# Patient Record
Sex: MALE | Race: WHITE | NOT HISPANIC OR LATINO | Employment: OTHER | URBAN - METROPOLITAN AREA
[De-identification: names, ages, dates, MRNs, and addresses within clinical notes are randomized per-mention and may not be internally consistent; named-entity substitution may affect disease eponyms.]

---

## 2017-02-13 ENCOUNTER — OFFICE VISIT (OUTPATIENT)
Dept: ORTHOPEDICS | Facility: CLINIC | Age: 82
End: 2017-02-13
Payer: COMMERCIAL

## 2017-02-13 VITALS — WEIGHT: 156 LBS | BODY MASS INDEX: 25.07 KG/M2 | HEIGHT: 66 IN | RESPIRATION RATE: 18 BRPM

## 2017-02-13 DIAGNOSIS — M17.0 PRIMARY OSTEOARTHRITIS OF BOTH KNEES: Primary | ICD-10-CM

## 2017-02-13 PROCEDURE — 20610 DRAIN/INJ JOINT/BURSA W/O US: CPT | Mod: 50 | Performed by: ORTHOPAEDIC SURGERY

## 2017-02-13 RX ORDER — METHYLPREDNISOLONE ACETATE 80 MG/ML
320 INJECTION, SUSPENSION INTRA-ARTICULAR; INTRALESIONAL; INTRAMUSCULAR; SOFT TISSUE ONCE
Qty: 4 ML | Refills: 0 | OUTPATIENT
Start: 2017-02-13 | End: 2017-02-13

## 2017-02-13 NOTE — PROGRESS NOTES
The patient's left and right knee was prepped with betadine solution after verification of allergies. Area approximately 10 cm x 10 cm prepped in a sterile fashion. After injection, betadine removed with soap and water and band-aids applied.    2ml depo-medrol with 1% lidocaine plain injected into each knee (for a total of 4 mL) by Dr. Sheng Montoya  LOT# [id number]  Exp. 06/2019

## 2017-02-13 NOTE — MR AVS SNAPSHOT
"              After Visit Summary   2/13/2017    Lang Christina    MRN: [id number]           Patient Information     Date Of Birth          6/3/1928        Visit Information        Provider Department      2/13/2017 1:15 PM Sheng Montoya MD [facility]        Today's Diagnoses     Primary osteoarthritis of both knees    -  1      Care Instructions    You have been given a \"cortisone\" injection today.  There are two medications in the injection you were given.      One of these medications is lidocaine, which is similar to the numbing agent you receive at the dentist.  You should start to feel the effect of this medication starting a few  minutes after you are injected and it will last for about two hours.  After the lidocaine wears off, the area will feel sore and a small percentage of people actually have a slight increase of pain for the first 24-48 hours after the lidocaine wears off.     The other medication is the \"cortisone\" type medication.  This medication takes effect slowly and should make you more comfortable day by day over the next two weeks.  After the first two weeks, the medication levels off and keeps working over about three months or longer.      You may have the area injected, in general, every three months.  This is the estimated amount of time that the body takes to metabolize the \"cortisone.\"  Getting injections too frequently may result in a softening of the cartilage and cause the joint to actually wear out more quickly.    The most important thing for you to note is how the area feels over the next two hours.  If the correct space was injected, the area should feel much more comfortable over the next two hours.  If the area never gets comfortable over this time period, it is possible that the medication was trapped in some surrounding soft tissue and is not free in the joint.  If this happens, the medication will not work long-term.  However, if this occurs we can " "attempt to re-inject the area sooner that the three months that is normally recommended.  If you report to us that the injection was NOT effective, we will ask you if it worked initially.  Please note how the area feels during the next two hours, as we may be inquiring about this.    To schedule another appointment, call [phone].          Follow-ups after your visit        Your next 10 appointments already scheduled     Mar 27, 2017  1:00 PM CDT   New Visit with Julieth Aguilar MD   [facility] ([address]  [place] [address] [phone]              Who to contact     If you have questions or need follow up information about today's clinic visit or your schedule please contact [facility] directly at [phone].  Normal or non-critical lab and imaging results will be communicated to you by MyChart, letter or phone within 4 business days after the clinic has received the results. If you do not hear from us within 7 days, please contact the clinic through MyChart or phone. If you have a critical or abnormal lab result, we will notify you by phone as soon as possible.  Submit refill requests through Spontacts or call your pharmacy and they will forward the refill request to us. Please allow 3 business days for your refill to be completed.          Additional Information About Your Visit        MyChart Information     Spontacts lets you send messages to your doctor, view your test results, renew your prescriptions, schedule appointments and more. To sign up, go to [email or web address] . Click on \"Log in\" on the left side of the screen, which will take you to the Welcome page. Then click on \"Sign up Now\" on the right side of the page.     You will be asked to enter the access code listed below, as well as some personal information. Please follow the directions to create your username and password.     Your access code is: " [email or web address]  Expires: 2017  1:19 PM     Your access code will  in 90 days. If you need help or a new code, please call your [place] clinic or [phone].        Care EveryWhere ID     This is your Care EveryWhere ID. This could be used by other organizations to access your [place] medical records  [id number]        Your Vitals Were     Respirations Height BMI (Body Mass Index)             18 1.664 m (5' 5.5\") 25.56 kg/m2          Blood Pressure from Last 3 Encounters:   16 126/76   12/30/15 142/75   11/03/15 133/79    Weight from Last 3 Encounters:   17 70.8 kg (156 lb)   16 70.3 kg (155 lb)   16 69.9 kg (154 lb)              Today, you had the following     No orders found for display       Primary Care Provider Office Phone # Fax #    Micheal Blake [phone]       [address] [address]        Thank you!     Thank you for choosing [facility]  for your care. Our goal is always to provide you with excellent care. Hearing back from our patients is one way we can continue to improve our services. Please take a few minutes to complete the written survey that you may receive in the mail after your visit with us. Thank you!             Your Updated Medication List - Protect others around you: Learn how to safely use, store and throw away your medicines at www.disposemymeds.org.          This list is accurate as of: 17  1:19 PM.  Always use your most recent med list.                   Brand Name Dispense Instructions for use    albuterol (2.5 MG/3ML) 0.083% neb solution     270 mL    INHALE CONTENTS OF ONE VIAL VIA NEBULIZER EVERY SIX HOURS AS NEEDED FOR SHORTNESS OF BREATH/DYSPNEA OR WHEEZING       cyanocobalamin 1000 MCG Tabs      Take  by mouth daily.       diclofenac 1 % Gel topical gel    VOLTAREN    1 Tube    Place 2 g onto the skin 4 times daily       order for DME     1 Device    Equipment being " ordered: nebulizer machine       simvastatin 80 MG tablet    ZOCOR    45 tablet    TAKE ONE-HALF TABLET BY MOUTH EVERY NIGHT AT BEDTIME       TYLENOL 325 MG tablet   Generic drug:  acetaminophen      Take 1-2 tablets by mouth at onset of headache.       vitamin D 1000 UNITS capsule      Take 1 capsule by mouth daily.

## 2017-02-13 NOTE — PATIENT INSTRUCTIONS
"You have been given a \"cortisone\" injection today.  There are two medications in the injection you were given.      One of these medications is lidocaine, which is similar to the numbing agent you receive at the dentist.  You should start to feel the effect of this medication starting a few  minutes after you are injected and it will last for about two hours.  After the lidocaine wears off, the area will feel sore and a small percentage of people actually have a slight increase of pain for the first 24-48 hours after the lidocaine wears off.     The other medication is the \"cortisone\" type medication.  This medication takes effect slowly and should make you more comfortable day by day over the next two weeks.  After the first two weeks, the medication levels off and keeps working over about three months or longer.      You may have the area injected, in general, every three months.  This is the estimated amount of time that the body takes to metabolize the \"cortisone.\"  Getting injections too frequently may result in a softening of the cartilage and cause the joint to actually wear out more quickly.    The most important thing for you to note is how the area feels over the next two hours.  If the correct space was injected, the area should feel much more comfortable over the next two hours.  If the area never gets comfortable over this time period, it is possible that the medication was trapped in some surrounding soft tissue and is not free in the joint.  If this happens, the medication will not work long-term.  However, if this occurs we can attempt to re-inject the area sooner that the three months that is normally recommended.  If you report to us that the injection was NOT effective, we will ask you if it worked initially.  Please note how the area feels during the next two hours, as we may be inquiring about this.    To schedule another appointment, call [phone].    "

## 2017-02-13 NOTE — PROGRESS NOTES
Follow up bilateral primary knee osteoarthritis.  Last injection 12/1/16 with a double steroid dose.  Range of motion 4-120 on left, 2-100 on right..    He  desires injection today of bilateral knee(s).  Risks, benefits, potential complications and alternatives were discussed.   With the patient's consent, sterile prep was performed of bilateral knee(s).  Each knee was injected with Depo Medrol 160 mg and lidocaine at anteromedial site.    Return to clinic as needed.

## 2017-02-13 NOTE — NURSING NOTE
"Chief Complaint   Patient presents with     RECHECK     Bilateral knee OA last injections on 12/1/16.       Initial Resp 18  Ht 1.664 m (5' 5.5\")  Wt 70.8 kg (156 lb)  BMI 25.56 kg/m2 Estimated body mass index is 25.56 kg/(m^2) as calculated from the following:    Height as of this encounter: 1.664 m (5' 5.5\").    Weight as of this encounter: 70.8 kg (156 lb).  Medication Reconciliation: complete   Yashira Hadley MA      "

## 2017-03-30 ENCOUNTER — OFFICE VISIT (OUTPATIENT)
Dept: OPHTHALMOLOGY | Facility: CLINIC | Age: 82
End: 2017-03-30
Payer: COMMERCIAL

## 2017-03-30 DIAGNOSIS — H52.4 PRESBYOPIA: ICD-10-CM

## 2017-03-30 DIAGNOSIS — H35.89 RPE MOTTLING OF MACULA: ICD-10-CM

## 2017-03-30 DIAGNOSIS — H43.813 PVD (POSTERIOR VITREOUS DETACHMENT), BILATERAL: ICD-10-CM

## 2017-03-30 DIAGNOSIS — H52.11 MYOPIA, RIGHT: ICD-10-CM

## 2017-03-30 DIAGNOSIS — H32 PRESUMED OCULAR HISTOPLASMOSIS SYNDROME OF BOTH EYES: ICD-10-CM

## 2017-03-30 DIAGNOSIS — Z96.1 PSEUDOPHAKIA: Primary | ICD-10-CM

## 2017-03-30 DIAGNOSIS — H52.203 ASTIGMATISM, BILATERAL: ICD-10-CM

## 2017-03-30 DIAGNOSIS — B39.9 PRESUMED OCULAR HISTOPLASMOSIS SYNDROME OF BOTH EYES: ICD-10-CM

## 2017-03-30 PROCEDURE — 92014 COMPRE OPH EXAM EST PT 1/>: CPT | Performed by: STUDENT IN AN ORGANIZED HEALTH CARE EDUCATION/TRAINING PROGRAM

## 2017-03-30 PROCEDURE — 92015 DETERMINE REFRACTIVE STATE: CPT | Performed by: STUDENT IN AN ORGANIZED HEALTH CARE EDUCATION/TRAINING PROGRAM

## 2017-03-30 ASSESSMENT — REFRACTION_WEARINGRX
OD_AXIS: 030
OS_SPHERE: -0.25
OD_ADD: +2.75
SPECS_TYPE: BIFOCAL
OD_SPHERE: -1.00
OS_ADD: +2.75
OS_AXIS: 173
OS_CYLINDER: +1.50
OD_CYLINDER: +1.50
OS_HPRISM: 3 BO

## 2017-03-30 ASSESSMENT — REFRACTION_MANIFEST
OD_AXIS: 023
OS_ADD: +3.00
OD_CYLINDER: +1.50
OD_SPHERE: -0.50
OS_CYLINDER: +1.50
OS_SPHERE: PLANO
OD_ADD: +3.00
OS_AXIS: 178

## 2017-03-30 ASSESSMENT — CONF VISUAL FIELD
OS_NORMAL: 1
OD_NORMAL: 1

## 2017-03-30 ASSESSMENT — CUP TO DISC RATIO
OD_RATIO: 0.5
OS_RATIO: 0.6

## 2017-03-30 ASSESSMENT — SLIT LAMP EXAM - LIDS
COMMENTS: NORMAL
COMMENTS: NORMAL

## 2017-03-30 ASSESSMENT — VISUAL ACUITY
OD_CC: 20/25
OS_CC: 1
OS_CC: 20/20
OD_CC+: +2
OD_CC: 1-
METHOD: SNELLEN - LINEAR
CORRECTION_TYPE: GLASSES

## 2017-03-30 ASSESSMENT — EXTERNAL EXAM - RIGHT EYE: OD_EXAM: NORMAL

## 2017-03-30 ASSESSMENT — TONOMETRY
OS_IOP_MMHG: 19
IOP_METHOD: APPLANATION
OD_IOP_MMHG: 15

## 2017-03-30 ASSESSMENT — EXTERNAL EXAM - LEFT EYE: OS_EXAM: NORMAL

## 2017-03-30 NOTE — PROGRESS NOTES
" Current Eye Medications:  Artificial tears both eyes as needed.       Subjective:  Comprehensive Eye Exam.   Vision is good with correction.  No vision changes or concerns.     Objective:  See Ophthalmology Exam.    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 Avoid \"get the red out\" drops).   Glasses prescription given - optional     Julieth Aguilar MD  [phone]        "

## 2017-03-30 NOTE — PATIENT INSTRUCTIONS
"Use artificial tears up to 4 times per day (Refresh Plus, Systane Balance, or generic artificial tears are ok. Avoid \"get the red out\" drops).   Glasses prescription given - optional     Julieth Aguilar MD  [phone]    "

## 2017-03-30 NOTE — MR AVS SNAPSHOT
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 Avoid \"get the red out\" drops).   Glasses prescription given - optional     Julieth CHAVEZ. Lauren LLANES  [phone]          Follow-ups after your visit        Follow-up notes from your care team     Return in about 1 year (around 3/30/2018) for Complete Exam.      Who to contact     If you have questions or need follow up information about today's clinic visit or your schedule please contact [facility] directly at [phone].  Normal or non-critical lab and imaging results will be communicated to you by CloudTalkhart, letter or phone within 4 business days after the clinic has received the results. If you do not hear from us within 7 days, please contact the clinic through Cearnat or phone. If you have a critical or abnormal lab result, we will notify you by phone as soon as possible.  Submit refill requests through myBestHelper or call your pharmacy and they will forward the refill request to us. Please allow 3 business days for your refill to be completed.          Additional Information About Your Visit        MyChart Information     myBestHelper lets you send messages to your doctor, view your test results, renew your prescriptions, schedule appointments and more. To sign up, go to [email or web address] . Click on \"Log in\" on the left side of " "the screen, which will take you to the Welcome page. Then click on \"Sign up Now\" on the right side of the page.     You will be asked to enter the access code listed below, as well as some personal information. Please follow the directions to create your username and password.     Your access code is: [email or web address]  Expires: 2017  2:19 PM     Your access code will  in 90 days. If you need help or a new code, please call your [place] clinic or [phone].        Care EveryWhere ID     This is your Care EveryWhere ID. This could be used by other organizations to access your [place] medical records  [id number]         Blood Pressure from Last 3 Encounters:   16 126/76   12/30/15 142/75   11/03/15 133/79    Weight from Last 3 Encounters:   17 70.8 kg (156 lb)   16 70.3 kg (155 lb)   16 69.9 kg (154 lb)              Today, you had the following     No orders found for display       Primary Care Provider Office Phone # Fax #    Micheal Blake [phone]       [address] [address]        Thank you!     Thank you for choosing [facility]  for your care. Our goal is always to provide you with excellent care. Hearing back from our patients is one way we can continue to improve our services. Please take a few minutes to complete the written survey that you may receive in the mail after your visit with us. Thank you!             Your Updated Medication List - Protect others around you: Learn how to safely use, store and throw away your medicines at www.disposemymeds.org.          This list is accurate as of: 3/30/17  2:15 PM.  Always use your most recent med list.                   Brand Name Dispense Instructions for use    albuterol (2.5 MG/3ML) 0.083% neb solution     270 mL    INHALE CONTENTS OF ONE VIAL VIA NEBULIZER EVERY SIX HOURS AS NEEDED FOR SHORTNESS OF BREATH/DYSPNEA OR WHEEZING       cyanocobalamin 1000 " MCG Tabs      Take  by mouth daily.       diclofenac 1 % Gel topical gel    VOLTAREN    1 Tube    Place 2 g onto the skin 4 times daily       order for DME     1 Device    Equipment being ordered: nebulizer machine       simvastatin 80 MG tablet    ZOCOR    45 tablet    TAKE ONE-HALF TABLET BY MOUTH EVERY NIGHT AT BEDTIME       TYLENOL 325 MG tablet   Generic drug:  acetaminophen      Take 1-2 tablets by mouth at onset of headache.       vitamin D 1000 UNITS capsule      Take 1 capsule by mouth daily.

## 2017-04-24 DIAGNOSIS — J43.2 CENTRILOBULAR EMPHYSEMA (H): ICD-10-CM

## 2017-04-24 RX ORDER — ALBUTEROL SULFATE 0.83 MG/ML
SOLUTION RESPIRATORY (INHALATION)
Qty: 270 ML | Refills: 1 | Status: SHIPPED | OUTPATIENT
Start: 2017-04-24 | End: 2017-06-19

## 2017-04-24 NOTE — TELEPHONE ENCOUNTER
Prescription approved per [facility].  Giuliano Perales RN

## 2017-04-24 NOTE — TELEPHONE ENCOUNTER
Albuterol       Last Written Prescription Date: 11/21/16  Last Fill Quantity: 270ml, # refills: 3    [facility] Office Visit with [facility] or [facility] prescribing provider:  11/4/16   Future Office Visit:       Date of Last Asthma Action Plan Letter:   There are no preventive care reminders to display for this patient.   Asthma Control Test: No flowsheet data found.    Date of Last Spirometry Test:   No results found for this or any previous visit.   SHANKAR/MA

## 2017-05-18 ENCOUNTER — OFFICE VISIT (OUTPATIENT)
Dept: ORTHOPEDICS | Facility: CLINIC | Age: 82
End: 2017-05-18
Payer: COMMERCIAL

## 2017-05-18 VITALS — HEIGHT: 66 IN | RESPIRATION RATE: 14 BRPM | BODY MASS INDEX: 25.55 KG/M2 | WEIGHT: 159 LBS

## 2017-05-18 DIAGNOSIS — M17.0 PRIMARY OSTEOARTHRITIS OF BOTH KNEES: Primary | ICD-10-CM

## 2017-05-18 PROCEDURE — 20610 DRAIN/INJ JOINT/BURSA W/O US: CPT | Mod: 50 | Performed by: ORTHOPAEDIC SURGERY

## 2017-05-18 RX ORDER — METHYLPREDNISOLONE ACETATE 80 MG/ML
320 INJECTION, SUSPENSION INTRA-ARTICULAR; INTRALESIONAL; INTRAMUSCULAR; SOFT TISSUE ONCE
Qty: 4 ML | Refills: 0 | OUTPATIENT
Start: 2017-05-18 | End: 2017-05-18

## 2017-05-18 NOTE — MR AVS SNAPSHOT
"              After Visit Summary   5/18/2017    Lang Christina    MRN: [id number]           Patient Information     Date Of Birth          6/3/1928        Visit Information        Provider Department      5/18/2017 2:15 PM Sheng Montoya MD [facility]        Today's Diagnoses     Primary osteoarthritis of both knees    -  1      Care Instructions    You have had a steroid injection today.  For the first 2 hours there will likely be some numbing in the joint from the lidocaine.  This is a good sign, indicating that the injection is in the right place.  In 2 hours the lidocaine will wear off, and the joint will hurt like you had a shot.  Each day the cortisone makes it feel better.  It reaches peak effect in 2 weeks.  We expect it to last for 3 months.  You may resume regular activity when you feel ready.  If you are diabetic, your glucoses will be quite high for several days.          Follow-ups after your visit        Who to contact     If you have questions or need follow up information about today's clinic visit or your schedule please contact [facility] directly at [phone].  Normal or non-critical lab and imaging results will be communicated to you by BuildForgehart, letter or phone within 4 business days after the clinic has received the results. If you do not hear from us within 7 days, please contact the clinic through Cloud 66t or phone. If you have a critical or abnormal lab result, we will notify you by phone as soon as possible.  Submit refill requests through [employer] or call your pharmacy and they will forward the refill request to us. Please allow 3 business days for your refill to be completed.          Additional Information About Your Visit        BuildForgehart Information     [employer] lets you send messages to your doctor, view your test results, renew your prescriptions, schedule appointments and more. To sign up, go to [email or web address] . Click on \"Log in\" on the " "left side of the screen, which will take you to the Welcome page. Then click on \"Sign up Now\" on the right side of the page.     You will be asked to enter the access code listed below, as well as some personal information. Please follow the directions to create your username and password.     Your access code is: [email or web address]  Expires: 2017  5:17 PM     Your access code will  in 90 days. If you need help or a new code, please call your [facility] or [phone].        Care EveryWhere ID     This is your Care EveryWhere ID. This could be used by other organizations to access your [place] medical records  [id number]        Your Vitals Were     Respirations Height BMI (Body Mass Index)             14 1.664 m (5' 5.5\") 26.06 kg/m2          Blood Pressure from Last 3 Encounters:   16 126/76   12/30/15 142/75   11/03/15 133/79    Weight from Last 3 Encounters:   17 72.1 kg (159 lb)   17 70.8 kg (156 lb)   16 70.3 kg (155 lb)              We Performed the Following     DRAIN/INJECT LARGE JOINT/BURSA     METHYLPREDNISOLONE 80 MG INJ          Today's Medication Changes          These changes are accurate as of: 17  5:17 PM.  If you have any questions, ask your nurse or doctor.               Start taking these medicines.        Dose/Directions    methylPREDNISolone acetate 80 MG/ML injection   Commonly known as:  DEPO-MEDROL   Used for:  Primary osteoarthritis of both knees   Started by:  Sheng Montoya MD        Dose:  320 mg   4 mLs (320 mg) by INTRA-ARTICULAR route once for 1 dose   Quantity:  4 mL   Refills:  0            Where to get your medicines      Some of these will need a paper prescription and others can be bought over the counter.  Ask your nurse if you have questions.     You don't need a prescription for these medications     methylPREDNISolone acetate 80 MG/ML injection                Primary Care Provider Office Phone # Fax #    Micheal Blkae MD " [phone]       [facility] [address]  [place] [address]        Thank you!     Thank you for choosing [facility]  for your care. Our goal is always to provide you with excellent care. Hearing back from our patients is one way we can continue to improve our services. Please take a few minutes to complete the written survey that you may receive in the mail after your visit with us. Thank you!             Your Updated Medication List - Protect others around you: Learn how to safely use, store and throw away your medicines at www.disposemymeds.org.          This list is accurate as of: 5/18/17  5:17 PM.  Always use your most recent med list.                   Brand Name Dispense Instructions for use    albuterol (2.5 MG/3ML) 0.083% neb solution     270 mL    INHALE CONTENTS OF ONE VIAL VIA NEBULIZER EVERY SIX HOURS AS NEEDED FOR SHORTNESS OF BREATH/DYSPNEA OR WHEEZING       cyanocobalamin 1000 MCG Tabs      Take  by mouth daily.       diclofenac 1 % Gel topical gel    VOLTAREN    1 Tube    Place 2 g onto the skin 4 times daily       methylPREDNISolone acetate 80 MG/ML injection    DEPO-MEDROL    4 mL    4 mLs (320 mg) by INTRA-ARTICULAR route once for 1 dose       order for DME     1 Device    Equipment being ordered: nebulizer machine       simvastatin 80 MG tablet    ZOCOR    45 tablet    TAKE ONE-HALF TABLET BY MOUTH EVERY NIGHT AT BEDTIME       TYLENOL 325 MG tablet   Generic drug:  acetaminophen      Take 1-2 tablets by mouth at onset of headache.       vitamin D 1000 UNITS capsule      Take 1 capsule by mouth daily.

## 2017-05-18 NOTE — PROGRESS NOTES
The patient's bilateral knee was prepped with betadine solution after verification of allergies. Area approximately 10 cm x 10 cm prepped in a sterile fashion. After injection, betadine removed with soap and water and band-aids applied.    4ml depo medrol with 1% lidocaine plain injected into patient's bilateral knee by Dr. Sheng Montoya  LOT# [id number]  Exp. 8/19

## 2017-05-18 NOTE — LETTER
5/18/2017       RE: Lang Christina  [address] [place] [address]           Dear Colleague,    Thank you for referring your patient, Lang Christina, to the [facility]. Please see a copy of my visit note below.    Follow up bilateral primary knee osteoarthritis.  Last injection 2/13/17 with a double steroid dose.  Range of motion 4-120 on left, 2-100 on right..    He  desires injection today of bilateral knee(s).  Risks, benefits, potential complications and alternatives were discussed.   With the patient's consent, sterile prep was performed of bilateral knee(s).  Each knee was injected with Depo Medrol 160 mg and lidocaine at anteromedial site.    Return to clinic as needed.       The patient's bilateral knee was prepped with betadine solution after verification of allergies. Area approximately 10 cm x 10 cm prepped in a sterile fashion. After injection, betadine removed with soap and water and band-aids applied.    4ml depo medrol with 1% lidocaine plain injected into patient's bilateral knee by Dr. Sheng Montoya  LOT# [id number]  Exp. 8/19    Again, thank you for allowing me to participate in the care of your patient.        Sincerely,              Sheng Montoya MD

## 2017-05-18 NOTE — NURSING NOTE
"Chief Complaint   Patient presents with     RECHECK     Follow-up for bilateral knee osteoarthritis, last injection 2/13/17.       Initial Resp 14  Ht 1.664 m (5' 5.5\")  Wt 72.1 kg (159 lb)  BMI 26.06 kg/m2 Estimated body mass index is 26.06 kg/(m^2) as calculated from the following:    Height as of this encounter: 1.664 m (5' 5.5\").    Weight as of this encounter: 72.1 kg (159 lb).  Medication Reconciliation: complete     BITA VizcarraC  Supervising physician: Sheng Montoya MD  Dept. of Orthopedics  [facility]          "

## 2017-05-18 NOTE — PROGRESS NOTES
Follow up bilateral primary knee osteoarthritis.  Last injection 2/13/17 with a double steroid dose.  Range of motion 4-120 on left, 2-100 on right..    He  desires injection today of bilateral knee(s).  Risks, benefits, potential complications and alternatives were discussed.   With the patient's consent, sterile prep was performed of bilateral knee(s).  Each knee was injected with Depo Medrol 160 mg and lidocaine at anteromedial site.    Return to clinic as needed.

## 2017-06-19 DIAGNOSIS — J43.2 CENTRILOBULAR EMPHYSEMA (H): ICD-10-CM

## 2017-06-20 RX ORDER — ALBUTEROL SULFATE 0.83 MG/ML
SOLUTION RESPIRATORY (INHALATION)
Qty: 270 ML | Refills: 1 | Status: SHIPPED | OUTPATIENT
Start: 2017-06-20 | End: 2017-08-23

## 2017-06-20 NOTE — TELEPHONE ENCOUNTER
Prescription approved per [facility] Refill Protocol.    Signed Prescriptions:                        Disp   Refills    albuterol (2.5 MG/3ML) 0.083% neb solution 270 mL 1        Sig: NEBULIZE CONTENTS OF ONE VIAL (3MLS) VIA NEBULIZATION           ROUTE EVERY 6 HOURS AS NEEDED FOR SHORTNESS OF           BREATH / DYSPNEA OR WHEEZING  Authorizing Provider: ADAM LOZANO  Ordering User: KEAGAN ODELL RN, BSN

## 2017-06-20 NOTE — TELEPHONE ENCOUNTER
albuterol (2.5 MG/3ML) 0.083% neb solution       Last Written Prescription Date: 4/24/2017  Last Fill Quantity: 270 mL, # refills:  1   Last Office Visit with G, P or [facility] prescribing provider:  11/4/2016   Future Office Visit:       Date of Last Asthma Action Plan Letter:   There are no preventive care reminders to display for this patient.   Asthma Control Test: No flowsheet data found.    Date of Last Spirometry Test:   No results found for this or any previous visit.

## 2017-06-26 DIAGNOSIS — J43.2 CENTRILOBULAR EMPHYSEMA (H): ICD-10-CM

## 2017-06-27 RX ORDER — ALBUTEROL SULFATE 0.83 MG/ML
SOLUTION RESPIRATORY (INHALATION)
Qty: 270 ML | Refills: 1
Start: 2017-06-27

## 2017-06-27 NOTE — TELEPHONE ENCOUNTER
Prescription(s) that was shannan'd up has now been removed from encounter.    Closing encounter.    Giuliano Perales RN

## 2017-06-27 NOTE — TELEPHONE ENCOUNTER
Duplicate order confirmed by pharmacy.  Sharon Brumfield CMA ([facility])

## 2017-07-12 NOTE — PROGRESS NOTES
SUBJECTIVE:                                                    Lang Christina is a 89 year old male who presents to clinic today for the following health issues:    Concern - Breathing problem/Emphysema  Onset: x2 years     Description:   Cough in mornings, happens all the time    Intensity: moderate    Progression of Symptoms:  worsening    Accompanying Signs & Symptoms:  Coughing, clearing throat, hard time breathing oxygen low     Previous history of similar problem:   none    Precipitating factors:   Worsened by: exertion    Alleviating factors:  Improved by: nebulizer, resting    Therapies Tried and outcome: nebulizer, resting  -- Patient states he has been having significant productive coughing when he wakes up. After he coughs and blows his nose it gets better. Also he clears his throat frequently. He reports his knees are so bad that he cannot exert himself too much. He is curious if he should have a portable nebulizer. He uses a nebulizer 3 times a day, but is not sure it is helping very much. He notes he smoked for about 45 pack years.      Problem list and histories reviewed & adjusted, as indicated.  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       Social History   Substance Use Topics     Smoking status: Former Smoker     Packs/day:  1.00     Years: 47.00     Types: Cigarettes, Pipe     Quit date: 1/1/1994     Smokeless tobacco: Never Used     Alcohol use No     Family History   Problem Relation Age of Onset     CEREBROVASCULAR DISEASE Brother          Current Outpatient Prescriptions   Medication Sig Dispense Refill     albuterol (2.5 MG/3ML) 0.083% neb solution NEBULIZE CONTENTS OF ONE VIAL (3MLS) VIA NEBULIZATION ROUTE EVERY 6 HOURS AS NEEDED FOR SHORTNESS OF BREATH / DYSPNEA OR WHEEZING 270 mL 1     simvastatin (ZOCOR) 80 MG tablet TAKE ONE-HALF TABLET BY MOUTH EVERY NIGHT AT BEDTIME 45 tablet 3     diclofenac (VOLTAREN) 1 % GEL Place 2 g onto the skin 4 times daily 1 Tube 1     ORDER FOR DME, SET TO LOCAL PRINT, Equipment being ordered: nebulizer machine 1 Device 0     Cyanocobalamin 1000 MCG TABS Take  by mouth daily.       acetaminophen (TYLENOL) 325 MG tablet Take 1-2 tablets by mouth at onset of headache.       Cholecalciferol (VITAMIN D) 1000 UNIT capsule Take 1 capsule by mouth daily.       Labs reviewed in EPIC    Reviewed and updated as needed this visit by clinical staff  Tobacco  Allergies  Meds  Med Hx  Surg Hx  Fam Hx  Soc Hx      Reviewed and updated as needed this visit by Provider         ROS:  Constitutional, HEENT, cardiovascular, pulmonary, GI, , musculoskeletal, neuro, skin, endocrine and psych systems are negative, except as otherwise noted.    This document serves as a record of the services and decisions personally performed and made by Micheal Blake MD. It was created on their behalf by Lenny Julien, a trained medical scribe. The creation of this document is based the provider's statements to the medical scribe.  Lenny Julien July 17, 2017 2:32 PM    OBJECTIVE:     /84 (BP Location: Left arm, Patient Position: Chair, Cuff Size: Adult Regular)  Pulse 91  Temp 98  F (36.7  C) (Oral)  Wt 73 kg (161 lb)  SpO2 93%  BMI 26.38 kg/m2  Body mass index is 26.38 kg/(m^2).  GENERAL: healthy, alert and no  distress, answers all questions appropriately, appropriate grooming and affect, appears stated age   EYES: Eyes grossly normal to inspection, PERRL and conjunctivae and sclerae normal  RESP: diminished breath sounds at bases, slight expiratory wheeze in right posterior lung  CV: regular rate and rhythm, normal S1 S2, no S3 or S4, no murmur, click or rub, no peripheral edema and peripheral pulses strong  SKIN: no suspicious lesions or rashes to visible skin  NEURO: mentation intact and speech normal  PSYCH: mentation appears normal, affect normal/bright    Diagnostic Test Results:  Results for orders placed or performed in visit on 11/04/16   Cholesterol   Result Value Ref Range    Cholesterol 147 <200 mg/dL   HDL cholesterol   Result Value Ref Range    HDL Cholesterol 64 >39 mg/dL   Basic metabolic panel   Result Value Ref Range    Sodium 139 133 - 144 mmol/L    Potassium 4.7 3.4 - 5.3 mmol/L    Chloride 103 94 - 109 mmol/L    Carbon Dioxide 30 20 - 32 mmol/L    Anion Gap 6 3 - 14 mmol/L    Glucose 108 (H) 70 - 99 mg/dL    Urea Nitrogen 13 7 - 30 mg/dL    Creatinine 0.90 0.66 - 1.25 mg/dL    GFR Estimate 80 >60 mL/min/1.7m2    GFR Estimate If Black >90   GFR Calc   >60 mL/min/1.7m2    Calcium 9.6 8.5 - 10.1 mg/dL   Hemoglobin   Result Value Ref Range    Hemoglobin 13.8 13.3 - 17.7 g/dL   Vitamin B12   Result Value Ref Range    Vitamin B12 602 193 - 986 pg/mL     ASSESSMENT/PLAN:     (J43.2) Centrilobular emphysema (H)  (primary encounter diagnosis)  Comment: he has a well established history of mild to moderate chronic obstructive pulmonary disease. No clear cut evidence of change, we agree to recheck spirometry and a chest xray   Plan: XR Chest 2 Views, Spirometry, Breathing         Capacity: Normal Order, Clinic Performed        Further follow up depending on the test results and depending on how things go     (E78.5) Hyperlipidemia LDL goal <130  Comment: he can wait on laboratory studies until we  see him for complete physical exam in December   Plan: issues reviewed      (E53.8) Vitamin B12 deficiency  Comment: as above   Plan: as above     Patient Instructions   - Follow up with me around December.    - I will follow up with you about imaging and spirometry results.     The information in this document, created by the medical scribe for me, accurately reflects the services I personally performed and the decisions made by me. I have reviewed and approved this document for accuracy.   MD Micheal Travis MD  [facility]

## 2017-07-17 ENCOUNTER — OFFICE VISIT (OUTPATIENT)
Dept: FAMILY MEDICINE | Facility: CLINIC | Age: 82
End: 2017-07-17
Payer: COMMERCIAL

## 2017-07-17 ENCOUNTER — RADIANT APPOINTMENT (OUTPATIENT)
Dept: GENERAL RADIOLOGY | Facility: CLINIC | Age: 82
End: 2017-07-17
Attending: INTERNAL MEDICINE
Payer: COMMERCIAL

## 2017-07-17 VITALS
HEART RATE: 91 BPM | BODY MASS INDEX: 26.38 KG/M2 | WEIGHT: 161 LBS | OXYGEN SATURATION: 93 % | DIASTOLIC BLOOD PRESSURE: 84 MMHG | SYSTOLIC BLOOD PRESSURE: 122 MMHG | TEMPERATURE: 98 F

## 2017-07-17 DIAGNOSIS — E78.5 HYPERLIPIDEMIA LDL GOAL <130: ICD-10-CM

## 2017-07-17 DIAGNOSIS — E53.8 VITAMIN B12 DEFICIENCY: ICD-10-CM

## 2017-07-17 DIAGNOSIS — J43.2 CENTRILOBULAR EMPHYSEMA (H): ICD-10-CM

## 2017-07-17 DIAGNOSIS — J43.2 CENTRILOBULAR EMPHYSEMA (H): Primary | ICD-10-CM

## 2017-07-17 LAB
FEF 25/75: NORMAL
FEV-1: NORMAL
FEV1/FVC: NORMAL
FVC: NORMAL

## 2017-07-17 PROCEDURE — 94010 BREATHING CAPACITY TEST: CPT | Performed by: INTERNAL MEDICINE

## 2017-07-17 PROCEDURE — 99214 OFFICE O/P EST MOD 30 MIN: CPT | Mod: 25 | Performed by: INTERNAL MEDICINE

## 2017-07-17 PROCEDURE — 71020 XR CHEST 2 VW: CPT

## 2017-07-17 NOTE — NURSING NOTE
"Chief Complaint   Patient presents with     Breathing Problem       Initial /84 (BP Location: Left arm, Patient Position: Chair, Cuff Size: Adult Regular)  Pulse 91  Temp 98  F (36.7  C) (Oral)  Wt 161 lb (73 kg)  SpO2 93%  BMI 26.38 kg/m2 Estimated body mass index is 26.38 kg/(m^2) as calculated from the following:    Height as of 5/18/17: 5' 5.5\" (1.664 m).    Weight as of this encounter: 161 lb (73 kg).  Medication Reconciliation: complete   Malia CARLOS CMA (AAMA)      "

## 2017-07-17 NOTE — PATIENT INSTRUCTIONS
- Follow up with me around December.    - I will follow up with you about imaging and spirometry results.       [facility]    If you have any questions regarding to your visit please contact your care team:     Team Pink:   Clinic Hours Telephone Number   Internal Medicine:  Dr. Giovanna Hudson NP       7am-7pm  Monday - Thursday   7am-5pm  Fridays  [phone]  (Appointment scheduling available 24/7)    Questions about your visit?  Team Line  [phone]   Urgent Care - [place] - 11am-9pm Monday-Friday Saturday-Sunday- 9am-5pm   [place] - 5pm-9pm Monday-Friday Saturday-Sunday- 9am-5pm  [phone] - [place]   [phone] - [place]       What options do I have for visits at the clinic other than the traditional office visit?  To expand how we care for you, many of our providers are utilizing electronic visits (e-visits) and telephone visits, when medically appropriate, for interactions with their patients rather than a visit in the clinic.   We also offer nurse visits for many medical concerns. Just like any other service, we will bill your insurance company for this type of visit based on time spent on the phone with your provider. Not all insurance companies cover these visits. Please check with your medical insurance if this type of visit is covered. You will be responsible for any charges that are not paid by your insurance.      E-visits via [employer]:  generally incur a $35.00 fee.  Telephone visits:  Time spent on the phone: *charged based on time that is spent on the phone in increments of 10 minutes. Estimated cost:   5-10 mins $30.00   11-20 mins. $59.00   21-30 mins. $85.00   Use [employer] (secure email communication and access to your chart) to send your primary care provider a message or make an appointment. Ask someone on your Team how to sign up for [employer].    For a Price Quote for your services, please call our  [employer] [place] Line at [phone].    As always, Thank you for trusting us with your health care needs!    Discharged by Malia CARLOS CMA ([facility])

## 2017-07-17 NOTE — MR AVS SNAPSHOT
After Visit Summary   7/17/2017    Lang Christina    MRN: [id number]           Patient Information     Date Of Birth          6/3/1928        Visit Information        Provider Department      7/17/2017 2:10 PM Micheal Blake MD [facility]        Today's Diagnoses     Centrilobular emphysema (H)    -  1    Hyperlipidemia LDL goal <130        Vitamin B12 deficiency          Care Instructions    - Follow up with me around December.    - I will follow up with you about imaging and spirometry results.       [facility]    If you have any questions regarding to your visit please contact your care team:     Team Pink:   Clinic Hours Telephone Number   Internal Medicine:  Dr. Giovanna Hudson, NP       7am-7pm  Monday - Thursday   7am-5pm  Fridays  [phone]  (Appointment scheduling available 24/7)    Questions about your visit?  Team Line  [phone]   Urgent Care - [place] - 11am-9pm Monday-Friday Saturday-Sunday- 9am-5pm   [place] - 5pm-9pm Monday-Friday Saturday-Sunday- 9am-5pm  [phone] - [place]   [phone] - [place]       What options do I have for visits at the clinic other than the traditional office visit?  To expand how we care for you, many of our providers are utilizing electronic visits (e-visits) and telephone visits, when medically appropriate, for interactions with their patients rather than a visit in the clinic.   We also offer nurse visits for many medical concerns. Just like any other service, we will bill your insurance company for this type of visit based on time spent on the phone with your provider. Not all insurance companies cover these visits. Please check with your medical insurance if this type of visit is covered. You will be responsible for any charges that are not paid by your insurance.      E-visits via [employer]:  generally incur a $35.00 fee.  Telephone  "visits:  Time spent on the phone: *charged based on time that is spent on the phone in increments of 10 minutes. Estimated cost:   5-10 mins $30.00   11-20 mins. $59.00   21-30 mins. $85.00   Use [employer] (secure email communication and access to your chart) to send your primary care provider a message or make an appointment. Ask someone on your Team how to sign up for [employer].    For a Price Quote for your services, please call our [employer] Line at [phone].    As always, Thank you for trusting us with your health care needs!    Discharged by Malia CARLOS CMA ([facility])            Follow-ups after your visit        Future tests that were ordered for you today     Open Future Orders        Priority Expected Expires Ordered    XR Chest 2 Views Routine 7/17/2017 7/17/2018 7/17/2017            Who to contact     If you have questions or need follow up information about today's clinic visit or your schedule please contact [facility] directly at [phone].  Normal or non-critical lab and imaging results will be communicated to you by MyChart, letter or phone within 4 business days after the clinic has received the results. If you do not hear from us within 7 days, please contact the clinic through [employer] or phone. If you have a critical or abnormal lab result, we will notify you by phone as soon as possible.  Submit refill requests through "[employer]" or call your pharmacy and they will forward the refill request to us. Please allow 3 business days for your refill to be completed.          Additional Information About Your Visit        Planet [employer]art Information     "[employer]" lets you send messages to your doctor, view your test results, renew your prescriptions, schedule appointments and more. To sign up, go to [email or web address]" . Click on \"Log in\" on the left side of the screen, which will take you to the Welcome page. Then click on \"Sign up Now\" on the right side of the page.     You will be asked to " enter the access code listed below, as well as some personal information. Please follow the directions to create your username and password.     Your access code is: [email or web address]  Expires: 2017  5:17 PM     Your access code will  in 90 days. If you need help or a new code, please call your [place] clinic or [phone].        Care EveryWhere ID     This is your Care EveryWhere ID. This could be used by other organizations to access your [place] medical records  [id number]        Your Vitals Were     Pulse Temperature Pulse Oximetry BMI (Body Mass Index)          91 98  F (36.7  C) (Oral) 93% 26.38 kg/m2         Blood Pressure from Last 3 Encounters:   17 122/84   16 126/76   12/30/15 142/75    Weight from Last 3 Encounters:   17 161 lb (73 kg)   17 159 lb (72.1 kg)   17 156 lb (70.8 kg)              We Performed the Following     Spirometry, Breathing Capacity: Normal Order, Clinic Performed        Primary Care Provider Office Phone # Fax #    Micheal Blake [phone]       [place] [id number] [facility] [address]        Equal Access to Services     DAWIT ROBLES AH: Hadii aad ku hadasho Soomaali, waaxda luqadaha, qaybta kaalmada adeegyada, waxay idiin hayadamn jacqueline benjamin . So [facility] [phone].    ATENCIÓN: Si habla español, tiene a parra disposición servicios gratuitos de asistencia lingüística. ame al [phone].    We comply with applicable federal civil rights laws and [place] laws. We do not discriminate on the basis of race, color, national origin, age, disability sex, sexual orientation or gender identity.            Thank you!     Thank you for choosing [facility]  for your care. Our goal is always to provide you with excellent care. Hearing back from our patients is one way we can continue to improve our services. Please take a few minutes to complete the written survey that you may receive in the  mail after your visit with us. Thank you!             Your Updated Medication List - Protect others around you: Learn how to safely use, store and throw away your medicines at www.disposemymeds.org.          This list is accurate as of: 7/17/17  2:39 PM.  Always use your most recent med list.                   Brand Name Dispense Instructions for use Diagnosis    albuterol (2.5 MG/3ML) 0.083% neb solution     270 mL    NEBULIZE CONTENTS OF ONE VIAL (3MLS) VIA NEBULIZATION ROUTE EVERY 6 HOURS AS NEEDED FOR SHORTNESS OF BREATH / DYSPNEA OR WHEEZING    Centrilobular emphysema (H)       cyanocobalamin 1000 MCG Tabs      Take  by mouth daily.        diclofenac 1 % Gel topical gel    VOLTAREN    1 Tube    Place 2 g onto the skin 4 times daily    Stiffness of neck       order for DME     1 Device    Equipment being ordered: nebulizer machine    COPD (chronic obstructive pulmonary disease) (H)       simvastatin 80 MG tablet    ZOCOR    45 tablet    TAKE ONE-HALF TABLET BY MOUTH EVERY NIGHT AT BEDTIME    Hyperlipidemia LDL goal <130       TYLENOL 325 MG tablet   Generic drug:  acetaminophen      Take 1-2 tablets by mouth at onset of headache.        vitamin D 1000 UNITS capsule      Take 1 capsule by mouth daily.

## 2017-07-17 NOTE — LETTER
[facility]  [address] [place] [address]    July 18, 2017    Lang [address]Cristina  [address]  [place] [address]          Dear Karime Croft is a copy of your results. No real change from prior spirometry ! Please let me know if you have any questions for me about this spirometry test.     Results for orders placed or performed in visit on 07/17/17   Spirometry, Breathing Capacity: Normal Order, Clinic Performed   Result Value Ref Range    FEV-1      FVC      FEV1/FVC      FEF 25/75         Sincerely,        Micheal Blake MD/rk

## 2017-07-31 ENCOUNTER — OFFICE VISIT (OUTPATIENT)
Dept: ORTHOPEDICS | Facility: CLINIC | Age: 82
End: 2017-07-31
Payer: COMMERCIAL

## 2017-07-31 VITALS — WEIGHT: 160 LBS | BODY MASS INDEX: 25.71 KG/M2 | TEMPERATURE: 98.6 F | RESPIRATION RATE: 18 BRPM | HEIGHT: 66 IN

## 2017-07-31 DIAGNOSIS — M17.0 PRIMARY OSTEOARTHRITIS OF BOTH KNEES: Primary | ICD-10-CM

## 2017-07-31 PROCEDURE — 20610 DRAIN/INJ JOINT/BURSA W/O US: CPT | Mod: 50 | Performed by: ORTHOPAEDIC SURGERY

## 2017-07-31 RX ORDER — METHYLPREDNISOLONE ACETATE 80 MG/ML
320 INJECTION, SUSPENSION INTRA-ARTICULAR; INTRALESIONAL; INTRAMUSCULAR; SOFT TISSUE ONCE
Qty: 4 ML | Refills: 0 | OUTPATIENT
Start: 2017-07-31 | End: 2017-07-31

## 2017-07-31 NOTE — PROGRESS NOTES
Follow up bilateral primary knee osteoarthritis.  Last injection 5/18/17 with a double steroid dose.  Range of motion 4-120 on left, 2-100 on right..    He  desires injection today of bilateral knee(s).  Risks, benefits, potential complications and alternatives were discussed.   With the patient's consent, sterile prep was performed of bilateral knee(s).  Each knee was injected with Depo Medrol 160 mg and lidocaine at anteromedial site.    Return to clinic as needed.

## 2017-07-31 NOTE — NURSING NOTE
"Chief Complaint   Patient presents with     RECHECK     Bilateral knee OA last injection 5/18/17.       Initial Temp 98.6  F (37  C)  Resp 18  Ht 1.664 m (5' 5.5\")  Wt 72.6 kg (160 lb)  BMI 26.22 kg/m2 Estimated body mass index is 26.22 kg/(m^2) as calculated from the following:    Height as of this encounter: 1.664 m (5' 5.5\").    Weight as of this encounter: 72.6 kg (160 lb).  Medication Reconciliation: complete   Yashira Hadley MA      "

## 2017-07-31 NOTE — LETTER
7/31/2017         RE: Lang Christina  [address] [place] [address]        Dear Colleague,    Thank you for referring your patient, Lang Christina, to the [facility]. Please see a copy of my visit note below.    The patient's left and right knees were prepped with betadine solution after verification of allergies. Area approximately 10 cm x 10 cm prepped in a sterile fashion. After injection, betadine removed with soap and water and band-aids applied.    2ml depo-medrol with 1% lidocaine plain injected into each knee (for a total of 4 mL depo-medrol) by Dr. Sheng Montoya  LOT# [id number]  Exp. 11/2019    Damien Cervantes PA-C  Supervising physician: Sheng Montoya MD  Dept. of Orthopedics  [facility]          Follow up bilateral primary knee osteoarthritis.  Last injection 5/18/17 with a double steroid dose.  Range of motion 4-120 on left, 2-100 on right..    He  desires injection today of bilateral knee(s).  Risks, benefits, potential complications and alternatives were discussed.   With the patient's consent, sterile prep was performed of bilateral knee(s).  Each knee was injected with Depo Medrol 160 mg and lidocaine at anteromedial site.    Return to clinic as needed.         Again, thank you for allowing me to participate in the care of your patient.        Sincerely,        Sheng Montoya MD

## 2017-07-31 NOTE — PROGRESS NOTES
The patient's left and right knees were prepped with betadine solution after verification of allergies. Area approximately 10 cm x 10 cm prepped in a sterile fashion. After injection, betadine removed with soap and water and band-aids applied.    2ml depo-medrol with 1% lidocaine plain injected into each knee (for a total of 4 mL depo-medrol) by Dr. Sheng Montoya  LOT# [id number]  Exp. 11/2019    Damien Cervantes PA-C  Supervising physician: Sheng Montoya MD  Dept. of Orthopedics  [facility]

## 2017-07-31 NOTE — MR AVS SNAPSHOT
"              After Visit Summary   7/31/2017    Lang Christina    MRN: [id number]           Patient Information     Date Of Birth          6/3/1928        Visit Information        Provider Department      7/31/2017 2:00 PM Sheng Montoya MD [facility]        Today's Diagnoses     Primary osteoarthritis of both knees    -  1      Care Instructions    You have had a steroid injection today.  For the first 2 hours there will likely be some numbing in the joint from the lidocaine.  This is a good sign, indicating that the injection is in the right place.  In 2 hours the lidocaine will wear off, and the joint will hurt like you had a shot.  Each day the cortisone makes it feel better.  It reaches peak effect in 2 weeks.  We expect it to last for 3 months.  You may resume regular activity when you feel ready.  If you are diabetic, your glucoses will be quite high for several days.            Follow-ups after your visit        Who to contact     If you have questions or need follow up information about today's clinic visit or your schedule please contact [facility] directly at [phone].  Normal or non-critical lab and imaging results will be communicated to you by Teravachart, letter or phone within 4 business days after the clinic has received the results. If you do not hear from us within 7 days, please contact the clinic through simpleFLOORSt or phone. If you have a critical or abnormal lab result, we will notify you by phone as soon as possible.  Submit refill requests through AMR[employer] or call your pharmacy and they will forward the refill request to us. Please allow 3 business days for your refill to be completed.          Additional Information About Your Visit        Teravachart Information     [employer] lets you send messages to your doctor, view your test results, renew your prescriptions, schedule appointments and more. To sign up, go to [email or web address] . Click on \"Log in\" on the " "left side of the screen, which will take you to the Welcome page. Then click on \"Sign up Now\" on the right side of the page.     You will be asked to enter the access code listed below, as well as some personal information. Please follow the directions to create your username and password.     Your access code is: [email or web address]  Expires: 2017  5:17 PM     Your access code will  in 90 days. If you need help or a new code, please call your [facility] or [phone].        Care EveryWhere ID     This is your Care EveryWhere ID. This could be used by other organizations to access your [place] medical records  [id number]        Your Vitals Were     Temperature Respirations Height BMI (Body Mass Index)          98.6  F (37  C) 18 1.664 m (5' 5.5\") 26.22 kg/m2         Blood Pressure from Last 3 Encounters:   17 122/84   16 126/76   12/30/15 142/75    Weight from Last 3 Encounters:   17 72.6 kg (160 lb)   17 73 kg (161 lb)   17 72.1 kg (159 lb)              We Performed the Following     DRAIN/INJECT LARGE JOINT/BURSA     METHYLPREDNISOLONE 80 MG INJ          Today's Medication Changes          These changes are accurate as of: 17  3:22 PM.  If you have any questions, ask your nurse or doctor.               Start taking these medicines.        Dose/Directions    methylPREDNISolone acetate 80 MG/ML injection   Commonly known as:  DEPO-MEDROL   Used for:  Primary osteoarthritis of both knees   Started by:  Sheng Montoya MD        Dose:  320 mg   4 mLs (320 mg) by INTRA-ARTICULAR route once for 1 dose   Quantity:  4 mL   Refills:  0            Where to get your medicines      Some of these will need a paper prescription and others can be bought over the counter.  Ask your nurse if you have questions.     You don't need a prescription for these medications     methylPREDNISolone acetate 80 MG/ML injection                Primary Care Provider Office Phone # Fax #    " Micheal Blake -[phone]       [address] [address]        Equal Access to Services     DAWIT ROBLES : Hadbuzz mahendra mckeon nildao Soxochilt, waaxda luqadaha, qaybta kaalmada jay, kaylene huber laHemalathajennifer kapadia. So [facility] [phone].    ATENCIÓN: Si habla español, tiene a parra disposición servicios gratuitos de asistencia lingüística. Llame al [phone].    We comply with applicable federal civil rights laws and [place] laws. We do not discriminate on the basis of race, color, national origin, age, disability sex, sexual orientation or gender identity.            Thank you!     Thank you for choosing [facility]  for your care. Our goal is always to provide you with excellent care. Hearing back from our patients is one way we can continue to improve our services. Please take a few minutes to complete the written survey that you may receive in the mail after your visit with us. Thank you!             Your Updated Medication List - Protect others around you: Learn how to safely use, store and throw away your medicines at www.disposemymeds.org.          This list is accurate as of: 7/31/17  3:22 PM.  Always use your most recent med list.                   Brand Name Dispense Instructions for use Diagnosis    albuterol (2.5 MG/3ML) 0.083% neb solution     270 mL    NEBULIZE CONTENTS OF ONE VIAL (3MLS) VIA NEBULIZATION ROUTE EVERY 6 HOURS AS NEEDED FOR SHORTNESS OF BREATH / DYSPNEA OR WHEEZING    Centrilobular emphysema (H)       cyanocobalamin 1000 MCG Tabs      Take  by mouth daily.        diclofenac 1 % Gel topical gel    VOLTAREN    1 Tube    Place 2 g onto the skin 4 times daily    Stiffness of neck       methylPREDNISolone acetate 80 MG/ML injection    DEPO-MEDROL    4 mL    4 mLs (320 mg) by INTRA-ARTICULAR route once for 1 dose    Primary osteoarthritis of both knees       order for DME     1 Device    Equipment being ordered:  nebulizer machine    COPD (chronic obstructive pulmonary disease) (H)       simvastatin 80 MG tablet    ZOCOR    45 tablet    TAKE ONE-HALF TABLET BY MOUTH EVERY NIGHT AT BEDTIME    Hyperlipidemia LDL goal <130       TYLENOL 325 MG tablet   Generic drug:  acetaminophen      Take 1-2 tablets by mouth at onset of headache.        vitamin D 1000 UNITS capsule      Take 1 capsule by mouth daily.

## 2017-08-23 DIAGNOSIS — J43.2 CENTRILOBULAR EMPHYSEMA (H): ICD-10-CM

## 2017-08-24 RX ORDER — ALBUTEROL SULFATE 0.83 MG/ML
SOLUTION RESPIRATORY (INHALATION)
Qty: 270 ML | Refills: 1 | Status: SHIPPED | OUTPATIENT
Start: 2017-08-24 | End: 2017-10-24

## 2017-08-24 NOTE — TELEPHONE ENCOUNTER
Albuterol Sulfate 2.5mg/3ml neb       Last Written Prescription Date: 6/20/17  Last Fill Quantity: 270 ml , # refills: 1    Last Office Visit with G, P or [facility] prescribing provider:  11/4/16   Future Office Visit:   None  SHANKAR/MA      Date of Last Asthma Action Plan Letter:   There are no preventive care reminders to display for this patient.   Asthma Control Test: No flowsheet data found.    Date of Last Spirometry Test:   No results found for this or any previous visit.

## 2017-08-24 NOTE — TELEPHONE ENCOUNTER
Prescription approved per [facility] Refill Protocol.  Yanelis Carranza, RN - BC

## 2017-09-25 ENCOUNTER — OFFICE VISIT (OUTPATIENT)
Dept: ORTHOPEDICS | Facility: CLINIC | Age: 82
End: 2017-09-25
Payer: COMMERCIAL

## 2017-09-25 VITALS — TEMPERATURE: 98.9 F | BODY MASS INDEX: 25.71 KG/M2 | HEIGHT: 66 IN | RESPIRATION RATE: 18 BRPM | WEIGHT: 160 LBS

## 2017-09-25 DIAGNOSIS — M17.0 PRIMARY OSTEOARTHRITIS OF BOTH KNEES: Primary | ICD-10-CM

## 2017-09-25 PROCEDURE — 20610 DRAIN/INJ JOINT/BURSA W/O US: CPT | Mod: 50 | Performed by: ORTHOPAEDIC SURGERY

## 2017-09-25 RX ORDER — METHYLPREDNISOLONE ACETATE 80 MG/ML
320 INJECTION, SUSPENSION INTRA-ARTICULAR; INTRALESIONAL; INTRAMUSCULAR; SOFT TISSUE ONCE
Qty: 4 ML | Refills: 0 | OUTPATIENT
Start: 2017-09-25 | End: 2017-09-25

## 2017-09-25 NOTE — LETTER
9/25/2017         RE: Lang Christina  [address] [place] [address]        Dear Colleague,    Thank you for referring your patient, Lang Christina, to the [facility]. Please see a copy of my visit note below.    Follow up bilateral primary knee osteoarthritis.  Last injection 7/31/17 with a double steroid dose.  Range of motion 4-120 on left, 2-120 on right..    He  desires injection today of bilateral knee(s).  Risks, benefits, potential complications and alternatives were discussed.   With the patient's consent, sterile prep was performed of bilateral knee(s).  Each knee was injected with Depo Medrol 160 mg and lidocaine at anteromedial site.    Return to clinic as needed.       The patient's bilateral knee was prepped with betadine solution after verification of allergies. Area approximately 10 cm x 10 cm prepped in a sterile fashion. After injection, betadine removed with soap and water and band-aids applied.    4ml depo medrol with 1% lidocaine plain injected into patient's bilateral knee by Dr. Sheng Montoya  LOT# [id number]  Exp. 12/19    Again, thank you for allowing me to participate in the care of your patient.        Sincerely,        Sheng Montoya MD

## 2017-09-25 NOTE — PROGRESS NOTES
Follow up bilateral primary knee osteoarthritis.  Last injection 7/31/17 with a double steroid dose.  Range of motion 4-120 on left, 2-120 on right..    He  desires injection today of bilateral knee(s).  Risks, benefits, potential complications and alternatives were discussed.   With the patient's consent, sterile prep was performed of bilateral knee(s).  Each knee was injected with Depo Medrol 160 mg and lidocaine at anteromedial site.    Return to clinic as needed.

## 2017-09-25 NOTE — MR AVS SNAPSHOT
After Visit Summary   9/25/2017    Lang Christina    MRN: [id number]           Patient Information     Date Of Birth          6/3/1928        Visit Information        Provider Department      9/25/2017 1:00 PM Sheng Montoya MD [facility]        Today's Diagnoses     Primary osteoarthritis of both knees    -  1      Care Instructions    You have had a steroid injection today.  For the first 2 hours there will likely be some numbing in the joint from the lidocaine.  This is a good sign, indicating that the injection is in the right place.  In 2 hours the lidocaine will wear off, and the joint will hurt like you had a shot.  Each day the cortisone makes it feel better.  It reaches peak effect in 2 weeks.  We expect it to last for 3 months.  You may resume regular activity when you feel ready.  If you are diabetic, your glucoses will be quite high for several days.            Follow-ups after your visit        Your next 10 appointments already scheduled     Sep 25, 2017  1:00 PM CDT   [facility]   [facility] ([address] [phone]              Who to contact     If you have questions or need follow up information about today's clinic visit or your schedule please contact [facility] directly at [phone].  Normal or non-critical lab and imaging results will be communicated to you by MyChart, letter or phone within 4 business days after the clinic has received the results. If you do not hear from us within 7 days, please contact the clinic through MyChart or phone. If you have a critical or abnormal lab result, we will notify you by phone as soon as possible.  Submit refill requests through [employer]ille or call your pharmacy and they will forward the refill request to us. Please allow 3 business days for your refill to be completed.          Additional Information  "About Your Visit        [facility] Information     [employer] lets you send messages to your doctor, view your test results, renew your prescriptions, schedule appointments and more. To sign up, go to [email or web address] . Click on \"Log in\" on the left side of the screen, which will take you to the Welcome page. Then click on \"Sign up Now\" on the right side of the page.     You will be asked to enter the access code listed below, as well as some personal information. Please follow the directions to create your username and password.     Your access code is: [email or web address]  Expires: 2017 12:58 PM     Your access code will  in 90 days. If you need help or a new code, please call your [place] clinic or [phone].        Care EveryWhere ID     This is your Care EveryWhere ID. This could be used by other organizations to access your [place] medical records  [id number]        Your Vitals Were     Temperature Respirations Height BMI (Body Mass Index)          98.9  F (37.2  C) 18 1.664 m (5' 5.5\") 26.22 kg/m2         Blood Pressure from Last 3 Encounters:   17 122/84   16 126/76   12/30/15 142/75    Weight from Last 3 Encounters:   17 72.6 kg (160 lb)   17 72.6 kg (160 lb)   17 73 kg (161 lb)              We Performed the Following     DRAIN/INJECT LARGE JOINT/BURSA        Primary Care Provider Office Phone # Fax #    Micheal Blake [phone] [address] [facility] [address]        Equal Access to Services     [facility]: Hadii mahendra Avalos, beata cadet, jorge alarconalmajose thompson, kaylene kapadia. So [facility] [phone].    ATENCIÓN: Si habla español, tiene a parra disposición servicios gratuitos de asistencia lingüística. Llame al [phone].    We comply with applicable federal civil rights laws and [place] laws. We do not discriminate on the basis of race, color, national origin, age, disability sex, " sexual orientation or gender identity.            Thank you!     Thank you for choosing [facility]  for your care. Our goal is always to provide you with excellent care. Hearing back from our patients is one way we can continue to improve our services. Please take a few minutes to complete the written survey that you may receive in the mail after your visit with us. Thank you!             Your Updated Medication List - Protect others around you: Learn how to safely use, store and throw away your medicines at www.disposemymeds.org.          This list is accurate as of: 9/25/17 12:58 PM.  Always use your most recent med list.                   Brand Name Dispense Instructions for use Diagnosis    albuterol (2.5 MG/3ML) 0.083% neb solution     270 mL    INHALE ONE VIAL VIA NEBULIZER EVERY 6 HOURS AS NEEDED FOR SHORTNESS OF BREATH/DYSPNEA OR WHEEZING    Centrilobular emphysema (H)       cyanocobalamin 1000 MCG Tabs      Take  by mouth daily.        diclofenac 1 % Gel topical gel    VOLTAREN    1 Tube    Place 2 g onto the skin 4 times daily    Stiffness of neck       order for DME     1 Device    Equipment being ordered: nebulizer machine    COPD (chronic obstructive pulmonary disease) (H)       simvastatin 80 MG tablet    ZOCOR    45 tablet    TAKE ONE-HALF TABLET BY MOUTH EVERY NIGHT AT BEDTIME    Hyperlipidemia LDL goal <130       TYLENOL 325 MG tablet   Generic drug:  acetaminophen      Take 1-2 tablets by mouth at onset of headache.        vitamin D 1000 UNITS capsule      Take 1 capsule by mouth daily.

## 2017-09-25 NOTE — PROGRESS NOTES
The patient's bilateral knee was prepped with betadine solution after verification of allergies. Area approximately 10 cm x 10 cm prepped in a sterile fashion. After injection, betadine removed with soap and water and band-aids applied.    4ml depo medrol with 1% lidocaine plain injected into patient's bilateral knee by Dr. Sheng Montoya  LOT# [id number]  Exp. 12/19

## 2017-09-25 NOTE — NURSING NOTE
"Chief Complaint   Patient presents with     RECHECK     Bilateral knee OA last injection 160mg on 7/31/17.       Initial Temp 98.9  F (37.2  C)  Resp 18  Ht 1.664 m (5' 5.5\")  Wt 72.6 kg (160 lb)  BMI 26.22 kg/m2 Estimated body mass index is 26.22 kg/(m^2) as calculated from the following:    Height as of this encounter: 1.664 m (5' 5.5\").    Weight as of this encounter: 72.6 kg (160 lb).  Medication Reconciliation: complete   Yashira Hadley MA      "

## 2017-10-03 ENCOUNTER — ALLIED HEALTH/NURSE VISIT (OUTPATIENT)
Dept: NURSING | Facility: CLINIC | Age: 82
End: 2017-10-03
Payer: COMMERCIAL

## 2017-10-03 DIAGNOSIS — Z23 NEED FOR PROPHYLACTIC VACCINATION AND INOCULATION AGAINST INFLUENZA: Primary | ICD-10-CM

## 2017-10-03 PROCEDURE — 99207 ZZC NO CHARGE NURSE ONLY: CPT

## 2017-10-03 PROCEDURE — G0008 ADMIN INFLUENZA VIRUS VAC: HCPCS

## 2017-10-03 PROCEDURE — 90662 IIV NO PRSV INCREASED AG IM: CPT

## 2017-10-03 NOTE — MR AVS SNAPSHOT
"              After Visit Summary   10/3/2017    Lang Christina    MRN: [id number]           Patient Information     Date Of Birth          6/3/1928        Visit Information        Provider Department      10/3/2017 2:15 PM [facility] [place]        Today's Diagnoses     Need for prophylactic vaccination and inoculation against influenza    -  1       Follow-ups after your visit        Who to contact     If you have questions or need follow up information about today's clinic visit or your schedule please contact [facility] directly at [phone].  Normal or non-critical lab and imaging results will be communicated to you by BidKindhart, letter or phone within 4 business days after the clinic has received the results. If you do not hear from us within 7 days, please contact the clinic through BEAT BioThe[employer]st or phone. If you have a critical or abnormal lab result, we will notify you by phone as soon as possible.  Submit refill requests through Banksnob or call your pharmacy and they will forward the refill request to us. Please allow 3 business days for your refill to be completed.          Additional Information About Your Visit        MyChart Information     Banksnob lets you send messages to your doctor, view your test results, renew your prescriptions, schedule appointments and more. To sign up, go to [email or web address] . Click on \"Log in\" on the left side of the screen, which will take you to the Welcome page. Then click on \"Sign up Now\" on the right side of the page.     You will be asked to enter the access code listed below, as well as some personal information. Please follow the directions to create your username and password.     Your access code is: [email or web address]  Expires: 2017 12:58 PM     Your access code will  in 90 days. If you need help or a new code, please call your [facility] or [phone].        Care EveryWhere ID     This is your Care EveryWhere ID. " This could be used by other organizations to access your [place] medical records  [id number]         Blood Pressure from Last 3 Encounters:   07/17/17 122/84   11/04/16 126/76   12/30/15 142/75    Weight from Last 3 Encounters:   09/25/17 160 lb (72.6 kg)   07/31/17 160 lb (72.6 kg)   07/17/17 161 lb (73 kg)              We Performed the Following     FLU VACCINE, INCREASED ANTIGEN, PRESV FREE, AGE 65+ [24570]     Vaccine Administration, Initial [17751]        Primary Care Provider Office Phone # Fax #    Micheal Blake [phone] [address] [facility] [address]        Equal Access to Services     DAWIT ROBLES : Hadii aad ku hadasho Soomaali, waaxda luqadaha, qaybta kaalmada adeegyada, kaylene benjamin . So [facility] [phone].    ATENCIÓN: Si habla español, tiene a parra disposición servicios gratuitos de asistencia lingüística. LlOur [facility] [phone].    We comply with applicable federal civil rights laws and [place] laws. We do not discriminate on the basis of race, color, national origin, age, disability, sex, sexual orientation, or gender identity.            Thank you!     Thank you for choosing [facility]  for your care. Our goal is always to provide you with excellent care. Hearing back from our patients is one way we can continue to improve our services. Please take a few minutes to complete the written survey that you may receive in the mail after your visit with us. Thank you!             Your Updated Medication List - Protect others around you: Learn how to safely use, store and throw away your medicines at www.disposemymeds.org.          This list is accurate as of: 10/3/17  2:23 PM.  Always use your most recent med list.                   Brand Name Dispense Instructions for use Diagnosis    albuterol (2.5 MG/3ML) 0.083% neb solution     270 mL    INHALE ONE VIAL VIA NEBULIZER EVERY 6 HOURS AS NEEDED FOR SHORTNESS OF BREATH/DYSPNEA OR  WHEEZING    Centrilobular emphysema (H)       cyanocobalamin 1000 MCG Tabs      Take  by mouth daily.        diclofenac 1 % Gel topical gel    VOLTAREN    1 Tube    Place 2 g onto the skin 4 times daily    Stiffness of neck       order for DME     1 Device    Equipment being ordered: nebulizer machine    COPD (chronic obstructive pulmonary disease) (H)       simvastatin 80 MG tablet    ZOCOR    45 tablet    TAKE ONE-HALF TABLET BY MOUTH EVERY NIGHT AT BEDTIME    Hyperlipidemia LDL goal <130       TYLENOL 325 MG tablet   Generic drug:  acetaminophen      Take 1-2 tablets by mouth at onset of headache.        vitamin D 1000 UNITS capsule      Take 1 capsule by mouth daily.

## 2017-10-24 DIAGNOSIS — J43.2 CENTRILOBULAR EMPHYSEMA (H): ICD-10-CM

## 2017-10-24 RX ORDER — ALBUTEROL SULFATE 0.83 MG/ML
SOLUTION RESPIRATORY (INHALATION)
Qty: 270 ML | Refills: 3 | Status: SHIPPED | OUTPATIENT
Start: 2017-10-24 | End: 2018-02-16

## 2017-10-24 NOTE — TELEPHONE ENCOUNTER
Prescription approved per [facility] Refill Protocol.  Giuliano Perales RN

## 2017-12-01 ENCOUNTER — OFFICE VISIT (OUTPATIENT)
Dept: OPHTHALMOLOGY | Facility: CLINIC | Age: 82
End: 2017-12-01
Payer: COMMERCIAL

## 2017-12-01 DIAGNOSIS — H43.813 POSTERIOR VITREOUS DETACHMENT OF BOTH EYES: ICD-10-CM

## 2017-12-01 DIAGNOSIS — Z96.1 PSEUDOPHAKIA: ICD-10-CM

## 2017-12-01 DIAGNOSIS — H32 PRESUMED OCULAR HISTOPLASMOSIS SYNDROME OF BOTH EYES: Primary | ICD-10-CM

## 2017-12-01 DIAGNOSIS — B39.9 PRESUMED OCULAR HISTOPLASMOSIS SYNDROME OF BOTH EYES: Primary | ICD-10-CM

## 2017-12-01 PROCEDURE — 92012 INTRM OPH EXAM EST PATIENT: CPT | Performed by: STUDENT IN AN ORGANIZED HEALTH CARE EDUCATION/TRAINING PROGRAM

## 2017-12-01 ASSESSMENT — VISUAL ACUITY
OS_CC+: -1
OD_CC: 20/20
OS_CC: 20/20
METHOD: SNELLEN - LINEAR
CORRECTION_TYPE: GLASSES
OD_CC: 20/25
METHOD: SNELLEN - LINEAR
OS_CC: 20/30
OS_CC+: -1
CORRECTION_TYPE: GLASSES
OD_CC+: -1
OD_CC+: -1

## 2017-12-01 ASSESSMENT — REFRACTION_WEARINGRX
OS_AXIS: 173
OS_HPRISM: 3 BO
OD_AXIS: 030
SPECS_TYPE: BIFOCAL
OS_AXIS: 004
OD_ADD: +3.00
OD_SPHERE: -1.00
OS_CYLINDER: +1.50
OD_CYLINDER: +1.50
OD_SPHERE: -0.50
OD_CYLINDER: +1.50
OS_ADD: +2.75
OS_CYLINDER: +1.50
OD_AXIS: 023
OS_HPRISM: 3 BO
OD_ADD: +2.75
OS_SPHERE: PLANO
OS_SPHERE: -0.25
OS_ADD: +3.00

## 2017-12-01 ASSESSMENT — REFRACTION_MANIFEST
OS_AXIS: 171
OD_CYLINDER: +1.50
OS_CYLINDER: +1.25
OS_SPHERE: -0.25
OD_ADD: +3.00
OD_AXIS: 025
OD_SPHERE: -1.00
OS_ADD: +3.00

## 2017-12-01 ASSESSMENT — CUP TO DISC RATIO
OD_RATIO: 0.5 UD
OS_RATIO: 0.6 UD

## 2017-12-01 ASSESSMENT — SLIT LAMP EXAM - LIDS
COMMENTS: NORMAL
COMMENTS: NORMAL

## 2017-12-01 ASSESSMENT — EXTERNAL EXAM - RIGHT EYE: OD_EXAM: NORMAL

## 2017-12-01 ASSESSMENT — REFRACTION_FINALRX: OS_HPRISM: 5 BO

## 2017-12-01 ASSESSMENT — EXTERNAL EXAM - LEFT EYE: OS_EXAM: NORMAL

## 2017-12-01 NOTE — PROGRESS NOTES
" Current Eye Medications:  Refresh plus prn not usually every day both eyes     Subjective: here for MD check,  Blurred doubling vision at night for the last week or so.  Images are somewhat double to him, like seeing 1.5 of a car.  Got new glasses, they are less thick than the older lenses.  Is due for complete after 3-30-18, needs to schedule.       Objective:  See Ophthalmology Exam.      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    Plan:  Glasses prescription given - optional   Use artificial tears up to 4 times per day (Refresh Plus, Systane Balance, or generic artificial tears are ok. Avoid \"get the red out\" drops).     Julieth Aguilar MD  [phone]           "

## 2017-12-01 NOTE — PATIENT INSTRUCTIONS
"Glasses prescription given - optional   Use artificial tears up to 4 times per day (Refresh Plus, Systane Balance, or generic artificial tears are ok. Avoid \"get the red out\" drops).     Julieth Aguilar MD  [phone]    "

## 2017-12-01 NOTE — MR AVS SNAPSHOT
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 Avoid \"get the red out\" drops).     Julieth Aguilar MD  [phone]            Follow-ups after your visit        Follow-up notes from your care team     Return in 5 months (on 5/1/2018) for TA and dilate.      Who to contact     If you have questions or need follow up information about today's clinic visit or your schedule please contact [facility] directly at [phone].  Normal or non-critical lab and imaging results will be communicated to you by [employer], letter or phone within 4 business days after the clinic has received the results. If you do not hear from us within 7 days, please contact the clinic through [employer] or phone. If you have a critical or abnormal lab result, we will notify you by phone as soon as possible.  Submit refill requests through [employer] or call your pharmacy and they will forward the refill request to us. Please allow 3 business days for your refill to be completed.          Additional Information About Your Visit        [employer] Information     [employer] lets you send messages to your doctor, view your test results, renew your prescriptions, schedule appointments and more. To sign up, go to [email or web address] . Click on \"Log in\" on the left side of the screen, which will take you to the Welcome page. Then click on \"Sign up Now\" on the right side of the page. "     You will be asked to enter the access code listed below, as well as some personal information. Please follow the directions to create your username and password.     Your access code is: [email or web address]  Expires: 2017 11:58 AM     Your access code will  in 90 days. If you need help or a new code, please call your [place] clinic or [phone].        Care EveryWhere ID     This is your Care EveryWhere ID. This could be used by other organizations to access your [place] medical records  [id number]         Blood Pressure from Last 3 Encounters:   17 122/84   16 126/76   12/30/15 142/75    Weight from Last 3 Encounters:   17 72.6 kg (160 lb)   17 72.6 kg (160 lb)   17 73 kg (161 lb)              Today, you had the following     No orders found for display       Primary Care Provider Office Phone # Fax #    Micheal Blake [phone]       [address] [facility] [address]        Equal Access to Services     [facility]: Hadii aad ku hadasho Soomaali, waaxda luqadaha, qaybta kaalmada adeegyada, kaylene benjamin . So [facility] [phone].    ATENCIÓN: Si habla español, tiene a parra disposición servicios gratuitos de asistencia lingüística. [facility] [phone].    We comply with applicable federal civil rights laws and [place] laws. We do not discriminate on the basis of race, color, national origin, age, disability, sex, sexual orientation, or gender identity.            Thank you!     Thank you for choosing [facility]  for your care. Our goal is always to provide you with excellent care. Hearing back from our patients is one way we can continue to improve our services. Please take a few minutes to complete the written survey that you may receive in the mail after your visit with us. Thank you!             Your Updated Medication List - Protect others around you: Learn how to safely use, store and throw away your  medicines at www.disposemymeds.org.          This list is accurate as of: 12/1/17  1:26 PM.  Always use your most recent med list.                   Brand Name Dispense Instructions for use Diagnosis    albuterol (2.5 MG/3ML) 0.083% neb solution     270 mL    NEBULIZE CONTENTS OF ONE VIAL EVERY 6 HOURS AS NEEDED FOR SHORTNESS OF BREATH / WHEEZING    Centrilobular emphysema (H)       cyanocobalamin 1000 MCG Tabs      Take  by mouth daily.        diclofenac 1 % Gel topical gel    VOLTAREN    1 Tube    Place 2 g onto the skin 4 times daily    Stiffness of neck       order for DME     1 Device    Equipment being ordered: nebulizer machine    COPD (chronic obstructive pulmonary disease) (H)       simvastatin 80 MG tablet    ZOCOR    45 tablet    TAKE ONE-HALF TABLET BY MOUTH EVERY NIGHT AT BEDTIME    Hyperlipidemia LDL goal <130       TYLENOL 325 MG tablet   Generic drug:  acetaminophen      Take 1-2 tablets by mouth at onset of headache.        vitamin D 1000 UNITS capsule      Take 1 capsule by mouth daily.

## 2017-12-07 ENCOUNTER — OFFICE VISIT (OUTPATIENT)
Dept: INTERNAL MEDICINE | Facility: CLINIC | Age: 82
End: 2017-12-07
Payer: COMMERCIAL

## 2017-12-07 ENCOUNTER — OFFICE VISIT (OUTPATIENT)
Dept: ORTHOPEDICS | Facility: CLINIC | Age: 82
End: 2017-12-07
Payer: COMMERCIAL

## 2017-12-07 VITALS
WEIGHT: 161.4 LBS | SYSTOLIC BLOOD PRESSURE: 118 MMHG | TEMPERATURE: 97.6 F | DIASTOLIC BLOOD PRESSURE: 74 MMHG | OXYGEN SATURATION: 93 % | HEART RATE: 102 BPM | BODY MASS INDEX: 26.45 KG/M2

## 2017-12-07 VITALS — BODY MASS INDEX: 25.39 KG/M2 | WEIGHT: 158 LBS | RESPIRATION RATE: 18 BRPM | HEIGHT: 66 IN | TEMPERATURE: 98.5 F

## 2017-12-07 DIAGNOSIS — E78.5 HYPERLIPIDEMIA LDL GOAL <130: ICD-10-CM

## 2017-12-07 DIAGNOSIS — M17.0 PRIMARY OSTEOARTHRITIS OF BOTH KNEES: Primary | ICD-10-CM

## 2017-12-07 DIAGNOSIS — J43.2 CENTRILOBULAR EMPHYSEMA (H): Primary | ICD-10-CM

## 2017-12-07 DIAGNOSIS — Z91.81 AT RISK FOR FALLING: ICD-10-CM

## 2017-12-07 DIAGNOSIS — J30.0 CHRONIC VASOMOTOR RHINITIS: ICD-10-CM

## 2017-12-07 LAB
ALT SERPL W P-5'-P-CCNC: 24 U/L (ref 0–70)
CHOLEST SERPL-MCNC: 154 MG/DL
HDLC SERPL-MCNC: 69 MG/DL
LDLC SERPL CALC-MCNC: 65 MG/DL
NONHDLC SERPL-MCNC: 85 MG/DL
TRIGL SERPL-MCNC: 99 MG/DL

## 2017-12-07 PROCEDURE — 99214 OFFICE O/P EST MOD 30 MIN: CPT | Performed by: INTERNAL MEDICINE

## 2017-12-07 PROCEDURE — 20610 DRAIN/INJ JOINT/BURSA W/O US: CPT | Mod: 50 | Performed by: ORTHOPAEDIC SURGERY

## 2017-12-07 PROCEDURE — 80061 LIPID PANEL: CPT | Performed by: INTERNAL MEDICINE

## 2017-12-07 PROCEDURE — 36415 COLL VENOUS BLD VENIPUNCTURE: CPT | Performed by: INTERNAL MEDICINE

## 2017-12-07 PROCEDURE — 84460 ALANINE AMINO (ALT) (SGPT): CPT | Performed by: INTERNAL MEDICINE

## 2017-12-07 RX ORDER — SIMVASTATIN 80 MG
TABLET ORAL
Qty: 45 TABLET | Refills: 3 | Status: CANCELLED | OUTPATIENT
Start: 2017-12-07

## 2017-12-07 RX ORDER — SIMVASTATIN 10 MG
10 TABLET ORAL AT BEDTIME
Qty: 90 TABLET | Refills: 1 | Status: SHIPPED | OUTPATIENT
Start: 2017-12-07 | End: 2018-05-18

## 2017-12-07 RX ORDER — METHYLPREDNISOLONE ACETATE 80 MG/ML
320 INJECTION, SUSPENSION INTRA-ARTICULAR; INTRALESIONAL; INTRAMUSCULAR; SOFT TISSUE ONCE
Qty: 4 ML | Refills: 0 | OUTPATIENT
Start: 2017-12-07 | End: 2017-12-07

## 2017-12-07 ASSESSMENT — PAIN SCALES - GENERAL: PAINLEVEL: NO PAIN (0)

## 2017-12-07 NOTE — PATIENT INSTRUCTIONS
- Try using the inhaler instead of nebulizer. However, you can use the nebulizer as needed.      [facility]    If you have any questions regarding to your visit please contact your care team:     Team Pink:   Clinic Hours Telephone Number   Internal Medicine:  Dr. Giovanna Hudson NP       7am-7pm  Monday - Thursday   7am-5pm  Fridays  [phone]  (Appointment scheduling available 24/7)    Questions about your visit?  Team Line  [phone]   Urgent Care - [place] - 11am-9pm Monday-Friday Saturday-Sunday- 9am-5pm   [place] - 5pm-9pm Monday-Friday Saturday-Sunday- 9am-5pm  [phone] - [facility]  [phone] - [place]       What options do I have for visits at the clinic other than the traditional office visit?  To expand how we care for you, many of our providers are utilizing electronic visits (e-visits) and telephone visits, when medically appropriate, for interactions with their patients rather than a visit in the clinic.   We also offer nurse visits for many medical concerns. Just like any other service, we will bill your insurance company for this type of visit based on time spent on the phone with your provider. Not all insurance companies cover these visits. Please check with your medical insurance if this type of visit is covered. You will be responsible for any charges that are not paid by your insurance.      E-visits via [employer]:  generally incur a $35.00 fee.  Telephone visits:  Time spent on the phone: *charged based on time that is spent on the phone in increments of 10 minutes. Estimated cost:   5-10 mins $30.00   11-20 mins. $59.00   21-30 mins. $85.00   Use [employer] (secure email communication and access to your chart) to send your primary care provider a message or make an appointment. Ask someone on your Team how to sign up for [employer].    For a Price Quote for your services, please call our Consumer Price  Line at [phone].    As always, Thank you for trusting us with your health care needs!    Genesis Gamboa, CMA

## 2017-12-07 NOTE — MR AVS SNAPSHOT
After Visit Summary   12/7/2017    Lang Christina    MRN: [id number]           Patient Information     Date Of Birth          6/3/1928        Visit Information        Provider Department      12/7/2017 1:15 PM Sheng Montoya MD [facility]        Today's Diagnoses     Primary osteoarthritis of both knees    -  1      Care Instructions    You have had a steroid injection today.  For the first 2 hours there will likely be some numbing in the joint from the lidocaine.  This is a good sign, indicating that the injection is in the right place.  In 2 hours the lidocaine will wear off, and the joint will hurt like you had a shot.  Each day the cortisone makes it feel better.  It reaches peak effect in 2 weeks.  We expect it to last for 3 months.  You may resume regular activity when you feel ready.  If you are diabetic, your glucoses will be quite high for several days.            Follow-ups after your visit        Your next 10 appointments already scheduled     Dec 07, 2017  2:30 PM CST   Office Visit with Micheal Blake MD   [facility] ([address] [address] [phone]           Bring a current list of meds and any records pertaining to this visit. For Physicals, please bring immunization records and any forms needing to be filled out. Please arrive 10 minutes early to complete paperwork.              Who to contact     If you have questions or need follow up information about today's clinic visit or your schedule please contact [facility] directly at [phone].  Normal or non-critical lab and imaging results will be communicated to you by MyChart, letter or phone within 4 business days after the clinic has received the results. If you do not hear from us within 7 days, please contact the clinic through MyChart or phone. If you have a critical or abnormal lab result, we will notify you by phone as soon  "as possible.  Submit refill requests through [employer] or call your pharmacy and they will forward the refill request to us. Please allow 3 business days for your refill to be completed.          Additional Information About Your Visit        [employer] Information     [employer] lets you send messages to your doctor, view your test results, renew your prescriptions, schedule appointments and more. To sign up, go to [email or web address] . Click on \"Log in\" on the left side of the screen, which will take you to the Welcome page. Then click on \"Sign up Now\" on the right side of the page.     You will be asked to enter the access code listed below, as well as some personal information. Please follow the directions to create your username and password.     Your access code is: [email or web address]  Expires: 2017 11:58 AM     Your access code will  in 90 days. If you need help or a new code, please call your [place] clinic or [phone].        Care EveryWhere ID     This is your Care EveryWhere ID. This could be used by other organizations to access your [place] medical records  [id number]        Your Vitals Were     Temperature Respirations Height BMI (Body Mass Index)          98.5  F (36.9  C) 18 1.664 m (5' 5.5\") 25.89 kg/m2         Blood Pressure from Last 3 Encounters:   17 122/84   16 126/76   12/30/15 142/75    Weight from Last 3 Encounters:   17 71.7 kg (158 lb)   17 72.6 kg (160 lb)   17 72.6 kg (160 lb)              We Performed the Following     DRAIN/INJECT LARGE JOINT/BURSA     METHYLPREDNISOLONE 80 MG INJ          Today's Medication Changes          These changes are accurate as of: 17  1:42 PM.  If you have any questions, ask your nurse or doctor.               Start taking these medicines.        Dose/Directions    methylPREDNISolone acetate 80 MG/ML injection   Commonly known as:  DEPO-MEDROL   Used for:  Primary osteoarthritis of both knees   Started by:  " Sheng Montoya MD        Dose:  320 mg   4 mLs (320 mg) by INTRA-ARTICULAR route once for 1 dose   Quantity:  4 mL   Refills:  0            Where to get your medicines      Some of these will need a paper prescription and others can be bought over the counter.  Ask your nurse if you have questions.     You don't need a prescription for these medications     methylPREDNISolone acetate 80 MG/ML injection                Primary Care Provider Office Phone # Fax #    Micheal Blake [phone] [address] [facility] [address]        Equal Access to Services     [facility]: Hadii aad ku hadasho Soomaali, waaxda luqadaha, qaybta kaalmada adeegyada, kaylene benjamin . So [facility] [phone].    ATENCIÓN: Si habla español, tiene a parra disposición servicios gratuitos de asistencia lingüística. [facility] [phone].    We comply with applicable federal civil rights laws and [place] laws. We do not discriminate on the basis of race, color, national origin, age, disability, sex, sexual orientation, or gender identity.            Thank you!     Thank you for choosing [facility]  for your care. Our goal is always to provide you with excellent care. Hearing back from our patients is one way we can continue to improve our services. Please take a few minutes to complete the written survey that you may receive in the mail after your visit with us. Thank you!             Your Updated Medication List - Protect others around you: Learn how to safely use, store and throw away your medicines at www.disposemymeds.org.          This list is accurate as of: 12/7/17  1:42 PM.  Always use your most recent med list.                   Brand Name Dispense Instructions for use Diagnosis    albuterol (2.5 MG/3ML) 0.083% neb solution     270 mL    NEBULIZE CONTENTS OF ONE VIAL EVERY 6 HOURS AS NEEDED FOR SHORTNESS OF BREATH / WHEEZING    Centrilobular emphysema (H)        cyanocobalamin 1000 MCG Tabs      Take  by mouth daily.        diclofenac 1 % Gel topical gel    VOLTAREN    1 Tube    Place 2 g onto the skin 4 times daily    Stiffness of neck       methylPREDNISolone acetate 80 MG/ML injection    DEPO-MEDROL    4 mL    4 mLs (320 mg) by INTRA-ARTICULAR route once for 1 dose    Primary osteoarthritis of both knees       order for DME     1 Device    Equipment being ordered: nebulizer machine    COPD (chronic obstructive pulmonary disease) (H)       simvastatin 80 MG tablet    ZOCOR    45 tablet    TAKE ONE-HALF TABLET BY MOUTH EVERY NIGHT AT BEDTIME    Hyperlipidemia LDL goal <130       TYLENOL 325 MG tablet   Generic drug:  acetaminophen      Take 1-2 tablets by mouth at onset of headache.        vitamin D 1000 UNITS capsule      Take 1 capsule by mouth daily.

## 2017-12-07 NOTE — LETTER
12/7/2017         RE: Lang Christina  [address] [place] [address]        Dear Colleague,    Thank you for referring your patient, Lang Christina, to the [facility]. Please see a copy of my visit note below.    The patient's left and right knees were prepped with betadine solution after verification of allergies. Area approximately 10 cm x 10 cm prepped in a sterile fashion. After injection, betadine removed with soap and water and band-aids applied.    2ml depo-medrol with 1% lidocaine plain injected into each knee (for a total of 4 mL depo-medrol) by Dr. Sheng Montoya  LOT# [id number]  Exp. 03/2020      Damien Cervantes PA-C  Supervising physician: Sheng Montoya MD  Dept. of Orthopedics  [facility]          Follow up bilateral primary knee osteoarthritis.  Last injection 9/25/17 with a double steroid dose.  Range of motion 4-120 on left, 2-120 on right..    He  desires injection today of bilateral knee(s).  Risks, benefits, potential complications and alternatives were discussed.   With the patient's consent, sterile prep was performed of bilateral knee(s).  Each knee was injected with Depo Medrol 160 mg and lidocaine at anteromedial site.    Return to clinic as needed.         Again, thank you for allowing me to participate in the care of your patient.        Sincerely,        Sheng Montoya MD

## 2017-12-07 NOTE — LETTER
[facility]  [address] [place] [address]    December 8, 2017    Lang Christina  [address]  [place] [address]          Dear Lang,    These are excellent laboratory studies !     Results for orders placed or performed in visit on 12/07/17   ALT   Result Value Ref Range    ALT 24 0 - 70 U/L   Lipid panel reflex to direct LDL Non-fasting   Result Value Ref Range    Cholesterol 154 <200 mg/dL    Triglycerides 99 <150 mg/dL    HDL Cholesterol 69 >39 mg/dL    LDL Cholesterol Calculated 65 <100 mg/dL    Non HDL Cholesterol 85 <130 mg/dL       If you have any questions or concerns, please me or my clinic team at [phone].      Sincerely,        Micheal Blake MD/bt

## 2017-12-07 NOTE — NURSING NOTE
"Chief Complaint   Patient presents with     RECHECK     Bilateral knee OA last injection 9/25/17.       Initial Temp 98.5  F (36.9  C)  Resp 18  Ht 1.664 m (5' 5.5\")  Wt 71.7 kg (158 lb)  BMI 25.89 kg/m2 Estimated body mass index is 25.89 kg/(m^2) as calculated from the following:    Height as of this encounter: 1.664 m (5' 5.5\").    Weight as of this encounter: 71.7 kg (158 lb).  Medication Reconciliation: complete   Yashira Hadley MA      "

## 2017-12-07 NOTE — MR AVS SNAPSHOT
After Visit Summary   12/7/2017    Lang Christina    MRN: [id number]           Patient Information     Date Of Birth          6/3/1928        Visit Information        Provider Department      12/7/2017 2:30 PM Micheal Blake MD [facility]        Today's Diagnoses     Centrilobular emphysema (H)    -  1    Chronic vasomotor rhinitis        At risk for falling        Hyperlipidemia LDL goal <130          Care Instructions    - Try using the inhaler instead of nebulizer. However, you can use the nebulizer as needed.      [facility]    If you have any questions regarding to your visit please contact your care team:     Team Pink:   Clinic Hours Telephone Number   Internal Medicine:  Dr. Giovanna Hudson NP       7am-7pm  Monday - Thursday   7am-5pm  Fridays  [phone]  (Appointment scheduling available 24/7)    Questions about your visit?  Team Line  [phone]   Urgent Care - [place] and [place] - 11am-9pm Monday-Friday Saturday-Sunday- 9am-5pm   [place] - 5pm-9pm Monday-Friday Saturday-Sunday- 9am-5pm  [phone] - [place]   [phone] - [place]       What options do I have for visits at the clinic other than the traditional office visit?  To expand how we care for you, many of our providers are utilizing electronic visits (e-visits) and telephone visits, when medically appropriate, for interactions with their patients rather than a visit in the clinic.   We also offer nurse visits for many medical concerns. Just like any other service, we will bill your insurance company for this type of visit based on time spent on the phone with your provider. Not all insurance companies cover these visits. Please check with your medical insurance if this type of visit is covered. You will be responsible for any charges that are not paid by your insurance.      E-visits via [employer]:  generally incur a $35.00  "fee.  Telephone visits:  Time spent on the phone: *charged based on time that is spent on the phone in increments of 10 minutes. Estimated cost:   5-10 mins $30.00   11-20 mins. $59.00   21-30 mins. $85.00   Use [employer] (secure email communication and access to your chart) to send your primary care provider a message or make an appointment. Ask someone on your Team how to sign up for [employer].    For a Price Quote for your services, please call our [employer] Line at [phone].    As always, Thank you for trusting us with your health care needs!    Genesis Gamboa, MICHELINE          Follow-ups after your visit        Who to contact     If you have questions or need follow up information about today's clinic visit or your schedule please contact [facility] directly at [phone].  Normal or non-critical lab and imaging results will be communicated to you by [employer], letter or phone within 4 business days after the clinic has received the results. If you do not hear from us within 7 days, please contact the clinic through [employer] or phone. If you have a critical or abnormal lab result, we will notify you by phone as soon as possible.  Submit refill requests through [employer] or call your pharmacy and they will forward the refill request to us. Please allow 3 business days for your refill to be completed.          Additional Information About Your Visit        [employer] Information     [employer] lets you send messages to your doctor, view your test results, renew your prescriptions, schedule appointments and more. To sign up, go to [email or web address] . Click on \"Log in\" on the left side of the screen, which will take you to the Welcome page. Then click on \"Sign up Now\" on the right side of the page.     You will be asked to enter the access code listed below, as well as some personal information. Please follow the directions to create your username and password.     Your access code is: " [email or web address]  Expires: 2017 11:58 AM     Your access code will  in 90 days. If you need help or a new code, please call your [facility] clinic or [phone].        Care EveryWhere ID     This is your Care EveryWhere ID. This could be used by other organizations to access your [facility] medical records  [id number]        Your Vitals Were     Pulse Temperature Pulse Oximetry BMI (Body Mass Index)          102 97.6  F (36.4  C) (Oral) 93% 26.45 kg/m2         Blood Pressure from Last 3 Encounters:   17 118/74   17 122/84   16 126/76    Weight from Last 3 Encounters:   17 161 lb 6.4 oz (73.2 kg)   17 158 lb (71.7 kg)   17 160 lb (72.6 kg)              We Performed the Following     ALT     Lipid panel reflex to direct LDL Non-fasting          Today's Medication Changes          These changes are accurate as of: 17  3:07 PM.  If you have any questions, ask your nurse or doctor.               Start taking these medicines.        Dose/Directions    Ipratropium-Albuterol  MCG/ACT inhaler   Commonly known as:  COMBIVENT RESPIMAT   Used for:  Centrilobular emphysema (H)   Started by:  Micheal Blake MD        Dose:  1 puff   Inhale 1 puff into the lungs 4 times daily   Quantity:  1 Inhaler   Refills:  11       methylPREDNISolone acetate 80 MG/ML injection   Commonly known as:  DEPO-MEDROL   Used for:  Primary osteoarthritis of both knees   Started by:  Sheng Montoya MD        Dose:  320 mg   4 mLs (320 mg) by INTRA-ARTICULAR route once for 1 dose   Quantity:  4 mL   Refills:  0         These medicines have changed or have updated prescriptions.        Dose/Directions    * simvastatin 80 MG tablet   Commonly known as:  ZOCOR   This may have changed:  Another medication with the same name was added. Make sure you understand how and when to take each.   Used for:  Hyperlipidemia LDL goal <130   Changed by:  Micheal Blake MD        TAKE ONE-HALF TABLET BY MOUTH  EVERY NIGHT AT BEDTIME   Quantity:  45 tablet   Refills:  3       * simvastatin 10 MG tablet   Commonly known as:  ZOCOR   This may have changed:  You were already taking a medication with the same name, and this prescription was added. Make sure you understand how and when to take each.   Used for:  Hyperlipidemia LDL goal <130   Changed by:  Micheal lBake MD        Dose:  10 mg   Take 1 tablet (10 mg) by mouth At Bedtime   Quantity:  90 tablet   Refills:  1       * Notice:  This list has 2 medication(s) that are the same as other medications prescribed for you. Read the directions carefully, and ask your doctor or other care provider to review them with you.         Where to get your medicines      These medications were sent to [facility] [place], [place] - [address]  [address] [facility] [address]     Phone:  [phone]     Ipratropium-Albuterol  MCG/ACT inhaler    simvastatin 10 MG tablet         Some of these will need a paper prescription and others can be bought over the counter.  Ask your nurse if you have questions.     You don't need a prescription for these medications     methylPREDNISolone acetate 80 MG/ML injection                Primary Care Provider Office Phone # Fax #    Micheal Blake [phone] [address] [facility] [address] [facility] [address]        Equal Access to Services     [facility]: Hadii mahendra ku hadasho Soomaali, waaxda luqadaha, qaybta kaalmada adeegyada, kaylene maya hayjennifer kapadia. So [facility] [phone].    ATENCIÓN: Si habla español, tiene a parra disposición servicios gratuitos de asistencia lingüística. Llame al [phone].    We comply with applicable federal civil rights laws and [place] laws. We do not discriminate on the basis of race, color, national origin, age, disability, sex, sexual orientation, or gender identity.            Thank you!     Thank you for choosing [facility]   for your care. Our goal is always to provide you with excellent care. Hearing back from our patients is one way we can continue to improve our services. Please take a few minutes to complete the written survey that you may receive in the mail after your visit with us. Thank you!             Your Updated Medication List - Protect others around you: Learn how to safely use, store and throw away your medicines at www.disposemymeds.org.          This list is accurate as of: 12/7/17  3:07 PM.  Always use your most recent med list.                   Brand Name Dispense Instructions for use Diagnosis    albuterol (2.5 MG/3ML) 0.083% neb solution     270 mL    NEBULIZE CONTENTS OF ONE VIAL EVERY 6 HOURS AS NEEDED FOR SHORTNESS OF BREATH / WHEEZING    Centrilobular emphysema (H)       cyanocobalamin 1000 MCG Tabs      Take  by mouth daily.        diclofenac 1 % Gel topical gel    VOLTAREN    1 Tube    Place 2 g onto the skin 4 times daily    Stiffness of neck       Ipratropium-Albuterol  MCG/ACT inhaler    COMBIVENT RESPIMAT    1 Inhaler    Inhale 1 puff into the lungs 4 times daily    Centrilobular emphysema (H)       methylPREDNISolone acetate 80 MG/ML injection    DEPO-MEDROL    4 mL    4 mLs (320 mg) by INTRA-ARTICULAR route once for 1 dose    Primary osteoarthritis of both knees       order for DME     1 Device    Equipment being ordered: nebulizer machine    COPD (chronic obstructive pulmonary disease) (H)       * simvastatin 80 MG tablet    ZOCOR    45 tablet    TAKE ONE-HALF TABLET BY MOUTH EVERY NIGHT AT BEDTIME    Hyperlipidemia LDL goal <130       * simvastatin 10 MG tablet    ZOCOR    90 tablet    Take 1 tablet (10 mg) by mouth At Bedtime    Hyperlipidemia LDL goal <130       TYLENOL 325 MG tablet   Generic drug:  acetaminophen      Take 1-2 tablets by mouth at onset of headache.        vitamin D 1000 UNITS capsule      Take 1 capsule by mouth daily.        * Notice:  This list has 2 medication(s) that are  the same as other medications prescribed for you. Read the directions carefully, and ask your doctor or other care provider to review them with you.

## 2017-12-07 NOTE — PROGRESS NOTES
The patient's left and right knees were prepped with betadine solution after verification of allergies. Area approximately 10 cm x 10 cm prepped in a sterile fashion. After injection, betadine removed with soap and water and band-aids applied.    2ml depo-medrol with 1% lidocaine plain injected into each knee (for a total of 4 mL depo-medrol) by Dr. Sheng Montoya  LOT# [id number]  Exp. 03/2020      Damien Cervantes PA-C  Supervising physician: Sheng Montoya MD  Dept. of Orthopedics  [facility]

## 2017-12-07 NOTE — NURSING NOTE
"Chief Complaint   Patient presents with     Breathing Problem     Due for Fall Risk and PHQ2       Initial /74  Pulse 102  Temp 97.6  F (36.4  C) (Oral)  Wt 161 lb 6.4 oz (73.2 kg)  SpO2 93%  BMI 26.45 kg/m2 Estimated body mass index is 26.45 kg/(m^2) as calculated from the following:    Height as of an earlier encounter on 12/7/17: 5' 5.5\" (1.664 m).    Weight as of this encounter: 161 lb 6.4 oz (73.2 kg).  Medication Reconciliation: complete    "

## 2017-12-07 NOTE — PROGRESS NOTES
Follow up bilateral primary knee osteoarthritis.  Last injection 9/25/17 with a double steroid dose.  Range of motion 4-120 on left, 2-120 on right..    He  desires injection today of bilateral knee(s).  Risks, benefits, potential complications and alternatives were discussed.   With the patient's consent, sterile prep was performed of bilateral knee(s).  Each knee was injected with Depo Medrol 160 mg and lidocaine at anteromedial site.    Return to clinic as needed.

## 2017-12-07 NOTE — PROGRESS NOTES
SUBJECTIVE:   Lang Christina is a 89 year old male who presents to clinic today for the following health issues:    Emphysema -- Patient states he clears his throat a lot. Over the first 30 minutes after getting up he has significant productive coughing and rhinitis. Describes sputum as clear to white. He is curious if he needs a better nebulizer. He does not notice an improvement after using a nebulizer. He feels his exercise tolerance is good with breathing but cannot go too far because of his knees. Patient has 50 years of smoking history but quit about 25 years ago. I spent a considerable amount of time in review with Mr. Christina this evening and he's really not overly concerned with these symptoms, more wanting to review his medications just to play it safe, was he missing anything ? There's been no progression with these symptoms, nothing with weight loss or any truly concerning symptoms     XR Chest 7/17/2017 --  IMPRESSION: Cardiomegaly, unchanged. No acute infiltrates. Calcified  pleural plaques especially posteriorly on the left. No pleural  effusion.    Problem list and histories reviewed & adjusted, as indicated.  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       Social History   Substance Use Topics     Smoking status: Former Smoker      Packs/day: 1.00     Years: 47.00     Types: Cigarettes, Pipe     Quit date: 1/1/1994     Smokeless tobacco: Never Used     Alcohol use No     Family History   Problem Relation Age of Onset     CEREBROVASCULAR DISEASE Brother          Current Outpatient Prescriptions   Medication Sig Dispense Refill     methylPREDNISolone acetate (DEPO-MEDROL) 80 MG/ML injection 4 mLs (320 mg) by INTRA-ARTICULAR route once for 1 dose 4 mL 0     Ipratropium-Albuterol (COMBIVENT RESPIMAT)  MCG/ACT inhaler Inhale 1 puff into the lungs 4 times daily 1 Inhaler 11     simvastatin (ZOCOR) 10 MG tablet Take 1 tablet (10 mg) by mouth At Bedtime 90 tablet 1     albuterol (2.5 MG/3ML) 0.083% neb solution NEBULIZE CONTENTS OF ONE VIAL EVERY 6 HOURS AS NEEDED FOR SHORTNESS OF BREATH / WHEEZING 270 mL 3     simvastatin (ZOCOR) 80 MG tablet TAKE ONE-HALF TABLET BY MOUTH EVERY NIGHT AT BEDTIME 45 tablet 3     ORDER FOR DME, SET TO LOCAL PRINT, Equipment being ordered: nebulizer machine 1 Device 0     Cyanocobalamin 1000 MCG TABS Take  by mouth daily.       acetaminophen (TYLENOL) 325 MG tablet Take 1-2 tablets by mouth at onset of headache.       Cholecalciferol (VITAMIN D) 1000 UNIT capsule Take 1 capsule by mouth daily.       diclofenac (VOLTAREN) 1 % GEL Place 2 g onto the skin 4 times daily (Patient not taking: Reported on 12/7/2017) 1 Tube 1     Labs reviewed in EPIC      Reviewed and updated as needed this visit by clinical staffTobacco  Allergies  Meds       Reviewed and updated as needed this visit by Provider         ROS:  Constitutional, HEENT, cardiovascular, pulmonary, GI, , musculoskeletal, neuro, skin, endocrine and psych systems are negative, except as otherwise noted.    This document serves as a record of the services and decisions personally performed and made by Micheal Blake MD. It was created on their behalf by Lenny Julien, a trained medical scribe. The creation of this document is based the provider's statements  to the medical scribe.  Lenny Julien December 7, 2017 3:01 PM    OBJECTIVE:   /74  Pulse 102  Temp 97.6  F (36.4  C) (Oral)  Wt 73.2 kg (161 lb 6.4 oz)  SpO2 93%  BMI 26.45 kg/m2  Body mass index is 26.45 kg/(m^2).  GENERAL: healthy, alert and no distress, looks quite well and vigorous for his years   EYES: Eyes grossly normal to inspection, EOMI, conjunctivae and sclerae normal  SKIN: no suspicious lesions or rashes to visible skin   NEURO: mentation intact and speech normal  PSYCH: mentation appears normal, affect normal/bright  LUNGS generally clear to auscultation bilateral      Diagnostic Test Results:  Results for orders placed or performed in visit on 07/17/17   XR Chest 2 Views    Narrative    CHEST TWO VIEWS  7/17/2017 3:00 PM     HISTORY: Centrilobular emphysema.    COMPARISON: 6/4/2015      Impression    IMPRESSION: Cardiomegaly, unchanged. No acute infiltrates. Calcified  pleural plaques especially posteriorly on the left. No pleural  effusion.    HARJEET EDEN MD     ASSESSMENT/PLAN:     (J43.2) Centrilobular emphysema (H)  (primary encounter diagnosis)  Comment: patient wanted to discuss the relative merits /demerits of going back to the metered dose inhaler which oddly he feels worked better for him then the nebulizer treatments do. Absolutely we can change back to the hand held device. This speaks to his symptoms as not so bad. He does not feel the nebulizer treatments provide much help at all. What to be on the watch for is reviewed. He has some emphysema but really doesn't have much for hyper-reactive airway disease or chronic bronchitis with his lung disease   Plan: Ipratropium-Albuterol (COMBIVENT RESPIMAT)          MCG/ACT inhaler            (J30.0) Chronic vasomotor rhinitis  Comment: this is an an unrelated matter. This is why he has a lot of his coughing just in the am and episodic clear to whitish nasal discharge   Plan: continue current plan of care       (Z91.81) At  risk for falling  Comment:   Plan:     (E78.5) Hyperlipidemia LDL goal <130  Comment: I reduced his statin medication from 40 milligrams to 10 milligrams  Plan: ALT, Lipid panel reflex to direct LDL         Non-fasting, simvastatin (ZOCOR) 10 MG tablet              Patient Instructions   - Try using the inhaler instead of nebulizer. However, you can use the nebulizer as needed.    The information in this document, created by the medical scribe for me, accurately reflects the services I personally performed and the decisions made by me. I have reviewed and approved this document for accuracy.   MD Micheal Travis MD  [facility]

## 2018-02-15 ENCOUNTER — OFFICE VISIT (OUTPATIENT)
Dept: ORTHOPEDICS | Facility: CLINIC | Age: 83
End: 2018-02-15
Payer: COMMERCIAL

## 2018-02-15 VITALS — BODY MASS INDEX: 25.71 KG/M2 | HEIGHT: 66 IN | RESPIRATION RATE: 18 BRPM | WEIGHT: 160 LBS | TEMPERATURE: 97.9 F

## 2018-02-15 DIAGNOSIS — M17.0 PRIMARY OSTEOARTHRITIS OF BOTH KNEES: Primary | ICD-10-CM

## 2018-02-15 PROCEDURE — 20610 DRAIN/INJ JOINT/BURSA W/O US: CPT | Mod: 50 | Performed by: ORTHOPAEDIC SURGERY

## 2018-02-15 RX ORDER — METHYLPREDNISOLONE ACETATE 80 MG/ML
320 INJECTION, SUSPENSION INTRA-ARTICULAR; INTRALESIONAL; INTRAMUSCULAR; SOFT TISSUE ONCE
Qty: 4 ML | Refills: 0 | OUTPATIENT
Start: 2018-02-15 | End: 2018-02-15

## 2018-02-15 NOTE — LETTER
2/15/2018         RE: Lang Christina  [address] [place] [address]        Dear Colleague,    Thank you for referring your patient, Lang Christina, to the [facility]. Please see a copy of my visit note below.    Follow up bilateral primary knee osteoarthritis.  Last injection 12/7/17 with a double steroid dose.  Range of motion 4-120 on left, 2-120 on right..    He  desires injection today of bilateral knee(s).  Risks, benefits, potential complications and alternatives were discussed.   With the patient's consent, sterile prep was performed of bilateral knee(s).  Each knee was injected with Depo Medrol 160 mg and lidocaine at anteromedial site.    Return to clinic as needed.         Again, thank you for allowing me to participate in the care of your patient.        Sincerely,        Sheng Montoya MD

## 2018-02-15 NOTE — NURSING NOTE
"Chief Complaint   Patient presents with     RECHECK     Bilateral knee OA last injection 160mg 12/7/17.       Initial Temp 97.9  F (36.6  C)  Resp 18  Ht 1.664 m (5' 5.5\")  Wt 72.6 kg (160 lb)  BMI 26.22 kg/m2 Estimated body mass index is 26.22 kg/(m^2) as calculated from the following:    Height as of this encounter: 1.664 m (5' 5.5\").    Weight as of this encounter: 72.6 kg (160 lb).  Medication Reconciliation: complete   Yashira Hadley MA      "

## 2018-02-15 NOTE — MR AVS SNAPSHOT
"              After Visit Summary   2/15/2018    Lang Christina    MRN: [id number]           Patient Information     Date Of Birth          6/3/1928        Visit Information        Provider Department      2/15/2018 1:45 PM Sheng Montoya MD [facility]        Today's Diagnoses     Primary osteoarthritis of both knees    -  1       Follow-ups after your visit        Who to contact     If you have questions or need follow up information about today's clinic visit or your schedule please contact [facility] directly at [phone].  Normal or non-critical lab and imaging results will be communicated to you by Science Exchangehart, letter or phone within 4 business days after the clinic has received the results. If you do not hear from us within 7 days, please contact the clinic through Science Exchangehart or phone. If you have a critical or abnormal lab result, we will notify you by phone as soon as possible.  Submit refill requests through [email or web address] or call your pharmacy and they will forward the refill request to us. Please allow 3 business days for your refill to be completed.          Additional Information About Your Visit        MyChart Information     [email or web address] lets you send messages to your doctor, view your test results, renew your prescriptions, schedule appointments and more. To sign up, go to [email or web address] . Click on \"Log in\" on the left side of the screen, which will take you to the Welcome page. Then click on \"Sign up Now\" on the right side of the page.     You will be asked to enter the access code listed below, as well as some personal information. Please follow the directions to create your username and password.     Your access code is: [email or web address]  Expires: 2018  5:43 PM     Your access code will  in 90 days. If you need help or a new code, please call your [facility] or [phone].        Care EveryWhere ID     This is your Care EveryWhere ID. This could be used " "by other organizations to access your [facility] medical records  [id number]        Your Vitals Were     Temperature Respirations Height BMI (Body Mass Index)          97.9  F (36.6  C) 18 1.664 m (5' 5.5\") 26.22 kg/m2         Blood Pressure from Last 3 Encounters:   12/07/17 118/74   07/17/17 122/84   11/04/16 126/76    Weight from Last 3 Encounters:   02/15/18 72.6 kg (160 lb)   12/07/17 73.2 kg (161 lb 6.4 oz)   12/07/17 71.7 kg (158 lb)              We Performed the Following     DRAIN/INJECT LARGE JOINT/BURSA     METHYLPREDNISOLONE 80 MG INJ          Today's Medication Changes          These changes are accurate as of 2/15/18  5:43 PM.  If you have any questions, ask your nurse or doctor.               Start taking these medicines.        Dose/Directions    methylPREDNISolone acetate 80 MG/ML injection   Commonly known as:  DEPO-MEDROL   Used for:  Primary osteoarthritis of both knees   Started by:  Sheng Montoya MD        Dose:  320 mg   4 mLs (320 mg) by INTRA-ARTICULAR route once for 1 dose   Quantity:  4 mL   Refills:  0            Where to get your medicines      Some of these will need a paper prescription and others can be bought over the counter.  Ask your nurse if you have questions.     You don't need a prescription for these medications     methylPREDNISolone acetate 80 MG/ML injection                Primary Care Provider Office Phone # Fax #    Micheal Blake [phone] [address] [facility] [address]        Equal Access to Services     [facility]: Hadii mahendra ku hadasho Soomaali, waaxda luqadaha, qaybta kaalmada adeegyada, kaylene kapadia. So [facility] [phone].    ATENCIÓN: Si habla español, tiene a parra disposición servicios gratuitos de asistencia lingüística. Llame al [phone].    We comply with applicable federal civil rights laws and [place] laws. We do not discriminate on the basis of race, color, national origin, age, " disability, sex, sexual orientation, or gender identity.            Thank you!     Thank you for choosing [facility]  for your care. Our goal is always to provide you with excellent care. Hearing back from our patients is one way we can continue to improve our services. Please take a few minutes to complete the written survey that you may receive in the mail after your visit with us. Thank you!             Your Updated Medication List - Protect others around you: Learn how to safely use, store and throw away your medicines at www.disposemymeds.org.          This list is accurate as of 2/15/18  5:43 PM.  Always use your most recent med list.                   Brand Name Dispense Instructions for use Diagnosis    albuterol (2.5 MG/3ML) 0.083% neb solution     270 mL    NEBULIZE CONTENTS OF ONE VIAL EVERY 6 HOURS AS NEEDED FOR SHORTNESS OF BREATH / WHEEZING    Centrilobular emphysema (H)       cyanocobalamin 1000 MCG Tabs      Take  by mouth daily.        diclofenac 1 % Gel topical gel    VOLTAREN    1 Tube    Place 2 g onto the skin 4 times daily    Stiffness of neck       Ipratropium-Albuterol  MCG/ACT inhaler    COMBIVENT RESPIMAT    1 Inhaler    Inhale 1 puff into the lungs 4 times daily    Centrilobular emphysema (H)       methylPREDNISolone acetate 80 MG/ML injection    DEPO-MEDROL    4 mL    4 mLs (320 mg) by INTRA-ARTICULAR route once for 1 dose    Primary osteoarthritis of both knees       order for DME     1 Device    Equipment being ordered: nebulizer machine    COPD (chronic obstructive pulmonary disease) (H)       * simvastatin 80 MG tablet    ZOCOR    45 tablet    TAKE ONE-HALF TABLET BY MOUTH EVERY NIGHT AT BEDTIME    Hyperlipidemia LDL goal <130       * simvastatin 10 MG tablet    ZOCOR    90 tablet    Take 1 tablet (10 mg) by mouth At Bedtime    Hyperlipidemia LDL goal <130       TYLENOL 325 MG tablet   Generic drug:  acetaminophen      Take 1-2 tablets by mouth at onset of  headache.        vitamin D 1000 UNITS capsule      Take 1 capsule by mouth daily.        * Notice:  This list has 2 medication(s) that are the same as other medications prescribed for you. Read the directions carefully, and ask your doctor or other care provider to review them with you.

## 2018-02-15 NOTE — PROGRESS NOTES
Follow up bilateral primary knee osteoarthritis.  Last injection 12/7/17 with a double steroid dose.  Range of motion 4-120 on left, 2-120 on right..    He  desires injection today of bilateral knee(s).  Risks, benefits, potential complications and alternatives were discussed.   With the patient's consent, sterile prep was performed of bilateral knee(s).  Each knee was injected with Depo Medrol 160 mg and lidocaine at anteromedial site.    Return to clinic as needed.

## 2018-04-09 ENCOUNTER — OFFICE VISIT (OUTPATIENT)
Dept: ORTHOPEDICS | Facility: CLINIC | Age: 83
End: 2018-04-09
Payer: COMMERCIAL

## 2018-04-09 VITALS
WEIGHT: 162 LBS | DIASTOLIC BLOOD PRESSURE: 88 MMHG | HEART RATE: 96 BPM | SYSTOLIC BLOOD PRESSURE: 147 MMHG | BODY MASS INDEX: 26.55 KG/M2 | RESPIRATION RATE: 18 BRPM

## 2018-04-09 DIAGNOSIS — M17.0 PRIMARY OSTEOARTHRITIS OF BOTH KNEES: Primary | ICD-10-CM

## 2018-04-09 PROCEDURE — 20610 DRAIN/INJ JOINT/BURSA W/O US: CPT | Mod: 50 | Performed by: ORTHOPAEDIC SURGERY

## 2018-04-09 RX ORDER — METHYLPREDNISOLONE ACETATE 80 MG/ML
320 INJECTION, SUSPENSION INTRA-ARTICULAR; INTRALESIONAL; INTRAMUSCULAR; SOFT TISSUE ONCE
Qty: 4 ML | Refills: 0 | OUTPATIENT
Start: 2018-04-09 | End: 2018-04-09

## 2018-04-09 NOTE — PROGRESS NOTES
Follow up bilateral primary knee osteoarthritis.  Last injection 2/15/18 with a double steroid dose.  Range of motion 4-120 on left, 2-120 on right..    He  desires injection today of bilateral knee(s).  Risks, benefits, potential complications and alternatives were discussed.   With the patient's consent, sterile prep was performed of bilateral knee(s).  Each knee was injected with Depo Medrol 160 mg and lidocaine at anteromedial site.    Return to clinic as needed.

## 2018-04-09 NOTE — PROGRESS NOTES
The patient's left and right knees were prepped with betadine solution after verification of allergies. Area approximately 10 cm x 10 cm prepped in a sterile fashion. After injection, betadine removed with soap and water and band-aids applied.    2ml depo-medrol with 1% lidocaine plain injected into each knee (for a total of 4 mL depo-medrol) by Dr. Sheng Montoya  LOT# [id number]  Exp. 06/2020    Damien Cervantes PA-C  Supervising physician: Sheng Montoya MD  Dept. of Orthopedics  [facility]

## 2018-04-09 NOTE — LETTER
4/9/2018         RE: Lang Christina  [address] [place] [address]        Dear Colleague,    Thank you for referring your patient, Lang Christina, to the [facility]. Please see a copy of my visit note below.    The patient's left and right knees were prepped with betadine solution after verification of allergies. Area approximately 10 cm x 10 cm prepped in a sterile fashion. After injection, betadine removed with soap and water and band-aids applied.    2ml depo-medrol with 1% lidocaine plain injected into each knee (for a total of 4 mL depo-medrol) by Dr. Sheng Montoya  LOT# [id number]  Exp. 06/2020    Damien Cervantes PA-C  Supervising physician: Sheng Montoya MD  Dept. of Orthopedics  [facility]          Follow up bilateral primary knee osteoarthritis.  Last injection 2/15/18 with a double steroid dose.  Range of motion 4-120 on left, 2-120 on right..    He  desires injection today of bilateral knee(s).  Risks, benefits, potential complications and alternatives were discussed.   With the patient's consent, sterile prep was performed of bilateral knee(s).  Each knee was injected with Depo Medrol 160 mg and lidocaine at anteromedial site.    Return to clinic as needed.         Again, thank you for allowing me to participate in the care of your patient.        Sincerely,        Sheng Montoya MD

## 2018-05-14 ENCOUNTER — OFFICE VISIT (OUTPATIENT)
Dept: ORTHOPEDICS | Facility: CLINIC | Age: 83
End: 2018-05-14
Payer: COMMERCIAL

## 2018-05-14 VITALS
HEIGHT: 65 IN | DIASTOLIC BLOOD PRESSURE: 79 MMHG | RESPIRATION RATE: 13 BRPM | BODY MASS INDEX: 26.66 KG/M2 | SYSTOLIC BLOOD PRESSURE: 125 MMHG | WEIGHT: 160 LBS

## 2018-05-14 DIAGNOSIS — M17.12 PRIMARY OSTEOARTHRITIS OF LEFT KNEE: Primary | ICD-10-CM

## 2018-05-14 PROCEDURE — 20610 DRAIN/INJ JOINT/BURSA W/O US: CPT | Mod: LT | Performed by: ORTHOPAEDIC SURGERY

## 2018-05-14 RX ORDER — METHYLPREDNISOLONE ACETATE 80 MG/ML
160 INJECTION, SUSPENSION INTRA-ARTICULAR; INTRALESIONAL; INTRAMUSCULAR; SOFT TISSUE ONCE
Qty: 2 ML | Refills: 0 | OUTPATIENT
Start: 2018-05-14 | End: 2018-05-14

## 2018-05-14 NOTE — LETTER
5/14/2018         RE: Lang Christina  [address] [place] [address]        Dear Colleague,    Thank you for referring your patient, Lang Christina, to the [facility]. Please see a copy of my visit note below.    The patient's left knee was prepped with betadine solution after verification of allergies. Area approximately 10 cm x 10 cm prepped in a sterile fashion. After injection, betadine removed with soap and water and band-aids applied.    1ml depo-medrol with 1% lidocaine plain injected into patient's left knee by Dr. Sheng Montoya  LOT# [id number]  Exp. 06/2020    Damien Cervantes PA-C  Supervising physician: Sheng Montoya MD  Dept. of Orthopedics  [facility]          Follow up bilateral primary knee osteoarthritis.  Last injection 4/9/18 with a double steroid dose.  Range of motion 4-120 on left, 2-120 on right.  Left knee is painful again.    He  desires injection today of left knee(s).  Risks, benefits, potential complications and alternatives were discussed.   With the patient's consent, sterile prep was performed of left knee(s).  Left knee was injected with Depo Medrol 160 mg and lidocaine at anteromedial site.    Return to clinic as needed.         Again, thank you for allowing me to participate in the care of your patient.        Sincerely,        Sheng Montoya MD

## 2018-05-14 NOTE — PROGRESS NOTES
The patient's left knee was prepped with betadine solution after verification of allergies. Area approximately 10 cm x 10 cm prepped in a sterile fashion. After injection, betadine removed with soap and water and band-aids applied.    1ml depo-medrol with 1% lidocaine plain injected into patient's left knee by Dr. Sheng Montoya  LOT# [id number]  Exp. 06/2020    Damien Cervantes PA-C  Supervising physician: Sheng Montoya MD  Dept. of Orthopedics  [facility]

## 2018-05-14 NOTE — MR AVS SNAPSHOT
"              After Visit Summary   5/14/2018    Lang Christina    MRN: [id number]           Patient Information     Date Of Birth          6/3/1928        Visit Information        Provider Department      5/14/2018 1:45 PM Sheng Montoya MD [facility]        Today's Diagnoses     Primary osteoarthritis of left knee    -  1      Care Instructions    You have had a steroid injection today.  For the first 2 hours there will likely be some numbing in the joint from the lidocaine.  This is a good sign, indicating that the injection is in the right place.  In 2 hours the lidocaine will wear off, and the joint will hurt like you had a shot.  Each day the cortisone makes it feel better.  It reaches peak effect in 2 weeks.  We expect it to last for 3 months.  You may resume regular activity when you feel ready.  If you are diabetic, your glucoses will be quite high for several days.            Follow-ups after your visit        Who to contact     If you have questions or need follow up information about today's clinic visit or your schedule please contact [facility] directly at [phone].  Normal or non-critical lab and imaging results will be communicated to you by Avidbotshart, letter or phone within 4 business days after the clinic has received the results. If you do not hear from us within 7 days, please contact the clinic through Associated Contentt or phone. If you have a critical or abnormal lab result, we will notify you by phone as soon as possible.  Submit refill requests through Nutanix or call your pharmacy and they will forward the refill request to us. Please allow 3 business days for your refill to be completed.          Additional Information About Your Visit        Avidbotshart Information     Nutanix lets you send messages to your doctor, view your test results, renew your prescriptions, schedule appointments and more. To sign up, go to [email or web address] . Click on \"Log in\" on the " "left side of the screen, which will take you to the Welcome page. Then click on \"Sign up Now\" on the right side of the page.     You will be asked to enter the access code listed below, as well as some personal information. Please follow the directions to create your username and password.     Your access code is: [email or web address]  Expires: 2018  6:43 PM     Your access code will  in 90 days. If you need help or a new code, please call your [facility] or [phone].        Care EveryWhere ID     This is your Care EveryWhere ID. This could be used by other organizations to access your [facility] medical records  [id number]        Your Vitals Were     Respirations Height BMI (Body Mass Index)             13 1.651 m (5' 5\") 26.63 kg/m2          Blood Pressure from Last 3 Encounters:   18 125/79   18 147/88   17 118/74    Weight from Last 3 Encounters:   18 72.6 kg (160 lb)   18 73.5 kg (162 lb)   02/15/18 72.6 kg (160 lb)              We Performed the Following     DRAIN/INJECT LARGE JOINT/BURSA     METHYLPREDNISOLONE 80 MG INJ          Today's Medication Changes          These changes are accurate as of 18  2:09 PM.  If you have any questions, ask your nurse or doctor.               Start taking these medicines.        Dose/Directions    methylPREDNISolone acetate 80 MG/ML injection   Commonly known as:  DEPO-MEDROL   Used for:  Primary osteoarthritis of left knee   Started by:  Sheng Montoya MD        Dose:  160 mg   2 mLs (160 mg) by INTRA-ARTICULAR route once for 1 dose   Quantity:  2 mL   Refills:  0            Where to get your medicines      Some of these will need a paper prescription and others can be bought over the counter.  Ask your nurse if you have questions.     You don't need a prescription for these medications     methylPREDNISolone acetate 80 MG/ML injection                Primary Care Provider Office Phone # Fax #    Micheal Blake MD " [phone]       [address] [facility] [address]        Equal Access to Services     DAWIT ROBLES : Hadii mahendra ku zhen Soxochilt, waaxda luqadaha, qaybta kaalmada jay, kaylene kapadia. So [facility] [phone].    ATENCIÓN: Si habla español, tiene a parra disposición servicios gratuitos de asistencia lingüística. Llame al [phone].    We comply with applicable federal civil rights laws and [place] laws. We do not discriminate on the basis of race, color, national origin, age, disability, sex, sexual orientation, or gender identity.            Thank you!     Thank you for choosing [facility]  for your care. Our goal is always to provide you with excellent care. Hearing back from our patients is one way we can continue to improve our services. Please take a few minutes to complete the written survey that you may receive in the mail after your visit with us. Thank you!             Your Updated Medication List - Protect others around you: Learn how to safely use, store and throw away your medicines at www.disposemymeds.org.          This list is accurate as of 5/14/18  2:09 PM.  Always use your most recent med list.                   Brand Name Dispense Instructions for use Diagnosis    * albuterol (2.5 MG/3ML) 0.083% neb solution     270 mL    INHALE CONTENTS OF ONE VIAL VIA NEBULIZER EVERY SIX HOURS AS NEEDED FOR SHORTNESS OF BREATH/WHEEZING    Centrilobular emphysema (H)       * albuterol (2.5 MG/3ML) 0.083% neb solution     270 mL    INHALE CONTENTS OF ONE VIAL VIA NEBULIZER EVERY SIX HOURS AS NEEDED FOR SHORTNESS OF BREATH/WHEEZING    Centrilobular emphysema (H)       cyanocobalamin 1000 MCG Tabs      Take  by mouth daily.        diclofenac 1 % Gel topical gel    VOLTAREN    1 Tube    Place 2 g onto the skin 4 times daily    Stiffness of neck       Ipratropium-Albuterol  MCG/ACT inhaler    COMBIVENT RESPIMAT    1 Inhaler    Inhale 1 puff  into the lungs 4 times daily    Centrilobular emphysema (H)       methylPREDNISolone acetate 80 MG/ML injection    DEPO-MEDROL    2 mL    2 mLs (160 mg) by INTRA-ARTICULAR route once for 1 dose    Primary osteoarthritis of left knee       order for DME     1 Device    Equipment being ordered: nebulizer machine    COPD (chronic obstructive pulmonary disease) (H)       * simvastatin 80 MG tablet    ZOCOR    45 tablet    TAKE ONE-HALF TABLET BY MOUTH EVERY NIGHT AT BEDTIME    Hyperlipidemia LDL goal <130       * simvastatin 10 MG tablet    ZOCOR    90 tablet    Take 1 tablet (10 mg) by mouth At Bedtime    Hyperlipidemia LDL goal <130       TYLENOL 325 MG tablet   Generic drug:  acetaminophen      Take 1-2 tablets by mouth at onset of headache.        vitamin D 1000 units capsule      Take 1 capsule by mouth daily.        * Notice:  This list has 4 medication(s) that are the same as other medications prescribed for you. Read the directions carefully, and ask your doctor or other care provider to review them with you.

## 2018-05-14 NOTE — PROGRESS NOTES
Follow up bilateral primary knee osteoarthritis.  Last injection 4/9/18 with a double steroid dose.  Range of motion 4-120 on left, 2-120 on right.  Left knee is painful again.    He  desires injection today of left knee(s).  Risks, benefits, potential complications and alternatives were discussed.   With the patient's consent, sterile prep was performed of left knee(s).  Left knee was injected with Depo Medrol 160 mg and lidocaine at anteromedial site.    Return to clinic as needed.

## 2018-05-29 ENCOUNTER — RADIANT APPOINTMENT (OUTPATIENT)
Dept: GENERAL RADIOLOGY | Facility: CLINIC | Age: 83
End: 2018-05-29
Attending: INTERNAL MEDICINE
Payer: COMMERCIAL

## 2018-05-29 ENCOUNTER — OFFICE VISIT (OUTPATIENT)
Dept: FAMILY MEDICINE | Facility: CLINIC | Age: 83
End: 2018-05-29
Payer: COMMERCIAL

## 2018-05-29 VITALS
OXYGEN SATURATION: 92 % | SYSTOLIC BLOOD PRESSURE: 122 MMHG | HEART RATE: 117 BPM | WEIGHT: 157 LBS | BODY MASS INDEX: 26.13 KG/M2 | DIASTOLIC BLOOD PRESSURE: 74 MMHG | TEMPERATURE: 97.4 F | RESPIRATION RATE: 16 BRPM

## 2018-05-29 DIAGNOSIS — R00.0 IRREGULAR TACHYCARDIA: ICD-10-CM

## 2018-05-29 DIAGNOSIS — R05.9 COUGH: ICD-10-CM

## 2018-05-29 DIAGNOSIS — J43.2 CENTRILOBULAR EMPHYSEMA (H): ICD-10-CM

## 2018-05-29 DIAGNOSIS — J43.2 CENTRILOBULAR EMPHYSEMA (H): Primary | ICD-10-CM

## 2018-05-29 PROCEDURE — 93000 ELECTROCARDIOGRAM COMPLETE: CPT | Performed by: INTERNAL MEDICINE

## 2018-05-29 PROCEDURE — 99214 OFFICE O/P EST MOD 30 MIN: CPT | Performed by: INTERNAL MEDICINE

## 2018-05-29 PROCEDURE — 71046 X-RAY EXAM CHEST 2 VIEWS: CPT

## 2018-05-29 RX ORDER — PREDNISONE 20 MG/1
40 TABLET ORAL DAILY
Qty: 10 TABLET | Refills: 0 | Status: SHIPPED | OUTPATIENT
Start: 2018-05-29 | End: 2018-06-03

## 2018-05-29 NOTE — PATIENT INSTRUCTIONS
[facility]    If you have any questions regarding to your visit please contact your care team:     Team Pink:   Clinic Hours Telephone Number   Internal Medicine:  Dr. Giovanna Hudson NP       7am-7pm  Monday - Thursday   7am-5pm  Fridays  [phone]  (Appointment scheduling available 24/7)    Questions about your recent visit?  Team Line  [phone]   [facility] [place] [facility] - 11am-9pm Monday-Friday Saturday-Sunday- 9am-5pm   [place] - 5pm-9pm Monday-Friday Saturday-Sunday- 9am-5pm  [phone] - [place]  [phone] - [place]       What options do I have for a visit other than an office visit? We offer electronic visits (e-visits) and telephone visits, when medically appropriate.  Please check with your medical insurance to see if these types of visits are covered, as you will be responsible for any charges that are not paid by your insurance.      You can use ThumbAd (secure electronic communication) to access to your chart, send your primary care provider a message, or make an appointment. Ask a team member how to get started.     For a price quote for your services, please call our Consumer Price Line at [phone] or our Imaging Cost estimation line at [phone] (for imaging tests).  Malia CARLOS CMA ([facility])

## 2018-05-29 NOTE — MR AVS SNAPSHOT
After Visit Summary   5/29/2018    Lang Christina    MRN: [id number]           Patient Information     Date Of Birth          6/3/1928        Visit Information        Provider Department      5/29/2018 11:10 AM Micheal Blake MD [facility]        Today's Diagnoses     Centrilobular emphysema (H)    -  1    Cough        Irregular tachycardia          Care Instructions    [facility]    If you have any questions regarding to your visit please contact your care team:     Team Pink:   Clinic Hours Telephone Number   Internal Medicine:  Dr. Giovanna Hudson NP       7am-7pm  Monday - Thursday   7am-5pm  Fridays  [phone]  (Appointment scheduling available 24/7)    Questions about your recent visit?  Team Line  [phone]   Urgent Care - [place] and [facility] - 11am-9pm Monday-Friday Saturday-Sunday- 9am-5pm   [place] - 5pm-9pm Monday-Friday Saturday-Sunday- 9am-5pm  [phone] - [place]  [phone] - [place]       What options do I have for a visit other than an office visit? We offer electronic visits (e-visits) and telephone visits, when medically appropriate.  Please check with your medical insurance to see if these types of visits are covered, as you will be responsible for any charges that are not paid by your insurance.      You can use [employer] (secure electronic communication) to access to your chart, send your primary care provider a message, or make an appointment. Ask a team member how to get started.     For a price quote for your services, please call our Consumer Price Line at [phone] or our Imaging Cost estimation line at [phone] (for imaging tests).  Malia CARLOS CMA ([facility])            Follow-ups after your visit        Who to contact     If you have questions or need follow up information about today's clinic visit or your schedule please contact [facility] directly at  [phone].  Normal or non-critical lab and imaging results will be communicated to you by MyChart, letter or phone within 4 business days after the clinic has received the results. If you do not hear from us within 7 days, please contact the clinic through MyChart or phone. If you have a critical or abnormal lab result, we will notify you by phone as soon as possible.  Submit refill requests through [employer] or call your pharmacy and they will forward the refill request to us. Please allow 3 business days for your refill to be completed.          Additional Information About Your Visit        Care EveryWhere ID     This is your Care EveryWhere ID. This could be used by other organizations to access your [place] medical records  [id number]        Your Vitals Were     Pulse Temperature Respirations Pulse Oximetry BMI (Body Mass Index)       117 97.4  F (36.3  C) (Oral) 16 92% 26.13 kg/m2        Blood Pressure from Last 3 Encounters:   05/29/18 122/74   05/14/18 125/79   04/09/18 147/88    Weight from Last 3 Encounters:   05/29/18 157 lb (71.2 kg)   05/14/18 160 lb (72.6 kg)   04/09/18 162 lb (73.5 kg)              We Performed the Following     EKG 12-lead complete w/read - Clinics          Today's Medication Changes          These changes are accurate as of 5/29/18 12:14 PM.  If you have any questions, ask your nurse or doctor.               Start taking these medicines.        Dose/Directions    predniSONE 20 MG tablet   Commonly known as:  DELTASONE   Used for:  Cough, Centrilobular emphysema (H)   Started by:  Micheal Blake MD        Dose:  40 mg   Take 2 tablets (40 mg) by mouth daily for 5 days   Quantity:  10 tablet   Refills:  0            Where to get your medicines      These medications were sent to [facility] - [address]  [address] [address]     Phone:  [phone]     predniSONE 20 MG tablet                Primary Care  Provider Office Phone # Fax #    Micheal Blake [phone] [address] [address]        Equal Access to Services     RAFAYOVANY TRAVIS : Hadii aad ku hadgwyno Soclarisseali, waaxda luqadaha, qaybta kaalmada adeegyada, kaylene ahmadihomer kang. So [facility] [phone].    ATENCIÓN: Si habla español, tiene a parra disposición servicios gratuitos de asistencia lingüística. Llame al [phone].    We comply with applicable federal civil rights laws and [place] laws. We do not discriminate on the basis of race, color, national origin, age, disability, sex, sexual orientation, or gender identity.            Thank you!     Thank you for choosing [facility]  for your care. Our goal is always to provide you with excellent care. Hearing back from our patients is one way we can continue to improve our services. Please take a few minutes to complete the written survey that you may receive in the mail after your visit with us. Thank you!             Your Updated Medication List - Protect others around you: Learn how to safely use, store and throw away your medicines at www.disposemymeds.org.          This list is accurate as of 5/29/18 12:14 PM.  Always use your most recent med list.                   Brand Name Dispense Instructions for use Diagnosis    * albuterol (2.5 MG/3ML) 0.083% neb solution     270 mL    INHALE CONTENTS OF ONE VIAL VIA NEBULIZER EVERY SIX HOURS AS NEEDED FOR SHORTNESS OF BREATH/WHEEZING    Centrilobular emphysema (H)       * albuterol (2.5 MG/3ML) 0.083% neb solution     270 mL    INHALE CONTENTS OF ONE VIAL VIA NEBULIZER EVERY SIX HOURS AS NEEDED FOR SHORTNESS OF BREATH/WHEEZING    Centrilobular emphysema (H)       cyanocobalamin 1000 MCG Tabs      Take  by mouth daily.        diclofenac 1 % Gel topical gel    VOLTAREN    1 Tube    Place 2 g onto the skin 4 times daily    Stiffness of neck       Ipratropium-Albuterol  MCG/ACT inhaler     COMBIVENT RESPIMAT    1 Inhaler    Inhale 1 puff into the lungs 4 times daily    Centrilobular emphysema (H)       order for DME     1 Device    Equipment being ordered: nebulizer machine    COPD (chronic obstructive pulmonary disease) (H)       predniSONE 20 MG tablet    DELTASONE    10 tablet    Take 2 tablets (40 mg) by mouth daily for 5 days    Cough, Centrilobular emphysema (H)       * simvastatin 80 MG tablet    ZOCOR    45 tablet    TAKE ONE-HALF TABLET BY MOUTH EVERY NIGHT AT BEDTIME    Hyperlipidemia LDL goal <130       * simvastatin 10 MG tablet    ZOCOR    90 tablet    TAKE ONE TABLET BY MOUTH EVERY DAY AT BEDTIME    Hyperlipidemia LDL goal <130       TYLENOL 325 MG tablet   Generic drug:  acetaminophen      Take 1-2 tablets by mouth at onset of headache.        vitamin D 1000 units capsule      Take 1 capsule by mouth daily.        * Notice:  This list has 4 medication(s) that are the same as other medications prescribed for you. Read the directions carefully, and ask your doctor or other care provider to review them with you.

## 2018-05-29 NOTE — PROGRESS NOTES
SUBJECTIVE:   Lang Christina is a 89 year old male who presents to clinic today for the following health issues:       Centrilobular emphysema (H)  Cough  Irregular tachycardia     RESPIRATORY SYMPTOMS      Duration: 3-4 days    Description  nasal congestion, cough and wheezing, tachycardia    Severity: moderate    Accompanying signs and symptoms: None    History (predisposing factors):  COPD and former smoker    Precipitating or alleviating factors: None    Therapies tried and outcome:  none    He notes that the coughing is the worst part of this illness. Has been going on for a few days now. He also mentions that he has a fast, sometimes irregular heartbeat, but notes that this has been going on many years, and he has been asymptomatic. He checks his pulse often and notes irregular beats every so often. He doesn't feel out of breath, but like he has to clear his throat a lot. He does see this as getting worse and isn't concerned.    He has known moderate chronic obstructive pulmonary disease. He hadn't found benefit with Advair [ fluticasone / serevent ] inpatient and only uses prn nebulizer treatments and sometimes Ipratropium-Albuterol (COMBIVENT RESPIMAT)  MCG/ACT inhaler      Problem list and histories reviewed & adjusted, as indicated.  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       Social History   Substance Use Topics     Smoking status: Former Smoker     Packs/day: 1.00     Years: 47.00     Types: Cigarettes, Pipe     Quit date: 1/1/1994     Smokeless tobacco: Never Used     Alcohol use No     Family History   Problem Relation Age of Onset     CEREBROVASCULAR DISEASE Brother            Reviewed and updated as needed this visit by clinical staff       Reviewed and updated as needed this visit by Provider         ROS:  Constitutional, HEENT, cardiovascular, pulmonary, GI, , musculoskeletal, neuro, skin, endocrine and psych systems are negative, except as otherwise noted.    This document serves as a record of the services and decisions personally performed and made by Micheal Blake MD. It was created on his behalf by Joya Cruz, a trained medical scribe. The creation of this document is based on the provider's statements to the medical scribe.  Joya Cruz May 29, 2018 11:28 AM    OBJECTIVE:     /74  Pulse 117  Temp 97.4  F (36.3  C) (Oral)  Resp 16  Wt 71.2 kg (157 lb)  SpO2 92%  BMI 26.13 kg/m2  Body mass index is 26.13 kg/(m^2).  GENERAL: healthy, alert and no distress  EYES: Eyes grossly normal to inspection, PERRL and conjunctivae and sclerae normal  HENT: ear canals and TM's normal, nose and mouth without ulcers or lesions  NECK: no adenopathy, no asymmetry, masses, or scars and thyroid normal to palpation  RESP: Poor air movement, decreased lung sounds at bases  CV: regular rate and rhythm, normal S1 S2, no S3 or S4, no murmur, click or rub, no peripheral edema and peripheral pulses strong, seemed irregularly irregular rhythm consistent with atrial fibrillation but we got stat electrocardiogram which shows a normal sinus rhythm   ABDOMEN: soft, nontender, no hepatosplenomegaly, no masses and bowel sounds normal  MS: no gross musculoskeletal defects noted, no edema  SKIN: no suspicious lesions or rashes to visible  skin  NEURO: Mentation intact and speech normal  PSYCH: mentation appears normal, affect normal/bright    Diagnostic Test Results:  No results found for this or any previous visit (from the past 24 hour(s)).     EKG - appears normal, NSR, normal axis, first degree AV block with WA interval greater then 200 msec , no acute ST/T changes c/w ischemia, no LVH by voltage criteria, there are no prior tracings available    ASSESSMENT/PLAN:       (J43.2) Centrilobular emphysema (H)  (primary encounter diagnosis)  Comment: EKG and Chest XR today. Prescribed a five day course of prednisone, likely just a COPD flare-up. Should return if this doesn't improve his symptoms after he has finished the entire course.   Plan: EKG 12-lead complete w/read - Clinics, XR Chest        2 Views, predniSONE (DELTASONE) 20 MG tablet,         Ipratropium-Albuterol (COMBIVENT RESPIMAT)          MCG/ACT inhaler            (R05) Cough  Comment: as above. Last chest xray from about a year ago showed calcified pleural plaques , I suspect nothing new here  Plan: EKG 12-lead complete w/read - Clinics, XR Chest        2 Views, predniSONE (DELTASONE) 20 MG tablet        Await official overread of the film per radiology     (R00.0) Irregular tachycardia  Comment: with a normal sinus rhythm, I have no proof this is a atrial fibrillation. Reviewed risks with patient. He prefers to do nothing further today   Plan: EKG 12-lead complete w/read - Clinics, XR Chest        2 Views            The information in this document, created by the medical scribe for me, accurately reflects the services I personally performed and the decisions made by me. I have reviewed and approved this document for accuracy.   MD Micheal Travis MD  [facility]

## 2018-07-16 ENCOUNTER — OFFICE VISIT (OUTPATIENT)
Dept: ORTHOPEDICS | Facility: CLINIC | Age: 83
End: 2018-07-16
Payer: COMMERCIAL

## 2018-07-16 VITALS
HEART RATE: 98 BPM | HEIGHT: 65 IN | DIASTOLIC BLOOD PRESSURE: 73 MMHG | TEMPERATURE: 98 F | RESPIRATION RATE: 18 BRPM | WEIGHT: 157 LBS | SYSTOLIC BLOOD PRESSURE: 123 MMHG | BODY MASS INDEX: 26.16 KG/M2

## 2018-07-16 DIAGNOSIS — M17.0 PRIMARY OSTEOARTHRITIS OF BOTH KNEES: Primary | ICD-10-CM

## 2018-07-16 PROCEDURE — 20610 DRAIN/INJ JOINT/BURSA W/O US: CPT | Mod: 50 | Performed by: ORTHOPAEDIC SURGERY

## 2018-07-16 RX ORDER — METHYLPREDNISOLONE ACETATE 80 MG/ML
320 INJECTION, SUSPENSION INTRA-ARTICULAR; INTRALESIONAL; INTRAMUSCULAR; SOFT TISSUE ONCE
Qty: 4 ML | Refills: 0 | OUTPATIENT
Start: 2018-07-16 | End: 2018-07-16

## 2018-07-16 NOTE — PROGRESS NOTES
Follow up bilateral primary knee osteoarthritis.  Last injection 5/14/18 with a double steroid dose.  Range of motion 4-120 on left, 2-120 on right.    He  desires injection today of bilateral knee(s).  Risks, benefits, potential complications and alternatives were discussed.   With the patient's consent, sterile prep was performed of bilateral knee(s).  Each knee was injected with Depo Medrol 160 mg and lidocaine at anteromedial site.    Return to clinic as needed.

## 2018-07-16 NOTE — PROGRESS NOTES
The patient's left and right knees were prepped with betadine solution after verification of allergies. Area approximately 10 cm x 10 cm prepped in a sterile fashion. After injection, betadine removed with soap and water and band-aids applied.    2ml depo-medrol with 1% lidocaine plain injected into each knee (for a total of 4 mL depo-medrol) by Dr. Sheng Montoya  LOT# [id number]  Exp. 06/2020    Damien Cervantes PA-C  Supervising physician: Sheng Montoya MD  Dept. of Orthopedics  [facility]

## 2018-07-16 NOTE — LETTER
7/16/2018         RE: Lang Christina  [address] [place] [address]        Dear Colleague,    Thank you for referring your patient, Lang Christina, to the [facility]. Please see a copy of my visit note below.    The patient's left and right knees were prepped with betadine solution after verification of allergies. Area approximately 10 cm x 10 cm prepped in a sterile fashion. After injection, betadine removed with soap and water and band-aids applied.    2ml depo-medrol with 1% lidocaine plain injected into each knee (for a total of 4 mL depo-medrol) by Dr. Sheng Montoya  LOT# [id number]  Exp. 06/2020    Damien Cervantes PA-C  Supervising physician: Sheng Montoya MD  Dept. of Orthopedics  [facility]          Follow up bilateral primary knee osteoarthritis.  Last injection 5/14/18 with a double steroid dose.  Range of motion 4-120 on left, 2-120 on right.    He  desires injection today of bilateral knee(s).  Risks, benefits, potential complications and alternatives were discussed.   With the patient's consent, sterile prep was performed of bilateral knee(s).  Each knee was injected with Depo Medrol 160 mg and lidocaine at anteromedial site.    Return to clinic as needed.         Again, thank you for allowing me to participate in the care of your patient.        Sincerely,        Sheng Montoya MD

## 2018-07-16 NOTE — MR AVS SNAPSHOT
"              After Visit Summary   7/16/2018    Lang Christina    MRN: [id number]           Patient Information     Date Of Birth          6/3/1928        Visit Information        Provider Department      7/16/2018 2:15 PM Sheng Montoya MD [facility]        Today's Diagnoses     Primary osteoarthritis of both knees    -  1       Follow-ups after your visit        Who to contact     If you have questions or need follow up information about today's clinic visit or your schedule please contact [facility] directly at [phone].  Normal or non-critical lab and imaging results will be communicated to you by MyChart, letter or phone within 4 business days after the clinic has received the results. If you do not hear from us within 7 days, please contact the clinic through MyChart or phone. If you have a critical or abnormal lab result, we will notify you by phone as soon as possible.  Submit refill requests through [employer] or call your pharmacy and they will forward the refill request to us. Please allow 3 business days for your refill to be completed.          Additional Information About Your Visit        Care EveryWhere ID     This is your Care EveryWhere ID. This could be used by other organizations to access your [place] medical records  [id number]        Your Vitals Were     Pulse Temperature Respirations Height BMI (Body Mass Index)       98 98  F (36.7  C) 18 1.651 m (5' 5\") 26.13 kg/m2        Blood Pressure from Last 3 Encounters:   07/16/18 123/73   05/29/18 122/74   05/14/18 125/79    Weight from Last 3 Encounters:   07/16/18 71.2 kg (157 lb)   05/29/18 71.2 kg (157 lb)   05/14/18 72.6 kg (160 lb)              We Performed the Following     DRAIN/INJECT LARGE JOINT/BURSA     METHYLPREDNISOLONE 80 MG INJ          Today's Medication Changes          These changes are accurate as of 7/16/18  2:37 PM.  If you have any questions, ask your nurse or doctor.             "   Start taking these medicines.        Dose/Directions    methylPREDNISolone acetate 80 MG/ML injection   Commonly known as:  DEPO-MEDROL   Used for:  Primary osteoarthritis of both knees   Started by:  Sheng Montoya MD        Dose:  320 mg   4 mLs (320 mg) by INTRA-ARTICULAR route once for 1 dose   Quantity:  4 mL   Refills:  0            Where to get your medicines      Some of these will need a paper prescription and others can be bought over the counter.  Ask your nurse if you have questions.     You don't need a prescription for these medications     methylPREDNISolone acetate 80 MG/ML injection                Primary Care Provider Office Phone # Fax #    Micheal Blake [phone] [address] [address]        Equal Access to Services     [facility] HospitalAUT[facility]N : Hadii mahendra mckeon hadgwyno Soxochilt, waaxda luqadaha, qaybta kaalmada adeeddieyada, kaylene benjamin . So [facility] [phone].    ATENCIÓN: Si habla español, tiene a parra disposición servicios gratuitos de asistencia lingüística. LlMary [facility] [phone].    We comply with applicable federal civil rights laws and [place] laws. We do not discriminate on the basis of race, color, national origin, age, disability, sex, sexual orientation, or gender identity.            Thank you!     Thank you for choosing [facility]  for your care. Our goal is always to provide you with excellent care. Hearing back from our patients is one way we can continue to improve our services. Please take a few minutes to complete the written survey that you may receive in the mail after your visit with us. Thank you!             Your Updated Medication List - Protect others around you: Learn how to safely use, store and throw away your medicines at www.disposemymeds.org.          This list is accurate as of 7/16/18  2:37 PM.  Always use your most recent med list.                   Brand Name Dispense Instructions for  use Diagnosis    * albuterol (2.5 MG/3ML) 0.083% neb solution     270 mL    INHALE CONTENTS OF ONE VIAL VIA NEBULIZER EVERY SIX HOURS AS NEEDED FOR SHORTNESS OF BREATH/WHEEZING    Centrilobular emphysema (H)       * albuterol (2.5 MG/3ML) 0.083% neb solution     270 mL    NEBULIZE AND INHALE 1 VIAL BY MOUTH EVERY 6 HOURS AS NEEDED FOR WHEEZING / SHORTNESS OF BREATH    Centrilobular emphysema (H)       cyanocobalamin 1000 MCG Tabs      Take  by mouth daily.        diclofenac 1 % Gel topical gel    VOLTAREN    1 Tube    Place 2 g onto the skin 4 times daily    Stiffness of neck       Ipratropium-Albuterol  MCG/ACT inhaler    COMBIVENT RESPIMAT    1 Inhaler    Inhale 1 puff into the lungs 4 times daily    Centrilobular emphysema (H)       methylPREDNISolone acetate 80 MG/ML injection    DEPO-MEDROL    4 mL    4 mLs (320 mg) by INTRA-ARTICULAR route once for 1 dose    Primary osteoarthritis of both knees       order for DME     1 Device    Equipment being ordered: nebulizer machine    COPD (chronic obstructive pulmonary disease) (H)       * simvastatin 80 MG tablet    ZOCOR    45 tablet    TAKE ONE-HALF TABLET BY MOUTH EVERY NIGHT AT BEDTIME    Hyperlipidemia LDL goal <130       * simvastatin 10 MG tablet    ZOCOR    90 tablet    TAKE ONE TABLET BY MOUTH EVERY DAY AT BEDTIME    Hyperlipidemia LDL goal <130       TYLENOL 325 MG tablet   Generic drug:  acetaminophen      Take 1-2 tablets by mouth at onset of headache.        vitamin D 1000 units capsule      Take 1 capsule by mouth daily.        * Notice:  This list has 4 medication(s) that are the same as other medications prescribed for you. Read the directions carefully, and ask your doctor or other care provider to review them with you.

## 2018-08-28 ENCOUNTER — OFFICE VISIT (OUTPATIENT)
Dept: ORTHOPEDICS | Facility: CLINIC | Age: 83
End: 2018-08-28
Payer: COMMERCIAL

## 2018-08-28 ENCOUNTER — RADIANT APPOINTMENT (OUTPATIENT)
Dept: GENERAL RADIOLOGY | Facility: CLINIC | Age: 83
End: 2018-08-28
Attending: ORTHOPAEDIC SURGERY
Payer: COMMERCIAL

## 2018-08-28 VITALS — SYSTOLIC BLOOD PRESSURE: 124 MMHG | HEART RATE: 102 BPM | DIASTOLIC BLOOD PRESSURE: 67 MMHG

## 2018-08-28 DIAGNOSIS — M17.0 PRIMARY OSTEOARTHRITIS OF BOTH KNEES: Primary | ICD-10-CM

## 2018-08-28 DIAGNOSIS — M17.0 PRIMARY OSTEOARTHRITIS OF BOTH KNEES: ICD-10-CM

## 2018-08-28 PROCEDURE — 73562 X-RAY EXAM OF KNEE 3: CPT | Mod: RT

## 2018-08-28 PROCEDURE — 20610 DRAIN/INJ JOINT/BURSA W/O US: CPT | Mod: 50 | Performed by: ORTHOPAEDIC SURGERY

## 2018-08-28 RX ORDER — LIDOCAINE HYDROCHLORIDE 10 MG/ML
4 INJECTION, SOLUTION INFILTRATION; PERINEURAL
Status: DISCONTINUED | OUTPATIENT
Start: 2018-08-28 | End: 2019-03-04

## 2018-08-28 RX ORDER — METHYLPREDNISOLONE ACETATE 80 MG/ML
80 INJECTION, SUSPENSION INTRA-ARTICULAR; INTRALESIONAL; INTRAMUSCULAR; SOFT TISSUE
Status: DISCONTINUED | OUTPATIENT
Start: 2018-08-28 | End: 2019-03-04

## 2018-08-28 RX ADMIN — METHYLPREDNISOLONE ACETATE 80 MG: 80 INJECTION, SUSPENSION INTRA-ARTICULAR; INTRALESIONAL; INTRAMUSCULAR; SOFT TISSUE at 13:25

## 2018-08-28 RX ADMIN — LIDOCAINE HYDROCHLORIDE 4 ML: 10 INJECTION, SOLUTION INFILTRATION; PERINEURAL at 13:25

## 2018-08-28 ASSESSMENT — PAIN SCALES - GENERAL: PAINLEVEL: NO PAIN (0)

## 2018-08-28 NOTE — MR AVS SNAPSHOT
After Visit Summary   8/28/2018    Lang Christina    MRN: [id number]           Patient Information     Date Of Birth          6/3/1928        Visit Information        Provider Department      8/28/2018 1:00 PM Edgardo Esteban MD [facility]        Today's Diagnoses     Primary osteoarthritis of both knees    -  1       Follow-ups after your visit        Who to contact     If you have questions or need follow up information about today's clinic visit or your schedule please contact [facility] directly at [phone].  Normal or non-critical lab and imaging results will be communicated to you by MyChart, letter or phone within 4 business days after the clinic has received the results. If you do not hear from us within 7 days, please contact the clinic through MyChart or phone. If you have a critical or abnormal lab result, we will notify you by phone as soon as possible.  Submit refill requests through "[employer] or call your pharmacy and they will forward the refill request to us. Please allow 3 business days for your refill to be completed.          Additional Information About Your Visit        Care EveryWhere ID     This is your Care EveryWhere ID. This could be used by other organizations to access your [facility] medical records  [id number]        Your Vitals Were     Pulse                   102            Blood Pressure from Last 3 Encounters:   08/28/18 124/67   07/16/18 123/73   05/29/18 122/74    Weight from Last 3 Encounters:   07/16/18 71.2 kg (157 lb)   05/29/18 71.2 kg (157 lb)   05/14/18 72.6 kg (160 lb)              We Performed the Following     Large Joint Injection/Arthocentesis        Primary Care Provider Office Phone # Fax #    Micheal Blake [phone] [address] [facility] [address]        Equal Access to Services     DAWIT ROBLES AH: beata Lim, kaylene amin  murtaza benjamin ah. So [facility] [phone].    ATENCIÓN: Si habla español, tiene a parra disposición servicios gratuitos de asistencia lingüística. Brittaney al [phone].    We comply with applicable federal civil rights laws and [place] laws. We do not discriminate on the basis of race, color, national origin, age, disability, sex, sexual orientation, or gender identity.            Thank you!     Thank you for choosing [facility]  for your care. Our goal is always to provide you with excellent care. Hearing back from our patients is one way we can continue to improve our services. Please take a few minutes to complete the written survey that you may receive in the mail after your visit with us. Thank you!             Your Updated Medication List - Protect others around you: Learn how to safely use, store and throw away your medicines at www.disposemymeds.org.          This list is accurate as of 8/28/18  2:03 PM.  Always use your most recent med list.                   Brand Name Dispense Instructions for use Diagnosis    * albuterol (2.5 MG/3ML) 0.083% neb solution     270 mL    INHALE CONTENTS OF ONE VIAL VIA NEBULIZER EVERY SIX HOURS AS NEEDED FOR SHORTNESS OF BREATH/WHEEZING    Centrilobular emphysema (H)       * albuterol (2.5 MG/3ML) 0.083% neb solution     270 mL    NEBULIZE AND INHALE 1 VIAL BY MOUTH EVERY 6 HOURS AS NEEDED FOR WHEEZING / SHORTNESS OF BREATH    Centrilobular emphysema (H)       cyanocobalamin 1000 MCG Tabs      Take  by mouth daily.        diclofenac 1 % Gel topical gel    VOLTAREN    1 Tube    Place 2 g onto the skin 4 times daily    Stiffness of neck       Ipratropium-Albuterol  MCG/ACT inhaler    COMBIVENT RESPIMAT    1 Inhaler    Inhale 1 puff into the lungs 4 times daily    Centrilobular emphysema (H)       order for DME     1 Device    Equipment being ordered: nebulizer machine    COPD (chronic obstructive pulmonary disease) (H)       * simvastatin 80 MG  tablet    ZOCOR    45 tablet    TAKE ONE-HALF TABLET BY MOUTH EVERY NIGHT AT BEDTIME    Hyperlipidemia LDL goal <130       * simvastatin 10 MG tablet    ZOCOR    90 tablet    TAKE ONE TABLET BY MOUTH EVERY DAY AT BEDTIME    Hyperlipidemia LDL goal <130       TYLENOL 325 MG tablet   Generic drug:  acetaminophen      Take 1-2 tablets by mouth at onset of headache.        vitamin D 1000 units capsule      Take 1 capsule by mouth daily.        * Notice:  This list has 4 medication(s) that are the same as other medications prescribed for you. Read the directions carefully, and ask your doctor or other care provider to review them with you.

## 2018-08-28 NOTE — PROGRESS NOTES
Large Joint Injection/Arthocentesis  Date/Time: 8/28/2018 1:25 PM  Performed by: IZABELLA QUAN  Authorized by: IZABELLA QUAN     Indications:  Osteoarthritis and pain  Needle Size:  22 G  Guidance: landmark guided    Approach:  Anterolateral  Location:  Knee  Laterality:  Bilateral  Site:  Bilateral knee  Medications (Right):  80 mg methylPREDNISolone acetate 80 MG/ML; 4 mL lidocaine 1 %  Medications (Left):  80 mg methylPREDNISolone acetate 80 MG/ML; 4 mL lidocaine 1 %  Outcome:  Tolerated well, no immediate complications  Consent Given by:  Patient  Timeout: timeout called immediately prior to procedure    Prep: patient was prepped and draped in usual sterile fashion     Bilateral procedure knees

## 2018-08-28 NOTE — LETTER
8/28/2018         RE: Lang Christina  [address] [place] [address]        Dear Colleague,    Thank you for referring your patient, Lang Christina, to the [facility]. Please see a copy of my visit note below.    SUBJECTIVE:   Lang Christina, who is a patient of Dr. West, who is here in follow up of bilateral knee osteoarthritis. The patient has had many injections in the past. He typically receives bilateral steroid injections every 2-3 months, and occasionally shots have been a month apart. His most recent injection was on 07/16/18 and the patient didn't get much relief.    Review of Systems:  Constitutional/General: Negative for fever, chills, change in weight  Integumentary/Skin: Negative for worrisome rashes, moles, or lesions  Neuro: Negative for weakness, dizziness, or paresthesias   Psychiatric: negative for changes in mood or affect    OBJECTIVE:  Physical Exam:  /67 (BP Location: Left arm, Patient Position: Sitting, Cuff Size: Adult Regular)  Pulse 102  General Appearance: healthy, alert and no distress   Skin: no suspicious lesions or rashes  Neuro: Normal strength and tone, mentation intact and speech normal  Vascular: good pulses, and capillary refill   Lymph: no lymphadenopathy   Psych:  mentation appears normal and affect normal/bright  Resp: no increased work of breathing    Bilateral Knee Exam:   Left Knee Right Knee   Alignment moderate varus moderate varus   Patellofemoral Joint moderate crepitations moderate crepitations   ROM 10-95 degrees  0-95 degrees      X-rays:  Obtained today, 08/28/18, of the BILATERAL KNEES: 3-views, reviewed in the office with the patient by myself today and show severe osteoarthritis without much change or bone loss compared with x-rays from 2012.     ASSESSMENT:   Severe OA, bilateral knee    PLAN:   We decided to proceed with repeat bilateral steroid injections.  We did discuss the potential drawbacks of repeated  injections, of which he has had many.  He does not want surgery. With the patient's consent, bilateral knee(s) injected intra-articularly with 80mg of Depomedrol and 4cc of local anesthetic after sterile prep.  He wanted to know if there were any other options for him.  We briefly talked about other things such as viscosupplementation injections and  bracing options.  Other possibilities include Coolief RF ablation.  He can discuss these with Dr. Montoya in more detail.  All questions were answered.     Return to clinic: JOSE ELIAS Quan MD  Dept. Orthopedic Surgery  [facility]     This document serves as a record of the services and decisions personally performed and made by Dr. BREANNA Quan MD. It was created on his behalf by Ayaz De Leon, a trained medical scribe. The creation of this record is based on the provider's personal observations and the statements of the patient. This document has been checked and approved by the attending provider.   Ayaz De Leon August 28, 2018 1:28 PM     Large Joint Injection/Arthocentesis  Date/Time: 8/28/2018 1:25 PM  Performed by: IZABELLA QUAN  Authorized by: IZABELLA QUAN     Indications:  Osteoarthritis and pain  Needle Size:  22 G  Guidance: landmark guided    Approach:  Anterolateral  Location:  Knee  Laterality:  Bilateral  Site:  Bilateral knee  Medications (Right):  80 mg methylPREDNISolone acetate 80 MG/ML; 4 mL lidocaine 1 %  Medications (Left):  80 mg methylPREDNISolone acetate 80 MG/ML; 4 mL lidocaine 1 %  Outcome:  Tolerated well, no immediate complications  Consent Given by:  Patient  Timeout: timeout called immediately prior to procedure    Prep: patient was prepped and draped in usual sterile fashion     Bilateral procedure knees          Again, thank you for allowing me to participate in the care of your patient.        Sincerely,        Izabella Quan MD

## 2018-08-28 NOTE — PROGRESS NOTES
SUBJECTIVE:   Lang Christina, who is a patient of Dr. West, who is here in follow up of bilateral knee osteoarthritis. The patient has had many injections in the past. He typically receives bilateral steroid injections every 2-3 months, and occasionally shots have been a month apart. His most recent injection was on 07/16/18 and the patient didn't get much relief.    Review of Systems:  Constitutional/General: Negative for fever, chills, change in weight  Integumentary/Skin: Negative for worrisome rashes, moles, or lesions  Neuro: Negative for weakness, dizziness, or paresthesias   Psychiatric: negative for changes in mood or affect    OBJECTIVE:  Physical Exam:  /67 (BP Location: Left arm, Patient Position: Sitting, Cuff Size: Adult Regular)  Pulse 102  General Appearance: healthy, alert and no distress   Skin: no suspicious lesions or rashes  Neuro: Normal strength and tone, mentation intact and speech normal  Vascular: good pulses, and capillary refill   Lymph: no lymphadenopathy   Psych:  mentation appears normal and affect normal/bright  Resp: no increased work of breathing    Bilateral Knee Exam:   Left Knee Right Knee   Alignment moderate varus moderate varus   Patellofemoral Joint moderate crepitations moderate crepitations   ROM 10-95 degrees  0-95 degrees      X-rays:  Obtained today, 08/28/18, of the BILATERAL KNEES: 3-views, reviewed in the office with the patient by myself today and show severe osteoarthritis without much change or bone loss compared with x-rays from 2012.     ASSESSMENT:   Severe OA, bilateral knee    PLAN:   We decided to proceed with repeat bilateral steroid injections.  We did discuss the potential drawbacks of repeated injections, of which he has had many.  He does not want surgery. With the patient's consent, bilateral knee(s) injected intra-articularly with 80mg of Depomedrol and 4cc of local anesthetic after sterile prep.  He wanted to know if there were any other  options for him.  We briefly talked about other things such as viscosupplementation injections and  bracing options.  Other possibilities include Coolief RF ablation.  He can discuss these with Dr. Montoya in more detail.  All questions were answered.     Return to clinic: JOSE ELIAS Esteban MD  Dept. Orthopedic Surgery  [facility]     This document serves as a record of the services and decisions personally performed and made by Dr. BREANNA Esteban MD. It was created on his behalf by Ayaz De Leon, a trained medical scribe. The creation of this record is based on the provider's personal observations and the statements of the patient. This document has been checked and approved by the attending provider.   Ayaz De Leon August 28, 2018 1:28 PM

## 2018-09-12 DIAGNOSIS — J43.2 CENTRILOBULAR EMPHYSEMA (H): ICD-10-CM

## 2018-09-13 RX ORDER — ALBUTEROL SULFATE 0.83 MG/ML
SOLUTION RESPIRATORY (INHALATION)
Qty: 270 ML | Refills: 1 | Status: SHIPPED | OUTPATIENT
Start: 2018-09-13 | End: 2018-11-12

## 2018-10-11 ENCOUNTER — OFFICE VISIT (OUTPATIENT)
Dept: ORTHOPEDICS | Facility: CLINIC | Age: 83
End: 2018-10-11
Payer: COMMERCIAL

## 2018-10-11 VITALS
SYSTOLIC BLOOD PRESSURE: 137 MMHG | OXYGEN SATURATION: 92 % | HEART RATE: 101 BPM | WEIGHT: 157 LBS | DIASTOLIC BLOOD PRESSURE: 82 MMHG | RESPIRATION RATE: 13 BRPM | HEIGHT: 65 IN | BODY MASS INDEX: 26.16 KG/M2

## 2018-10-11 DIAGNOSIS — M17.0 PRIMARY OSTEOARTHRITIS OF BOTH KNEES: Primary | ICD-10-CM

## 2018-10-11 PROCEDURE — 20610 DRAIN/INJ JOINT/BURSA W/O US: CPT | Mod: 50 | Performed by: ORTHOPAEDIC SURGERY

## 2018-10-11 RX ORDER — METHYLPREDNISOLONE ACETATE 80 MG/ML
320 INJECTION, SUSPENSION INTRA-ARTICULAR; INTRALESIONAL; INTRAMUSCULAR; SOFT TISSUE ONCE
Qty: 4 ML | Refills: 0 | OUTPATIENT
Start: 2018-10-11 | End: 2019-03-04

## 2018-10-11 NOTE — MR AVS SNAPSHOT
"              After Visit Summary   10/11/2018    Lang Christina    MRN: [id number]           Patient Information     Date Of Birth          6/3/1928        Visit Information        Provider Department      10/11/2018 1:00 PM Sheng Montoya MD [facility]        Today's Diagnoses     Primary osteoarthritis of both knees    -  1       Follow-ups after your visit        Who to contact     If you have questions or need follow up information about today's clinic visit or your schedule please contact [facility] directly at [phone].  Normal or non-critical lab and imaging results will be communicated to you by MyChart, letter or phone within 4 business days after the clinic has received the results. If you do not hear from us within 7 days, please contact the clinic through MyChart or phone. If you have a critical or abnormal lab result, we will notify you by phone as soon as possible.  Submit refill requests through [employer] or call your pharmacy and they will forward the refill request to us. Please allow 3 business days for your refill to be completed.          Additional Information About Your Visit        Care EveryWhere ID     This is your Care EveryWhere ID. This could be used by other organizations to access your [place] medical records  [id number]        Your Vitals Were     Pulse Respirations Height Pulse Oximetry BMI (Body Mass Index)       101 13 1.651 m (5' 5\") 92% 26.13 kg/m2        Blood Pressure from Last 3 Encounters:   10/11/18 137/82   08/28/18 124/67   07/16/18 123/73    Weight from Last 3 Encounters:   10/11/18 71.2 kg (157 lb)   07/16/18 71.2 kg (157 lb)   05/29/18 71.2 kg (157 lb)              We Performed the Following     DRAIN/INJECT LARGE JOINT/BURSA     METHYLPREDNISOLONE 80 MG INJ          Today's Medication Changes          These changes are accurate as of 10/11/18  5:46 PM.  If you have any questions, ask your nurse or doctor.               Start " taking these medicines.        Dose/Directions    methylPREDNISolone acetate 80 MG/ML injection   Commonly known as:  DEPO-MEDROL   Used for:  Primary osteoarthritis of both knees   Started by:  Sheng Montoya MD        Dose:  320 mg   4 mLs (320 mg) by INTRA-ARTICULAR route once for 1 dose   Quantity:  4 mL   Refills:  0            Where to get your medicines      Some of these will need a paper prescription and others can be bought over the counter.  Ask your nurse if you have questions.     You don't need a prescription for these medications     methylPREDNISolone acetate 80 MG/ML injection                Primary Care Provider Office Phone # Fax #    Micheal Blkae [phone] [address] [facility] [id number]        Equal Access to Services     [facility]: Hadii mahendra mckeon hadgwyno Soxochilt, waaxda luqadaha, qaybta kaalmada adeeddieyajose, kaylene benjamin . So [facility] [phone].    ATENCIÓN: Si habla español, tiene a parra disposición servicios gratuitos de asistencia lingüística. [facility] [phone].    We comply with applicable federal civil rights laws and [place] laws. We do not discriminate on the basis of race, color, national origin, age, disability, sex, sexual orientation, or gender identity.            Thank you!     Thank you for choosing [facility]  for your care. Our goal is always to provide you with excellent care. Hearing back from our patients is one way we can continue to improve our services. Please take a few minutes to complete the written survey that you may receive in the mail after your visit with us. Thank you!             Your Updated Medication List - Protect others around you: Learn how to safely use, store and throw away your medicines at www.disposemymeds.org.          This list is accurate as of 10/11/18  5:46 PM.  Always use your most recent med list.                   Brand Name Dispense Instructions for use  Diagnosis    * albuterol (2.5 MG/3ML) 0.083% neb solution     270 mL    INHALE CONTENTS OF ONE VIAL VIA NEBULIZER EVERY SIX HOURS AS NEEDED FOR SHORTNESS OF BREATH/WHEEZING    Centrilobular emphysema (H)       * albuterol (2.5 MG/3ML) 0.083% neb solution     270 mL    NEBULIZE AND INHALE CONTENTS OF 1 VIAL BY MOUTH EVERY 6 HOURS AS NEEDED FOR WHEEZING/SHORTNESS OF BREATH    Centrilobular emphysema (H)       cyanocobalamin 1000 MCG Tabs      Take  by mouth daily.        diclofenac 1 % Gel topical gel    VOLTAREN    1 Tube    Place 2 g onto the skin 4 times daily    Stiffness of neck       Ipratropium-Albuterol  MCG/ACT inhaler    COMBIVENT RESPIMAT    1 Inhaler    Inhale 1 puff into the lungs 4 times daily    Centrilobular emphysema (H)       methylPREDNISolone acetate 80 MG/ML injection    DEPO-MEDROL    4 mL    4 mLs (320 mg) by INTRA-ARTICULAR route once for 1 dose    Primary osteoarthritis of both knees       order for DME     1 Device    Equipment being ordered: nebulizer machine    COPD (chronic obstructive pulmonary disease) (H)       * simvastatin 80 MG tablet    ZOCOR    45 tablet    TAKE ONE-HALF TABLET BY MOUTH EVERY NIGHT AT BEDTIME    Hyperlipidemia LDL goal <130       * simvastatin 10 MG tablet    ZOCOR    90 tablet    TAKE ONE TABLET BY MOUTH EVERY DAY AT BEDTIME    Hyperlipidemia LDL goal <130       TYLENOL 325 MG tablet   Generic drug:  acetaminophen      Take 1-2 tablets by mouth at onset of headache.        vitamin D 1000 units capsule      Take 1 capsule by mouth daily.        * Notice:  This list has 4 medication(s) that are the same as other medications prescribed for you. Read the directions carefully, and ask your doctor or other care provider to review them with you.

## 2018-10-11 NOTE — PROGRESS NOTES
Follow up bilateral primary knee osteoarthritis.  Last injection 8/28/18.  We usually use a double dose.  He has severe osteoarthritis by x-ray.  Range of motion 4-120 on left, 2-120 on right.    He  desires injection today of bilateral knee(s).  Risks, benefits, potential complications and alternatives were discussed.   With the patient's consent, sterile prep was performed of bilateral knee(s).  Each knee was injected with Depo Medrol 160 mg and lidocaine at anteromedial site.    Return to clinic as needed.

## 2018-10-11 NOTE — PROGRESS NOTES
The patient's left and right knees were prepped with betadine solution after verification of allergies. Area approximately 10 cm x 10 cm prepped in a sterile fashion. After injection, betadine removed with soap and water and band-aids applied.    2ml depo-medrol with 1% lidocaine plain injected into each knee (for a total of 4 mL depo-medrol) by Dr. Sheng Montoya  LOT# [id number]  Exp. 11/19    Damien Cervantes PA-C  Supervising physician: Sheng Montoya MD  Dept. of Orthopedics  [facility]

## 2018-10-11 NOTE — LETTER
10/11/2018         RE: Lang Christina  [address] [place] [address]        Dear Colleague,    Thank you for referring your patient, Lang Christina, to the [facility]. Please see a copy of my visit note below.    The patient's left and right knees were prepped with betadine solution after verification of allergies. Area approximately 10 cm x 10 cm prepped in a sterile fashion. After injection, betadine removed with soap and water and band-aids applied.    2ml depo-medrol with 1% lidocaine plain injected into each knee (for a total of 4 mL depo-medrol) by Dr. Sheng Montoya  LOT# [id number]  Exp. 11/19    Damien Cervantes PA-C  Supervising physician: Sheng Montoya MD  Dept. of Orthopedics  [facility]          Follow up bilateral primary knee osteoarthritis.  Last injection 8/28/18.  We usually use a double dose.  He has severe osteoarthritis by x-ray.  Range of motion 4-120 on left, 2-120 on right.    He  desires injection today of bilateral knee(s).  Risks, benefits, potential complications and alternatives were discussed.   With the patient's consent, sterile prep was performed of bilateral knee(s).  Each knee was injected with Depo Medrol 160 mg and lidocaine at anteromedial site.    Return to clinic as needed.         Again, thank you for allowing me to participate in the care of your patient.        Sincerely,        Sheng Montoya MD

## 2018-10-16 ENCOUNTER — ALLIED HEALTH/NURSE VISIT (OUTPATIENT)
Dept: NURSING | Facility: CLINIC | Age: 83
End: 2018-10-16
Payer: COMMERCIAL

## 2018-10-16 DIAGNOSIS — Z23 NEED FOR PROPHYLACTIC VACCINATION AND INOCULATION AGAINST INFLUENZA: Primary | ICD-10-CM

## 2018-10-16 PROCEDURE — G0008 ADMIN INFLUENZA VIRUS VAC: HCPCS

## 2018-10-16 PROCEDURE — 90662 IIV NO PRSV INCREASED AG IM: CPT

## 2018-10-16 NOTE — MR AVS SNAPSHOT
After Visit Summary   10/16/2018    Lang Christina    MRN: [id number]           Patient Information     Date Of Birth          6/3/1928        Visit Information        Provider Department      10/16/2018 11:00 AM [facility]        Today's Diagnoses     Need for prophylactic vaccination and inoculation against influenza    -  1       Follow-ups after your visit        Who to contact     If you have questions or need follow up information about today's clinic visit or your schedule please contact [facility] directly at [phone].  Normal or non-critical lab and imaging results will be communicated to you by MyChart, letter or phone within 4 business days after the clinic has received the results. If you do not hear from us within 7 days, please contact the clinic through MyChart or phone. If you have a critical or abnormal lab result, we will notify you by phone as soon as possible.  Submit refill requests through [employer] or call your pharmacy and they will forward the refill request to us. Please allow 3 business days for your refill to be completed.          Additional Information About Your Visit        Care EveryWhere ID     This is your Care EveryWhere ID. This could be used by other organizations to access your [place] medical records  [id number]         Blood Pressure from Last 3 Encounters:   10/11/18 137/82   08/28/18 124/67   07/16/18 123/73    Weight from Last 3 Encounters:   10/11/18 157 lb (71.2 kg)   07/16/18 157 lb (71.2 kg)   05/29/18 157 lb (71.2 kg)              We Performed the Following     FLU VACCINE, INCREASED ANTIGEN, PRESV FREE, AGE 65+ [63921]     Vaccine Administration, Initial [34224]        Primary Care Provider Office Phone # Fax #    Micheal Blake [phone]       [address] [facility] [place] [address]        Equal Access to Services     DAWIT ROBLES AH: Karson Avalos, beata cadet, jorge  kaylene medrano addisonhomer christyeddie benjamin ah. Veda [facility] [phone].    ATENCIÓN: Si greta alfonso, tiene a parra disposición servicios gratuitos de asistencia lingüística. Brittaney al [phone].    We comply with applicable federal civil rights laws and [place] laws. We do not discriminate on the basis of race, color, national origin, age, disability, sex, sexual orientation, or gender identity.            Thank you!     Thank you for choosing [facility]  for your care. Our goal is always to provide you with excellent care. Hearing back from our patients is one way we can continue to improve our services. Please take a few minutes to complete the written survey that you may receive in the mail after your visit with us. Thank you!             Your Updated Medication List - Protect others around you: Learn how to safely use, store and throw away your medicines at www.disposemymeds.org.          This list is accurate as of 10/16/18 11:11 AM.  Always use your most recent med list.                   Brand Name Dispense Instructions for use Diagnosis    * albuterol (2.5 MG/3ML) 0.083% neb solution     270 mL    INHALE CONTENTS OF ONE VIAL VIA NEBULIZER EVERY SIX HOURS AS NEEDED FOR SHORTNESS OF BREATH/WHEEZING    Centrilobular emphysema (H)       * albuterol (2.5 MG/3ML) 0.083% neb solution     270 mL    NEBULIZE AND INHALE CONTENTS OF 1 VIAL BY MOUTH EVERY 6 HOURS AS NEEDED FOR WHEEZING/SHORTNESS OF BREATH    Centrilobular emphysema (H)       cyanocobalamin 1000 MCG Tabs      Take  by mouth daily.        diclofenac 1 % Gel topical gel    VOLTAREN    1 Tube    Place 2 g onto the skin 4 times daily    Stiffness of neck       Ipratropium-Albuterol  MCG/ACT inhaler    COMBIVENT RESPIMAT    1 Inhaler    Inhale 1 puff into the lungs 4 times daily    Centrilobular emphysema (H)       order for DME     1 Device    Equipment being ordered: nebulizer machine    COPD (chronic obstructive  pulmonary disease) (H)       * simvastatin 80 MG tablet    ZOCOR    45 tablet    TAKE ONE-HALF TABLET BY MOUTH EVERY NIGHT AT BEDTIME    Hyperlipidemia LDL goal <130       * simvastatin 10 MG tablet    ZOCOR    90 tablet    TAKE ONE TABLET BY MOUTH EVERY DAY AT BEDTIME    Hyperlipidemia LDL goal <130       TYLENOL 325 MG tablet   Generic drug:  acetaminophen      Take 1-2 tablets by mouth at onset of headache.        vitamin D 1000 units capsule      Take 1 capsule by mouth daily.        * Notice:  This list has 4 medication(s) that are the same as other medications prescribed for you. Read the directions carefully, and ask your doctor or other care provider to review them with you.

## 2018-10-16 NOTE — PROGRESS NOTES

## 2018-11-12 DIAGNOSIS — E78.5 HYPERLIPIDEMIA LDL GOAL <130: ICD-10-CM

## 2018-11-12 DIAGNOSIS — J43.2 CENTRILOBULAR EMPHYSEMA (H): ICD-10-CM

## 2018-11-13 RX ORDER — ALBUTEROL SULFATE 0.83 MG/ML
SOLUTION RESPIRATORY (INHALATION)
Qty: 270 ML | Refills: 1 | Status: SHIPPED | OUTPATIENT
Start: 2018-11-13 | End: 2018-12-11

## 2018-11-13 RX ORDER — SIMVASTATIN 10 MG
TABLET ORAL
Qty: 90 TABLET | Refills: 0 | Status: SHIPPED | OUTPATIENT
Start: 2018-11-13 | End: 2019-02-19

## 2018-11-13 NOTE — TELEPHONE ENCOUNTER
"Prescription approved per [facility] Refill Protocol.      Requested Prescriptions   Signed Prescriptions Disp Refills     simvastatin (ZOCOR) 10 MG tablet 90 tablet 0     Sig: TAKE ONE TABLET BY MOUTH EVERY DAY AT BEDTIME    Statins Protocol Passed    11/13/2018  5:23 PM       Passed - LDL on file in past 12 months    Recent Labs   Lab Test  12/07/17   1509   LDL  65            Passed - No abnormal creatine kinase in past 12 months    No lab results found.            Passed - Recent (12 mo) or future (30 days) visit within the authorizing provider's specialty    Patient had office visit in the last 12 months or has a visit in the next 30 days with authorizing provider or within the authorizing provider's specialty.  See \"Patient Info\" tab in inbasket, or \"Choose Columns\" in Meds & Orders section of the refill encounter.             Passed - Patient is age 18 or older        albuterol (2.5 MG/3ML) 0.083% neb solution 270 mL 1     Sig: NEBULIZE AND INHALE 1 VIAL BY MOUTH EVERY 6 HOURS AS NEEDED FOR WHEEZING/SHORTNESS OF BREATH    Asthma Maintenance Inhalers - Anticholinergics Passed    11/13/2018  5:23 PM       Passed - Patient is age 12 years or older       Passed - Recent (12 mo) or future (30 days) visit within the authorizing provider's specialty    Patient had office visit in the last 12 months or has a visit in the next 30 days with authorizing provider or within the authorizing provider's specialty.  See \"Patient Info\" tab in inbasket, or \"Choose Columns\" in Meds & Orders section of the refill encounter.              Ellen Braswell, RN  [facility]    "

## 2018-12-11 DIAGNOSIS — J43.2 CENTRILOBULAR EMPHYSEMA (H): ICD-10-CM

## 2018-12-13 RX ORDER — ALBUTEROL SULFATE 0.83 MG/ML
SOLUTION RESPIRATORY (INHALATION)
Qty: 270 ML | Refills: 1 | Status: SHIPPED | OUTPATIENT
Start: 2018-12-13 | End: 2019-03-11

## 2019-01-14 ENCOUNTER — OFFICE VISIT (OUTPATIENT)
Dept: ORTHOPEDICS | Facility: CLINIC | Age: 84
End: 2019-01-14
Payer: COMMERCIAL

## 2019-01-14 VITALS
HEIGHT: 65 IN | OXYGEN SATURATION: 90 % | WEIGHT: 157 LBS | DIASTOLIC BLOOD PRESSURE: 81 MMHG | SYSTOLIC BLOOD PRESSURE: 120 MMHG | BODY MASS INDEX: 26.16 KG/M2 | RESPIRATION RATE: 13 BRPM | HEART RATE: 98 BPM

## 2019-01-14 DIAGNOSIS — M17.0 PRIMARY OSTEOARTHRITIS OF BOTH KNEES: Primary | ICD-10-CM

## 2019-01-14 PROCEDURE — 20610 DRAIN/INJ JOINT/BURSA W/O US: CPT | Mod: 50 | Performed by: ORTHOPAEDIC SURGERY

## 2019-01-14 RX ORDER — METHYLPREDNISOLONE ACETATE 80 MG/ML
320 INJECTION, SUSPENSION INTRA-ARTICULAR; INTRALESIONAL; INTRAMUSCULAR; SOFT TISSUE ONCE
Qty: 4 ML | Refills: 0 | OUTPATIENT
Start: 2019-01-14 | End: 2019-03-04

## 2019-01-14 ASSESSMENT — MIFFLIN-ST. JEOR: SCORE: 1299.03

## 2019-01-14 NOTE — LETTER
1/14/2019         RE: Lang Christina  [address] [place] [address]        Dear Colleague,    Thank you for referring your patient, Lang Christina, to the [facility]. Please see a copy of my visit note below.    Follow up bilateral primary knee osteoarthritis.  Last injection 10/11/18.  We usually use a double dose.  He has severe osteoarthritis by x-ray.  Range of motion 4-120 on left, 2-120 on right.    He  desires injection today of bilateral knee(s).  Risks, benefits, potential complications and alternatives were discussed.   With the patient's consent, sterile prep was performed of bilateral knee(s).  Each knee was injected with Depo Medrol 160 mg and lidocaine at anteromedial site.    Return to clinic as needed.       The patient's left and right knees were prepped with betadine solution after verification of allergies. Area approximately 10 cm x 10 cm prepped in a sterile fashion. After injection, betadine removed with soap and water and band-aids applied.    2ml depo-medrol with 1% lidocaine plain injected into each knee (for a total of 4 mL depo-medrol) by Dr. Sheng Montoya  LOT# [id number]  Exp. 12/19    BITA VizcarraC  Supervising physician: Sheng Montoya MD  Dept. of Orthopedics  [facility]          Again, thank you for allowing me to participate in the care of your patient.        Sincerely,        Sheng Montoya MD

## 2019-01-14 NOTE — PROGRESS NOTES
The patient's left and right knees were prepped with betadine solution after verification of allergies. Area approximately 10 cm x 10 cm prepped in a sterile fashion. After injection, betadine removed with soap and water and band-aids applied.    2ml depo-medrol with 1% lidocaine plain injected into each knee (for a total of 4 mL depo-medrol) by Dr. Sheng Montoya  LOT# [id number]  Exp. 12/19    Damien Cervantes PA-C  Supervising physician: Sheng Montoya MD  Dept. of Orthopedics  [facility]

## 2019-01-14 NOTE — PROGRESS NOTES
Follow up bilateral primary knee osteoarthritis.  Last injection 10/11/18.  We usually use a double dose.  He has severe osteoarthritis by x-ray.  Range of motion 4-120 on left, 2-120 on right.    He  desires injection today of bilateral knee(s).  Risks, benefits, potential complications and alternatives were discussed.   With the patient's consent, sterile prep was performed of bilateral knee(s).  Each knee was injected with Depo Medrol 160 mg and lidocaine at anteromedial site.    Return to clinic as needed.

## 2019-02-19 DIAGNOSIS — E78.5 HYPERLIPIDEMIA LDL GOAL <130: ICD-10-CM

## 2019-02-19 NOTE — TELEPHONE ENCOUNTER
Okay for refill once follow-up with Micheal Blake MD for med check, fasting labs scheduled.    Dorothy Hudson, CNP

## 2019-02-19 NOTE — TELEPHONE ENCOUNTER
"Routing refill request to provider for review/approval because:  Labs not current:  LDL    Requested Prescriptions   Pending Prescriptions Disp Refills     simvastatin (ZOCOR) 10 MG tablet [Pharmacy Med Name: SIMVASTATIN 10MG TABS] 90 tablet 0     Sig: TAKE ONE TABLET BY MOUTH EVERY NIGHT AT BEDTIME    Statins Protocol Failed - 2/19/2019  4:22 PM       Failed - LDL on file in past 12 months    Recent Labs   Lab Test 12/07/17  1509   LDL 65            Passed - No abnormal creatine kinase in past 12 months    No lab results found.            Passed - Recent (12 mo) or future (30 days) visit within the authorizing provider's specialty    Patient had office visit in the last 12 months or has a visit in the next 30 days with authorizing provider or within the authorizing provider's specialty.  See \"Patient Info\" tab in inbasket, or \"Choose Columns\" in Meds & Orders section of the refill encounter.             Passed - Medication is active on med list       Passed - Patient is age 18 or older        Kayla Ellison RN  "

## 2019-02-19 NOTE — TELEPHONE ENCOUNTER
Contacted patient scheduled an appointment for 3/5/2019 at 2:50pm, with PCP for a medication follow up  Thank you  Miriam

## 2019-02-20 RX ORDER — SIMVASTATIN 10 MG
TABLET ORAL
Qty: 30 TABLET | Refills: 0 | Status: SHIPPED | OUTPATIENT
Start: 2019-02-20 | End: 2019-03-04

## 2019-03-04 ENCOUNTER — OFFICE VISIT (OUTPATIENT)
Dept: FAMILY MEDICINE | Facility: CLINIC | Age: 84
End: 2019-03-04
Payer: COMMERCIAL

## 2019-03-04 VITALS
RESPIRATION RATE: 18 BRPM | TEMPERATURE: 96.7 F | WEIGHT: 150 LBS | SYSTOLIC BLOOD PRESSURE: 92 MMHG | DIASTOLIC BLOOD PRESSURE: 60 MMHG | BODY MASS INDEX: 24.96 KG/M2 | HEART RATE: 75 BPM | OXYGEN SATURATION: 95 %

## 2019-03-04 DIAGNOSIS — J43.2 CENTRILOBULAR EMPHYSEMA (H): ICD-10-CM

## 2019-03-04 DIAGNOSIS — E53.8 VITAMIN B12 DEFICIENCY: ICD-10-CM

## 2019-03-04 DIAGNOSIS — E78.5 HYPERLIPIDEMIA LDL GOAL <130: Primary | ICD-10-CM

## 2019-03-04 DIAGNOSIS — Z71.89 COUNSELING REGARDING ADVANCED DIRECTIVES: ICD-10-CM

## 2019-03-04 LAB
ALT SERPL W P-5'-P-CCNC: 21 U/L (ref 0–70)
ANION GAP SERPL CALCULATED.3IONS-SCNC: 7 MMOL/L (ref 3–14)
BASOPHILS # BLD AUTO: 0 10E9/L (ref 0–0.2)
BASOPHILS NFR BLD AUTO: 0.4 %
BUN SERPL-MCNC: 11 MG/DL (ref 7–30)
CALCIUM SERPL-MCNC: 8.8 MG/DL (ref 8.5–10.1)
CHLORIDE SERPL-SCNC: 105 MMOL/L (ref 94–109)
CHOLEST SERPL-MCNC: 162 MG/DL
CO2 SERPL-SCNC: 28 MMOL/L (ref 20–32)
CREAT SERPL-MCNC: 0.75 MG/DL (ref 0.66–1.25)
DIFFERENTIAL METHOD BLD: ABNORMAL
EOSINOPHIL # BLD AUTO: 0.1 10E9/L (ref 0–0.7)
EOSINOPHIL NFR BLD AUTO: 2.1 %
ERYTHROCYTE [DISTWIDTH] IN BLOOD BY AUTOMATED COUNT: 13.1 % (ref 10–15)
FEF 25/75: NORMAL
FEV-1: NORMAL
FEV1/FVC: NORMAL
FVC: NORMAL
GFR SERPL CREATININE-BSD FRML MDRD: 80 ML/MIN/{1.73_M2}
GLUCOSE SERPL-MCNC: 89 MG/DL (ref 70–99)
HCT VFR BLD AUTO: 42.8 % (ref 40–53)
HDLC SERPL-MCNC: 68 MG/DL
HGB BLD-MCNC: 13.8 G/DL (ref 13.3–17.7)
LDLC SERPL CALC-MCNC: 69 MG/DL
LYMPHOCYTES # BLD AUTO: 1.1 10E9/L (ref 0.8–5.3)
LYMPHOCYTES NFR BLD AUTO: 20.3 %
MCH RBC QN AUTO: 32.9 PG (ref 26.5–33)
MCHC RBC AUTO-ENTMCNC: 32.2 G/DL (ref 31.5–36.5)
MCV RBC AUTO: 102 FL (ref 78–100)
MONOCYTES # BLD AUTO: 0.7 10E9/L (ref 0–1.3)
MONOCYTES NFR BLD AUTO: 13.2 %
NEUTROPHILS # BLD AUTO: 3.3 10E9/L (ref 1.6–8.3)
NEUTROPHILS NFR BLD AUTO: 64 %
NONHDLC SERPL-MCNC: 94 MG/DL
PLATELET # BLD AUTO: 201 10E9/L (ref 150–450)
POTASSIUM SERPL-SCNC: 4 MMOL/L (ref 3.4–5.3)
RBC # BLD AUTO: 4.19 10E12/L (ref 4.4–5.9)
SODIUM SERPL-SCNC: 140 MMOL/L (ref 133–144)
TRIGL SERPL-MCNC: 124 MG/DL
VIT B12 SERPL-MCNC: 655 PG/ML (ref 193–986)
WBC # BLD AUTO: 5.2 10E9/L (ref 4–11)

## 2019-03-04 PROCEDURE — 80048 BASIC METABOLIC PNL TOTAL CA: CPT | Performed by: INTERNAL MEDICINE

## 2019-03-04 PROCEDURE — 84460 ALANINE AMINO (ALT) (SGPT): CPT | Performed by: INTERNAL MEDICINE

## 2019-03-04 PROCEDURE — 94010 BREATHING CAPACITY TEST: CPT | Performed by: INTERNAL MEDICINE

## 2019-03-04 PROCEDURE — 80061 LIPID PANEL: CPT | Performed by: INTERNAL MEDICINE

## 2019-03-04 PROCEDURE — 85025 COMPLETE CBC W/AUTO DIFF WBC: CPT | Performed by: INTERNAL MEDICINE

## 2019-03-04 PROCEDURE — 82607 VITAMIN B-12: CPT | Performed by: INTERNAL MEDICINE

## 2019-03-04 PROCEDURE — 36415 COLL VENOUS BLD VENIPUNCTURE: CPT | Performed by: INTERNAL MEDICINE

## 2019-03-04 PROCEDURE — 99214 OFFICE O/P EST MOD 30 MIN: CPT | Mod: 25 | Performed by: INTERNAL MEDICINE

## 2019-03-04 RX ORDER — SIMVASTATIN 10 MG
TABLET ORAL
Qty: 90 TABLET | Refills: 3 | Status: SHIPPED | OUTPATIENT
Start: 2019-03-04 | End: 2020-03-20

## 2019-03-04 NOTE — LETTER
My COPD Action Plan     Name: Lang Christina    YOB: 1928   Date: 3/3/2019    My doctor: Micheal Blake MD   My clinic: [address]  [place] [address] [phone]  My Controller Medicine:  none   Dose:      My Rescue Medicine: Albuterol (Proair/Ventolin/Proventil) inhaler   Dose:  Four times a day prn      My Flare Up Medicine:     Dose:   FEV-1 (no units)   Date Value   01/22/2015 50     FEV1/FVC (no units)   Date Value   01/22/2015 68      My COPD Severity: Mild = FeV1 > 80%      Use of Oxygen: Oxygen Not Prescribed      Make sure you've had your pneumonia   vaccines.          GREEN ZONE       Doing well today      Usual level of activity and exercise    Usual amount of cough and mucus    No shortness of breath    Usual level of health (thinking clearly, sleeping well, feel like eating) Actions:      Take daily medicines    Use oxygen as prescribed    Follow regular exercise and diet plan    Avoid cigarette smoke and other irritants that harm the lungs           YELLOW ZONE          Having a bad day or flare up      Short of breath more than usual    A lot more sputum (mucus) than usual    Sputum looks yellow, green, tan, brown or bloody    More coughing or wheezing    Fever or chills    Less energy; trouble completing activities    Trouble thinking or focusing    Using quick relief inhaler or nebulizer more often    Poor sleep; symptoms wake me up    Do not feel like eating Actions:      Get plenty of rest    Take daily medicines    Use quick relief inhaler every  hours    If you use oxygen, call you doctor to see if you should adjust your oxygen    Do breathing exercises or other things to help you relax    Let a loved one, friend or neighbor know you are feeling worse    Call your care team if you have 2 or more symptoms.  Start taking steroids or antibiotics if directed by your care team           RED ZONE       Need medical care now      Severe  shortness of breath (feel you can't breathe)    Fever, chills    Not enough breath to do any activity    Trouble coughing up mucus, walking or talking    Blood in mucus    Frequent coughing   Rescue medicines are not working    Not able to sleep because of breathing    Feel confused or drowsy    Chest pain    Actions:      Call your health care team.  If you cannot reach your care team, call 911 or go to the emergency room.        Annual Reminders:  Meet with Care Team, Flu Shot every Fall  Pharmacy: [facility] - [address]

## 2019-03-04 NOTE — LETTER
[facility]  [address] [place] [address]    March 4, 2019    Lang Sanford  [address] [place] [place] [address]          Dear Lang,      This spirometry confirms the presence of chronic obstructive pulmonary disease     Enclosed is a copy of your results.     Results for orders placed or performed in visit on 03/04/19   Spirometry, Breathing Capacity: Normal Order, Clinic Performed   Result Value Ref Range    FEV-1      FVC      FEV1/FVC      FEF 25/75     CBC with platelets differential   Result Value Ref Range    WBC 5.2 4.0 - 11.0 10e9/L    RBC Count 4.19 (L) 4.4 - 5.9 10e12/L    Hemoglobin 13.8 13.3 - 17.7 g/dL    Hematocrit 42.8 40.0 - 53.0 %     (H) 78 - 100 fl    MCH 32.9 26.5 - 33.0 pg    MCHC 32.2 31.5 - 36.5 g/dL    RDW 13.1 10.0 - 15.0 %    Platelet Count 201 150 - 450 10e9/L    % Neutrophils 64.0 %    % Lymphocytes 20.3 %    % Monocytes 13.2 %    % Eosinophils 2.1 %    % Basophils 0.4 %    Absolute Neutrophil 3.3 1.6 - 8.3 10e9/L    Absolute Lymphocytes 1.1 0.8 - 5.3 10e9/L    Absolute Monocytes 0.7 0.0 - 1.3 10e9/L    Absolute Eosinophils 0.1 0.0 - 0.7 10e9/L    Absolute Basophils 0.0 0.0 - 0.2 10e9/L    Diff Method Automated Method        If you have any questions or concerns, please call myself or my nurse at [phone].      Sincerely,        Micheal Blake MD/rocio

## 2019-03-04 NOTE — PROGRESS NOTES
SUBJECTIVE:   Lang Christina is a 89 year old male who presents to clinic today for the following health issues:       Hyperlipidemia LDL goal <130  Vitamin B12 deficiency  Centrilobular emphysema (H)  Counseling regarding advanced directives     Medication Followup of Simvastatin    Taking Medication as prescribed: yes    Side Effects:  None    Medication Helping Symptoms:  yes     Last appointment with me was 18    Preventative healthcare maintenance goals due including     ShingRx vaccination against herpes zoster   Chronic obstructive pulmonary disease action plan printed already   Medicare Annual Wellness Visit   Eye exams   Spirometry   Advanced directive discussion   ALT [ liver test ]   Fall Risk Assessment   Fasting lipid panel   PHQ9 depression survey       Problem list and histories reviewed & adjusted, as indicated.  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       Social History     Tobacco Use     Smoking status: Former Smoker     Packs/day: 1.00     Years: 47.00     Pack years: 47.00     Types: Cigarettes, Pipe     Last attempt to quit: 1994     Years since quittin.1     Smokeless tobacco: Never Used   Substance Use Topics     Alcohol use: No     Family History   Problem Relation Age of Onset     Cerebrovascular Disease  Brother          Current Outpatient Medications   Medication Sig Dispense Refill     acetaminophen (TYLENOL) 325 MG tablet Take 1-2 tablets by mouth at onset of headache.       albuterol (2.5 MG/3ML) 0.083% neb solution INHALE CONTENTS OF ONE VIAL VIA NEBULIZER EVERY SIX HOURS AS NEEDED FOR SHORTNESS OF BREATH/WHEEZING 270 mL 1     albuterol (PROVENTIL) (2.5 MG/3ML) 0.083% neb solution USE 1 VIAL PER NEBULIZATION EVERY 6 HOURS AS NEEDED FOR WHEEZING/SHORTNESS OF BREATH 270 mL 1     Cholecalciferol (VITAMIN D) 1000 UNIT capsule Take 1 capsule by mouth daily.       Cyanocobalamin 1000 MCG TABS Take  by mouth daily.       diclofenac (VOLTAREN) 1 % GEL Place 2 g onto the skin 4 times daily 1 Tube 1     Ipratropium-Albuterol (COMBIVENT RESPIMAT)  MCG/ACT inhaler Inhale 1 puff into the lungs 4 times daily 1 Inhaler 11     ORDER FOR DME, SET TO LOCAL PRINT, Equipment being ordered: nebulizer machine 1 Device 0     simvastatin (ZOCOR) 10 MG tablet TAKE ONE TABLET BY MOUTH EVERY NIGHT AT BEDTIME 90 tablet 3     Allergies   Allergen Reactions     Penicillins      Recent Labs   Lab Test 12/07/17  1509 11/04/16  1056 11/03/15  1202 10/07/14  1408 09/30/13  1114 09/24/12  1132   A1C  --   --   --  5.8  --  5.7   LDL 65  --  72 76 75 78   HDL 69 64  --  60 57 50   TRIG 99  --   --   --  98 114   ALT 24  --  23 26 26 <6   CR  --  0.90 0.84 0.88 0.76 0.76   GFRESTIMATED  --  80 86 82 >90 >90   GFRESTBLACK  --  >90   GFR Calc   >90   GFR Calc   >90   GFR Calc   >90 >90   POTASSIUM  --  4.7 4.7 4.5 4.4 4.9   TSH  --   --   --  1.41 1.33  --       BP Readings from Last 3 Encounters:   03/04/19 92/60   01/14/19 120/81   10/11/18 137/82    Wt Readings from Last 3 Encounters:   03/04/19 68 kg (150 lb)   01/14/19 71.2 kg (157 lb)   10/11/18 71.2 kg (157 lb)                  Labs reviewed in EPIC    Reviewed and updated as needed this visit by clinical staff       Reviewed and updated  as needed this visit by Provider         ROS:  Constitutional, HEENT, cardiovascular, pulmonary, gi and gu systems are negative, except as otherwise noted.    OBJECTIVE:                                                    BP 92/60   Pulse 75   Temp 96.7  F (35.9  C) (Oral)   Resp 18   Wt 68 kg (150 lb)   SpO2 95%   BMI 24.96 kg/m    Body mass index is 24.96 kg/m .  GENERAL APPEARANCE: healthy, alert and no distress  RESP: lungs clear to auscultation - no rales, rhonchi or wheezes  CV: regular rates and rhythm, normal S1 S2, no S3 or S4 and no murmur, click or rub  NEURO: Normal strength and tone, mentation intact and speech normal  PSYCH: mentation appears normal and affect normal/bright    Diagnostic test results:  Diagnostic Test Results:  Orders Placed This Encounter   Procedures     Spirometry, Breathing Capacity: Normal Order, Clinic Performed     ALT     Lipid panel reflex to direct LDL Fasting     Vitamin B12     CBC with platelets differential     Basic metabolic panel          ASSESSMENT/PLAN:                                                    1. Hyperlipidemia LDL goal <130  Routine follow up office visit regarding hypercholesterolemia  - ALT  - Lipid panel reflex to direct LDL Fasting  - simvastatin (ZOCOR) 10 MG tablet; TAKE ONE TABLET BY MOUTH EVERY NIGHT AT BEDTIME  Dispense: 90 tablet; Refill: 3  Continue current plan of care  , follow up as indicated on results     2. Vitamin B12 deficiency  Discuss with patient the reasons for vitamin B 12  And what is pernicious anemia   - Vitamin B12  - CBC with platelets differential    3. Centrilobular emphysema (H)  We agreed to check spirometry - patient uses a nebulizer treatment almost always 3 times a day even though he eschews any treatment with Advair, Dulera, or Symbicort, a combined a combined inhaled corticosteroid / long acting beta agonist agents . We discussed the theoretical risks with excessive beta agonist therapy . Further follow up  depending on the test results   - Spirometry, Breathing Capacity: Normal Order, Clinic Performed  - Basic metabolic panel    4. Counseling regarding advanced directives  Postponed       Follow up with Provider - 1 year      Micheal Blake MD  [facility]

## 2019-03-04 NOTE — LETTER
[facility]  [address] [place] [address]    March 6, 2019    Lang Christina  [address] [place] [address]          Dear Lang,    All of these tests are within acceptable limits. Things look OK !     Enclosed is a copy of your results.     Results for orders placed or performed in visit on 03/04/19   Spirometry, Breathing Capacity: Normal Order, Clinic Performed   Result Value Ref Range    FEV-1      FVC      FEV1/FVC      FEF 25/75     ALT   Result Value Ref Range    ALT 21 0 - 70 U/L   Lipid panel reflex to direct LDL Fasting   Result Value Ref Range    Cholesterol 162 <200 mg/dL    Triglycerides 124 <150 mg/dL    HDL Cholesterol 68 >39 mg/dL    LDL Cholesterol Calculated 69 <100 mg/dL    Non HDL Cholesterol 94 <130 mg/dL   Vitamin B12   Result Value Ref Range    Vitamin B12 655 193 - 986 pg/mL   CBC with platelets differential   Result Value Ref Range    WBC 5.2 4.0 - 11.0 10e9/L    RBC Count 4.19 (L) 4.4 - 5.9 10e12/L    Hemoglobin 13.8 13.3 - 17.7 g/dL    Hematocrit 42.8 40.0 - 53.0 %     (H) 78 - 100 fl    MCH 32.9 26.5 - 33.0 pg    MCHC 32.2 31.5 - 36.5 g/dL    RDW 13.1 10.0 - 15.0 %    Platelet Count 201 150 - 450 10e9/L    % Neutrophils 64.0 %    % Lymphocytes 20.3 %    % Monocytes 13.2 %    % Eosinophils 2.1 %    % Basophils 0.4 %    Absolute Neutrophil 3.3 1.6 - 8.3 10e9/L    Absolute Lymphocytes 1.1 0.8 - 5.3 10e9/L    Absolute Monocytes 0.7 0.0 - 1.3 10e9/L    Absolute Eosinophils 0.1 0.0 - 0.7 10e9/L    Absolute Basophils 0.0 0.0 - 0.2 10e9/L    Diff Method Automated Method    Basic metabolic panel   Result Value Ref Range    Sodium 140 133 - 144 mmol/L    Potassium 4.0 3.4 - 5.3 mmol/L    Chloride 105 94 - 109 mmol/L    Carbon Dioxide 28 20 - 32 mmol/L    Anion Gap 7 3 - 14 mmol/L    Glucose 89 70 - 99 mg/dL    Urea Nitrogen 11 7 - 30 mg/dL    Creatinine 0.75 0.66 - 1.25 mg/dL    GFR Estimate 80 >60  mL/min/[1.73_m2]    GFR Estimate If Black >90 >60 mL/min/[1.73_m2]    Calcium 8.8 8.5 - 10.1 mg/dL       If you have any questions or concerns, please call myself or my nurse at [phone].      Sincerely,        Micheal Blake MD/chan

## 2019-03-11 DIAGNOSIS — J43.2 CENTRILOBULAR EMPHYSEMA (H): ICD-10-CM

## 2019-03-11 RX ORDER — ALBUTEROL SULFATE 0.83 MG/ML
SOLUTION RESPIRATORY (INHALATION)
Qty: 270 ML | Refills: 1 | Status: SHIPPED | OUTPATIENT
Start: 2019-03-11 | End: 2019-04-08

## 2019-03-11 NOTE — TELEPHONE ENCOUNTER
Patient needs a refill on his Albuterol [facility] ASAP. He saw md last week and thought md would send down a refill for this.

## 2019-03-25 ENCOUNTER — TELEPHONE (OUTPATIENT)
Dept: FAMILY MEDICINE | Facility: CLINIC | Age: 84
End: 2019-03-25

## 2019-03-25 DIAGNOSIS — J43.2 CENTRILOBULAR EMPHYSEMA (H): ICD-10-CM

## 2019-03-25 RX ORDER — NEBULIZER ACCESSORIES
KIT MISCELLANEOUS
Qty: 1 KIT | Refills: 0 | Status: SHIPPED | OUTPATIENT
Start: 2019-03-25 | End: 2021-10-04

## 2019-03-25 NOTE — TELEPHONE ENCOUNTER
Reason for Call:  Other call back    Detailed comments: patient went into the pharmacy because he is in need for a new nebulizer machine, so needs a new prescription. Would pick it up here at the [facility]    Phone Number Patient can be reached at: Home number on file [phone] (home)    Best Time: anytime    Can we leave a detailed message on this number? YES    Call taken on 3/25/2019 at 12:16 PM by Karen Mccain

## 2019-03-25 NOTE — TELEPHONE ENCOUNTER
Prescription sent  Patient updated    Signed Prescriptions:                        Disp   Refills    Respiratory Therapy Supplies (NEBULIZER/TU*1 kit  0        Sig: To be used with neb solution as prescribed  Authorizing Provider: ADAM LOZANO  Ordering User: ALFREDO HORN, ANTOINE

## 2019-03-29 NOTE — TELEPHONE ENCOUNTER
Lang came in had requested a new nebulizer prescription and only received hoses and no prescription for a nebulizer or nebulizer in the box delivered to his home. Patient is very upset that he did not receive a new nebulizer and or prescription to go get one. Packing slip states there was one in the box, but there was not one.    Please call Lang @[phone]  to discuss.

## 2019-03-29 NOTE — TELEPHONE ENCOUNTER
Worked with the .     The [employer] didn't get a signed order right away for the Nebulizer. Once this was received they sent a Nebulizer to the patient. This is on its way to him now.     contacted him as well and instructed him to contact: [facility] [facility]   [address]  [place] [address]     Phone number [phone]  Fax number [phone]     He will be calling them. Katlin Riojas,

## 2019-04-08 DIAGNOSIS — J43.2 CENTRILOBULAR EMPHYSEMA (H): ICD-10-CM

## 2019-04-10 RX ORDER — ALBUTEROL SULFATE 0.83 MG/ML
SOLUTION RESPIRATORY (INHALATION)
Qty: 270 ML | Refills: 1 | Status: SHIPPED | OUTPATIENT
Start: 2019-04-10 | End: 2019-07-10

## 2019-04-10 NOTE — TELEPHONE ENCOUNTER
Spoke to pharmacy and refill is needed.  Ellen BECKWITH CMA ([facility])

## 2019-04-22 ENCOUNTER — OFFICE VISIT (OUTPATIENT)
Dept: ORTHOPEDICS | Facility: CLINIC | Age: 84
End: 2019-04-22
Payer: COMMERCIAL

## 2019-04-22 VITALS
RESPIRATION RATE: 13 BRPM | BODY MASS INDEX: 24.66 KG/M2 | DIASTOLIC BLOOD PRESSURE: 81 MMHG | WEIGHT: 148 LBS | OXYGEN SATURATION: 95 % | HEART RATE: 77 BPM | SYSTOLIC BLOOD PRESSURE: 128 MMHG | HEIGHT: 65 IN

## 2019-04-22 DIAGNOSIS — M17.0 PRIMARY OSTEOARTHRITIS OF BOTH KNEES: Primary | ICD-10-CM

## 2019-04-22 PROCEDURE — 20610 DRAIN/INJ JOINT/BURSA W/O US: CPT | Mod: 50 | Performed by: ORTHOPAEDIC SURGERY

## 2019-04-22 RX ORDER — METHYLPREDNISOLONE ACETATE 80 MG/ML
320 INJECTION, SUSPENSION INTRA-ARTICULAR; INTRALESIONAL; INTRAMUSCULAR; SOFT TISSUE ONCE
Qty: 4 ML | Refills: 0 | OUTPATIENT
Start: 2019-04-22 | End: 2019-08-29

## 2019-04-22 ASSESSMENT — MIFFLIN-ST. JEOR: SCORE: 1258.2

## 2019-04-22 NOTE — PROGRESS NOTES
The patient's bilateral knee was prepped with betadine solution after verification of allergies. Area approximately 10 cm x 10 cm prepped in a sterile fashion. After injection, betadine removed with soap and water and band-aids applied.    2ml depo medrol with 1% lidocaine plain injected into patient's bilateral knee by Dr. Sheng Montoya  LOT# [id number]  Exp. 2/20

## 2019-04-22 NOTE — LETTER
4/22/2019         RE: Lang Christina  [address] [place] [address]        Dear Colleague,    Thank you for referring your patient, Lang Christina, to the [facility]. Please see a copy of my visit note below.    Follow up bilateral primary knee osteoarthritis.  Last injection 1/14/19.  We usually use a double dose.  He has severe osteoarthritis by x-ray.  Range of motion 4-120 on left, 2-120 on right.    He  desires injection today of bilateral knee(s).  Risks, benefits, potential complications and alternatives were discussed.   With the patient's consent, sterile prep was performed of bilateral knee(s).  Each knee was injected with Depo Medrol 160 mg and lidocaine at anteromedial site.    Return to clinic as needed.       The patient's bilateral knee was prepped with betadine solution after verification of allergies. Area approximately 10 cm x 10 cm prepped in a sterile fashion. After injection, betadine removed with soap and water and band-aids applied.    2ml depo medrol with 1% lidocaine plain injected into patient's bilateral knee by Dr. Sheng Montoya  LOT# [id number]  Exp. 2/20      Again, thank you for allowing me to participate in the care of your patient.        Sincerely,        Sheng Montoya MD

## 2019-04-22 NOTE — PROGRESS NOTES
Follow up bilateral primary knee osteoarthritis.  Last injection 1/14/19.  We usually use a double dose.  He has severe osteoarthritis by x-ray.  Range of motion 4-120 on left, 2-120 on right.    He  desires injection today of bilateral knee(s).  Risks, benefits, potential complications and alternatives were discussed.   With the patient's consent, sterile prep was performed of bilateral knee(s).  Each knee was injected with Depo Medrol 160 mg and lidocaine at anteromedial site.    Return to clinic as needed.

## 2019-07-10 DIAGNOSIS — J43.2 CENTRILOBULAR EMPHYSEMA (H): ICD-10-CM

## 2019-07-12 ENCOUNTER — DOCUMENTATION ONLY (OUTPATIENT)
Dept: OPHTHALMOLOGY | Facility: CLINIC | Age: 84
End: 2019-07-12

## 2019-07-12 RX ORDER — ALBUTEROL SULFATE 0.83 MG/ML
SOLUTION RESPIRATORY (INHALATION)
Qty: 270 ML | Refills: 1 | Status: SHIPPED | OUTPATIENT
Start: 2019-07-12 | End: 2020-08-04

## 2019-07-12 NOTE — TELEPHONE ENCOUNTER
"Prescription approved per [facility] Refill Protocol.    Requested Prescriptions   Signed Prescriptions Disp Refills    albuterol (PROVENTIL) (2.5 MG/3ML) 0.083% neb solution 270 mL 1     Sig: NEBULIZE CONTENTS OF ONE VIAL (3 MLS) BY NEBULIZATION EVERY 6 HOURS AS NEEDED FOR WHEEZING OR SHORTNESS OF BREATH       Asthma Maintenance Inhalers - Anticholinergics Passed - 7/12/2019 11:13 AM        Passed - Patient is age 12 years or older        Passed - Recent (12 mo) or future (30 days) visit within the authorizing provider's specialty     Patient had office visit in the last 12 months or has a visit in the next 30 days with authorizing provider or within the authorizing provider's specialty.  See \"Patient Info\" tab in inbasket, or \"Choose Columns\" in Meds & Orders section of the refill encounter.              Passed - Medication is active on med list        Ellen Braswell RN  [facility]    "

## 2019-07-15 ENCOUNTER — OFFICE VISIT (OUTPATIENT)
Dept: ORTHOPEDICS | Facility: CLINIC | Age: 84
End: 2019-07-15
Payer: COMMERCIAL

## 2019-07-15 VITALS
OXYGEN SATURATION: 93 % | WEIGHT: 148 LBS | HEIGHT: 65 IN | RESPIRATION RATE: 16 BRPM | BODY MASS INDEX: 24.66 KG/M2 | SYSTOLIC BLOOD PRESSURE: 133 MMHG | HEART RATE: 100 BPM | DIASTOLIC BLOOD PRESSURE: 80 MMHG

## 2019-07-15 DIAGNOSIS — M17.0 PRIMARY OSTEOARTHRITIS OF BOTH KNEES: Primary | ICD-10-CM

## 2019-07-15 PROCEDURE — 20610 DRAIN/INJ JOINT/BURSA W/O US: CPT | Mod: 50 | Performed by: ORTHOPAEDIC SURGERY

## 2019-07-15 RX ORDER — METHYLPREDNISOLONE ACETATE 80 MG/ML
320 INJECTION, SUSPENSION INTRA-ARTICULAR; INTRALESIONAL; INTRAMUSCULAR; SOFT TISSUE ONCE
Qty: 4 ML | Refills: 0 | OUTPATIENT
Start: 2019-07-15 | End: 2019-08-29

## 2019-07-15 ASSESSMENT — MIFFLIN-ST. JEOR: SCORE: 1253.2

## 2019-07-15 NOTE — PROGRESS NOTES
Follow up bilateral primary knee osteoarthritis.  Last injection 4/22/19.  We usually use a double dose.  He has severe osteoarthritis by x-ray.  Range of motion 4-120 on left, 2-120 on right.    He  desires injection today of bilateral knee(s).  Risks, benefits, potential complications and alternatives were discussed.   With the patient's consent, sterile prep was performed of bilateral knee(s).  Each knee was injected with Depo Medrol 160 mg and lidocaine at anteromedial site.    Return to clinic as needed.

## 2019-07-15 NOTE — LETTER
7/15/2019         RE: Lang Christina  [address] [place] [address]        Dear Colleague,    Thank you for referring your patient, Lang Christina, to the [facility]. Please see a copy of my visit note below.    Follow up bilateral primary knee osteoarthritis.  Last injection 4/22/19.  We usually use a double dose.  He has severe osteoarthritis by x-ray.  Range of motion 4-120 on left, 2-120 on right.    He  desires injection today of bilateral knee(s).  Risks, benefits, potential complications and alternatives were discussed.   With the patient's consent, sterile prep was performed of bilateral knee(s).  Each knee was injected with Depo Medrol 160 mg and lidocaine at anteromedial site.    Return to clinic as needed.       The patient's bilateral knee was prepped with betadine solution after verification of allergies. Area approximately 10 cm x 10 cm prepped in a sterile fashion. After injection, betadine removed with soap and water and band-aids applied.    1ml depo medrol with 1% lidocaine plain injected into patient's bilateral knee by Dr. Sheng Montoya  LOT# [id number]  Exp. 2/20      Again, thank you for allowing me to participate in the care of your patient.        Sincerely,        Sheng Montoya MD

## 2019-07-15 NOTE — PROGRESS NOTES
The patient's bilateral knee was prepped with betadine solution after verification of allergies. Area approximately 10 cm x 10 cm prepped in a sterile fashion. After injection, betadine removed with soap and water and band-aids applied.    1ml depo medrol with 1% lidocaine plain injected into patient's bilateral knee by Dr. Sheng Montoya  LOT# [id number]  Exp. 2/20

## 2019-08-29 ENCOUNTER — OFFICE VISIT (OUTPATIENT)
Dept: FAMILY MEDICINE | Facility: CLINIC | Age: 84
End: 2019-08-29
Payer: COMMERCIAL

## 2019-08-29 VITALS
WEIGHT: 147 LBS | RESPIRATION RATE: 18 BRPM | OXYGEN SATURATION: 94 % | HEART RATE: 96 BPM | BODY MASS INDEX: 24.46 KG/M2 | DIASTOLIC BLOOD PRESSURE: 84 MMHG | TEMPERATURE: 97.9 F | SYSTOLIC BLOOD PRESSURE: 120 MMHG

## 2019-08-29 DIAGNOSIS — R91.8 GROUND GLASS OPACITY PRESENT ON IMAGING OF LUNG: ICD-10-CM

## 2019-08-29 DIAGNOSIS — J43.2 CENTRILOBULAR EMPHYSEMA (H): Primary | ICD-10-CM

## 2019-08-29 DIAGNOSIS — E78.5 HYPERLIPIDEMIA LDL GOAL <130: ICD-10-CM

## 2019-08-29 DIAGNOSIS — J30.0 VASOMOTOR RHINITIS: ICD-10-CM

## 2019-08-29 DIAGNOSIS — R79.89 TROPONIN LEVEL ELEVATED: ICD-10-CM

## 2019-08-29 DIAGNOSIS — D69.2 SENILE PURPURA (H): ICD-10-CM

## 2019-08-29 PROCEDURE — 99214 OFFICE O/P EST MOD 30 MIN: CPT | Performed by: INTERNAL MEDICINE

## 2019-08-29 PROCEDURE — 99207 C PAF COMPLETED  NO CHARGE: CPT | Performed by: INTERNAL MEDICINE

## 2019-08-29 RX ORDER — ALBUTEROL SULFATE 0.83 MG/ML
2.5 SOLUTION RESPIRATORY (INHALATION) EVERY 6 HOURS PRN
Qty: 120 VIAL | Refills: 11 | Status: SHIPPED | OUTPATIENT
Start: 2019-08-29 | End: 2020-09-17

## 2019-08-29 RX ORDER — FLUTICASONE PROPIONATE 50 MCG
1 SPRAY, SUSPENSION (ML) NASAL
COMMUNITY
Start: 2019-08-18 | End: 2019-08-29

## 2019-08-29 RX ORDER — FLUTICASONE PROPIONATE 50 MCG
1 SPRAY, SUSPENSION (ML) NASAL 2 TIMES DAILY
Qty: 16 G | Refills: 11 | Status: SHIPPED | OUTPATIENT
Start: 2019-08-29 | End: 2020-08-04

## 2019-08-29 NOTE — PATIENT INSTRUCTIONS
1) It looks like you're doing well, let us know if you have concerns, please see us in 6-12 months [email or web address].

## 2019-08-29 NOTE — PROGRESS NOTES
Subjective     Lang Christina is a 89 year old male who presents to clinic today for the following health issues:    HPI       Hospital Follow-up Visit:    Hospital/Nursing Home/IP Rehab Facility: [facility]   Date of Admission: 08/16/2019  Date of Discharge: 08/18/2019  Reason(s) for Admission: Hypoxemia (Primary Dx);   Chronic obstructive pulmonary disease, unspecified COPD type (HC);   Elevated troponin;   Ground glass opacity present on imaging of lung;   Rhinitis, unspecified type            Problems taking medications regularly:  None       Medication changes since discharge: see current med list       Problems adhering to non-medication therapy:  None    Summary of hospitalization:  CareEverywhere information obtained and reviewed  Diagnostic Tests/Treatments reviewed.  Follow up needed: none  Other Healthcare Providers Involved in Patient s Care:         None  Update since discharge: improved.     Post Discharge Medication Reconciliation: discharge medications reconciled, continue medications without change.  Plan of care communicated with patient     Coding guidelines for this visit:  Type of Medical   Decision Making Face-to-Face Visit       within 7 Days of discharge Face-to-Face Visit        within 14 days of discharge   Moderate Complexity 36136 [id number]   High Complexity 92270 16453          Has diagnosis of COPD for some time and has been doing nebulizer treatments. Had shortness of breath, congestion and coughing. Was given prescription of Anoro Ellipta (has been using about once a day) after hospitalization to be used as needed. Also had elevated troponin noted at ER but this was only highest at 0.125. This was all most consistent with demand ischemia and not acute coronary syndrome. Says that he was recommended by cardiology to come back for some imaging. Oxygen saturation has been variable, but was discharged without supplemental oxygen. He has an oximeter and has been monitoring the oxygen  saturation over the last week or so. 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       Social History     Tobacco Use     Smoking status: Former Smoker     Packs/day: 1.00     Years: 47.00     Pack years: 47.00     Types: Cigarettes, Pipe     Last attempt to quit: 1994     Years since quittin.6     Smokeless tobacco: Never Used   Substance Use Topics     Alcohol use: No     Family History   Problem Relation Age of Onset     Cerebrovascular Disease Brother          BP Readings from Last 3 Encounters:   19 120/84   07/15/19 133/80   19 128/81    Wt Readings from Last 3 Encounters:   19 66.7 kg (147 lb)   07/15/19 67.1 kg (148 lb)   19 67.1 kg (148 lb)         Reviewed and updated as needed this visit by Provider         Review of Systems   ROS COMP: Constitutional, HEENT, cardiovascular, pulmonary, GI, , musculoskeletal, neuro, skin, endocrine and psych systems are negative, except as otherwise noted.    This document serves as a record of the services and decisions personally performed and made by Micheal Blake MD. It was created on his behalf  by Perez Lockett, a trained medical scribe. The creation of this document is based on the provider's statements to the medical scribe.  Perez Lockett 5:32 PM August 29, 2019      Objective    /84   Pulse 96   Temp 97.9  F (36.6  C) (Oral)   Resp 18   Wt 66.7 kg (147 lb)   SpO2 94%   BMI 24.46 kg/m    Body mass index is 24.46 kg/m .  Physical Exam   GENERAL: healthy, alert and no distress  EYES: Eyes grossly normal to inspection, PERRL and conjunctivae and sclerae normal  RESP: lungs clear to auscultation - no rales, rhonchi or wheezes  CV: regular rate and rhythm, normal S1 S2, no S3 or S4, no murmur, click or rub, no peripheral edema and peripheral pulses strong  MS: he has some scattered senile purpura on the arms  SKIN: no suspicious lesions or rashes to visible skin   NEURO: Normal strength and tone, mentation intact and speech normal  PSYCH: mentation appears normal, affect normal/bright    Diagnostic Test Results:  Labs reviewed in Epic  No results found for this or any previous visit (from the past 24 hour(s)).        Assessment & Plan     (J43.2) Centrilobular emphysema (H)  (primary encounter diagnosis)  Comment: refills provided . Basic diagnosis and pathological process reviewed . Patient doing a lot better.   Plan: PAF COMPLETED, albuterol (PROVENTIL) (2.5         MG/3ML) 0.083% neb solution,         umeclidinium-vilanterol (ANORO ELLIPTA) 62.5-25        MCG/INH oral inhaler            (R91.8) Ground glass opacity present on imaging of lung  Comment: reviewed   Plan: PAF COMPLETED        A recheck chest CT scan in 3 months is already scheduled according to my understanding . Explained to patient need for this study recheck     (R74.8) Troponin level elevated  Comment: only at 0.125 and would only really worry if above 3 or 4. Take a posture of observation on this, and no specific functional cardiology  Studies necessary in my opinion   Plan: PAF COMPLETED            (J30.0) Vasomotor  rhinitis  Comment: stable with current medications   Plan: PAF COMPLETED, fluticasone (FLONASE) 50 MCG/ACT        nasal spray            (E78.5) Hyperlipidemia LDL goal <130  Comment: recheck labs down the road   Plan:   Orders Placed This Encounter   Procedures     PAF COMPLETED       (D69.2) Senile purpura (H)  Comment: has noted these lately, and they were not concerning today when I examined him  Plan:      No follow-ups on file.    The information in this document, created by the medical scribe for me, accurately reflects the services I personally performed and the decisions made by me. I have reviewed and approved this document for accuracy prior to leaving the patient care area.  August 29, 2019 5:33 PM    Micheal Blake MD  [facility]

## 2019-09-23 ENCOUNTER — OFFICE VISIT (OUTPATIENT)
Dept: ORTHOPEDICS | Facility: CLINIC | Age: 84
End: 2019-09-23
Payer: COMMERCIAL

## 2019-09-23 VITALS
HEIGHT: 65 IN | SYSTOLIC BLOOD PRESSURE: 134 MMHG | DIASTOLIC BLOOD PRESSURE: 81 MMHG | HEART RATE: 111 BPM | RESPIRATION RATE: 16 BRPM | BODY MASS INDEX: 24.49 KG/M2 | WEIGHT: 147 LBS

## 2019-09-23 DIAGNOSIS — M17.0 PRIMARY OSTEOARTHRITIS OF BOTH KNEES: Primary | ICD-10-CM

## 2019-09-23 PROCEDURE — 20610 DRAIN/INJ JOINT/BURSA W/O US: CPT | Mod: 50 | Performed by: ORTHOPAEDIC SURGERY

## 2019-09-23 RX ORDER — METHYLPREDNISOLONE ACETATE 80 MG/ML
320 INJECTION, SUSPENSION INTRA-ARTICULAR; INTRALESIONAL; INTRAMUSCULAR; SOFT TISSUE ONCE
Qty: 4 ML | Refills: 0 | OUTPATIENT
Start: 2019-09-23 | End: 2019-09-23

## 2019-09-23 ASSESSMENT — MIFFLIN-ST. JEOR: SCORE: 1248.67

## 2019-09-23 NOTE — PROGRESS NOTES
Follow up bilateral primary knee osteoarthritis.  Last injection 7/15/19.  We usually use a double dose.  He has severe osteoarthritis by x-ray.  Range of motion 4-120 on left, 2-120 on right.    He  desires injection today of bilateral knee(s).  Risks, benefits, potential complications and alternatives were discussed.   With the patient's consent, sterile prep was performed of bilateral knee(s).  Each knee was injected with Depo Medrol 160 mg and lidocaine at anteromedial site.    Return to clinic as needed.

## 2019-09-23 NOTE — LETTER
9/23/2019         RE: Lang Christina  [address] [place] [address]        Dear Colleague,    Thank you for referring your patient, Lang Christina, to the [facility]. Please see a copy of my visit note below.    The patient's bilateral knee was prepped with betadine solution after verification of allergies. Area approximately 10 cm x 10 cm prepped in a sterile fashion. After injection, betadine removed with soap and water and band-aids applied.    4ml depo medrol with 1% lidocaine plain injected into patient's bilateral knee by Dr. Sheng Montoya  LOT# [id number]  Exp. 10/20      Follow up bilateral primary knee osteoarthritis.  Last injection 7/15/19.  We usually use a double dose.  He has severe osteoarthritis by x-ray.  Range of motion 4-120 on left, 2-120 on right.    He  desires injection today of bilateral knee(s).  Risks, benefits, potential complications and alternatives were discussed.   With the patient's consent, sterile prep was performed of bilateral knee(s).  Each knee was injected with Depo Medrol 160 mg and lidocaine at anteromedial site.    Return to clinic as needed.         Again, thank you for allowing me to participate in the care of your patient.        Sincerely,        Sheng Montoya MD

## 2019-09-23 NOTE — PROGRESS NOTES
The patient's bilateral knee was prepped with betadine solution after verification of allergies. Area approximately 10 cm x 10 cm prepped in a sterile fashion. After injection, betadine removed with soap and water and band-aids applied.    4ml depo medrol with 1% lidocaine plain injected into patient's bilateral knee by Dr. Sheng Montoya  LOT# [id number]  Exp. 10/20

## 2019-09-30 ENCOUNTER — OFFICE VISIT (OUTPATIENT)
Dept: OPHTHALMOLOGY | Facility: CLINIC | Age: 84
End: 2019-09-30
Payer: COMMERCIAL

## 2019-09-30 DIAGNOSIS — H43.813 POSTERIOR VITREOUS DETACHMENT OF BOTH EYES: ICD-10-CM

## 2019-09-30 DIAGNOSIS — H32 PRESUMED OCULAR HISTOPLASMOSIS SYNDROME OF BOTH EYES: Primary | ICD-10-CM

## 2019-09-30 DIAGNOSIS — Z96.1 PSEUDOPHAKIA: ICD-10-CM

## 2019-09-30 DIAGNOSIS — H50.00 ESOTROPIA: ICD-10-CM

## 2019-09-30 DIAGNOSIS — H52.4 PRESBYOPIA: ICD-10-CM

## 2019-09-30 DIAGNOSIS — B39.9 PRESUMED OCULAR HISTOPLASMOSIS SYNDROME OF BOTH EYES: Primary | ICD-10-CM

## 2019-09-30 PROCEDURE — 92014 COMPRE OPH EXAM EST PT 1/>: CPT | Performed by: STUDENT IN AN ORGANIZED HEALTH CARE EDUCATION/TRAINING PROGRAM

## 2019-09-30 PROCEDURE — 92015 DETERMINE REFRACTIVE STATE: CPT | Performed by: STUDENT IN AN ORGANIZED HEALTH CARE EDUCATION/TRAINING PROGRAM

## 2019-09-30 ASSESSMENT — REFRACTION_WEARINGRX
OS_AXIS: 178
OS_ADD: +3.00
OS_SPHERE: -0.25
OS_CYLINDER: +1.25
OD_SPHERE: -1.00
OD_AXIS: 021
OD_CYLINDER: +1.50
SPECS_TYPE: BIFOCAL
OD_ADD: +3.00
OS_HPRISM: 5 BO

## 2019-09-30 ASSESSMENT — VISUAL ACUITY
OD_CC: 20/25
OS_CC: J1+
OD_CC: J1+
OS_CC+: +2
CORRECTION_TYPE: GLASSES
METHOD: SNELLEN - LINEAR
OD_CC+: -3
OS_CC: 20/25

## 2019-09-30 ASSESSMENT — CONF VISUAL FIELD
OD_NORMAL: 1
OS_NORMAL: 1

## 2019-09-30 ASSESSMENT — EXTERNAL EXAM - RIGHT EYE: OD_EXAM: NORMAL

## 2019-09-30 ASSESSMENT — REFRACTION_MANIFEST
OD_SPHERE: -0.50
OD_AXIS: 029
OD_CYLINDER: +2.00
OD_ADD: +3.00
OS_SPHERE: -0.75
OS_AXIS: 179
OS_CYLINDER: +1.75
OS_ADD: +3.00

## 2019-09-30 ASSESSMENT — TONOMETRY
OS_IOP_MMHG: 14
OD_IOP_MMHG: 13
IOP_METHOD: APPLANATION

## 2019-09-30 ASSESSMENT — EXTERNAL EXAM - LEFT EYE: OS_EXAM: NORMAL

## 2019-09-30 ASSESSMENT — SLIT LAMP EXAM - LIDS
COMMENTS: NORMAL
COMMENTS: NORMAL

## 2019-09-30 ASSESSMENT — CUP TO DISC RATIO
OS_RATIO: 0.6
OD_RATIO: 0.5

## 2019-09-30 ASSESSMENT — REFRACTION_FINALRX: OS_HPRISM: 5 BO

## 2019-09-30 NOTE — PROGRESS NOTES
Current Eye Medications: Refresh tears both eyes as needed.      Subjective:  Here for complete today. Not seeing double unless the glasses slip down. Seeing perfect otherwise, no diplopia. Little dry. COPD, sleeps with oxygen.     Objective:  See Ophthalmology Exam.      Assessment:  Lang Christina is a 89 year old male who presents with:   Encounter Diagnoses   Name Primary?     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

## 2019-09-30 NOTE — LETTER
9/30/2019         RE: Lang Christina  [address] [place] [address]        Dear Colleague,    Thank you for referring your patient, Lang Christina, to the [facility]. Please see a copy of my visit note below.     Current Eye Medications: Refresh tears both eyes as needed.      Subjective:  Here for complete today. Not seeing double unless the glasses slip down. Seeing perfect otherwise, no diplopia. Little dry. COPD, sleeps with oxygen.     Objective:  See Ophthalmology Exam.      Assessment:  Lang Christina is a 89 year old male who presents with:   Encounter Diagnoses   Name Primary?     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        Sincerely,        Julieth Aguilar MD

## 2019-09-30 NOTE — PATIENT INSTRUCTIONS
Glasses prescription given - optional to update    Continue artificial tears up to four times a day as needed    Julieth Aguilar MD  [phone]

## 2019-10-09 ENCOUNTER — ALLIED HEALTH/NURSE VISIT (OUTPATIENT)
Dept: NURSING | Facility: CLINIC | Age: 84
End: 2019-10-09
Payer: COMMERCIAL

## 2019-10-09 DIAGNOSIS — Z23 NEED FOR PROPHYLACTIC VACCINATION AND INOCULATION AGAINST INFLUENZA: Primary | ICD-10-CM

## 2019-10-09 PROCEDURE — 99207 ZZC NO CHARGE NURSE ONLY: CPT

## 2019-10-09 PROCEDURE — 90662 IIV NO PRSV INCREASED AG IM: CPT

## 2019-10-09 PROCEDURE — G0008 ADMIN INFLUENZA VIRUS VAC: HCPCS

## 2020-01-20 ENCOUNTER — OFFICE VISIT (OUTPATIENT)
Dept: ORTHOPEDICS | Facility: CLINIC | Age: 85
End: 2020-01-20
Payer: COMMERCIAL

## 2020-01-20 VITALS
OXYGEN SATURATION: 95 % | HEIGHT: 65 IN | SYSTOLIC BLOOD PRESSURE: 124 MMHG | WEIGHT: 145 LBS | DIASTOLIC BLOOD PRESSURE: 71 MMHG | RESPIRATION RATE: 16 BRPM | HEART RATE: 64 BPM | BODY MASS INDEX: 24.16 KG/M2

## 2020-01-20 DIAGNOSIS — M17.0 PRIMARY OSTEOARTHRITIS OF BOTH KNEES: Primary | ICD-10-CM

## 2020-01-20 PROCEDURE — 20610 DRAIN/INJ JOINT/BURSA W/O US: CPT | Mod: 50 | Performed by: ORTHOPAEDIC SURGERY

## 2020-01-20 RX ORDER — METHYLPREDNISOLONE ACETATE 80 MG/ML
160 INJECTION, SUSPENSION INTRA-ARTICULAR; INTRALESIONAL; INTRAMUSCULAR; SOFT TISSUE
Status: DISCONTINUED | OUTPATIENT
Start: 2020-01-20 | End: 2022-06-13

## 2020-01-20 RX ORDER — LIDOCAINE HYDROCHLORIDE 10 MG/ML
5 INJECTION, SOLUTION INFILTRATION; PERINEURAL
Status: DISCONTINUED | OUTPATIENT
Start: 2020-01-20 | End: 2022-06-13

## 2020-01-20 RX ADMIN — METHYLPREDNISOLONE ACETATE 160 MG: 80 INJECTION, SUSPENSION INTRA-ARTICULAR; INTRALESIONAL; INTRAMUSCULAR; SOFT TISSUE at 13:16

## 2020-01-20 RX ADMIN — LIDOCAINE HYDROCHLORIDE 5 ML: 10 INJECTION, SOLUTION INFILTRATION; PERINEURAL at 13:16

## 2020-01-20 ASSESSMENT — MIFFLIN-ST. JEOR: SCORE: 1239.6

## 2020-01-20 NOTE — PROGRESS NOTES
Follow up bilateral primary knee osteoarthritis.  Last injection 9/23/19.  We usually use a double dose.  He has severe osteoarthritis by x-ray.  Range of motion 4-120 on left, 2-120 on right.    He  desires injection today of bilateral knee(s).  Risks, benefits, potential complications and alternatives were discussed.   With the patient's consent, sterile prep was performed of bilateral knee(s).  Each knee was injected with Depo Medrol 160 mg and lidocaine at anteromedial site.    Return to clinic as needed.

## 2020-01-20 NOTE — LETTER
1/20/2020         RE: Lang Christina  [address] [place] [address]        Dear Colleague,    Thank you for referring your patient, Lang Christina, to the [facility]. Please see a copy of my visit note below.      Large Joint Injection/Arthocentesis: bilateral knee  Date/Time: 1/20/2020 1:16 PM  Performed by: Sheng Montoya MD  Authorized by: Sheng Montoya MD     Indications:  Pain and osteoarthritis  Needle Size:  22 G  Guidance: landmark guided    Location:  Knee  Laterality:  Bilateral      Medications (Right):  160 mg methylPREDNISolone 80 MG/ML; 5 mL lidocaine 1 %  Medications (Left):  160 mg methylPREDNISolone 80 MG/ML; 5 mL lidocaine 1 %  Outcome:  Tolerated well, no immediate complications  Procedure discussed: discussed risks, benefits, and alternatives    Consent Given by:  Patient  Timeout: timeout called immediately prior to procedure    Prep: patient was prepped and draped in usual sterile fashion    Prep comment:  Betadine          Follow up bilateral primary knee osteoarthritis.  Last injection 9/23/19.  We usually use a double dose.  He has severe osteoarthritis by x-ray.  Range of motion 4-120 on left, 2-120 on right.    He  desires injection today of bilateral knee(s).  Risks, benefits, potential complications and alternatives were discussed.   With the patient's consent, sterile prep was performed of bilateral knee(s).  Each knee was injected with Depo Medrol 160 mg and lidocaine at anteromedial site.    Return to clinic as needed.         Again, thank you for allowing me to participate in the care of your patient.        Sincerely,        Sheng Montoya MD

## 2020-01-20 NOTE — PROGRESS NOTES
Large Joint Injection/Arthocentesis: bilateral knee  Date/Time: 1/20/2020 1:16 PM  Performed by: Sheng Montoya MD  Authorized by: Sheng Montoya MD     Indications:  Pain and osteoarthritis  Needle Size:  22 G  Guidance: landmark guided    Location:  Knee  Laterality:  Bilateral      Medications (Right):  160 mg methylPREDNISolone 80 MG/ML; 5 mL lidocaine 1 %  Medications (Left):  160 mg methylPREDNISolone 80 MG/ML; 5 mL lidocaine 1 %  Outcome:  Tolerated well, no immediate complications  Procedure discussed: discussed risks, benefits, and alternatives    Consent Given by:  Patient  Timeout: timeout called immediately prior to procedure    Prep: patient was prepped and draped in usual sterile fashion    Prep comment:  Betadine

## 2020-03-19 DIAGNOSIS — E78.5 HYPERLIPIDEMIA LDL GOAL <130: ICD-10-CM

## 2020-03-20 RX ORDER — SIMVASTATIN 10 MG
TABLET ORAL
Qty: 90 TABLET | Refills: 0 | Status: SHIPPED | OUTPATIENT
Start: 2020-03-20 | End: 2020-07-21

## 2020-03-20 NOTE — TELEPHONE ENCOUNTER
Medication is being filled for 1 time refill only due to:  Patient needs labs FASTING and recheck.   Marlene Pemberton RN

## 2020-05-18 ENCOUNTER — OFFICE VISIT (OUTPATIENT)
Dept: ORTHOPEDICS | Facility: CLINIC | Age: 85
End: 2020-05-18
Payer: COMMERCIAL

## 2020-05-18 VITALS — DIASTOLIC BLOOD PRESSURE: 78 MMHG | SYSTOLIC BLOOD PRESSURE: 137 MMHG | HEART RATE: 64 BPM | OXYGEN SATURATION: 95 %

## 2020-05-18 DIAGNOSIS — M17.0 PRIMARY OSTEOARTHRITIS OF BOTH KNEES: Primary | ICD-10-CM

## 2020-05-18 PROCEDURE — 20610 DRAIN/INJ JOINT/BURSA W/O US: CPT | Mod: 50 | Performed by: ORTHOPAEDIC SURGERY

## 2020-05-18 RX ORDER — METHYLPREDNISOLONE ACETATE 80 MG/ML
160 INJECTION, SUSPENSION INTRA-ARTICULAR; INTRALESIONAL; INTRAMUSCULAR; SOFT TISSUE
Status: DISCONTINUED | OUTPATIENT
Start: 2020-05-18 | End: 2022-06-13

## 2020-05-18 RX ORDER — LIDOCAINE HYDROCHLORIDE 10 MG/ML
5 INJECTION, SOLUTION EPIDURAL; INFILTRATION; INTRACAUDAL; PERINEURAL
Status: DISCONTINUED | OUTPATIENT
Start: 2020-05-18 | End: 2022-06-13

## 2020-05-18 RX ADMIN — LIDOCAINE HYDROCHLORIDE 5 ML: 10 INJECTION, SOLUTION EPIDURAL; INFILTRATION; INTRACAUDAL; PERINEURAL at 11:24

## 2020-05-18 RX ADMIN — METHYLPREDNISOLONE ACETATE 160 MG: 80 INJECTION, SUSPENSION INTRA-ARTICULAR; INTRALESIONAL; INTRAMUSCULAR; SOFT TISSUE at 11:24

## 2020-05-18 ASSESSMENT — PAIN SCALES - GENERAL: PAINLEVEL: MODERATE PAIN (5)

## 2020-05-18 NOTE — LETTER
5/18/2020         RE: Lang Christina  [address] [place] [address]        Dear Colleague,    Thank you for referring your patient, Lang Christina, to the [facility]. Please see a copy of my visit note below.    Large Joint Injection/Arthocentesis: bilateral knee    Date/Time: 5/18/2020 11:24 AM  Performed by: Sheng Montoya MD  Authorized by: Sheng Montoya MD     Indications:  Pain and osteoarthritis  Needle Size:  22 G  Approach:  Anterolateral  Location:  Knee  Laterality:  Bilateral      Medications (Right):  160 mg methylPREDNISolone 80 MG/ML; 5 mL lidocaine (PF) 1 %  Medications (Left):  160 mg methylPREDNISolone 80 MG/ML; 5 mL lidocaine (PF) 1 %  Outcome:  Tolerated well, no immediate complications  Procedure discussed: discussed risks, benefits, and alternatives    Consent Given by:  Patient  Timeout: timeout called immediately prior to procedure    Prep: patient was prepped and draped in usual sterile fashion            Follow up bilateral primary knee osteoarthritis.  Last injection 1/20/2020.  We usually use a double dose.  He has severe osteoarthritis by x-ray.  Range of motion 4-120 on left, 2-120 on right.    He  desires injection today of bilateral knee(s).  Risks, benefits, potential complications and alternatives were discussed.   With the patient's consent, sterile prep was performed of bilateral knee(s).  Each knee was injected with Depo Medrol 160 mg and lidocaine at anteromedial site.    Return to clinic as needed.         Again, thank you for allowing me to participate in the care of your patient.        Sincerely,        Sheng Montoya MD

## 2020-05-18 NOTE — PROGRESS NOTES
Follow up bilateral primary knee osteoarthritis.  Last injection 1/20/2020.  We usually use a double dose.  He has severe osteoarthritis by x-ray.  Range of motion 4-120 on left, 2-120 on right.    He  desires injection today of bilateral knee(s).  Risks, benefits, potential complications and alternatives were discussed.   With the patient's consent, sterile prep was performed of bilateral knee(s).  Each knee was injected with Depo Medrol 160 mg and lidocaine at anteromedial site.    Return to clinic as needed.

## 2020-05-18 NOTE — PROGRESS NOTES
Large Joint Injection/Arthocentesis: bilateral knee    Date/Time: 5/18/2020 11:24 AM  Performed by: Sheng Montoya MD  Authorized by: Sheng Montoya MD     Indications:  Pain and osteoarthritis  Needle Size:  22 G  Approach:  Anterolateral  Location:  Knee  Laterality:  Bilateral      Medications (Right):  160 mg methylPREDNISolone 80 MG/ML; 5 mL lidocaine (PF) 1 %  Medications (Left):  160 mg methylPREDNISolone 80 MG/ML; 5 mL lidocaine (PF) 1 %  Outcome:  Tolerated well, no immediate complications  Procedure discussed: discussed risks, benefits, and alternatives    Consent Given by:  Patient  Timeout: timeout called immediately prior to procedure    Prep: patient was prepped and draped in usual sterile fashion

## 2020-07-20 DIAGNOSIS — E78.5 HYPERLIPIDEMIA LDL GOAL <130: ICD-10-CM

## 2020-07-20 NOTE — TELEPHONE ENCOUNTER
Pt overdue for appt-will also need fasting labs.  Please call to schedule.  Marlene Pemberton RN

## 2020-07-21 RX ORDER — SIMVASTATIN 10 MG
TABLET ORAL
Qty: 90 TABLET | Refills: 0 | Status: SHIPPED | OUTPATIENT
Start: 2020-07-21 | End: 2020-08-04

## 2020-07-21 NOTE — TELEPHONE ENCOUNTER
appt made aug 4th. Patient said he will need medication before appt  Roger Kyle CMA on 7/21/2020 at 8:47 AM

## 2020-08-04 ENCOUNTER — OFFICE VISIT (OUTPATIENT)
Dept: INTERNAL MEDICINE | Facility: CLINIC | Age: 85
End: 2020-08-04
Payer: COMMERCIAL

## 2020-08-04 VITALS
TEMPERATURE: 97.7 F | DIASTOLIC BLOOD PRESSURE: 64 MMHG | WEIGHT: 140.2 LBS | RESPIRATION RATE: 18 BRPM | SYSTOLIC BLOOD PRESSURE: 108 MMHG | BODY MASS INDEX: 23.33 KG/M2 | OXYGEN SATURATION: 96 % | HEART RATE: 75 BPM

## 2020-08-04 DIAGNOSIS — J43.2 CENTRILOBULAR EMPHYSEMA (H): Primary | ICD-10-CM

## 2020-08-04 DIAGNOSIS — E78.5 HYPERLIPIDEMIA LDL GOAL <130: ICD-10-CM

## 2020-08-04 DIAGNOSIS — E53.8 VITAMIN B12 DEFICIENCY: ICD-10-CM

## 2020-08-04 DIAGNOSIS — J30.0 VASOMOTOR RHINITIS: ICD-10-CM

## 2020-08-04 LAB
ALT SERPL W P-5'-P-CCNC: 19 U/L (ref 0–70)
ANION GAP SERPL CALCULATED.3IONS-SCNC: 5 MMOL/L (ref 3–14)
BASOPHILS # BLD AUTO: 0 10E9/L (ref 0–0.2)
BASOPHILS NFR BLD AUTO: 0.3 %
BUN SERPL-MCNC: 12 MG/DL (ref 7–30)
CALCIUM SERPL-MCNC: 9.4 MG/DL (ref 8.5–10.1)
CHLORIDE SERPL-SCNC: 104 MMOL/L (ref 94–109)
CHOLEST SERPL-MCNC: 135 MG/DL
CO2 SERPL-SCNC: 30 MMOL/L (ref 20–32)
CREAT SERPL-MCNC: 0.74 MG/DL (ref 0.66–1.25)
DIFFERENTIAL METHOD BLD: ABNORMAL
EOSINOPHIL # BLD AUTO: 0.2 10E9/L (ref 0–0.7)
EOSINOPHIL NFR BLD AUTO: 3.4 %
ERYTHROCYTE [DISTWIDTH] IN BLOOD BY AUTOMATED COUNT: 12.5 % (ref 10–15)
GFR SERPL CREATININE-BSD FRML MDRD: 80 ML/MIN/{1.73_M2}
GLUCOSE SERPL-MCNC: 90 MG/DL (ref 70–99)
HCT VFR BLD AUTO: 40.6 % (ref 40–53)
HDLC SERPL-MCNC: 58 MG/DL
HGB BLD-MCNC: 13.2 G/DL (ref 13.3–17.7)
LDLC SERPL CALC-MCNC: 64 MG/DL
LYMPHOCYTES # BLD AUTO: 1.1 10E9/L (ref 0.8–5.3)
LYMPHOCYTES NFR BLD AUTO: 18 %
MCH RBC QN AUTO: 32.4 PG (ref 26.5–33)
MCHC RBC AUTO-ENTMCNC: 32.5 G/DL (ref 31.5–36.5)
MCV RBC AUTO: 100 FL (ref 78–100)
MONOCYTES # BLD AUTO: 0.7 10E9/L (ref 0–1.3)
MONOCYTES NFR BLD AUTO: 11.3 %
NEUTROPHILS # BLD AUTO: 3.9 10E9/L (ref 1.6–8.3)
NEUTROPHILS NFR BLD AUTO: 67 %
NONHDLC SERPL-MCNC: 77 MG/DL
PLATELET # BLD AUTO: 203 10E9/L (ref 150–450)
POTASSIUM SERPL-SCNC: 4.5 MMOL/L (ref 3.4–5.3)
RBC # BLD AUTO: 4.08 10E12/L (ref 4.4–5.9)
SODIUM SERPL-SCNC: 139 MMOL/L (ref 133–144)
TRIGL SERPL-MCNC: 63 MG/DL
WBC # BLD AUTO: 5.8 10E9/L (ref 4–11)

## 2020-08-04 PROCEDURE — 84460 ALANINE AMINO (ALT) (SGPT): CPT | Performed by: INTERNAL MEDICINE

## 2020-08-04 PROCEDURE — 80048 BASIC METABOLIC PNL TOTAL CA: CPT | Performed by: INTERNAL MEDICINE

## 2020-08-04 PROCEDURE — 80061 LIPID PANEL: CPT | Performed by: INTERNAL MEDICINE

## 2020-08-04 PROCEDURE — 99214 OFFICE O/P EST MOD 30 MIN: CPT | Performed by: INTERNAL MEDICINE

## 2020-08-04 PROCEDURE — 36415 COLL VENOUS BLD VENIPUNCTURE: CPT | Performed by: INTERNAL MEDICINE

## 2020-08-04 PROCEDURE — 85025 COMPLETE CBC W/AUTO DIFF WBC: CPT | Performed by: INTERNAL MEDICINE

## 2020-08-04 RX ORDER — IPRATROPIUM BROMIDE AND ALBUTEROL 20; 100 UG/1; UG/1
1 SPRAY, METERED RESPIRATORY (INHALATION) 4 TIMES DAILY
Qty: 1 INHALER | Refills: 11 | Status: SHIPPED | OUTPATIENT
Start: 2020-08-04 | End: 2021-03-10

## 2020-08-04 RX ORDER — SIMVASTATIN 10 MG
TABLET ORAL
Qty: 90 TABLET | Refills: 3 | Status: SHIPPED | OUTPATIENT
Start: 2020-08-04 | End: 2021-10-21

## 2020-08-04 RX ORDER — FLUTICASONE PROPIONATE 50 MCG
1 SPRAY, SUSPENSION (ML) NASAL 2 TIMES DAILY
Qty: 16 G | Refills: 11 | Status: SHIPPED | OUTPATIENT
Start: 2020-08-04 | End: 2021-08-16

## 2020-08-04 NOTE — PROGRESS NOTES
Subjective     Lang Christina is a 89 year old male who presents to clinic today for the following health issues:    HPI     Chief Complaint   Patient presents with     Recheck Medication        Centrilobular emphysema (H)  Hyperlipidemia LDL goal <130  Vasomotor rhinitis  Vitamin B12 deficiency     Uses a nebulizer treatment on and off, uses Anoro Ellipta (Umeclidinium and Vilanterol Inhalation Powder) and some breakthrough beta agonist therapy. He feels things are generally in a good place with treatment of his chronic obstructive pulmonary disease which is overall a moderate stage only.    Due for laboratory since it has been well past a year since last testing, patient has no questions or concerns today.    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       Social History     Tobacco Use     Smoking status: Former Smoker     Packs/day: 1.00     Years: 47.00     Pack years: 47.00     Types: Cigarettes, Pipe     Last attempt to quit: 1994     Years since quittin.6     Smokeless tobacco: Never Used   Substance Use Topics     Alcohol use: No     Family History   Problem Relation Age of Onset     Cerebrovascular Disease Brother          Allergies   Allergen Reactions     Penicillins      Recent Labs   Lab Test 19  1435 17  1509 16  1056 11/03/15  1202  10/07/14  1408  09/30/13  1114 09/24/12  1132   A1C  --   --   --   --  5.8  --   --  5.7   LDL 69 65  --  72 76   < > 75 78   HDL 68 69 64  --  60  --  57 50   TRIG 124 99  --   --   --   --  98 114   ALT 21 24  --  23 26  --  26 <6   CR 0.75  --  0.90 0.84 0.88  --  0.76 0.76   GFRESTIMATED 80  --  80 86 82  --  >90 >90   GFRESTBLACK >90  --  >90   GFR Calc   >90   GFR Calc   >90   GFR Calc    --  >90 >90   POTASSIUM 4.0  --  4.7 4.7 4.5  --  4.4 4.9   TSH  --   --   --   --  1.41  --  1.33  --     < > = values in this interval not displayed.        Reviewed and updated as needed this visit by Provider         Review of Systems   Constitutional, HEENT, cardiovascular, pulmonary, gi and gu systems are negative, except as otherwise noted.      Objective    /64   Pulse 75   Temp 97.7  F (36.5  C) (Oral)   Resp 18   Wt 63.6 kg (140 lb 3.2 oz)   SpO2 96%   BMI 23.33 kg/m    Body mass index is 23.33 kg/m .  Physical Exam   GENERAL: healthy, alert and no distress  NECK: no adenopathy, no asymmetry, masses, or scars and thyroid normal to palpation  RESP: lungs clear to auscultation - no rales, rhonchi or wheezes  CV: regular rate and rhythm, normal S1 S2, no S3 or S4, no murmur, click or rub, no peripheral edema and peripheral pulses strong  MS: no gross musculoskeletal defects noted, no edema  NEURO: Normal strength and tone, mentation intact and speech normal  PSYCH: mentation appears normal, affect normal/bright    Diagnostic Test Results:  Labs reviewed in Epic        Assessment & Plan     (J43.2) Centrilobular emphysema (H)  (primary encounter diagnosis)  Comment: stable phase of chronic illness   Plan: ipratropium-albuterol (COMBIVENT RESPIMAT)          MCG/ACT inhaler, umeclidinium-vilanterol        (ANORO ELLIPTA) 62.5-25 MCG/INH oral inhaler        Continue current plan of care      (E78.5) Hyperlipidemia LDL goal <130  Comment: stable phase of  chronic illness   Plan: simvastatin (ZOCOR) 10 MG tablet, Lipid panel         reflex to direct LDL Fasting, **ALT FUTURE         anytime        Continue current plan of care      (J30.0) Vasomotor rhinitis  Comment: very stubborn but chronic problem   Plan: fluticasone (FLONASE) 50 MCG/ACT nasal spray            (E53.8) Vitamin B12 deficiency  Comment: due for recheck   Plan: Basic metabolic panel, CBC with platelets         differential        Follow up as indicated on results         Return in about 1 year (around 8/4/2021) for Physical Exam, Lab Work.    Micheal Blake MD  [facility]

## 2020-08-04 NOTE — LETTER
August 5, 2020      Lang Christina  [address] [place] [address]          Dear ,    We are writing to inform you of your test results.  All of these tests are within acceptable limits , things look good !     Resulted Orders   Lipid panel reflex to direct LDL Fasting   Result Value Ref Range    Cholesterol 135 <200 mg/dL    Triglycerides 63 <150 mg/dL    HDL Cholesterol 58 >39 mg/dL    LDL Cholesterol Calculated 64 <100 mg/dL      Comment:      Desirable:       <100 mg/dl    Non HDL Cholesterol 77 <130 mg/dL   **ALT FUTURE anytime   Result Value Ref Range    ALT 19 0 - 70 U/L   Basic metabolic panel   Result Value Ref Range    Sodium 139 133 - 144 mmol/L    Potassium 4.5 3.4 - 5.3 mmol/L    Chloride 104 94 - 109 mmol/L    Carbon Dioxide 30 20 - 32 mmol/L    Anion Gap 5 3 - 14 mmol/L    Glucose 90 70 - 99 mg/dL    Urea Nitrogen 12 7 - 30 mg/dL    Creatinine 0.74 0.66 - 1.25 mg/dL    GFR Estimate 80 >60 mL/min/[1.73_m2]      Comment:      Non  GFR Calc  Starting 12/18/2018, serum creatinine based estimated GFR (eGFR) will be   calculated using the Chronic Kidney Disease Epidemiology Collaboration   (CKD-EPI) equation.      GFR Estimate If Black >90 >60 mL/min/[1.73_m2]      Comment:       GFR Calc  Starting 12/18/2018, serum creatinine based estimated GFR (eGFR) will be   calculated using the Chronic Kidney Disease Epidemiology Collaboration   (CKD-EPI) equation.      Calcium 9.4 8.5 - 10.1 mg/dL   CBC with platelets differential   Result Value Ref Range    WBC 5.8 4.0 - 11.0 10e9/L    RBC Count 4.08 (L) 4.4 - 5.9 10e12/L    Hemoglobin 13.2 (L) 13.3 - 17.7 g/dL    Hematocrit 40.6 40.0 - 53.0 %     78 - 100 fl    MCH 32.4 26.5 - 33.0 pg    MCHC 32.5 31.5 - 36.5 g/dL    RDW 12.5 10.0 - 15.0 %    Platelet Count 203 150 - 450 10e9/L    % Neutrophils 67.0 %    % Lymphocytes 18.0 %    % Monocytes 11.3 %    % Eosinophils 3.4 %    % Basophils 0.3 %    Absolute  Neutrophil 3.9 1.6 - 8.3 10e9/L    Absolute Lymphocytes 1.1 0.8 - 5.3 10e9/L    Absolute Monocytes 0.7 0.0 - 1.3 10e9/L    Absolute Eosinophils 0.2 0.0 - 0.7 10e9/L    Absolute Basophils 0.0 0.0 - 0.2 10e9/L    Diff Method Automated Method      If you have any questions or concerns, please call the clinic at the number listed above.     Sincerely,    Micheal Blake MD

## 2020-09-17 ENCOUNTER — TELEPHONE (OUTPATIENT)
Dept: FAMILY MEDICINE | Facility: CLINIC | Age: 85
End: 2020-09-17

## 2020-09-17 NOTE — TELEPHONE ENCOUNTER
Spoke to Sandy from [facility].  Informed her that Dr. Blake last saw patient on 8-4-2020, and according to his notes, no mention was made of patient using oxygen.    She will contact patient to schedule appointment with Dr. Blake to discuss further.  Francy Heard,

## 2020-09-17 NOTE — TELEPHONE ENCOUNTER
Please send addended notes to home care showing patients oxygen use was discussed. Ok to leave a detailed message.  Please fax to [phone]

## 2020-09-28 ENCOUNTER — OFFICE VISIT (OUTPATIENT)
Dept: INTERNAL MEDICINE | Facility: CLINIC | Age: 85
End: 2020-09-28
Payer: COMMERCIAL

## 2020-09-28 ENCOUNTER — OFFICE VISIT (OUTPATIENT)
Dept: ORTHOPEDICS | Facility: CLINIC | Age: 85
End: 2020-09-28
Payer: COMMERCIAL

## 2020-09-28 VITALS
HEIGHT: 65 IN | BODY MASS INDEX: 23.32 KG/M2 | SYSTOLIC BLOOD PRESSURE: 130 MMHG | RESPIRATION RATE: 14 BRPM | HEART RATE: 73 BPM | DIASTOLIC BLOOD PRESSURE: 74 MMHG | WEIGHT: 140 LBS

## 2020-09-28 VITALS
BODY MASS INDEX: 21.97 KG/M2 | HEART RATE: 99 BPM | HEIGHT: 67 IN | WEIGHT: 140 LBS | OXYGEN SATURATION: 94 % | DIASTOLIC BLOOD PRESSURE: 74 MMHG | TEMPERATURE: 98.2 F | SYSTOLIC BLOOD PRESSURE: 130 MMHG

## 2020-09-28 DIAGNOSIS — H61.92 SKIN LESION OF LEFT EXTERNAL EAR: ICD-10-CM

## 2020-09-28 DIAGNOSIS — J43.2 CENTRILOBULAR EMPHYSEMA (H): Primary | ICD-10-CM

## 2020-09-28 DIAGNOSIS — R09.02 HYPOXEMIA: ICD-10-CM

## 2020-09-28 DIAGNOSIS — M17.0 PRIMARY OSTEOARTHRITIS OF BOTH KNEES: Primary | ICD-10-CM

## 2020-09-28 PROCEDURE — 99213 OFFICE O/P EST LOW 20 MIN: CPT | Performed by: INTERNAL MEDICINE

## 2020-09-28 PROCEDURE — 20610 DRAIN/INJ JOINT/BURSA W/O US: CPT | Mod: 50 | Performed by: ORTHOPAEDIC SURGERY

## 2020-09-28 RX ORDER — METHYLPREDNISOLONE ACETATE 80 MG/ML
160 INJECTION, SUSPENSION INTRA-ARTICULAR; INTRALESIONAL; INTRAMUSCULAR; SOFT TISSUE
Status: DISCONTINUED | OUTPATIENT
Start: 2020-09-28 | End: 2022-06-13

## 2020-09-28 RX ORDER — LIDOCAINE HYDROCHLORIDE 10 MG/ML
5 INJECTION, SOLUTION INFILTRATION; PERINEURAL
Status: DISCONTINUED | OUTPATIENT
Start: 2020-09-28 | End: 2022-06-13

## 2020-09-28 RX ADMIN — METHYLPREDNISOLONE ACETATE 160 MG: 80 INJECTION, SUSPENSION INTRA-ARTICULAR; INTRALESIONAL; INTRAMUSCULAR; SOFT TISSUE at 09:33

## 2020-09-28 RX ADMIN — LIDOCAINE HYDROCHLORIDE 5 ML: 10 INJECTION, SOLUTION INFILTRATION; PERINEURAL at 09:33

## 2020-09-28 ASSESSMENT — MIFFLIN-ST. JEOR
SCORE: 1243.67
SCORE: 1211.92

## 2020-09-28 NOTE — PROGRESS NOTES
Large Joint Injection/Arthocentesis: bilateral knee    Date/Time: 9/28/2020 9:33 AM  Performed by: Sheng Montyoa MD  Authorized by: Sheng Montoya MD     Indications:  Pain and osteoarthritis  Needle Size:  22 G  Guidance: landmark guided    Location:  Knee  Laterality:  Bilateral      Medications (Right):  160 mg methylPREDNISolone 80 MG/ML; 5 mL lidocaine 1 %  Medications (Left):  160 mg methylPREDNISolone 80 MG/ML; 5 mL lidocaine 1 %  Outcome:  Tolerated well, no immediate complications  Procedure discussed: discussed risks, benefits, and alternatives    Consent Given by:  Patient  Timeout: timeout called immediately prior to procedure    Prep: patient was prepped and draped in usual sterile fashion

## 2020-09-28 NOTE — PROGRESS NOTES
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 The first is that [facility] home oxygen has required him to come in office today to establish whether or not he does in point of fact qualify for oxygen. We did therefore right away the hallway ambulation to test for oxygen saturation and find the following things    Oxygen readings:     1. Oxygen saturation on room air at rest 93%, pulse 95.  (if 88% or less stop here, if 89% or high continue to next step.)    2. Oxygen saturation during ambulation/exercise without oxygen 87%, pulse 114.  4. Oxygen saturation at rest on 1 liters of oxygen 97%, pulse 78.  3. Oxygen saturation during ambulation/exercise on 1 liters of oxygen 95% pulse 104.    Arpita Kaufman MA    The second issue is that while he was here today we notice a fairly large and fairly obvious skin condition jutting of of the pinna of his left ear and this virtually certainly to be a squamous cell skin cancer . See assessment and plan section      Review of Systems   Constitutional, HEENT, cardiovascular, pulmonary, gi and gu systems are negative, except as otherwise noted.      Objective    /74   Pulse 99   Temp 98.2  F (36.8  C) (Oral)   Ht 1.702 m (5' 7\")   Wt 63.5 kg (140 lb)   SpO2 94%   BMI 21.93 kg/m    Body mass index is 21.93 kg/m .  Physical Exam   GENERAL: healthy, alert and no distress  HENT: left external ear has a fairly large squamous growth jutting off the ear with some adherent scabby look with significant tissue growth suggestive of squamous cell skin cancer, nose and mouth without ulcers or lesions  RESP: lungs clear to auscultation - no rales, rhonchi or wheezes  CV: regular rate and " rhythm, normal S1 S2, no S3 or S4, no murmur, click or rub, no peripheral edema and peripheral pulses strong  MS: no gross musculoskeletal defects noted, no edema  NEURO: Normal strength and tone, mentation intact and speech normal  PSYCH: mentation appears normal, affect normal/bright        Assessment & Plan     Centrilobular emphysema (H)  Qualifies for home oxygen with exertion and also uses it at bedtime apparently   - Home Oxygen Order for DME - ONLY FOR DME    Hypoxemia    - Home Oxygen Order for DME - ONLY FOR DME    Skin lesion of left external ear  Referral to Dermatologist for urgent evaluation of this ear lesion  - DERMATOLOGY ADULT REFERRAL; Future         Return in about 6 months (around 3/28/2021).    Micheal Blake MD  [facility]

## 2020-09-28 NOTE — LETTER
9/28/2020         RE: Lang Christina  [address] [place] [address]        Dear Colleague,    Thank you for referring your patient, Lang Christina, to the [facility]. Please see a copy of my visit note below.        Large Joint Injection/Arthocentesis: bilateral knee    Date/Time: 9/28/2020 9:33 AM  Performed by: Sheng Montoya MD  Authorized by: Sheng Montoya MD     Indications:  Pain and osteoarthritis  Needle Size:  22 G  Guidance: landmark guided    Location:  Knee  Laterality:  Bilateral      Medications (Right):  160 mg methylPREDNISolone 80 MG/ML; 5 mL lidocaine 1 %  Medications (Left):  160 mg methylPREDNISolone 80 MG/ML; 5 mL lidocaine 1 %  Outcome:  Tolerated well, no immediate complications  Procedure discussed: discussed risks, benefits, and alternatives    Consent Given by:  Patient  Timeout: timeout called immediately prior to procedure    Prep: patient was prepped and draped in usual sterile fashion            Follow up bilateral primary knee osteoarthritis.  Last injection 5/18/2020.  We usually use a double dose.  He has severe osteoarthritis by x-ray.  Range of motion 4-120 on left, 2-120 on right.    He  desires injection today of bilateral knee(s).  Risks, benefits, potential complications and alternatives were discussed.   With the patient's consent, sterile prep was performed of bilateral knee(s).  Each knee was injected with Depo Medrol 160 mg and lidocaine at anteromedial site.    Return to clinic as needed.         Again, thank you for allowing me to participate in the care of your patient.        Sincerely,        Sheng Montoya MD

## 2020-09-28 NOTE — NURSING NOTE
Oxygen readings:     1. Oxygen saturation on room air at rest 93%, pulse 95.  (if 88% or less stop here, if 89% or high continue to next step.)    2. Oxygen saturation during ambulation/exercise without oxygen 87%, pulse 114.  4. Oxygen saturation at rest on 1 liters of oxygen 97%, pulse78.  3. Oxygen saturation during ambulation/exercise on 1 liters of oxygen 95% pulse 104.

## 2020-09-28 NOTE — PROGRESS NOTES
Follow up bilateral primary knee osteoarthritis.  Last injection 5/18/2020.  We usually use a double dose.  He has severe osteoarthritis by x-ray.  Range of motion 4-120 on left, 2-120 on right.    He  desires injection today of bilateral knee(s).  Risks, benefits, potential complications and alternatives were discussed.   With the patient's consent, sterile prep was performed of bilateral knee(s).  Each knee was injected with Depo Medrol 160 mg and lidocaine at anteromedial site.    Return to clinic as needed.

## 2020-09-29 ENCOUNTER — TELEPHONE (OUTPATIENT)
Dept: DERMATOLOGY | Facility: CLINIC | Age: 85
End: 2020-09-29

## 2020-09-29 NOTE — TELEPHONE ENCOUNTER
[facility] Call Center    Phone Message    May a detailed message be left on voicemail: yes     Reason for Call: Pt has been referred to Dermatology for Skin lesion of left external ear.  Pt is not able to do a video visit and wants to be seen in person.  He said that he needs to be seen soon because his  that referred him told him it's most likely cancer although it hasn't been diagnosed yet.  Pt does not have voicemail so if you don't reach him please try calling back.  Thanks.    Action Taken: Message routed to:  Adult Clinics: Dermatology p [id number]    Travel Screening: Not Applicable

## 2020-09-30 ENCOUNTER — ALLIED HEALTH/NURSE VISIT (OUTPATIENT)
Dept: NURSING | Facility: CLINIC | Age: 85
End: 2020-09-30
Payer: COMMERCIAL

## 2020-09-30 DIAGNOSIS — Z23 NEED FOR PROPHYLACTIC VACCINATION AND INOCULATION AGAINST INFLUENZA: Primary | ICD-10-CM

## 2020-09-30 PROCEDURE — 99207 ZZC NO CHARGE NURSE ONLY: CPT

## 2020-09-30 PROCEDURE — 90662 IIV NO PRSV INCREASED AG IM: CPT

## 2020-09-30 PROCEDURE — G0008 ADMIN INFLUENZA VIRUS VAC: HCPCS

## 2020-10-05 ENCOUNTER — TELEPHONE (OUTPATIENT)
Dept: INTERNAL MEDICINE | Facility: CLINIC | Age: 85
End: 2020-10-05

## 2020-10-05 ENCOUNTER — MEDICAL CORRESPONDENCE (OUTPATIENT)
Dept: HEALTH INFORMATION MANAGEMENT | Facility: CLINIC | Age: 85
End: 2020-10-05

## 2020-10-05 NOTE — TELEPHONE ENCOUNTER
Form came in via fax to be completed by the provider: Oxygen Certification of Medical Necessity Form    Who is the form from? [facility]  This was faxed to [facility]  Where was the form placed? Providers bin  What number is listed as a contact on the form? YOLANDE    [facility]  [address]    [address]   [place] [address]   Phone: [phone]  Fax: [phone]  Please fax to     Type of letter, form or note: medical     Please return the phone encounter when the form/order has been completed. Thank you.

## 2020-10-08 ENCOUNTER — OFFICE VISIT (OUTPATIENT)
Dept: DERMATOLOGY | Facility: CLINIC | Age: 85
End: 2020-10-08
Payer: COMMERCIAL

## 2020-10-08 DIAGNOSIS — D48.5 NEOPLASM OF UNCERTAIN BEHAVIOR OF EAR: Primary | ICD-10-CM

## 2020-10-08 PROCEDURE — 11102 TANGNTL BX SKIN SINGLE LES: CPT | Performed by: DERMATOLOGY

## 2020-10-08 PROCEDURE — 88305 TISSUE EXAM BY PATHOLOGIST: CPT | Performed by: DERMATOLOGY

## 2020-10-08 PROCEDURE — 11103 TANGNTL BX SKIN EA SEP/ADDL: CPT | Performed by: DERMATOLOGY

## 2020-10-08 ASSESSMENT — PAIN SCALES - GENERAL: PAINLEVEL: NO PAIN (0)

## 2020-10-08 NOTE — NURSING NOTE
Lang Sanford's goals for this visit include:   Chief Complaint   Patient presents with     Derm Problem     Lang is here today for spot of concern on his left ear        He requests these members of his care team be copied on today's visit information:     PCP: Micheal Blake    Referring Provider:  No referring provider defined for this encounter.    There were no vitals taken for this visit.    Do you need any medication refills at today's visit? No    Merary Rubin LPN

## 2020-10-08 NOTE — NURSING NOTE
The following medication was given:     MEDICATION:  Lidocaine with epinephrine 1% 1:[id number]  ROUTE: SQ  SITE: see procedure note  DOSE: 2cc  LOT #: -[id number]  : Marta  EXPIRATION DATE: 1-2-2021  NDC#: 6239-3165-22   Was there drug waste? 1cc  Multi-dose vial: Yes    Orly Steiner LPN  October 8, 2020

## 2020-10-08 NOTE — PATIENT INSTRUCTIONS

## 2020-10-08 NOTE — LETTER
10/8/2020         RE: Lang Christina  [address] [place] [address]        Dear Colleague,    Thank you for referring your patient, Lang Christina, to the [facility]. Please see a copy of my visit note below.    Dermatology Office Note    Dermatology Surgery Clinic  [facility]  Phone: [phone]  Fax:[phone]    Subjective: Mr. Christina is a very pleasant 89 year old man with history of NMSC who presents today for an area of concern over the left ear. The patient reports a slightly tender area over the antihelix. The area has been present for several months and slowly enlarging. It recently began bleeding.     Overall, the patient is feeling well. He declines a full body skin exam today.     Objective: An exam of the face, neck and scalp was performed today   - Over the left superior antihelix is a hemorrhagic plaque with surrounding erythema and scale  - Over the right superior antihelix is a pink, scaly plaque.     Assessment and Plan:     (1) Neoplasm of uncertain behavior, left antihelix   (2) Neoplasm of uncertain behavior, right antihelix  - Concern for NMSC, especially over there left antihelix  - Given the symmetry of the lesions, chondrodermatitis nodularis helices (although an exuberant case) should be included in the DDx, although less likely.      Shave biopsy:  After discussion of benefits and risks including but not limited to bleeding/bruising, pain/swelling, infection, scar, incomplete removal, nerve damage/numbness, recurrence, and non-diagnostic biopsy, written consent, verbal consent and photographs were obtained. Time-out was performed. The area was cleaned with isopropyl alcohol. 0.5mL of 1% lidocaine with epinephrine was injected to obtain adequate anesthesia of the lesion. A shave biopsy was performed over the left antihelix. Hemostasis was achieved with aluminium chloride. Vaseline and a sterile dressing were  applied. The patient tolerated the procedure and no complications were noted. The patient was provided with verbal and written post care instructions.     Shave biopsy:  After discussion of benefits and risks including but not limited to bleeding/bruising, pain/swelling, infection, scar, incomplete removal, nerve damage/numbness, recurrence, and non-diagnostic biopsy, written consent, verbal consent and photographs were obtained. Time-out was performed. The area was cleaned with isopropyl alcohol. 0.5mL of 1% lidocaine with epinephrine was injected to obtain adequate anesthesia of the lesion. A shave biopsy was performed over the right antihelix. Hemostasis was achieved with aluminium chloride. Vaseline and a sterile dressing were applied. The patient tolerated the procedure and no complications were noted. The patient was provided with verbal and written post care instructions.       Patient was discussed with and evaluated by attending physician Dr. Rosalinda Scott MD  PGY-6    Micrographic Surgery and Dermatologic Oncology Fellow  October 8, 2020    Staff Physician Comments:   I saw and evaluated the patient with the Mohs Surgery Fellow (Dr. Marvin Scott) and I agree with the assessment and plan and the above description of the procedure. I was present for the key portions of the procedure and entire exam.    West Tellez DO    Department of Dermatology  [facility]: Phone: [phone], Fax:[phone]  [facility]: Phone: [phone], Fax: [phone]      Again, thank you for allowing me to participate in the care of your patient.        Sincerely,        West Tellez MD

## 2020-10-12 LAB — COPATH REPORT: NORMAL

## 2020-10-15 ENCOUNTER — TELEPHONE (OUTPATIENT)
Dept: DERMATOLOGY | Facility: CLINIC | Age: 85
End: 2020-10-15

## 2020-10-15 NOTE — TELEPHONE ENCOUNTER
Orly Steiner LPN   10/15/2020  3:22 PM      Called and informed patient of results and treatment recommedations. MOHS procedure and aftercare reviewed with patient. Patient had no further questions or concerns at this time and scheduled for 11/5/2020 at 8:30am.      Orly Steiner LPN

## 2020-10-15 NOTE — TELEPHONE ENCOUNTER
Samson Shah MD  P [facility] Dermatology Adult [place]             Please call the patient with pathology results.     The biopsy on his left ear showed SCC. My treatment recommendation is Mohs.       Ok to schedule after telephone screening. Thank you.     The biopsy on his right ear showed a thick pre-cancer. We can take another look at it when he comes in for Mohs. If anything remains, we may freeze it. If it re-grows, a deeper biopsy may be necessary.   Thank you.    Associated Results    Dermatological path order and indications  Order: [id number]  Status:  Final result   Visible to patient:  No (not released) Dx:  Neoplasm of uncertain behavior of ear  Component 7d ago   Copath Report Patient Name: GADIEL FRITZ   MR#: [id number]   Specimen #: [id number]   Collected: 10/8/2020   Received: 10/9/2020   Reported: 10/12/2020 13:13   Ordering Phy(s): SAMSON SHAH     For improved result formatting, select 'View Enhanced Report Format' under    Linked Documents section.     SPECIMEN(S):   A: Skin, left ear, shave   B: Skin, right ear, shave     FINAL DIAGNOSIS:   A. 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 Skin, right ear, shave:   - Consistent with hypertrophic actinic keratosis, base transected - (see   comment and description)

## 2020-10-15 NOTE — TELEPHONE ENCOUNTER
[facility] Dermatologic Surgery Pre-Surgery Screening Note     Pre-screening Information:  Hx of Skin Cancer: Yes  Hx of Mohs Surgery: Yes  Transplant: No  Immunocompromised: No  Current Anticoagulant(s): None  Bleeding Disorder(s): No  Stent: No  Pacemaker: No  Defibrillator: No  Brain/Nerve Stimulator: No  Endocarditis/Rheumatic Fever Hx: No  Vascular graft: No  Prophylactic Antibiotic Needed: No  Congenital heart defect: No  Prosthetic Heart Valve: No  Lesion on Leg/Groin: No  Prosthetic Joint : No  Diabetic: No  HIV/AIDS: No  Hepatitis: No  Pregnant: No  Prior Problem with Local Anesthesia: No  Patient wears CPAP mask: No   needed?: No  Do you have mobility issues?: No  Do you use any assistive devices/DME?: No  Do you have any issues with lying for long periods of time?: No      Medications/Allergies  Medications and allergies review with patient: Yes     Current Outpatient Medications   Medication Sig Dispense Refill     acetaminophen (TYLENOL) 325 MG tablet Take 1-2 tablets by mouth at onset of headache.       albuterol (PROVENTIL) (2.5 MG/3ML) 0.083% neb solution TAKE 1 VIAL BY NEBULIZATION EVERY 6 HOURS AS NEEDED FOR SHORTNESS OF BREATH/DYSPNEA OR WHEEZING 360 mL 11     Cholecalciferol (VITAMIN D) 1000 UNIT capsule Take 1 capsule by mouth daily.       Cyanocobalamin 1000 MCG TABS Take  by mouth daily.       fluticasone (FLONASE) 50 MCG/ACT nasal spray Spray 1 spray into both nostrils 2 times daily 16 g 11     ipratropium-albuterol (COMBIVENT RESPIMAT)  MCG/ACT inhaler Inhale 1 puff into the lungs 4 times daily 1 Inhaler 11     order for DME Oxygen for home use. Liters per minute: 1 per nasal cannula. Frequency of use: With activity;. Length of need: 99.       Respiratory Therapy Supplies (NEBULIZER/TUBING/MOUTHPIECE) KIT To be used with neb solution as prescribed 1 kit 0     simvastatin (ZOCOR) 10 MG tablet TAKE ONE TABLET BY MOUTH EVERY NIGHT AT BEDTIME. FILLED FOR 1 TIME,  DUE FOR FASTING LABS AND RECHECK 90 tablet 3     umeclidinium-vilanterol (ANORO ELLIPTA) 62.5-25 MCG/INH oral inhaler Inhale 1 puff into the lungs daily 3 Inhaler 3     Allergies   Allergen Reactions     Penicillins        Pertinent Labs:  Last Creatine:   Creatinine   Date Value Ref Range Status   08/04/2020 0.74 0.66 - 1.25 mg/dL Final     Last INR: No results found for: INR    Other Questions:    Reminded patient to take any order prophylactic antibiotics 1 hour prior to appointment:     If on a prescribed anticoagulant, advised patient to continue or check with prescribing provider:     If on an OTC anticoagulant/supplement, advised patient to hold these medications 10-14 days prior to surgery and resume 48 hrs after surgery:      Please be aware that this can be an all day procedure. Please bring your daily medications and food/cash. Encourage patient to eat prior to procedure(s). After care instructions were reviewed with patient.    If any positives, send to RN to initiate antibiotic prophylaxis protocol and/or anticoagulation management protocol    Reviewed by:    Orly Steiner LPN

## 2020-10-18 NOTE — PROGRESS NOTES
Dermatology Office Note    Dermatology Surgery Clinic  [facility]  Phone: [phone]  Fax:[phone]    Subjective: Mr. Christina is a very pleasant 89 year old man with history of NMSC who presents today for an area of concern over the left ear. The patient reports a slightly tender area over the antihelix. The area has been present for several months and slowly enlarging. It recently began bleeding.     Overall, the patient is feeling well. He declines a full body skin exam today.     Objective: An exam of the face, neck and scalp was performed today   - Over the left superior antihelix is a hemorrhagic plaque with surrounding erythema and scale  - Over the right superior antihelix is a pink, scaly plaque.     Assessment and Plan:     (1) Neoplasm of uncertain behavior, left antihelix   (2) Neoplasm of uncertain behavior, right antihelix  - Concern for NMSC, especially over there left antihelix  - Given the symmetry of the lesions, chondrodermatitis nodularis helices (although an exuberant case) should be included in the DDx, although less likely.      Shave biopsy:  After discussion of benefits and risks including but not limited to bleeding/bruising, pain/swelling, infection, scar, incomplete removal, nerve damage/numbness, recurrence, and non-diagnostic biopsy, written consent, verbal consent and photographs were obtained. Time-out was performed. The area was cleaned with isopropyl alcohol. 0.5mL of 1% lidocaine with epinephrine was injected to obtain adequate anesthesia of the lesion. A shave biopsy was performed over the left antihelix. Hemostasis was achieved with aluminium chloride. Vaseline and a sterile dressing were applied. The patient tolerated the procedure and no complications were noted. The patient was provided with verbal and written post care instructions.     Shave biopsy:  After discussion of benefits and risks including but not limited to bleeding/bruising,  pain/swelling, infection, scar, incomplete removal, nerve damage/numbness, recurrence, and non-diagnostic biopsy, written consent, verbal consent and photographs were obtained. Time-out was performed. The area was cleaned with isopropyl alcohol. 0.5mL of 1% lidocaine with epinephrine was injected to obtain adequate anesthesia of the lesion. A shave biopsy was performed over the right antihelix. Hemostasis was achieved with aluminium chloride. Vaseline and a sterile dressing were applied. The patient tolerated the procedure and no complications were noted. The patient was provided with verbal and written post care instructions.       Patient was discussed with and evaluated by attending physician Dr. Rosalinda Scott MD  PGY-6    Micrographic Surgery and Dermatologic Oncology Fellow  October 8, 2020    Staff Physician Comments:   I saw and evaluated the patient with the Mohs Surgery Fellow (Dr. Marvin Scott) and I agree with the assessment and plan and the above description of the procedure. I was present for the key portions of the procedure and entire exam.    West Tellez DO    Department of Dermatology  [facility]: Phone: [phone], Fax:[phone]  [facility]: Phone: [phone], Fax: [phone]

## 2020-11-02 ENCOUNTER — OFFICE VISIT (OUTPATIENT)
Dept: OPHTHALMOLOGY | Facility: CLINIC | Age: 85
End: 2020-11-02
Payer: COMMERCIAL

## 2020-11-02 ENCOUNTER — TELEPHONE (OUTPATIENT)
Dept: OPHTHALMOLOGY | Facility: CLINIC | Age: 85
End: 2020-11-02

## 2020-11-02 DIAGNOSIS — H11.31 SCH (SUBCONJUNCTIVAL HEMORRHAGE), RIGHT: Primary | ICD-10-CM

## 2020-11-02 DIAGNOSIS — H50.00 ESOTROPIA: ICD-10-CM

## 2020-11-02 DIAGNOSIS — H43.813 POSTERIOR VITREOUS DETACHMENT OF BOTH EYES: ICD-10-CM

## 2020-11-02 DIAGNOSIS — Z96.1 PSEUDOPHAKIA: ICD-10-CM

## 2020-11-02 PROCEDURE — 92012 INTRM OPH EXAM EST PATIENT: CPT | Performed by: STUDENT IN AN ORGANIZED HEALTH CARE EDUCATION/TRAINING PROGRAM

## 2020-11-02 ASSESSMENT — SLIT LAMP EXAM - LIDS
COMMENTS: NORMAL
COMMENTS: NORMAL

## 2020-11-02 ASSESSMENT — VISUAL ACUITY
OD_CC: 20/25
OS_CC: 20/25
OS_CC+: -2
CORRECTION_TYPE: GLASSES
METHOD: SNELLEN - LINEAR
OD_CC+: -2

## 2020-11-02 ASSESSMENT — EXTERNAL EXAM - RIGHT EYE: OD_EXAM: NORMAL

## 2020-11-02 ASSESSMENT — EXTERNAL EXAM - LEFT EYE: OS_EXAM: NORMAL

## 2020-11-02 NOTE — PROGRESS NOTES
Current Eye Medications:   artificial tears as needed     Subjective:  MD check: starting Friday the right eye has been red, no pain, feels somewhat dry. He states the vision is a little more blurred. He doesn't recall injuring the eye at all. Patient has an appointment for a complete exam on 11-18-20.     Objective:  See Ophthalmology Exam.      Assessment:  Lang Christina is a 89 year old male who presents with:   Encounter Diagnoses   Name Primary?     Pseudophakia OU      Posterior vitreous detachment of both eyes      Esotropia      Jason (subconjunctival hemorrhage), right Reassured.        Plan:  Continue artificial tears as needed   Keep scheduled appointment.    Julieth Aguilar MD  [phone]

## 2020-11-02 NOTE — TELEPHONE ENCOUNTER
Patient came to our clinic, right eye has been red since 10-30-20.  Patient states he woke with redness on 10-30-20 and blurry vision.  He doesn't feel the redness has worsened or improved over the past few days.  Right eye aches slightly, and is not improved with artificial tears.   Tech note:  I observe ~3+ diffuse injection right eye, difficult to discern if a subconjunctival hemorrhage or something else, but patient insists his vision is now cloudy in his right eye, and his eye aches.    He will return for an appointment this afternoon with Dr. Aguilar at 1:00.

## 2020-11-02 NOTE — LETTER
11/2/2020         RE: Lang Christina  [address] [place] [address]        Dear Colleague,    Thank you for referring your patient, Lang Christina, to the [facility]. Please see a copy of my visit note below.     Current Eye Medications:   artificial tears as needed     Subjective:  MD check: starting Friday the right eye has been red, no pain, feels somewhat dry. He states the vision is a little more blurred. He doesn't recall injuring the eye at all. Patient has an appointment for a complete exam on 11-18-20.     Objective:  See Ophthalmology Exam.      Assessment:  Lang Christina is a 89 year old male who presents with:   Encounter Diagnoses   Name Primary?     Pseudophakia OU      Posterior vitreous detachment of both eyes      Esotropia      Jason (subconjunctival hemorrhage), right Reassured.        Plan:  Continue artificial tears as needed   Keep scheduled appointment.    Julieth Aguilar MD  [phone]               Again, thank you for allowing me to participate in the care of your patient.        Sincerely,        Julieth Agiular MD

## 2020-11-02 NOTE — PATIENT INSTRUCTIONS
Continue artificial tears as needed   Keep scheduled appointment.    Julieth Aguilar MD  [phone]

## 2020-11-05 ENCOUNTER — OFFICE VISIT (OUTPATIENT)
Dept: DERMATOLOGY | Facility: CLINIC | Age: 85
End: 2020-11-05
Payer: COMMERCIAL

## 2020-11-05 VITALS — HEART RATE: 93 BPM | DIASTOLIC BLOOD PRESSURE: 74 MMHG | SYSTOLIC BLOOD PRESSURE: 116 MMHG

## 2020-11-05 DIAGNOSIS — C44.229 SQUAMOUS CELL CARCINOMA OF AURICLE OF LEFT EAR: Primary | ICD-10-CM

## 2020-11-05 DIAGNOSIS — C44.219 BASAL CELL CARCINOMA OF HELIX, LEFT: ICD-10-CM

## 2020-11-05 PROCEDURE — 17311 MOHS 1 STAGE H/N/HF/G: CPT | Mod: GC | Performed by: DERMATOLOGY

## 2020-11-05 PROCEDURE — 88331 PATH CONSLTJ SURG 1 BLK 1SPC: CPT | Mod: 26 | Performed by: DERMATOLOGY

## 2020-11-05 PROCEDURE — 12052 INTMD RPR FACE/MM 2.6-5.0 CM: CPT | Mod: 59 | Performed by: DERMATOLOGY

## 2020-11-05 PROCEDURE — 11102 TANGNTL BX SKIN SINGLE LES: CPT | Mod: 59 | Performed by: DERMATOLOGY

## 2020-11-05 PROCEDURE — 17312 MOHS ADDL STAGE: CPT | Mod: GC | Performed by: DERMATOLOGY

## 2020-11-05 RX ORDER — TRAMADOL HYDROCHLORIDE 50 MG/1
50 TABLET ORAL EVERY 6 HOURS PRN
Qty: 6 TABLET | Refills: 0 | Status: SHIPPED | OUTPATIENT
Start: 2020-11-05 | End: 2020-11-08

## 2020-11-05 RX ORDER — MUPIROCIN 20 MG/G
OINTMENT TOPICAL ONCE
Status: COMPLETED | OUTPATIENT
Start: 2020-11-05 | End: 2020-11-05

## 2020-11-05 RX ORDER — DOXYCYCLINE 100 MG/1
100 CAPSULE ORAL 2 TIMES DAILY
Qty: 10 CAPSULE | Refills: 0 | Status: SHIPPED | OUTPATIENT
Start: 2020-11-05 | End: 2021-03-10

## 2020-11-05 RX ADMIN — MUPIROCIN: 20 OINTMENT TOPICAL at 16:59

## 2020-11-05 ASSESSMENT — PAIN SCALES - GENERAL: PAINLEVEL: NO PAIN (0)

## 2020-11-05 NOTE — PATIENT INSTRUCTIONS
MOHS Wound Care Instructions  I will experience scar, altered skin color, bleeding, swelling, pain, crusting and redness. I may experience altered sensation. Risks are excessive bleeding, infection, muscle weakness, thick (hypertrophic or keloidal) scar, and recurrence,. A second procedure may be recommended to obtain the best cosmetic or functional result.  Possible complications of any surgical procedure are bleeding, infection, scarring, alteration in skin color and sensation, muscle weakness in the area, wound dehiscence or seperation, or recurrence of the lesion or disease. On occasion, after healing, a secondary procedure or revision may be recommended in order to obtain the best cosmetic or functional result.   After your surgery, a pressure bandage will be placed over the area that has sutures. This will help prevent bleeding. Please follow these instructions as they will help you to prevent complications as your wound heals.  For the First 48 hours After Surgery:  1. Leave the pressure bandage on and keep it dry. If it should come loose, you may retape it, but do not take it off.  2. Relax and take it easy. Do not do any vigorous exercise, heavy lifting, or bending forward. This could cause the wound to bleed.  3. Post-operative pain is usually mild. You may take plain or extra strength Tylenol every 4 hours as needed (do not take more than 4,000mg in one day). Do not take any medicine that contains aspirin, ibuprofen or motrin unless you have been recommended these by a doctor.  Avoid alcohol and vitamin E as these may increase your tendency to bleed.  4. You may put an ice pack around the bandaged area for 20 minutes every 2-3 hours. This may help reduce swelling, bruising, and pain. Make sure the ice pack is waterproof so that the pressure bandage does not get wet.   5. You may see a small amount of drainage or blood on your pressure bandage. This is normal. However, if drainage or bleeding continues or  saturates the bandage, you will need to apply firm pressure over the bandage with a washcloth for 15 minutes. If bleeding continues after applying pressure for 15 minutes then go to the nearest emergency room.  48 Hours After Surgery  Carefully remove the bandage and start daily wound care and dressing changes. You may also now shower and get the wound wet. Wash wound with a mild soap and water.  Use caution when washing the wound. Be gentle and do not let the forceful shower stream hit the wound directly.  PAT dry.  Daily Wound Care:  1. Wash wound with a mild soap and water.  Use caution when washing the wound, be gentle and do not let the forceful shower stream hit the wound directly.  2. PAT DRY.  3. Apply Vaseline (from a new container or tube) over the suture line with a Q-tip. It is very important to keep the wound continuously moist, as wounds heal best in a moist environment.  4.  Keep the site covered until sutures are removed, you can cover it with a Telfa (non-stick) dressing and tape or a band-aid.    5. If you are unable to keep wound covered, you must apply Vaseline every 2 - 3 hours (while awake) to ensure it is being kept moist for optimal healing. A dressing overnight is recommended to keep the area moist.   Call Us If:  1. You have pain that is not controlled with Tylenol.  2. You have signs or symptoms of an infection, such as: fever over 100 degrees F, redness, warmth, or foul-smelling or yellow/creamy drainage from the wound.  Who should I call with questions?    [facility]: [phone]     [facility]: [phone]    For urgent needs outside of business hours call the [facility] at [phone] and ask for the dermatology resident on call

## 2020-11-05 NOTE — LETTER
11/5/2020         RE: Lang Christina  [address] [place] [address]        Dear Colleague,    Thank you for referring your patient, Lang Christina, to the [facility]. Please see a copy of my visit note below.    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    Informed consent was obtained and the patient underwent the procedure as follows:  The patient was placed supine on the operating table.  The cancer was identified, outlined with a marker, and verified by the patient.  The entire surgical field was prepped with Hibiclens.  The surgical site was anesthetized using Lidocaine 1% with epi 1:100,000.      The area of clinically apparent tumor was debulked. The layer of tissue was then surgically excised using a #15 blade and was then transferred onto a specimen sheet maintaining the orientation of the specimen. Hemostasis was obtained using bipolar electrocoagulation. The wound site was then covered with a dressing while the tissue samples were processed for examination.    The excised tissue was [facility] maintaining the tissue orientation.  The tissue specimen was relaxed so that the entire surgical margin was  in a a single horizontal plane for sectioning and inked for precise mapping.  A precise reference map was drawn to reflect the sectioning of the specimen, colored inking of the margins, and orientation on the patient. The tissue was processed using horizontal sectioning of the base and continuous peripheral margins.  The histopathologic sections were reviewed in conjunction with the reference map.    Total blocks: 2    Total slides:  5    There were no cancer cells visualized on examination, therefore Mohs surgery was complete.      [facility] Mohs Dermatologic Surgery Procedure Note    FROZEN SECTION PATHOLOGY:  Clinical history: 89 year old male presented for Mohs surgery. Adjacent but not contiguous to Mohs surgical site was a lesion suspicious for an additional nonmelanoma skin cancer.     Procedure:  Shave biopsy/biopsies:  After discussion of risks, benefits and alternatives, informed consent was obtained for biopsy. The area(s) was/were cleaned with isopropyl alcohol or chlorhexidine and anesthetized. Shave biopsy/biopsies was/were performed on the left superior helix. Hemostasis was achieved with electrocautery. Vaseline and a sterile dressing were applied. The patient tolerated the procedure and no complications were noted. The patient was provided with verbal and written post care instructions.      Gross: 0.4 x 0.4 cm lesion submitted for processing by frozen section pathology  Microscopic:   Clustering of atypical basaloid cells with scant cytoplasm and peripheral palisading with extension into the dermis as nodular and infiltrative aggregates with multiple foci and surrounding sclerotic stroma.   Diagnosis: Skin, shave biopsy, Left Helix: Basal cell carcinoma, infiltrative type    The patient then underwent Mohs surgery for treatment of the lesion as below:    Date of Service:  Nov 5, 2020  Surgery: Mohs micrographic surgery    Case 2  Repair Type: secondary(Puracol  exp  2021/08)  Repair Size: 2.0X1.0 cm  Suture Material: Fast Absorbing Gut 5-0  Tumor Type: BCC - Basal cell carcinoma  Location: left superior helix  Derm-Path Accession #: [id number]  PreOp Size: 0.4X0.4 cm  PostOp Size: 4.3X1.2 cm  Mohs Accession #: [id number]  Level of Defect: cartilage      Procedure:  We discussed the principles of treatment and most likely complications including scarring, bleeding, infection, swelling, pain, crusting, nerve damage, large wound,  incomplete excision, wound dehiscence,  nerve damage, recurrence, and a second procedure may be recommended to obtain the best cosmetic or functional result.    Informed consent was obtained and the patient underwent the procedure as follows:  The patient was placed supine on the operating table.  The cancer was identified, outlined with a marker, and verified by the patient.  The entire surgical field was prepped with Hibiclens.  The surgical site was anesthetized using Lidocaine 1% with epi 1:100,000.      The area of clinically apparent tumor was debulked. The layer of tissue was then surgically excised using a #15 blade and was then transferred onto a specimen sheet maintaining the orientation of the specimen. Hemostasis was obtained using bipolar electrocoagulation. The wound site was then covered with a dressing while the tissue samples were processed for examination.    The excised tissue was transported to the Mohs histology laboratory maintaining the tissue orientation.  The tissue specimen was relaxed so that the entire surgical margin was in a a single horizontal plane for sectioning and inked for precise mapping.  A precise reference map was drawn to reflect the sectioning of the specimen, colored inking of the margins, and orientation on the patient.  The tissue was processed using horizontal sectioning of the base and continuous peripheral margins.  The histopathologic sections were reviewed in conjunction with the reference map.    Total  blocks: 1    Total slides:  2    Residual tumor was identified and indicated in red on the reference map, identifying the location where further tissue excision was necessary. Therefore, an additional stage of Mohs Micrographic surgery was deemed necessary.     Stage II   The patient was returned to the operating room, and the area prepped in the usual manner. The residual tumor was excised using the reference map as a guide. The specimen was transfered to a labeled specimen sheet maintaining the orientation of the specimen. Hemostasis was obtained and the wound site was covered with a dressing while the tissue was processed for examination.     The excised tissue was transported to the Mohs histology laboratory maintaining orientation. The specimen margins were inked for precise mapping and a reference map was prepared for the is additional stage to maintain precise orientation as described above. The tissue was processed using horizontal sectioning of the base and continuous peripheral margins. The histopathologic sections were reviewed in conjunction with the reference map.     Total blocks: 2    Total slides:  1    Residual tumor was identified and indicated in red on the reference map, identifying the location where further tissue excision was necessary. Therefore, an additional stage of Mohs Micrographic surgery was deemed necessary.     Stage III   The patient was returned to the operating room, and the area prepped in the usual manner. The residual tumor was excised using the reference map as a guide. The specimen was transfered to a labeled specimen sheet maintaining the orientation of the specimen. Hemostasis was obtained and the wound site was covered with a dressing while the tissue was processed for examination.     The excised tissue was transported to the Mohs histology laboratory maintaining orientation. The specimen margins were inked for precise mapping and a reference map was prepared for the is  additional stage to maintain precise orientation as described above. The tissue was processed using horizontal sectioning of the base and continuous peripheral margins. The histopathologic sections were reviewed in conjunction with the reference map.     Total blocks: 1    Total slides:  3    There were no cancer cells visualized on examination, therefore Mohs surgery was complete.       EVALUATION OF MOHS DEFECT FOR RECONSTRUCTION    The patient is status post Mohs' micrographic surgery.  The surgical site was examined with attention to normal anatomic and functional relationships.  After consideration and discussion of multiple options with the patient, it was determined that healing by partial intermediate linear repair and partial second intention would offer the best chance for preservation/restoration of all normal anatomic and functional relationships.  The patient verbalized understanding and agrees with this plan. It is also understood that should second intention healing be sub-optimal, additional procedures such as scar revision, steroid injection or dermabrasion may be recommended.    The open wound was cleaned with Chlorhexidine and rinsed with sterile saline. The skin was approximated using 4/5-0 Monocryl sutures and running 5-0 Fast absorbing gut sutures. A Puracol dressing was used to patch areas of exposed cartilage. A thick layer of ointment was applied.  A pressure dressing consisting of non-adherent gauze, gauze and hypafixwas applied.   Wound care was discussed with patient both orally and in writing.  The patient stated understanding and agreement of the course of care.    Puracol Lot:   Exp: 2021/08  Size of grafted area: 2.0 x 1.0 cm = 2.0 cm2    Dr. West Tellez was present for the entire Mohs surgery procedure and immediately available for the entire reconstruction and was physicially present for the key portions of the procedure.    Marvin Scott MD  PGY-6    Micrographic Surgery and  Dermatologic Oncology Fellow  November 5, 2020    Staff Physician Comments:   I saw and evaluated the patient with the Mohs Surgery Fellow (Dr. Marvin Scott) and I agree with the assessment and plan and the above description of the procedure. I was present for the key portions of the procedure and entire exam.    West Tellez DO    Department of Dermatology  [facility]: Phone: [phone], Fax:[phone]  [facility]: Phone: [phone], Fax: [phone]    Care and Laboratory Testing Performed at:  [facility]   Dermatology Clinic  [address] [place] [address]      Again, thank you for allowing me to participate in the care of your patient.        Sincerely,        West Tellez MD

## 2020-11-05 NOTE — NURSING NOTE
The following medication was given:     MEDICATION:  Lidocaine with epinephrine 1% 1:[id number]  ROUTE: SQ  SITE: see procedure note  DOSE: 13.5cc  LOT #: -[id number]  : Hospira  EXPIRATION DATE: 1-2-2021  NDC#: 7198-6073-76   Was there drug waste? 1.5cc  Multi-dose vial: Yes    Orly Steiner LPN  November 5, 2020    Mupirocin, Vaseline and pressure dressing applied to Mohs site on left ear and left superior helix.  Wound care instructions reviewed with patient and AVS provided.  Patient verbalized understanding. No further questions or concerns at this time.      Orly Steiner LPN

## 2020-11-05 NOTE — NURSING NOTE
Lang Christina's goals for this visit include:   Chief Complaint   Patient presents with     Derm Problem     Lang is here today for MOHS on his left ear due to SCC       He requests these members of his care team be copied on today's visit information:     PCP: Micheal Blake    Referring Provider:  No referring provider defined for this encounter.    There were no vitals taken for this visit.    Do you need any medication refills at today's visit? No    Merary Rubin LPN

## 2020-11-10 NOTE — PROGRESS NOTES
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    Informed consent was obtained and the patient underwent the procedure as follows:  The patient was placed supine on the operating table.  The cancer was identified, outlined with a marker, and verified by the patient.  The entire surgical field was prepped with Hibiclens.  The surgical site was anesthetized using Lidocaine 1% with epi 1:100,000.      The area of clinically apparent tumor was debulked. The layer of tissue was then surgically excised using a #15 blade and was then transferred onto a specimen sheet maintaining the orientation of the specimen. Hemostasis was obtained using bipolar electrocoagulation. The wound site was then covered with a dressing while the tissue samples were processed for examination.    The excised tissue was transported to the Mohs histology laboratory maintaining the tissue orientation.  The tissue specimen was relaxed so that the entire surgical margin was in a a single horizontal plane for sectioning and inked for precise mapping.  A precise reference map was drawn to reflect the sectioning of the specimen, colored inking of the margins, and orientation on the patient. The tissue was processed using  horizontal sectioning of the base and continuous peripheral margins.  The histopathologic sections were reviewed in conjunction with the reference map.    Total blocks: 2    Total slides:  5    There were no cancer cells visualized on examination, therefore Mohs surgery was complete.      [facility] Mohs Dermatologic Surgery Procedure Note    FROZEN SECTION PATHOLOGY:  Clinical history: 89 year old male presented for Mohs surgery. Adjacent but not contiguous to Mohs surgical site was a lesion suspicious for an additional nonmelanoma skin cancer.     Procedure:  Shave biopsy/biopsies:  After discussion of risks, benefits and alternatives, informed consent was obtained for biopsy. The area(s) was/were cleaned with isopropyl alcohol or chlorhexidine and anesthetized. Shave biopsy/biopsies was/were performed on the left superior helix. Hemostasis was achieved with electrocautery. Vaseline and a sterile dressing were applied. The patient tolerated the procedure and no complications were noted. The patient was provided with verbal and written post care instructions.      Gross: 0.4 x 0.4 cm lesion submitted for processing by frozen section pathology  Microscopic:   Clustering of atypical basaloid cells with scant cytoplasm and peripheral palisading with extension into the dermis as nodular and infiltrative aggregates with multiple foci and surrounding sclerotic stroma.   Diagnosis: Skin, shave biopsy, Left Helix: Basal cell carcinoma, infiltrative type    The patient then underwent Mohs surgery for treatment of the lesion as below:    Date of Service:  Nov 5, 2020  Surgery: Mohs micrographic surgery    Case 2  Repair Type: secondary(Puracol  exp 2021/08)  Repair Size: 2.0X1.0 cm  Suture Material: Fast Absorbing Gut 5-0  Tumor Type: BCC - Basal cell carcinoma  Location: left superior helix  Derm-Path Accession #: [id number]  PreOp Size: 0.4X0.4 cm  PostOp Size: 4.3X1.2 cm  Mohs Accession #:  [id number]  Level of Defect: cartilage      Procedure:  We discussed the principles of treatment and most likely complications including scarring, bleeding, infection, swelling, pain, crusting, nerve damage, large wound,  incomplete excision, wound dehiscence,  nerve damage, recurrence, and a second procedure may be recommended to obtain the best cosmetic or functional result.    Informed consent was obtained and the patient underwent the procedure as follows:  The patient was placed supine on the operating table.  The cancer was identified, outlined with a marker, and verified by the patient.  The entire surgical field was prepped with Hibiclens.  The surgical site was anesthetized using Lidocaine 1% with epi 1:100,000.      The area of clinically apparent tumor was debulked. The layer of tissue was then surgically excised using a #15 blade and was then transferred onto a specimen sheet maintaining the orientation of the specimen. Hemostasis was obtained using bipolar electrocoagulation. The wound site was then covered with a dressing while the tissue samples were processed for examination.    The excised tissue was transported to the Mohs histology laboratory maintaining the tissue orientation.  The tissue specimen was relaxed so that the entire surgical margin was in a a single horizontal plane for sectioning and inked for precise mapping.  A precise reference map was drawn to reflect the sectioning of the specimen, colored inking of the margins, and orientation on the patient.  The tissue was processed using horizontal sectioning of the base and continuous peripheral margins.  The histopathologic sections were reviewed in conjunction with the reference map.    Total blocks: 1    Total slides:  2    Residual tumor was identified and indicated in red on the reference map, identifying the location where further tissue excision was necessary. Therefore, an additional stage of Mohs Micrographic surgery was deemed  necessary.     Stage II   The patient was returned to the operating room, and the area prepped in the usual manner. The residual tumor was excised using the reference map as a guide. The specimen was transfered to a labeled specimen sheet maintaining the orientation of the specimen. Hemostasis was obtained and the wound site was covered with a dressing while the tissue was processed for examination.     The excised tissue was transported to the Mohs histology laboratory maintaining orientation. The specimen margins were inked for precise mapping and a reference map was prepared for the is additional stage to maintain precise orientation as described above. The tissue was processed using horizontal sectioning of the base and continuous peripheral margins. The histopathologic sections were reviewed in conjunction with the reference map.     Total blocks: 2    Total slides:  1    Residual tumor was identified and indicated in red on the reference map, identifying the location where further tissue excision was necessary. Therefore, an additional stage of Mohs Micrographic surgery was deemed necessary.     Stage III   The patient was returned to the operating room, and the area prepped in the usual manner. The residual tumor was excised using the reference map as a guide. The specimen was transfered to a labeled specimen sheet maintaining the orientation of the specimen. Hemostasis was obtained and the wound site was covered with a dressing while the tissue was processed for examination.     The excised tissue was [facility] maintaining orientation. The specimen margins were inked for precise mapping and a reference map was prepared for the is additional stage to maintain precise orientation as described above. The tissue was processed using horizontal sectioning of the base and continuous peripheral margins. The histopathologic sections were reviewed in conjunction with the reference  map.     Total blocks: 1    Total slides:  3    There were no cancer cells visualized on examination, therefore Mohs surgery was complete.       EVALUATION OF MOHS DEFECT FOR RECONSTRUCTION    The patient is status post Mohs' micrographic surgery.  The surgical site was examined with attention to normal anatomic and functional relationships.  After consideration and discussion of multiple options with the patient, it was determined that healing by partial intermediate linear repair and partial second intention would offer the best chance for preservation/restoration of all normal anatomic and functional relationships.  The patient verbalized understanding and agrees with this plan. It is also understood that should second intention healing be sub-optimal, additional procedures such as scar revision, steroid injection or dermabrasion may be recommended.    The open wound was cleaned with Chlorhexidine and rinsed with sterile saline. The skin was approximated using 4/5-0 Monocryl sutures and running 5-0 Fast absorbing gut sutures. A Puracol dressing was used to patch areas of exposed cartilage. A thick layer of ointment was applied.  A pressure dressing consisting of non-adherent gauze, gauze and hypafixwas applied.   Wound care was discussed with patient both orally and in writing.  The patient stated understanding and agreement of the course of care.    Puracol Lot:   Exp: 2021/08  Size of grafted area: 2.0 x 1.0 cm = 2.0 cm2    Dr. West Tellez was present for the entire Mohs surgery procedure and immediately available for the entire reconstruction and was physicially present for the key portions of the procedure.    Marvin Scott MD  PGY-6    Micrographic Surgery and Dermatologic Oncology Fellow  November 5, 2020    Staff Physician Comments:   I saw and evaluated the patient with the Mohs Surgery Fellow (Dr. Marvin Scott) and I agree with the assessment and plan and the above description of the procedure. I  was present for the key portions of the procedure and entire exam.    West Tellez DO    Department of Dermatology  [facility]: Phone: [phone], Fax:[phone]  [facility]: Phone: [phone], Fax: [phone]    Care and Laboratory Testing Performed at:  [facility]   Dermatology Clinic  [address] [place] [address]

## 2020-11-18 ENCOUNTER — OFFICE VISIT (OUTPATIENT)
Dept: OPHTHALMOLOGY | Facility: CLINIC | Age: 85
End: 2020-11-18
Payer: COMMERCIAL

## 2020-11-18 DIAGNOSIS — H52.223 MYOPIA OF BOTH EYES WITH REGULAR ASTIGMATISM AND PRESBYOPIA: ICD-10-CM

## 2020-11-18 DIAGNOSIS — H50.00 ESOTROPIA: ICD-10-CM

## 2020-11-18 DIAGNOSIS — Z01.00 EXAMINATION OF EYES AND VISION: Primary | ICD-10-CM

## 2020-11-18 DIAGNOSIS — H43.813 POSTERIOR VITREOUS DETACHMENT OF BOTH EYES: ICD-10-CM

## 2020-11-18 DIAGNOSIS — H52.4 MYOPIA OF BOTH EYES WITH REGULAR ASTIGMATISM AND PRESBYOPIA: ICD-10-CM

## 2020-11-18 DIAGNOSIS — H32 PRESUMED OCULAR HISTOPLASMOSIS SYNDROME (POHS) OF BOTH EYES: ICD-10-CM

## 2020-11-18 DIAGNOSIS — H52.13 MYOPIA OF BOTH EYES WITH REGULAR ASTIGMATISM AND PRESBYOPIA: ICD-10-CM

## 2020-11-18 DIAGNOSIS — Z96.1 PSEUDOPHAKIA: ICD-10-CM

## 2020-11-18 DIAGNOSIS — B39.9 PRESUMED OCULAR HISTOPLASMOSIS SYNDROME (POHS) OF BOTH EYES: ICD-10-CM

## 2020-11-18 PROCEDURE — 92014 COMPRE OPH EXAM EST PT 1/>: CPT | Performed by: STUDENT IN AN ORGANIZED HEALTH CARE EDUCATION/TRAINING PROGRAM

## 2020-11-18 PROCEDURE — 92015 DETERMINE REFRACTIVE STATE: CPT | Performed by: STUDENT IN AN ORGANIZED HEALTH CARE EDUCATION/TRAINING PROGRAM

## 2020-11-18 ASSESSMENT — CONF VISUAL FIELD
OD_NORMAL: 1
OS_NORMAL: 1

## 2020-11-18 ASSESSMENT — REFRACTION_MANIFEST
OS_ADD: +3.00
OS_AXIS: 175
OD_SPHERE: -0.75
OS_SPHERE: -0.50
OS_CYLINDER: +1.75
OD_AXIS: 025
OD_ADD: +3.00
OD_CYLINDER: +2.00

## 2020-11-18 ASSESSMENT — VISUAL ACUITY
OD_CC+: -2
CORRECTION_TYPE: GLASSES
OD_CC: 20/25
METHOD: SNELLEN - LINEAR
OS_CC: 20/30
OS_CC+: -1

## 2020-11-18 ASSESSMENT — TONOMETRY
IOP_METHOD: APPLANATION
OS_IOP_MMHG: 12
OD_IOP_MMHG: 12

## 2020-11-18 ASSESSMENT — REFRACTION_WEARINGRX
OS_SPHERE: -0.25
OS_AXIS: 178
OS_CYLINDER: +1.25
SPECS_TYPE: BIFOCAL
OS_HPRISM: 5 BO
OD_CYLINDER: +1.50
OS_ADD: +3.00
OD_SPHERE: -1.00
OD_AXIS: 021
OD_ADD: +3.00

## 2020-11-18 ASSESSMENT — CUP TO DISC RATIO
OS_RATIO: 0.6
OD_RATIO: 0.5

## 2020-11-18 ASSESSMENT — SLIT LAMP EXAM - LIDS
COMMENTS: NORMAL
COMMENTS: NORMAL

## 2020-11-18 ASSESSMENT — REFRACTION_FINALRX
OS_HPRISM: 6 BO
OD_VPRISM: 2.0 BD

## 2020-11-18 ASSESSMENT — EXTERNAL EXAM - RIGHT EYE: OD_EXAM: NORMAL

## 2020-11-18 ASSESSMENT — EXTERNAL EXAM - LEFT EYE: OS_EXAM: NORMAL

## 2020-11-18 NOTE — LETTER
11/18/2020         RE: Lang Christina  [address] [place] [address]        Dear Colleague,    Thank you for referring your patient, Lang Christina, to the [facility]. Please see a copy of my visit note below.     Current Eye Medications: Refresh tears both eyes as needed, usually daily     Subjective:  Here for complete today. Doing well.  Eyes are slightly dry from watching too much TV     Objective:  See Ophthalmology Exam.       Assessment:  Lang Christina is a 89 year old male who presents with:   Encounter Diagnoses   Name Primary?     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    Plan:  Continue artificial tears up to four times a day as needed    Glasses prescription given - optional to update    Julieth Aguilar MD  [phone]          Again, thank you for allowing me to participate in the care of your patient.        Sincerely,        Julieth Aguilar MD

## 2020-11-18 NOTE — PROGRESS NOTES
Current Eye Medications: Refresh tears both eyes as needed, usually daily     Subjective:  Here for complete today. Doing well.  Eyes are slightly dry from watching too much TV     Objective:  See Ophthalmology Exam.       Assessment:  Lang Christina is a 89 year old male who presents with:   Encounter Diagnoses   Name Primary?     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    Plan:  Continue artificial tears up to four times a day as needed    Glasses prescription given - optional to update    Julieth Aguilar MD  [phone]

## 2020-11-18 NOTE — PATIENT INSTRUCTIONS
Continue artificial tears up to four times a day as needed    Glasses prescription given - optional to update    Julieth Aguilar MD  [phone]

## 2020-11-23 ENCOUNTER — OFFICE VISIT (OUTPATIENT)
Dept: DERMATOLOGY | Facility: CLINIC | Age: 85
End: 2020-11-23
Payer: COMMERCIAL

## 2020-11-23 DIAGNOSIS — Z51.89 VISIT FOR WOUND CHECK: ICD-10-CM

## 2020-11-23 DIAGNOSIS — L81.4 SOLAR LENTIGINOSIS: ICD-10-CM

## 2020-11-23 DIAGNOSIS — L57.0 AK (ACTINIC KERATOSIS): ICD-10-CM

## 2020-11-23 DIAGNOSIS — D36.10 NEUROFIBROMA: ICD-10-CM

## 2020-11-23 DIAGNOSIS — D36.15 NEUROFIBROMA OF ABDOMINAL WALL: ICD-10-CM

## 2020-11-23 DIAGNOSIS — L82.1 SEBORRHEIC KERATOSIS: ICD-10-CM

## 2020-11-23 DIAGNOSIS — Z85.828 HISTORY OF NONMELANOMA SKIN CANCER: Primary | ICD-10-CM

## 2020-11-23 PROCEDURE — 99213 OFFICE O/P EST LOW 20 MIN: CPT | Mod: 25 | Performed by: DERMATOLOGY

## 2020-11-23 PROCEDURE — 17004 DESTROY PREMAL LESIONS 15/>: CPT | Performed by: DERMATOLOGY

## 2020-11-23 ASSESSMENT — PAIN SCALES - GENERAL: PAINLEVEL: NO PAIN (0)

## 2020-11-23 NOTE — LETTER
11/23/2020         RE: Lang Christina  [address] [place] [address]        Dear Colleague,    Thank you for referring your patient, Lang Christina, to the [facility]. Please see a copy of my visit note below.    [facility] Dermatology Note      Dermatology Problem List:  1.History NMSC  -SCC and BCC left ear s/p MOHS 11/5/2020  -BCC right medial cheek s/p MOHS 4/16/2013  2. Hx of AK  -cryotherapy     CC:   Chief Complaint   Patient presents with     Skin Check     no areas of concern hx NMSC     Wound Check     left ear sp MOHS 11/5         Encounter Date: Nov 23, 2020    History of Present Illness:  Mr. Lang Christina is a 89 year old male who presents as a follow-up for wound check of left ear s/p MOHS 11/5/2020 for treatment of SCC and BCC and skin check with no areas of concern today. Patient reports he has no concerns with the healing of the left ear.     His ear continues to heal, but he is not significantly bothered by it's appearance or lack of normal contour. It is not painful unless he sleeps on it. There is minimal bleeding with bandage changes.     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     Past Medical History:   Diagnosis Date     Actinic keratosis      BCC (basal cell carcinoma of skin)     pt  unsure of year right temple and right side of nose     BCC (basal cell carcinoma) 12/2012     BPH (benign prostatic hypertrophy) with urinary obstruction      COPD (chronic obstructive pulmonary disease) (H)     9/2012 Allergy consult     Diverticulosis      ED (erectile dysfunction)      Hearing loss       Hyperlipidemia LDL goal < 130      Kidney stones      Localized osteoarthritis of both knees      Osteoarthritis      Skin cancer Pre-2009    pt unsure of type R temple and L cheek     Strabismus      Vasomotor rhinitis      Vitamin B12 deficiency      Past Surgical History:   Procedure Laterality Date     ARTHROSCOPY KNEE RT/LT      Right     CATARACT IOL, RT/LT       CYSTOSCOPY       HERNIA REPAIR, INGUINAL RT/LT      Right     LIGATN/STRIP LONG OR SHORT SAPHEN       PHACOEMULSIFICATION WITH STANDARD INTRAOCULAR LENS IMPLANT  12-09 / 01-10    RT / LT     TURP      .       Social History:  Patient reports that he quit smoking about 26 years ago. His smoking use included cigarettes and pipe. He has a 47.00 pack-year smoking history. He has never used smokeless tobacco. He reports that he does not drink alcohol or use drugs. Patient previously worked as a ,  and is a [employer].     Family History:  Family History   Problem Relation Age of Onset     Cerebrovascular Disease Brother        Medications:  Current Outpatient Medications   Medication Sig Dispense Refill     acetaminophen (TYLENOL) 325 MG tablet Take 1-2 tablets by mouth at onset of headache.       albuterol (PROVENTIL) (2.5 MG/3ML) 0.083% neb solution TAKE 1 VIAL BY NEBULIZATION EVERY 6 HOURS AS NEEDED FOR SHORTNESS OF BREATH/DYSPNEA OR WHEEZING 360 mL 11     Cholecalciferol (VITAMIN D) 1000 UNIT capsule Take 1 capsule by mouth daily.       Cyanocobalamin 1000 MCG TABS Take  by mouth daily.       doxycycline monohydrate (MONODOX) 100 MG capsule Take 1 capsule (100 mg) by mouth 2 times daily 10 capsule 0     fluticasone (FLONASE) 50 MCG/ACT nasal spray Spray 1 spray into both nostrils 2 times daily 16 g 11     ipratropium-albuterol (COMBIVENT RESPIMAT)  MCG/ACT inhaler Inhale 1 puff into the lungs 4 times daily 1 Inhaler 11     order for DME Oxygen for home use. Liters per minute: 1 per nasal cannula. Frequency of use: With  "activity;. Length of need: 99.       Respiratory Therapy Supplies (NEBULIZER/TUBING/MOUTHPIECE) KIT To be used with neb solution as prescribed 1 kit 0     simvastatin (ZOCOR) 10 MG tablet TAKE ONE TABLET BY MOUTH EVERY NIGHT AT BEDTIME. FILLED FOR 1 TIME, DUE FOR FASTING LABS AND RECHECK 90 tablet 3     umeclidinium-vilanterol (ANORO ELLIPTA) 62.5-25 MCG/INH oral inhaler Inhale 1 puff into the lungs daily 3 Inhaler 3     Allergies   Allergen Reactions     Penicillins      Review of Systems:  -Constitutional: Otherwise feeling well today, in usual state of health.  -Skin: As above in HPI. No additional skin concerns.    Physical exam:  Vitals: There were no vitals taken for this visit.  GEN: This is a well developed, well-nourished male in no acute distress, in a pleasant mood.    SKIN: Total skin excluding the undergarment areas was performed. The exam included the head/face, neck, both arms, chest, back, abdomen, both legs, digits and/or nails.   -Dimas skin type: III  -There is no erythema, telangectasias, nodularity, or pigmentation on the left ear.   Granulating surgical wound present along the helix.   -There are waxy stuck on tan to brown papules on the face.  -There are erythematous macules with overyling adherent scale on the right dorsal hand X3, right wrist X2, left dorsal hand, left forearm, right temple X2, right cheek X2, left cheek X3, left lower eyelid, nasal bridge.  -soft dome shaped papule with \"buttonhole\" texture; right upper chest and right lateral abdomen  - Multiple tan well defined macules on sun exposed skin of forehead, neck, and shoulders.   -No other lesions of concern on areas examined.     Labs:  N/A    Impression/Plan:  1. History of nonmelanoma skin cancer, no clincial evidence of recurrence  - Sun precaution was advised including the use of sun screens of SPF 30 or higher, sun protective clothing, and avoidance of tanning beds.    2. Left ear granulating wound s/p Mohs   - " Delayed skin graft or retroauricular interpolation flap was discussed. However, the patient would prefer to let the wound heal completely before making a decision. He is reasonably satisfied with the progress made in granulation.   -continue wound care at home with Vaseline and bandage on the lower part for one week and upper part for a few more weeks until healed over with fresh pink healed skin  Follow-up 3 months for scar evaluation.    3. Seborrheic keratosis, non irritated  - Discussed benign nature of lesions.    4. Actinic keratosis: right dorsal hand X3, right wrist X2, left dorsal hand, left forearm, right temple X2, right cheek X2, left cheek X3, left lower eyelid, nasal bridge  - Cryotherapy procedure note: After verbal consent and discussion of risks and benefits including but no limited to dyspigmentation/scar, blister, and pain, 16 AK's were treated with 1-2mm freeze border for 2 cycles with liquid nitrogen. Post cryotherapy instructions were provided.    -Hypertrophic appearing AK's on right dorsal hand around the MCP joints     If not significantly improved consider biopsy at follow-up    5.  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   - no significant neurologic effects.     Follow-up in 3 months scar evaluation left ear and 6 months for FBSE, earlier for new or changing lesions.       Staff Involved:  Staff Only    Scribe Disclosure:   I, Orly Steienr, am serving as a scribe to document services personally performed by Dr. Tellez, based on data collection and the provider's statements to me.       Provider Disclosure:   The documentation recorded by the scribe accurately reflects the services I personally performed and the decisions made by me.  I personally performed the procedures today.    West Tellez DO    Department of Dermatology  [facility]:  Phone: [phone], Fax:[phone]  [facility]: Phone: [phone], Fax: [phone]        Again, thank you for allowing me to participate in the care of your patient.        Sincerely,        West Tellez MD

## 2020-11-23 NOTE — PATIENT INSTRUCTIONS
Cryotherapy    What is it?    Use of a very cold liquid, such as liquid nitrogen, to freeze and destroy abnormal skin cells that need to be removed    What should I expect?    Tenderness and redness    A small blister that might grow and fill with dark purple blood. There may be crusting.    More than one treatment may be needed if the lesions do not go away.    How do I care for the treated area?    Gently wash the area with your hands when bathing.    Use a thin layer of Vaseline to help with healing. You may use a Band-Aid.     The area should heal within 7-10 days and may leave behind a pink or lighter color.     Do not use an antibiotic or Neosporin ointment.     You may take acetaminophen (Tylenol) for pain.     Call your Doctor if you have:    Severe pain    Signs of infection (warmth, redness, cloudy yellow drainage, and or a bad smell)    Questions or concerns    Who should I call with questions?       [facility]: [phone]       [facility]: [phone]       For urgent needs outside of business hours call the [facility] at [phone]        and ask for the dermatology resident on call

## 2020-11-23 NOTE — PROGRESS NOTES
[facility] Dermatology Note      Dermatology Problem List:  1.History NMSC  -SCC and BCC left ear s/p MOHS 11/5/2020  -BCC right medial cheek s/p MOHS 4/16/2013  2. Hx of AK  -cryotherapy     CC:   Chief Complaint   Patient presents with     Skin Check     no areas of concern hx NMSC     Wound Check     left ear sp MOHS 11/5         Encounter Date: Nov 23, 2020    History of Present Illness:  Mr. Lang Christina is a 89 year old male who presents as a follow-up for wound check of left ear s/p MOHS 11/5/2020 for treatment of SCC and BCC and skin check with no areas of concern today. Patient reports he has no concerns with the healing of the left ear.     His ear continues to heal, but he is not significantly bothered by it's appearance or lack of normal contour. It is not painful unless he sleeps on it. There is minimal bleeding with bandage changes.     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     Past Medical History:   Diagnosis Date     Actinic keratosis      BCC (basal cell carcinoma of skin)     pt  unsure of year right temple and right side of nose     BCC (basal cell carcinoma) 12/2012     BPH (benign prostatic hypertrophy) with urinary obstruction      COPD (chronic obstructive pulmonary disease) (H)     9/2012 Allergy consult     Diverticulosis      ED (erectile dysfunction)      Hearing loss      Hyperlipidemia LDL goal < 130      Kidney stones      Localized osteoarthritis of both knees      Osteoarthritis      Skin cancer Pre-2009    pt unsure of type R temple and L cheek     Strabismus      Vasomotor rhinitis      Vitamin B12 deficiency      Past Surgical  History:   Procedure Laterality Date     ARTHROSCOPY KNEE RT/LT      Right     CATARACT IOL, RT/LT       CYSTOSCOPY       HERNIA REPAIR, INGUINAL RT/LT      Right     LIGATN/STRIP LONG OR SHORT SAPHEN       PHACOEMULSIFICATION WITH STANDARD INTRAOCULAR LENS IMPLANT  12-09 / 01-10    RT / LT     ISELAP      .       Social History:  Patient reports that he quit smoking about 26 years ago. His smoking use included cigarettes and pipe. He has a 47.00 pack-year smoking history. He has never used smokeless tobacco. He reports that he does not drink alcohol or use drugs. Patient previously worked as a , [employer] and is a [employer].     Family History:  Family History   Problem Relation Age of Onset     Cerebrovascular Disease Brother        Medications:  Current Outpatient Medications   Medication Sig Dispense Refill     acetaminophen (TYLENOL) 325 MG tablet Take 1-2 tablets by mouth at onset of headache.       albuterol (PROVENTIL) (2.5 MG/3ML) 0.083% neb solution TAKE 1 VIAL BY NEBULIZATION EVERY 6 HOURS AS NEEDED FOR SHORTNESS OF BREATH/DYSPNEA OR WHEEZING 360 mL 11     Cholecalciferol (VITAMIN D) 1000 UNIT capsule Take 1 capsule by mouth daily.       Cyanocobalamin 1000 MCG TABS Take  by mouth daily.       doxycycline monohydrate (MONODOX) 100 MG capsule Take 1 capsule (100 mg) by mouth 2 times daily 10 capsule 0     fluticasone (FLONASE) 50 MCG/ACT nasal spray Spray 1 spray into both nostrils 2 times daily 16 g 11     ipratropium-albuterol (COMBIVENT RESPIMAT)  MCG/ACT inhaler Inhale 1 puff into the lungs 4 times daily 1 Inhaler 11     order for DME Oxygen for home use. Liters per minute: 1 per nasal cannula. Frequency of use: With activity;. Length of need: 99.       Respiratory Therapy Supplies (NEBULIZER/TUBING/MOUTHPIECE) KIT To be used with neb solution as prescribed 1 kit 0     simvastatin (ZOCOR) 10 MG tablet TAKE ONE TABLET BY MOUTH EVERY NIGHT AT BEDTIME. FILLED FOR 1 TIME, DUE FOR FASTING  "LABS AND RECHECK 90 tablet 3     umeclidinium-vilanterol (ANORO ELLIPTA) 62.5-25 MCG/INH oral inhaler Inhale 1 puff into the lungs daily 3 Inhaler 3     Allergies   Allergen Reactions     Penicillins      Review of Systems:  -Constitutional: Otherwise feeling well today, in usual state of health.  -Skin: As above in HPI. No additional skin concerns.    Physical exam:  Vitals: There were no vitals taken for this visit.  GEN: This is a well developed, well-nourished male in no acute distress, in a pleasant mood.    SKIN: Total skin excluding the undergarment areas was performed. The exam included the head/face, neck, both arms, chest, back, abdomen, both legs, digits and/or nails.   -Dimas skin type: III  -There is no erythema, telangectasias, nodularity, or pigmentation on the left ear.   Granulating surgical wound present along the helix.   -There are waxy stuck on tan to brown papules on the face.  -There are erythematous macules with overyling adherent scale on the right dorsal hand X3, right wrist X2, left dorsal hand, left forearm, right temple X2, right cheek X2, left cheek X3, left lower eyelid, nasal bridge.  -soft dome shaped papule with \"buttonhole\" texture; right upper chest and right lateral abdomen  - Multiple tan well defined macules on sun exposed skin of forehead, neck, and shoulders.   -No other lesions of concern on areas examined.     Labs:  N/A    Impression/Plan:  1. History of nonmelanoma skin cancer, no clincial evidence of recurrence  - Sun precaution was advised including the use of sun screens of SPF 30 or higher, sun protective clothing, and avoidance of tanning beds.    2. Left ear granulating wound s/p Mohs   - Delayed skin graft or retroauricular interpolation flap was discussed. However, the patient would prefer to let the wound heal completely before making a decision. He is reasonably satisfied with the progress made in granulation.   -continue wound care at home with Vaseline " and bandage on the lower part for one week and upper part for a few more weeks until healed over with fresh pink healed skin  Follow-up 3 months for scar evaluation.    3. Seborrheic keratosis, non irritated  - Discussed benign nature of lesions.    4. Actinic keratosis: right dorsal hand X3, right wrist X2, left dorsal hand, left forearm, right temple X2, right cheek X2, left cheek X3, left lower eyelid, nasal bridge  - Cryotherapy procedure note: After verbal consent and discussion of risks and benefits including but no limited to dyspigmentation/scar, blister, and pain, 16 AK's were treated with 1-2mm freeze border for 2 cycles with liquid nitrogen. Post cryotherapy instructions were provided.    -Hypertrophic appearing AK's on right dorsal hand around the MCP joints     If not significantly improved consider biopsy at follow-up    5.  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   - no significant neurologic effects.     Follow-up in 3 months scar evaluation left ear and 6 months for FBSE, earlier for new or changing lesions.       Staff Involved:  Staff Only    Scribe Disclosure:   I, Orly Steiner, am serving as a scribe to document services personally performed by Dr. Tellez, based on data collection and the provider's statements to me.       Provider Disclosure:   The documentation recorded by the scribe accurately reflects the services I personally performed and the decisions made by me.  I personally performed the procedures today.    West Tellez DO    Department of Dermatology  [facility]: Phone: [phone], Fax:[phone]  [facility]: Phone: [phone], Fax: [phone]

## 2020-11-23 NOTE — NURSING NOTE
Lang Christina's goals for this visit include:   Chief Complaint   Patient presents with     Skin Check     no areas of concern hx NMSC     Wound Check     left ear sp MOHS 11/8       He requests these members of his care team be copied on today's visit information:     PCP: Micheal Blake    Referring Provider:  No referring provider defined for this encounter.    There were no vitals taken for this visit.    Do you need any medication refills at today's visit? Keke Steiner LPN

## 2020-12-07 ENCOUNTER — OFFICE VISIT (OUTPATIENT)
Dept: ORTHOPEDICS | Facility: CLINIC | Age: 85
End: 2020-12-07
Payer: COMMERCIAL

## 2020-12-07 DIAGNOSIS — M17.0 PRIMARY OSTEOARTHRITIS OF BOTH KNEES: Primary | ICD-10-CM

## 2020-12-07 DIAGNOSIS — M25.461 EFFUSION OF RIGHT KNEE JOINT: ICD-10-CM

## 2020-12-07 LAB
APPEARANCE FLD: NORMAL
COLOR FLD: YELLOW
LYMPHOCYTES NFR FLD MANUAL: 5 %
MONOS+MACROS NFR FLD MANUAL: 3 %
NEUTS BAND NFR FLD MANUAL: 92 %
SPECIMEN SOURCE FLD: NORMAL
WBC # FLD AUTO: NORMAL /UL

## 2020-12-07 PROCEDURE — 89051 BODY FLUID CELL COUNT: CPT | Performed by: ORTHOPAEDIC SURGERY

## 2020-12-07 PROCEDURE — 20610 DRAIN/INJ JOINT/BURSA W/O US: CPT | Mod: 50 | Performed by: ORTHOPAEDIC SURGERY

## 2020-12-07 RX ORDER — METHYLPREDNISOLONE ACETATE 80 MG/ML
160 INJECTION, SUSPENSION INTRA-ARTICULAR; INTRALESIONAL; INTRAMUSCULAR; SOFT TISSUE
Status: DISCONTINUED | OUTPATIENT
Start: 2020-12-07 | End: 2022-06-13

## 2020-12-07 RX ORDER — LIDOCAINE HYDROCHLORIDE 10 MG/ML
6 INJECTION, SOLUTION EPIDURAL; INFILTRATION; INTRACAUDAL; PERINEURAL
Status: DISCONTINUED | OUTPATIENT
Start: 2020-12-07 | End: 2022-06-13

## 2020-12-07 RX ADMIN — LIDOCAINE HYDROCHLORIDE 6 ML: 10 INJECTION, SOLUTION EPIDURAL; INFILTRATION; INTRACAUDAL; PERINEURAL at 14:42

## 2020-12-07 RX ADMIN — METHYLPREDNISOLONE ACETATE 160 MG: 80 INJECTION, SUSPENSION INTRA-ARTICULAR; INTRALESIONAL; INTRAMUSCULAR; SOFT TISSUE at 14:42

## 2020-12-07 NOTE — LETTER
12/7/2020         RE: Lang Christina  [address] [place] [address]        Dear Colleague,    Thank you for referring your patient, Lang Christina, to the [facility]. Please see a copy of my visit note below.    Large Joint Injection/Arthocentesis: bilateral knee    Date/Time: 12/7/2020 2:42 PM  Performed by: Sheng Montoya MD  Authorized by: Sheng Montoya MD     Indications:  Pain and osteoarthritis  Needle Size:  22 G  Guidance: landmark guided    Approach:  Anteromedial  Location:  Knee  Laterality:  Bilateral      Medications (Right):  160 mg methylPREDNISolone 80 MG/ML; 6 mL lidocaine (PF) 1 %  Medications (Left):  160 mg methylPREDNISolone 80 MG/ML; 6 mL lidocaine (PF) 1 %  Outcome:  Tolerated well, no immediate complications  Procedure discussed: discussed risks, benefits, and alternatives    Consent Given by:  Patient  Timeout: timeout called immediately prior to procedure    Prep: patient was prepped and draped in usual sterile fashion            Follow up bilateral primary knee osteoarthritis.  Last injection 9/28/2020.  We usually use a double dose.  He has severe osteoarthritis by x-ray.  Just this weekend he noted significant swelling of his right knee.  He did not recall any history of injury.  He has significant pain in both knees.  He would like to have steroid injections.    He has a large effusion in the right knee.  There is also increased warmth about the knee.  There is no erythema.  He does have diffuse tenderness of the right knee and medial joint tenderness on the left knee.  Range of motion 4-120 on left, 4-120 on right.    He  desires injection today of bilateral knee(s).  We have determined to perform aspiration of the right knee, and use steroid only if it was clear.  Risks, benefits, potential complications and alternatives were discussed.   With the patient's consent, sterile prep was performed of bilateral knee(s).  Left  knee was  injected with Depo Medrol 160 mg and lidocaine at anteromedial site.  Right knee aspiration was performed after lidocaine injection.  70 ccs of cloudy yellow-green fluid was obtained.  This was sent for cell count and cultures.  No steroid injected.    Return to clinic as needed.         Again, thank you for allowing me to participate in the care of your patient.        Sincerely,        Sheng Montoya MD

## 2020-12-07 NOTE — PROGRESS NOTES
Large Joint Injection/Arthocentesis: bilateral knee    Date/Time: 12/7/2020 2:42 PM  Performed by: Sheng Montoya MD  Authorized by: Sheng Montoya MD     Indications:  Pain and osteoarthritis  Needle Size:  22 G  Guidance: landmark guided    Approach:  Anteromedial  Location:  Knee  Laterality:  Bilateral      Medications (Right):  160 mg methylPREDNISolone 80 MG/ML; 6 mL lidocaine (PF) 1 %  Medications (Left):  160 mg methylPREDNISolone 80 MG/ML; 6 mL lidocaine (PF) 1 %  Outcome:  Tolerated well, no immediate complications  Procedure discussed: discussed risks, benefits, and alternatives    Consent Given by:  Patient  Timeout: timeout called immediately prior to procedure    Prep: patient was prepped and draped in usual sterile fashion

## 2020-12-07 NOTE — PROGRESS NOTES
Follow up bilateral primary knee osteoarthritis.  Last injection 9/28/2020.  We usually use a double dose.  He has severe osteoarthritis by x-ray.  Just this weekend he noted significant swelling of his right knee.  He did not recall any history of injury.  He has significant pain in both knees.  He would like to have steroid injections.    He has a large effusion in the right knee.  There is also increased warmth about the knee.  There is no erythema.  He does have diffuse tenderness of the right knee and medial joint tenderness on the left knee.  Range of motion 4-120 on left, 4-120 on right.    He  desires injection today of bilateral knee(s).  We have determined to perform aspiration of the right knee, and use steroid only if it was clear.  Risks, benefits, potential complications and alternatives were discussed.   With the patient's consent, sterile prep was performed of bilateral knee(s).  Left  knee was injected with Depo Medrol 160 mg and lidocaine at anteromedial site.  Right knee aspiration was performed after lidocaine injection.  70 ccs of cloudy yellow-green fluid was obtained.  This was sent for cell count and cultures.  No steroid injected.    Return to clinic as needed.

## 2020-12-09 NOTE — PROGRESS NOTES
"Lang Christina is a 89 year old male who is being evaluated via a billable telephone visit.      The patient has been notified of following:     \"This telephone visit will be conducted via a call between you and your physician/provider. We have found that certain health care needs can be provided without the need for a physical exam.  This service lets us provide the care you need with a short phone conversation.  If a prescription is necessary we can send it directly to your pharmacy.  If lab work is needed we can place an order for that and you can then stop by our lab to have the test done at a later time.    Telephone visits are billed at different rates depending on your insurance coverage. During this emergency period, for some insurers they may be billed the same as an in-person visit.  Please reach out to your insurance provider with any questions.    If during the course of the call the physician/provider feels a telephone visit is not appropriate, you will not be charged for this service.\"    Patient has given verbal consent for Telephone visit?  Yes    What phone number would you like to be contacted at? [phone]    How would you like to obtain your AVS? Mail a copy    Subjective     Lang Christina is a 89 year old male who presents via phone visit today for the following health issues:    HPI     Hemorrhoids  Onset/Duration: 2-3 days  Description:   Lillian-anal lump: YES  Pain: YES  Itching: no  Accompanying Signs & Symptoms:  Blood in stool: no  Changes in stool pattern: YES  History:   Any previous GI studies done:none  Family History of colon cancer: no  Precipitating factors:   None  Alleviating factors:  None  Therapies tried and outcome: preparation H    Patient notes that OTC medication is ineffective.  Reviewed prevention.     Review of Systems   Constitutional, HEENT, cardiovascular, pulmonary, gi and gu systems are negative, except as otherwise noted.       Objective          Vitals:  No " vitals were obtained today due to virtual visit.    healthy, alert and no distress  PSYCH: Alert and oriented times 3; coherent speech, normal   rate and volume, able to articulate logical thoughts, able   to abstract reason, no tangential thoughts, no hallucinations   or delusions  His affect is normal  RESP: No cough, no audible wheezing, able to talk in full sentences  Remainder of exam unable to be completed due to telephone visits            Assessment/Plan:    Assessment & Plan     Inflamed external hemorrhoid  - hydrocortisone (ANUSOL-HC) 25 MG suppository; Place 1 suppository (25 mg) rectally 2 times daily  - polyethylene glycol (MIRALAX) 17 GM/Dose powder; Take 17 g (1 capful) by mouth daily          Return if symptoms worsen or fail to improve.    Naresh Moscoso PA-C  [facility]    Phone call duration:  10 minutes

## 2020-12-10 ENCOUNTER — VIRTUAL VISIT (OUTPATIENT)
Dept: FAMILY MEDICINE | Facility: CLINIC | Age: 85
End: 2020-12-10
Payer: COMMERCIAL

## 2020-12-10 DIAGNOSIS — K64.4 INFLAMED EXTERNAL HEMORRHOID: Primary | ICD-10-CM

## 2020-12-10 PROCEDURE — 99213 OFFICE O/P EST LOW 20 MIN: CPT | Mod: 95 | Performed by: PHYSICIAN ASSISTANT

## 2020-12-10 RX ORDER — POLYETHYLENE GLYCOL 3350 17 G/17G
1 POWDER, FOR SOLUTION ORAL DAILY
Qty: 578 G | Refills: 1 | Status: SHIPPED | OUTPATIENT
Start: 2020-12-10 | End: 2021-10-04

## 2020-12-10 RX ORDER — HYDROCORTISONE ACETATE 25 MG/1
25 SUPPOSITORY RECTAL 2 TIMES DAILY
Qty: 12 SUPPOSITORY | Refills: 0 | Status: SHIPPED | OUTPATIENT
Start: 2020-12-10 | End: 2022-06-13

## 2020-12-10 NOTE — PATIENT INSTRUCTIONS
Patient Education     Hemorrhoids    Hemorrhoids are swollen and inflamed veins inside the rectum and near the anus. The rectum is the last several inches of the colon. The anus is the passage between the rectum and the outside of the body.   Causes  The veins can become swollen due to increased pressure in them. This is most often caused by:     Chronic constipation or diarrhea    Straining when having a bowel movement    Sitting too long on the toilet    A low-fiber diet    Pregnancy  Symptoms    Bleeding from the rectum. You may notice this after bowel movements.    Lump near the anus    Itching around the anus    Pain around the anus    Mucus leaks from the anus  There are different types of hemorrhoids. Depending on the type you have and the severity, you may be able to treat yourself at home. In some cases, a procedure may be the best treatment option. Your healthcare provider can tell you more about this, if needed.   Home care  General care    To get relief from pain or itching, try:  ? Medicines. Your healthcare provider may recommend stool softeners, suppositories, or laxatives to help manage constipation. Use these exactly as directed.  ? Sitz baths. A sitz bath involves sitting in a few inches of warm bath water. Be careful not to make the water so hot that you burn yourself--test it before sitting in it. Soak for about 10 to 15 minutes a few times a day. This may help relieve pain.  ? Topical products. Your healthcare provider may prescribe or recommend creams, ointments, or pads that can be applied to the hemorrhoid. Use these exactly as directed.  Tips to help prevent hemorrhoids     Eat more fiber. Fiber adds bulk to stool and absorbs water as it moves through your colon. This makes stool softer and easier to pass.  ? Increase the fiber in your diet with more fiber-rich foods. These include fresh fruit, vegetables, and whole grains.  ? Take a fiber supplement or bulking agent, if advised by your  healthcare provider. These include products such as psyllium or methylcellulose.    Drink more water. Your healthcare provider may direct you to drink plenty of water. This can help keep stool soft.    Be more active. Frequent exercise aids digestion and helps prevent constipation. It may also help make bowel movements more regular.    Don t strain during bowel movements. This can make hemorrhoids more likely. Also, don t sit on the toilet for long periods of time.    Follow-up care  Follow up with your healthcare provider as advised. If a culture or imaging tests were done, someone will let you know the results when they are ready. This may take a few days or longer. If your healthcare provider recommends a procedure for your hemorrhoids, these options can be discussed. Options may include surgery and outpatient office treatments.   When to seek medical advice  Call your healthcare provider right away if any of these occur:     Increased bleeding from the rectum    Increased pain around the rectum or anus    Weakness or dizziness  Call 911  Call 911 if any of these occur:     Trouble breathing or swallowing    Fainting or loss of consciousness    Unusually fast heart rate    Vomiting blood    Large amounts of blood in stool or black, tarry stools  Lorene last reviewed this educational content on 8/1/2019 2000-2020 [employer]. [address] [place] [address], [place] [address]. All rights reserved. This information is not intended as a substitute for professional medical care. Always follow your healthcare professional's instructions.

## 2021-01-12 ENCOUNTER — OFFICE VISIT (OUTPATIENT)
Dept: INTERNAL MEDICINE | Facility: CLINIC | Age: 86
End: 2021-01-12
Payer: COMMERCIAL

## 2021-01-12 VITALS
DIASTOLIC BLOOD PRESSURE: 72 MMHG | HEIGHT: 65 IN | SYSTOLIC BLOOD PRESSURE: 122 MMHG | TEMPERATURE: 98.2 F | BODY MASS INDEX: 22.96 KG/M2 | HEART RATE: 98 BPM | WEIGHT: 137.8 LBS | RESPIRATION RATE: 14 BRPM | OXYGEN SATURATION: 98 %

## 2021-01-12 DIAGNOSIS — K64.4 EXTERNAL HEMORRHOIDS: Primary | ICD-10-CM

## 2021-01-12 DIAGNOSIS — J43.2 CENTRILOBULAR EMPHYSEMA (H): ICD-10-CM

## 2021-01-12 DIAGNOSIS — C44.320 SCC (SQUAMOUS CELL CARCINOMA), FACE: ICD-10-CM

## 2021-01-12 DIAGNOSIS — K59.00 CONSTIPATION, UNSPECIFIED CONSTIPATION TYPE: ICD-10-CM

## 2021-01-12 DIAGNOSIS — Z23 NEED FOR DIPHTHERIA-TETANUS-PERTUSSIS (TDAP) VACCINE: ICD-10-CM

## 2021-01-12 DIAGNOSIS — D69.2 SENILE PURPURA (H): ICD-10-CM

## 2021-01-12 DIAGNOSIS — Z23 NEED FOR SHINGLES VACCINE: ICD-10-CM

## 2021-01-12 DIAGNOSIS — M17.0 PRIMARY OSTEOARTHRITIS OF BOTH KNEES: ICD-10-CM

## 2021-01-12 PROCEDURE — 99213 OFFICE O/P EST LOW 20 MIN: CPT | Performed by: INTERNAL MEDICINE

## 2021-01-12 ASSESSMENT — MIFFLIN-ST. JEOR: SCORE: 1201.94

## 2021-01-12 NOTE — PROGRESS NOTES
Assessment & Plan     External hemorrhoids  Patient originally scheduled this appointment in order to review his concerns with an inflamed hemorrhoid but since scheduling the appointment and now, the hemorrhoid issue resolved on it's own. He decided to continue with this appointment due to a desire to review a variety of relatively non-serious issues today     SCC (squamous cell carcinoma), face  He wanted to point out to me what had happened since I had pointed out to him a sore on top of his left ear that was rather convincing evidence for it being a squamous cell skin cancer . He had gotten himself into a dermatologist consultation rapidly and this led to a biopsy and eventually Moh's micrographic surgery with apparently 5 different passes needed. He lost enough ear that he needed a subsequent  Reconstruction / plastic surgery and he showed me a bill past 8 thousand dollars ! But now his ear looks completely clear and fili eof any skin cancer !    Centrilobular emphysema (H)  Minor diagnosis, problem is stable and ongoing monitoring      Senile purpura (H)  Benign condition , no new findings     Primary osteoarthritis of both knees  Noted as a point of historical importance     Need for diphtheria-tetanus-pertussis (Tdap) vaccine  He declines     Need for shingles vaccine  He declines     Constipation, unspecified constipation type  Problem is stable and ongoing monitoring        Review of external notes as documented above      20 minutes spent on the date of the encounter doing chart review, history and exam, documentation and further activities as noted above      No follow-ups on file.    Micheal Blake MD  [employer] KELLY Croft is a 89 year old who presents to clinic today for the following health issues     HPI   Chief Complaint   Patient presents with     Hemorrhoids     And other issues     Encounter Diagnoses   Name Primary?     External hemorrhoids Yes     SCC  "(squamous cell carcinoma), face      Centrilobular emphysema (H)      Senile purpura (H)      Primary osteoarthritis of both knees      Need for diphtheria-tetanus-pertussis (Tdap) vaccine      Need for shingles vaccine      Constipation, unspecified constipation type       I met with patient today and reviewed a series of relatively minor issues . We needed to embark on no new orders or new treatment plans. Mainly discussed some things, as detailed above with the assessment and plan section        Review of Systems   Constitutional, HEENT, cardiovascular, pulmonary, gi and gu systems are negative, except as otherwise noted.      Objective    /72   Pulse 98   Temp 98.2  F (36.8  C) (Oral)   Resp 14   Ht 1.651 m (5' 5\")   Wt 62.5 kg (137 lb 12.8 oz)   SpO2 98%   BMI 22.93 kg/m    Body mass index is 22.93 kg/m .  Physical Exam   GENERAL: healthy, alert and no distress  EYES: Eyes grossly normal to inspection, PERRL and conjunctivae and sclerae normal  HENT: his left ear shows signs of a reconstructed and well healed external ear and no cancer !   NECK: no adenopathy, no asymmetry, masses, or scars and thyroid normal to palpation  RESP: lungs clear to auscultation - no rales, rhonchi or wheezes  CV: regular rate and rhythm, normal S1 S2, no S3 or S4, no murmur, click or rub, no peripheral edema and peripheral pulses strong      I spent a total of 20 minutes face-to-face with Lang Christina during today's office visit.  Over 50% of this time was spent counseling the patient and/or coordinating care regarding   Encounter Diagnoses   Name Primary?     External hemorrhoids Yes     SCC (squamous cell carcinoma), face      Centrilobular emphysema (H)      Senile purpura (H)      Primary osteoarthritis of both knees      Need for diphtheria-tetanus-pertussis (Tdap) vaccine      Need for shingles vaccine      Constipation, unspecified constipation type      .  See note for details.        "

## 2021-01-12 NOTE — PATIENT INSTRUCTIONS
You are doing great . I would like to see you back for a complete physical exam in around August 2021. Sooner if new problems or issues Arise     I do recommend you get the vaccine for Coronavirus (COVID-19) when you can. Also consider the ShingRx vaccination against herpes zoster and a new TDAP vaccination     Micheal Blake MD

## 2021-01-22 ENCOUNTER — TELEPHONE (OUTPATIENT)
Dept: OPHTHALMOLOGY | Facility: CLINIC | Age: 86
End: 2021-01-22

## 2021-01-22 NOTE — TELEPHONE ENCOUNTER
"Patient stopped in was angry about new glasses.  Says they are \"wrong\", won't say much else.  Wouldn't put them on to measure anything with optical, so not sure if they are correctly on his face or not. Zandra advised patient to give them another try tomorrow morning as he is adjusted to the old ones now end of day. Will try tomorrow but wants appt with Dr. Aguilar to check them out. Will make appointment up front.   "

## 2021-01-25 ENCOUNTER — OFFICE VISIT (OUTPATIENT)
Dept: ORTHOPEDICS | Facility: CLINIC | Age: 86
End: 2021-01-25
Payer: COMMERCIAL

## 2021-01-25 ENCOUNTER — OFFICE VISIT (OUTPATIENT)
Dept: OPHTHALMOLOGY | Facility: CLINIC | Age: 86
End: 2021-01-25
Payer: COMMERCIAL

## 2021-01-25 VITALS
HEART RATE: 54 BPM | WEIGHT: 139 LBS | HEIGHT: 66 IN | SYSTOLIC BLOOD PRESSURE: 118 MMHG | BODY MASS INDEX: 22.34 KG/M2 | DIASTOLIC BLOOD PRESSURE: 58 MMHG

## 2021-01-25 DIAGNOSIS — H52.4 PRESBYOPIA: Primary | ICD-10-CM

## 2021-01-25 DIAGNOSIS — M17.11 PRIMARY OSTEOARTHRITIS OF RIGHT KNEE: Primary | ICD-10-CM

## 2021-01-25 PROCEDURE — 20610 DRAIN/INJ JOINT/BURSA W/O US: CPT | Mod: RT | Performed by: ORTHOPAEDIC SURGERY

## 2021-01-25 PROCEDURE — 99207 PR NO CHARGE LOS: CPT | Performed by: STUDENT IN AN ORGANIZED HEALTH CARE EDUCATION/TRAINING PROGRAM

## 2021-01-25 RX ORDER — METHYLPREDNISOLONE ACETATE 80 MG/ML
160 INJECTION, SUSPENSION INTRA-ARTICULAR; INTRALESIONAL; INTRAMUSCULAR; SOFT TISSUE
Status: DISCONTINUED | OUTPATIENT
Start: 2021-01-25 | End: 2022-06-13

## 2021-01-25 RX ORDER — LIDOCAINE HYDROCHLORIDE 10 MG/ML
5 INJECTION, SOLUTION INFILTRATION; PERINEURAL
Status: DISCONTINUED | OUTPATIENT
Start: 2021-01-25 | End: 2022-06-13

## 2021-01-25 RX ADMIN — LIDOCAINE HYDROCHLORIDE 5 ML: 10 INJECTION, SOLUTION INFILTRATION; PERINEURAL at 14:31

## 2021-01-25 RX ADMIN — METHYLPREDNISOLONE ACETATE 160 MG: 80 INJECTION, SUSPENSION INTRA-ARTICULAR; INTRALESIONAL; INTRAMUSCULAR; SOFT TISSUE at 14:31

## 2021-01-25 ASSESSMENT — VISUAL ACUITY
METHOD: SNELLEN - LINEAR
OD_CC: 20/20
CORRECTION_TYPE: GLASSES
OS_CC: 20/20

## 2021-01-25 ASSESSMENT — MIFFLIN-ST. JEOR: SCORE: 1215.31

## 2021-01-25 ASSESSMENT — REFRACTION_FINALRX: OS_HPRISM: 6BO

## 2021-01-25 NOTE — PROGRESS NOTES
Large Joint Injection/Arthocentesis: R knee joint    Date/Time: 1/25/2021 2:31 PM  Performed by: Sheng Montoya MD  Authorized by: Sheng Montoya MD     Indications:  Osteoarthritis  Needle Size:  22 G  Guidance: landmark guided    Location:  Knee      Medications:  160 mg methylPREDNISolone 80 MG/ML; 5 mL lidocaine 1 %  Outcome:  Tolerated well, no immediate complications  Procedure discussed: discussed risks, benefits, and alternatives    Consent Given by:  Patient  Timeout: timeout called immediately prior to procedure    Prep: patient was prepped and draped in usual sterile fashion            Follow up right knee primary osteoarthritis.  Last injection 9/28/2020.  Left had injection 12/7/20, but right had cloudy fluid.  Aspiration was performed.  This fluid was sent for cell count and cultures.  Cell count was 22,000.  Cultures were never run. ?Why?  He feels better since the aspiration, with no sign of infection.  He would like right knee injection, though.  Range of motion 0-130 degrees .    He  desires injection today of right knee(s).  Risks, benefits, potential complications and alternatives were discussed.   With the patient's consent, sterile prep was performed of right knee(s).  Right knee was injected with Depo Medrol 160 mg and lidocaine at anteromedial site.  Return to clinic as needed.

## 2021-01-25 NOTE — PROGRESS NOTES
" Current Eye Medications:  None     Subjective:  Glasses check.  Patient is unable to wear his new glasses. \"They almost make me dizzy\".  Patient denies any vertical diplopia.     Objective:  See Ophthalmology Exam.       Assessment: Base down prism in the right eye should not have been prescribed (error). With 2 BU over the right lens in new glasses, patient is asymptomatic       Plan:   New prescription given with removal of vertical prism.    __________________    Agree with technician's assessment and plan above.    Julieth Aguilar MD  [phone]         "

## 2021-01-25 NOTE — LETTER
1/25/2021         RE: Lang Christina  [address] [place] [address]        Dear Colleague,    Thank you for referring your patient, Lang Christina, to the [facility]. Please see a copy of my visit note below.    Large Joint Injection/Arthocentesis: R knee joint    Date/Time: 1/25/2021 2:31 PM  Performed by: Sheng Montoya MD  Authorized by: Sheng Montoya MD     Indications:  Osteoarthritis  Needle Size:  22 G  Guidance: landmark guided    Location:  Knee      Medications:  160 mg methylPREDNISolone 80 MG/ML; 5 mL lidocaine 1 %  Outcome:  Tolerated well, no immediate complications  Procedure discussed: discussed risks, benefits, and alternatives    Consent Given by:  Patient  Timeout: timeout called immediately prior to procedure    Prep: patient was prepped and draped in usual sterile fashion            Follow up right knee primary osteoarthritis.  Last injection 9/28/2020.  Left had injection 12/7/20, but right had cloudy fluid.  Aspiration was performed.  This fluid was sent for cell count and cultures.  Cell count was 22,000.  Cultures were never run. ?Why?  He feels better since the aspiration, with no sign of infection.  He would like right knee injection, though.  Range of motion 0-130 degrees .    He  desires injection today of right knee(s).  Risks, benefits, potential complications and alternatives were discussed.   With the patient's consent, sterile prep was performed of right knee(s).  Right knee was injected with Depo Medrol 160 mg and lidocaine at anteromedial site.  Return to clinic as needed.         Again, thank you for allowing me to participate in the care of your patient.        Sincerely,        Sheng Montoya MD

## 2021-01-25 NOTE — LETTER
"    1/25/2021         RE: Lang Christina  [address] [place] [address]        Dear Colleague,    Thank you for referring your patient, Lang Christina, to the [facility]. Please see a copy of my visit note below.     Current Eye Medications:  None     Subjective:  Glasses check.  Patient is unable to wear his new glasses. \"They almost make me dizzy\".  Patient denies any vertical diplopia.     Objective:  See Ophthalmology Exam.       Assessment: Base down prism in the right eye should not have been prescribed (error). With 2 BU over the right lens in new glasses, patient is asymptomatic       Plan:   New prescription given with removal of vertical prism.    __________________    Agree with technician's assessment and plan above.    Julieth Aguilar MD  [phone]             Again, thank you for allowing me to participate in the care of your patient.        Sincerely,        Julieth Aguilar MD    "

## 2021-02-22 ENCOUNTER — TELEPHONE (OUTPATIENT)
Dept: DERMATOLOGY | Facility: CLINIC | Age: 86
End: 2021-02-22

## 2021-02-22 ENCOUNTER — IMMUNIZATION (OUTPATIENT)
Dept: PEDIATRICS | Facility: CLINIC | Age: 86
End: 2021-02-22
Payer: COMMERCIAL

## 2021-02-22 PROCEDURE — 0001A PR COVID VAC PFIZER DIL RECON 30 MCG/0.3 ML IM: CPT

## 2021-02-22 PROCEDURE — 91300 PR COVID VAC PFIZER DIL RECON 30 MCG/0.3 ML IM: CPT

## 2021-02-22 NOTE — TELEPHONE ENCOUNTER
"Writer spoke with patient in regards to missing todays appointment for wound check of left ear sp MOHS 11/05/2020. Patient states \"ya I don't need that appointment everything has healed well. I should've cancelled\".     Writer informed patient that we otherwise do not have any further appointments for him to see the dermatologist and he is due in May for his FBSE. Patient verbalized understanding but states \"I do not want to make that I have so much other things going on. I will call you if I need an appointment\".     Orly Steiner LPN    "

## 2021-03-10 ENCOUNTER — OFFICE VISIT (OUTPATIENT)
Dept: FAMILY MEDICINE | Facility: CLINIC | Age: 86
End: 2021-03-10
Payer: COMMERCIAL

## 2021-03-10 VITALS
BODY MASS INDEX: 22.12 KG/M2 | WEIGHT: 135 LBS | DIASTOLIC BLOOD PRESSURE: 75 MMHG | OXYGEN SATURATION: 92 % | TEMPERATURE: 98.4 F | HEART RATE: 92 BPM | SYSTOLIC BLOOD PRESSURE: 138 MMHG

## 2021-03-10 DIAGNOSIS — S46.811A TRAPEZIUS STRAIN, RIGHT, INITIAL ENCOUNTER: Primary | ICD-10-CM

## 2021-03-10 PROCEDURE — 99213 OFFICE O/P EST LOW 20 MIN: CPT | Performed by: NURSE PRACTITIONER

## 2021-03-10 RX ORDER — BACLOFEN 10 MG/1
5 TABLET ORAL 3 TIMES DAILY PRN
Qty: 60 TABLET | Refills: 0 | Status: SHIPPED | OUTPATIENT
Start: 2021-03-10 | End: 2021-10-04

## 2021-03-10 NOTE — PROGRESS NOTES
Assessment & Plan     Trapezius strain, right, initial encounter  Patient was disappointed that he couldn't have a shot to fix pain today.  He declined physical therapy.  Exercises given.  I estimated that improvement would take several weeks.  Patient to use tylenol and baclofen for pain.  - baclofen (LIORESAL) 10 MG tablet; Take 0.5 tablets (5 mg) by mouth 3 times daily as needed for muscle spasms                 No follow-ups on file.    LETA Joe CNP  [facility] KELLY Croft is a 89 year old who presents for the following health issues     HPI     Patient notes pain to his right neck, right upper back.  He notes right hand paresthesias for a while.  Neck symptoms have been ongoing since this am.  He woke up with pain.      Review of Systems   Constitutional, HEENT, cardiovascular, pulmonary, gi and gu systems are negative, except as otherwise noted.      Objective    /75   Pulse 92   Temp 98.4  F (36.9  C)   Wt 61.2 kg (135 lb)   SpO2 92%   BMI 22.12 kg/m    Body mass index is 22.12 kg/m .  Physical Exam   GENERAL: healthy, alert and no distress  MS: Tenderness to palpation of right trapezius.  Decreased range of motion of neck- all motions; pain with range of motion.  Full range of motion of bilateral shoulders.

## 2021-03-10 NOTE — PATIENT INSTRUCTIONS
Okay to use up to 3000 mg of tylenol per day for pain.  Patient Education     Neck Exercises: Active Neck Rotation    To start, lie on your back, knees bent and feet flat on the floor. Keep your ears, shoulders, and hips aligned, but don t press your lower back to the floor. Rest your hands on your pelvis. Breathe deeply and relax.   Here are the steps for the active neck rotation:    Use your neck muscles to turn your head to one side until you feel a stretch in the muscles.    Hold for  5 seconds. Then turn to the other side.    Repeat  5 times on each side.  Note: Keep your shoulders on the floor. Don t lift or tuck your chin as you turn your head.   [employer] last reviewed this educational content on 7/1/2020 2000-2020 [employer]. All rights reserved. This information is not intended as a substitute for professional medical care. Always follow your healthcare professional's instructions.           Patient Education     Neck Exercises: Overhead Arm Raise  To start, lie on your back, knees bent and feet flat on the floor. Keep your ears, shoulders, and hips aligned, but don t press your lower back to the floor. Rest your hands on your pelvis. Breathe deeply and relax. Tighten the belly muscles to keep the back from arching.   Here are the steps for the arm lift:    Raise one arm overhead, then lower it. As you lower that arm, raise the other arm.    Continue to move both arms in slow, smooth arcs. Keep your arms straight and your head and neck relaxed.    Repeat  10 times with each arm.  For your safety, check with your healthcare provider before starting an exercise program.   [employer] last reviewed this educational content on 7/1/2020 2000-2020 [employer]. All rights reserved. This information is not intended as a substitute for professional medical care. Always follow your healthcare professional's instructions.           Patient Education     Neck Exercises: Head Lifts    Do  this exercise on your hands and knees. Keep your knees under your hips and your hands under your shoulders. Keep your spine in a neutral position (not arched or sagging). Keep your ears in line with your shoulders. Hold for a few seconds before starting the exercise:    Keeping your back straight, slowly drop your chin toward your chest. Tuck in your chin.    Hold for 5 seconds. Then lift your head until your neck is level with your back.    Hold for 5 seconds. Repeat 5 to10 times.  [employer] last reviewed this educational content on 3/1/2018    2458-2520 [employer]. All rights reserved. This information is not intended as a substitute for professional medical care. Always follow your healthcare professional's instructions.           Patient Education     Neck Exercises: Neck Flex  To start, sit in a chair with your feet flat on the floor. Your weight should be slightly forward so that you re balanced evenly on your buttocks. Relax your shoulders and keep your head level. Avoid arching your back or rounding your shoulders. Using a chair with arms may help you keep your balance:    Rest the back of your left hand against your lower back. Place your right palm on the top of your head.    Gently pull your head forward and down until you feel a stretch in the muscles in the back of your neck. Don t force the motion.    Hold for 20 seconds, then return to starting position. Switch arms.    Repeat 5 to 10 times.    [employer] last reviewed this educational content on 3/1/2018    3873-4473 [employer]. All rights reserved. This information is not intended as a substitute for professional medical care. Always follow your healthcare professional's instructions.           Patient Education     Neck Exercises: Neck Isometrics  To start, sit in a chair with your feet flat on the floor. Your weight should be slightly forward so that you re balanced evenly on your buttocks. Relax your shoulders and keep  your head level. Using a chair with arms may help you keep your balance.        1. Press your palm against your forehead. Resist with your neck muscles. Hold for  10 seconds. Relax. Repeat 5 times.  2. Do the exercise again, pressing on the side of your head. Repeat  5 times. Switch sides.  3. Do the exercise again, pressing on the back of your head. Repeat  5 times.  For your safety, check with your healthcare provider before starting an exercise program.   [employer] last reviewed this educational content on 7/1/2020 2000-2020 [employer]. All rights reserved. This information is not intended as a substitute for professional medical care. Always follow your healthcare professional's instructions.

## 2021-03-25 ENCOUNTER — ANCILLARY PROCEDURE (OUTPATIENT)
Dept: GENERAL RADIOLOGY | Facility: CLINIC | Age: 86
End: 2021-03-25
Attending: INTERNAL MEDICINE
Payer: COMMERCIAL

## 2021-03-25 ENCOUNTER — OFFICE VISIT (OUTPATIENT)
Dept: INTERNAL MEDICINE | Facility: CLINIC | Age: 86
End: 2021-03-25
Payer: COMMERCIAL

## 2021-03-25 VITALS
TEMPERATURE: 98.6 F | DIASTOLIC BLOOD PRESSURE: 66 MMHG | BODY MASS INDEX: 22.45 KG/M2 | SYSTOLIC BLOOD PRESSURE: 131 MMHG | WEIGHT: 137 LBS | RESPIRATION RATE: 16 BRPM | OXYGEN SATURATION: 95 % | HEART RATE: 68 BPM

## 2021-03-25 DIAGNOSIS — M47.812 CERVICAL SPINE ARTHRITIS: ICD-10-CM

## 2021-03-25 DIAGNOSIS — M47.812 CERVICAL SPINE ARTHRITIS: Primary | ICD-10-CM

## 2021-03-25 DIAGNOSIS — R20.0 NUMBNESS OF FINGERS OF BOTH HANDS: ICD-10-CM

## 2021-03-25 PROCEDURE — 72040 X-RAY EXAM NECK SPINE 2-3 VW: CPT | Performed by: RADIOLOGY

## 2021-03-25 PROCEDURE — 99213 OFFICE O/P EST LOW 20 MIN: CPT | Performed by: INTERNAL MEDICINE

## 2021-03-25 NOTE — PROGRESS NOTES
Assessment & Plan     Cervical spine arthritis  Patient came in today for the symptoms of a brief period of a lot of neck stiffness and this has actually improved since he made the appointment. He has some suspected neck osteoarthritis, although this wasn't evaluated before. He's sanguine and mclain as he discusses his advanced years and that he doesn't want to over-medicalize his few remaining years of life. We talked about these symptoms and the possible outcomes. We reviewed cervical radiculopathy and entrapment neuropathy . I would like to obtain some baseline xrays but basically agree with him.  - XR Cervical Spine 2/3 Views; Future    Numbness of fingers of both hands  The deeper question is what's causing his finger numbness symptoms . He's describing the onset of tingling and numbess type sensations mostly affecting the 2nd and third fingers and the numbness is more at night. He also thinks there might be some weakness there for example he has troubles opening a jar with his right hand. There's also some far more mild symptoms with the left hand. His Tinels and Phalen's test are negative and yet nevertheless his symptoms seem most consistent with an early carpal tunnel syndrome in my opinion although I can't exclude effects from osteoarthritis and / or possibly cervical radiculopathy. We talked about the role of possibly an needle EMG examination. He wants to wait for now. He understands he may need this down the road if his symptoms do in fact get worse     [id number]}    Return in about 6 months (around 9/25/2021), or if symptoms worsen or fail to improve.    Micheal Blake MD  [facility] KELLY Croft is a 89 year old who presents for the following health issues   Encounter Diagnoses   Name Primary?     Cervical spine arthritis Yes     Numbness of fingers of both hands        HPI   Chief Complaint   Patient presents with     cold hands, finger tips     Pain     stiff neck pain      See as detailed above in assessment and plan section      Review of Systems   Constitutional, HEENT, cardiovascular, pulmonary, gi and gu systems are negative, except as otherwise noted.      Objective    /66   Pulse 68   Temp 98.6  F (37  C) (Oral)   Resp 16   Wt 62.1 kg (137 lb)   SpO2 95%   BMI 22.45 kg/m    Body mass index is 22.45 kg/m .  Physical Exam   GENERAL: healthy, alert and no distress  MS: has some fairly pronounced osteoarthritis changes to his hands, no edema, negative Tinels and Phalen's test   SKIN: no suspicious lesions or rashes  NEURO: Normal strength and tone, mentation intact and speech normal  PSYCH: mentation appears normal, affect normal/bright    Orders Placed This Encounter   Procedures     XR Cervical Spine 2/3 Views

## 2021-03-30 ENCOUNTER — IMMUNIZATION (OUTPATIENT)
Dept: PEDIATRICS | Facility: CLINIC | Age: 86
End: 2021-03-30
Attending: INTERNAL MEDICINE
Payer: COMMERCIAL

## 2021-03-30 PROCEDURE — 0002A PR COVID VAC PFIZER DIL RECON 30 MCG/0.3 ML IM: CPT

## 2021-03-30 PROCEDURE — 91300 PR COVID VAC PFIZER DIL RECON 30 MCG/0.3 ML IM: CPT

## 2021-05-10 ENCOUNTER — OFFICE VISIT (OUTPATIENT)
Dept: ORTHOPEDICS | Facility: CLINIC | Age: 86
End: 2021-05-10
Payer: COMMERCIAL

## 2021-05-10 VITALS
BODY MASS INDEX: 22.49 KG/M2 | WEIGHT: 135 LBS | HEIGHT: 65 IN | HEART RATE: 88 BPM | DIASTOLIC BLOOD PRESSURE: 72 MMHG | SYSTOLIC BLOOD PRESSURE: 111 MMHG | RESPIRATION RATE: 16 BRPM

## 2021-05-10 DIAGNOSIS — M17.0 PRIMARY OSTEOARTHRITIS OF BOTH KNEES: Primary | ICD-10-CM

## 2021-05-10 PROCEDURE — 20610 DRAIN/INJ JOINT/BURSA W/O US: CPT | Mod: 50 | Performed by: ORTHOPAEDIC SURGERY

## 2021-05-10 RX ORDER — METHYLPREDNISOLONE ACETATE 80 MG/ML
160 INJECTION, SUSPENSION INTRA-ARTICULAR; INTRALESIONAL; INTRAMUSCULAR; SOFT TISSUE
Status: DISCONTINUED | OUTPATIENT
Start: 2021-05-10 | End: 2022-06-13

## 2021-05-10 RX ORDER — LIDOCAINE HYDROCHLORIDE 10 MG/ML
5 INJECTION, SOLUTION INFILTRATION; PERINEURAL
Status: DISCONTINUED | OUTPATIENT
Start: 2021-05-10 | End: 2022-06-13

## 2021-05-10 RX ADMIN — METHYLPREDNISOLONE ACETATE 160 MG: 80 INJECTION, SUSPENSION INTRA-ARTICULAR; INTRALESIONAL; INTRAMUSCULAR; SOFT TISSUE at 14:51

## 2021-05-10 RX ADMIN — LIDOCAINE HYDROCHLORIDE 5 ML: 10 INJECTION, SOLUTION INFILTRATION; PERINEURAL at 14:51

## 2021-05-10 ASSESSMENT — MIFFLIN-ST. JEOR: SCORE: 1189.24

## 2021-05-10 NOTE — PROGRESS NOTES
Follow up bilateral knee primary osteoarthritis.  Last injection January 2021.  Range of motion 0-130.    He  desires injection today of bilateral knee(s).  Risks, benefits, potential complications and alternatives were discussed.   With the patient's consent, sterile prep was performed of bilateral knee(s).  Each knee was injected with Depo Medrol 80 mg and lidocaine at anteromedial site.  Return to clinic as needed.

## 2021-05-10 NOTE — PROGRESS NOTES
Large Joint Injection/Arthocentesis: bilateral knee    Date/Time: 5/10/2021 2:51 PM  Performed by: Sheng Montoya MD  Authorized by: Sheng Montoya MD     Indications:  Pain  Needle Size:  22 G  Guidance: landmark guided    Location:  Knee  Laterality:  Bilateral      Medications (Right):  160 mg methylPREDNISolone 80 MG/ML; 5 mL lidocaine 1 %  Medications (Left):  160 mg methylPREDNISolone 80 MG/ML; 5 mL lidocaine 1 %  Outcome:  Tolerated well, no immediate complications  Procedure discussed: discussed risks, benefits, and alternatives    Consent Given by:  Patient  Timeout: timeout called immediately prior to procedure    Prep: patient was prepped and draped in usual sterile fashion

## 2021-05-10 NOTE — LETTER
5/10/2021         RE: Lang Christina  [address] [place] [address]        Dear Colleague,    Thank you for referring your patient, Lang Christina, to the [facility]. Please see a copy of my visit note below.    Large Joint Injection/Arthocentesis: bilateral knee    Date/Time: 5/10/2021 2:51 PM  Performed by: Sheng Montoya MD  Authorized by: Sheng Montoya MD     Indications:  Pain  Needle Size:  22 G  Guidance: landmark guided    Location:  Knee  Laterality:  Bilateral      Medications (Right):  160 mg methylPREDNISolone 80 MG/ML; 5 mL lidocaine 1 %  Medications (Left):  160 mg methylPREDNISolone 80 MG/ML; 5 mL lidocaine 1 %  Outcome:  Tolerated well, no immediate complications  Procedure discussed: discussed risks, benefits, and alternatives    Consent Given by:  Patient  Timeout: timeout called immediately prior to procedure    Prep: patient was prepped and draped in usual sterile fashion            Follow up bilateral knee primary osteoarthritis.  Last injection January 2021.  Range of motion 0-130.    He  desires injection today of bilateral knee(s).  Risks, benefits, potential complications and alternatives were discussed.   With the patient's consent, sterile prep was performed of bilateral knee(s).  Each knee was injected with Depo Medrol 80 mg and lidocaine at anteromedial site.  Return to clinic as needed.         Again, thank you for allowing me to participate in the care of your patient.        Sincerely,        Sheng Montoya MD

## 2021-07-07 ENCOUNTER — ANCILLARY PROCEDURE (OUTPATIENT)
Dept: GENERAL RADIOLOGY | Facility: CLINIC | Age: 86
End: 2021-07-07
Attending: PHYSICIAN ASSISTANT
Payer: COMMERCIAL

## 2021-07-07 ENCOUNTER — OFFICE VISIT (OUTPATIENT)
Dept: FAMILY MEDICINE | Facility: CLINIC | Age: 86
End: 2021-07-07
Payer: COMMERCIAL

## 2021-07-07 VITALS
DIASTOLIC BLOOD PRESSURE: 54 MMHG | TEMPERATURE: 97.8 F | SYSTOLIC BLOOD PRESSURE: 106 MMHG | HEIGHT: 65 IN | WEIGHT: 132 LBS | HEART RATE: 62 BPM | OXYGEN SATURATION: 95 % | BODY MASS INDEX: 21.99 KG/M2

## 2021-07-07 DIAGNOSIS — M19.031 ARTHRITIS OF WRIST, RIGHT: ICD-10-CM

## 2021-07-07 DIAGNOSIS — M19.031 ARTHRITIS OF WRIST, RIGHT: Primary | ICD-10-CM

## 2021-07-07 PROCEDURE — 73110 X-RAY EXAM OF WRIST: CPT | Mod: RT | Performed by: RADIOLOGY

## 2021-07-07 PROCEDURE — 99213 OFFICE O/P EST LOW 20 MIN: CPT | Performed by: PHYSICIAN ASSISTANT

## 2021-07-07 RX ORDER — SULFAMETHOXAZOLE/TRIMETHOPRIM 800-160 MG
1 TABLET ORAL 2 TIMES DAILY
Qty: 14 TABLET | Refills: 0 | Status: SHIPPED | OUTPATIENT
Start: 2021-07-07 | End: 2021-10-04

## 2021-07-07 RX ORDER — PREDNISONE 20 MG/1
TABLET ORAL
Qty: 15 TABLET | Refills: 0 | Status: SHIPPED | OUTPATIENT
Start: 2021-07-07 | End: 2021-10-04

## 2021-07-07 ASSESSMENT — MIFFLIN-ST. JEOR: SCORE: 1170.63

## 2021-07-07 NOTE — PROGRESS NOTES
"    Assessment & Plan     Arthritis of wrist, right r/o gout, r/o infection  - predniSONE (DELTASONE) 20 MG tablet; Take 2 tabs by mouth daily x 3 days, then 1 tabs daily x 3 days,  then 1/2 tab daily x 3 days.  - sulfamethoxazole-trimethoprim (BACTRIM DS) 800-160 MG tablet; Take 1 tablet by mouth 2 times daily  - XR Wrist Right G/E 3 Views; Future      No follow-ups on file.    Naresh Moscoso PA-C  [facility] KELLY Croft is a 89 year old who presents for the following health issues  accompanied by his self:    HPI     Musculoskeletal problem/pain  Onset/Duration: 2 days  Description  Location: hand and wrist - right  Joint Swelling: YES  Redness: YES  Pain: YES  Warmth: no  Intensity:  5/10  Progression of Symptoms:  worsening  Accompanying signs and symptoms:   Fevers: no  Numbness/tingling/weakness: YES- weakness  History  Trauma to the area: no  Recent illness:  no  Previous similar problem: no  Previous evaluation:  no  Precipitating or alleviating factors:  Aggravating factors include: squeezing hand   Therapies tried and outcome: ice and acetaminophen  Outcome: not effective    Patient cannot recall any injury.  No h/o gout.  Never had this before. No fever.     Review of Systems   Constitutional, HEENT, cardiovascular, pulmonary, gi and gu systems are negative, except as otherwise noted.      Objective    /54   Pulse 62   Temp 97.8  F (36.6  C) (Oral)   Ht 1.651 m (5' 5\")   Wt 59.9 kg (132 lb)   SpO2 95%   BMI 21.97 kg/m    Body mass index is 21.97 kg/m .  Physical Exam   GENERAL: healthy, alert and no distress  MS:R Wrist: normal muscle tone, decreased range of motion  arthritis of the wrist noted and tenderness to palpation  Lateral side with effusion noted.  SKIN: overlying heat and erythema of the same. Patient has a small dark area on R index finger 2 mm in size that he says is a minor injury healing.  Did let him know I would like to recheck this if " this continues.

## 2021-07-07 NOTE — PROGRESS NOTES
DME (Durable Medical Equipment) Orders and Documentation  Orders Placed This Encounter   Procedures     Wrist/Arm/Hand Supplies Order      The patient was assessed and it was determined the patient is in need of the following listed DME Supplies/Equipment. Please complete supporting documentation below to demonstrate medical necessity.      Wrist/Arm/Hand Bracing Supplies Documentation  Patient requires the use of the ordered bracing device due to following medical need/condition: wrist pain

## 2021-07-08 ENCOUNTER — TELEPHONE (OUTPATIENT)
Dept: FAMILY MEDICINE | Facility: CLINIC | Age: 86
End: 2021-07-08

## 2021-07-08 NOTE — TELEPHONE ENCOUNTER
Attempted to call pt with result message from Naresh Moscoso. Unable to reach patient due to line being busy. Will need to try and call pt again to relay message below:    Naresh Moscoso PA-C   7/8/2021 12:38 PM CDT      Significant arthritic changes of the wrist.  Follow up if symptoms should persist, change or worsen.   Sanket AVILA RN, BSN  [facility]

## 2021-07-09 NOTE — TELEPHONE ENCOUNTER
Patient notified of provider message as written. Patient verbalized good understanding.     Krysta PINEDAN, RN  [facility]

## 2021-08-13 DIAGNOSIS — J30.0 VASOMOTOR RHINITIS: ICD-10-CM

## 2021-08-16 ENCOUNTER — OFFICE VISIT (OUTPATIENT)
Dept: ORTHOPEDICS | Facility: CLINIC | Age: 86
End: 2021-08-16
Payer: COMMERCIAL

## 2021-08-16 VITALS
BODY MASS INDEX: 21.99 KG/M2 | HEIGHT: 65 IN | SYSTOLIC BLOOD PRESSURE: 119 MMHG | HEART RATE: 90 BPM | WEIGHT: 132 LBS | DIASTOLIC BLOOD PRESSURE: 70 MMHG | RESPIRATION RATE: 16 BRPM

## 2021-08-16 DIAGNOSIS — M17.0 PRIMARY OSTEOARTHRITIS OF BOTH KNEES: Primary | ICD-10-CM

## 2021-08-16 PROCEDURE — 20610 DRAIN/INJ JOINT/BURSA W/O US: CPT | Mod: 50 | Performed by: ORTHOPAEDIC SURGERY

## 2021-08-16 RX ORDER — LIDOCAINE HYDROCHLORIDE 10 MG/ML
5 INJECTION, SOLUTION INFILTRATION; PERINEURAL
Status: DISCONTINUED | OUTPATIENT
Start: 2021-08-16 | End: 2022-06-13

## 2021-08-16 RX ORDER — FLUTICASONE PROPIONATE 50 MCG
SPRAY, SUSPENSION (ML) NASAL
Qty: 16 G | Refills: 0 | Status: SHIPPED | OUTPATIENT
Start: 2021-08-16 | End: 2021-09-20

## 2021-08-16 RX ORDER — METHYLPREDNISOLONE ACETATE 80 MG/ML
80 INJECTION, SUSPENSION INTRA-ARTICULAR; INTRALESIONAL; INTRAMUSCULAR; SOFT TISSUE
Status: DISCONTINUED | OUTPATIENT
Start: 2021-08-16 | End: 2022-06-13

## 2021-08-16 RX ADMIN — METHYLPREDNISOLONE ACETATE 80 MG: 80 INJECTION, SUSPENSION INTRA-ARTICULAR; INTRALESIONAL; INTRAMUSCULAR; SOFT TISSUE at 12:59

## 2021-08-16 RX ADMIN — LIDOCAINE HYDROCHLORIDE 5 ML: 10 INJECTION, SOLUTION INFILTRATION; PERINEURAL at 12:59

## 2021-08-16 ASSESSMENT — MIFFLIN-ST. JEOR: SCORE: 1170.63

## 2021-08-16 NOTE — PROGRESS NOTES
Large Joint Injection/Arthocentesis: bilateral knee    Date/Time: 8/16/2021 12:59 PM  Performed by: Sheng Montoya MD  Authorized by: Sheng Montoya MD     Indications:  Pain  Needle Size:  22 G  Guidance: landmark guided    Approach:  Anteromedial  Location:  Knee  Laterality:  Bilateral      Medications (Right):  80 mg methylPREDNISolone 80 MG/ML; 5 mL lidocaine 1 %  Medications (Left):  80 mg methylPREDNISolone 80 MG/ML; 5 mL lidocaine 1 %  Outcome:  Tolerated well, no immediate complications  Procedure discussed: discussed risks, benefits, and alternatives    Consent Given by:  Patient  Timeout: timeout called immediately prior to procedure    Prep: patient was prepped and draped in usual sterile fashion

## 2021-08-16 NOTE — LETTER
8/16/2021         RE: Lang Christina  [address] [place] [address]        Dear Colleague,    Thank you for referring your patient, Lang Christina, to the [facility]. Please see a copy of my visit note below.    Large Joint Injection/Arthocentesis: bilateral knee    Date/Time: 8/16/2021 12:59 PM  Performed by: Sheng Montoya MD  Authorized by: Sheng Montoya MD     Indications:  Pain  Needle Size:  22 G  Guidance: landmark guided    Approach:  Anteromedial  Location:  Knee  Laterality:  Bilateral      Medications (Right):  80 mg methylPREDNISolone 80 MG/ML; 5 mL lidocaine 1 %  Medications (Left):  80 mg methylPREDNISolone 80 MG/ML; 5 mL lidocaine 1 %  Outcome:  Tolerated well, no immediate complications  Procedure discussed: discussed risks, benefits, and alternatives    Consent Given by:  Patient  Timeout: timeout called immediately prior to procedure    Prep: patient was prepped and draped in usual sterile fashion            Follow up bilateral knee primary osteoarthritis.  Last injection 5/10/21.  Range of motion 0-130.    He  desires injection today of bilateral knee(s).  Risks, benefits, potential complications and alternatives were discussed.   With the patient's consent, sterile prep was performed of bilateral knee(s).  Each knee was injected with Depo Medrol 80 mg and lidocaine at anteromedial site.  Return to clinic as needed.         Again, thank you for allowing me to participate in the care of your patient.        Sincerely,        Sheng Montoya MD

## 2021-08-16 NOTE — PROGRESS NOTES
Follow up bilateral knee primary osteoarthritis.  Last injection 5/10/21.  Range of motion 0-130.    He  desires injection today of bilateral knee(s).  Risks, benefits, potential complications and alternatives were discussed.   With the patient's consent, sterile prep was performed of bilateral knee(s).  Each knee was injected with Depo Medrol 80 mg and lidocaine at anteromedial site.  Return to clinic as needed.

## 2021-08-16 NOTE — TELEPHONE ENCOUNTER
Prescription approved per [facility] Refill Protocol.    Ellen Braswell RN  [facility]

## 2021-09-08 ENCOUNTER — OFFICE VISIT (OUTPATIENT)
Dept: ORTHOPEDICS | Facility: CLINIC | Age: 86
End: 2021-09-08
Payer: COMMERCIAL

## 2021-09-08 VITALS — SYSTOLIC BLOOD PRESSURE: 107 MMHG | OXYGEN SATURATION: 97 % | HEART RATE: 95 BPM | DIASTOLIC BLOOD PRESSURE: 61 MMHG

## 2021-09-08 DIAGNOSIS — M17.0 PRIMARY OSTEOARTHRITIS OF BOTH KNEES: Primary | ICD-10-CM

## 2021-09-08 DIAGNOSIS — M25.461 EFFUSION OF RIGHT KNEE JOINT: ICD-10-CM

## 2021-09-08 PROCEDURE — 20610 DRAIN/INJ JOINT/BURSA W/O US: CPT | Mod: RT | Performed by: ORTHOPAEDIC SURGERY

## 2021-09-08 PROCEDURE — 99213 OFFICE O/P EST LOW 20 MIN: CPT | Mod: 25 | Performed by: ORTHOPAEDIC SURGERY

## 2021-09-08 RX ORDER — METHYLPREDNISOLONE ACETATE 80 MG/ML
80 INJECTION, SUSPENSION INTRA-ARTICULAR; INTRALESIONAL; INTRAMUSCULAR; SOFT TISSUE ONCE
Status: COMPLETED | OUTPATIENT
Start: 2021-09-08 | End: 2021-09-08

## 2021-09-08 RX ORDER — LIDOCAINE HYDROCHLORIDE 10 MG/ML
10 INJECTION, SOLUTION EPIDURAL; INFILTRATION; INTRACAUDAL; PERINEURAL
Status: DISCONTINUED | OUTPATIENT
Start: 2021-09-08 | End: 2022-06-13

## 2021-09-08 RX ORDER — METHYLPREDNISOLONE ACETATE 80 MG/ML
80 INJECTION, SUSPENSION INTRA-ARTICULAR; INTRALESIONAL; INTRAMUSCULAR; SOFT TISSUE
Status: DISCONTINUED | OUTPATIENT
Start: 2021-09-08 | End: 2022-06-13

## 2021-09-08 RX ADMIN — METHYLPREDNISOLONE ACETATE 80 MG: 80 INJECTION, SUSPENSION INTRA-ARTICULAR; INTRALESIONAL; INTRAMUSCULAR; SOFT TISSUE at 09:13

## 2021-09-08 RX ADMIN — LIDOCAINE HYDROCHLORIDE 10 ML: 10 INJECTION, SOLUTION EPIDURAL; INFILTRATION; INTRACAUDAL; PERINEURAL at 09:11

## 2021-09-08 RX ADMIN — METHYLPREDNISOLONE ACETATE 80 MG: 80 INJECTION, SUSPENSION INTRA-ARTICULAR; INTRALESIONAL; INTRAMUSCULAR; SOFT TISSUE at 09:11

## 2021-09-08 ASSESSMENT — PAIN SCALES - GENERAL: PAINLEVEL: MODERATE PAIN (5)

## 2021-09-08 NOTE — PROGRESS NOTES
SUBJECTIVE:   Lang Christina is a 89 year old male who is seen in follow-up for evaluation of right knee pain.     Treatments tried to this point: last had corticosteroid injections by me ,bilateral knees 2018. Doesn't want surgery.  Has had several shots by Dr. Montoya since. Including aspiration.  Last corticosteroid injection was 8/16/21: bilateral knees, right knee injection didn't help at all this time. Left knee isn't too bad. Always leads with right leg on stairs he reports.      Symptoms: pain to walk, swelling, right knee.    Previous knee issues: history of severe bilateral knee osteoarthritis.    Past Medical History:   Past Medical History:   Diagnosis Date     Actinic keratosis      BCC (basal cell carcinoma of skin)     pt  unsure of year right temple and right side of nose     BCC (basal cell carcinoma) 12/2012     BPH (benign prostatic hypertrophy) with urinary obstruction      COPD (chronic obstructive pulmonary disease) (H)     9/2012 Allergy consult     Diverticulosis      ED (erectile dysfunction)      Hearing loss      Hyperlipidemia LDL goal < 130      Kidney stones      Localized osteoarthritis of both knees      Osteoarthritis      Skin cancer Pre-2009    pt unsure of type R temple and L cheek     Strabismus      Vasomotor rhinitis      Vitamin B12 deficiency      Past Surgical History:   Past Surgical History:   Procedure Laterality Date     ARTHROSCOPY KNEE RT/LT      Right     CATARACT IOL, RT/LT       CYSTOSCOPY       HERNIA REPAIR, INGUINAL RT/LT      Right     LIGATN/STRIP LONG OR SHORT SAPHEN       PHACOEMULSIFICATION WITH STANDARD INTRAOCULAR LENS IMPLANT  12-09 / 01-10    RT / LT     TURP      .     Family History:   Family History   Problem Relation Age of Onset     Cerebrovascular Disease Brother      Social History:   Social History     Tobacco Use     Smoking status: Former Smoker     Packs/day: 1.00     Years: 47.00     Pack years: 47.00     Types: Cigarettes, Pipe      Quit date: 1994     Years since quittin.7     Smokeless tobacco: Never Used   Substance Use Topics     Alcohol use: No       Review of Systems:  Constitutional:  NEGATIVE for fever, chills, change in weight  Integumentary/Skin:  NEGATIVE for worrisome rashes, moles or lesions  Eyes:  NEGATIVE for vision changes or irritation  ENT/Mouth:  NEGATIVE for ear, mouth and throat problems  Resp:  NEGATIVE for significant cough or SOB  Breast:  NEGATIVE for masses, tenderness or discharge  CV:  NEGATIVE for chest pain, palpitations or peripheral edema  GI:  NEGATIVE for nausea, abdominal pain, heartburn, or change in bowel habits  :  Negative   Musculoskeletal:  See HPI above  Neuro:  NEGATIVE for weakness, dizziness or paresthesias  Endocrine:  NEGATIVE for temperature intolerance, skin/hair changes  Heme/allergy/immune:  NEGATIVE for bleeding problems  Psychiatric:  NEGATIVE for changes in mood or affect      OBJECTIVE:  Physical Exam:  /61 (BP Location: Left arm, Patient Position: Sitting, Cuff Size: Adult Regular)   Pulse 95   SpO2 97%   General Appearance: healthy, alert and no distress   Skin: no suspicious lesions or rashes  Neuro: Normal strength and tone, mentation intact and speech normal  Vascular: good pulses, and cappillary refill   Lymph: no lymphadenopathy   Psych:  mentation appears normal and affect normal/bright  Resp: no increased work of breathing     Bilateral Knee Exam:  Gait: walks with antalgic gait favoring right side  Alignment: unchanged     Patellofemoral joint: significant crepitations in the patellofemoral joints.  Effusion: large on right,.mild left   ROM: 7/90 right, 3-105 left   Tender: diffuse  Masses: popliteal fullness on right     X-rays:  Obtained 2018: severe bilateral knee osteoarthritis, end stage,       ASSESSMENT:   Encounter Diagnoses   Name Primary?     Primary osteoarthritis of both knees Yes     Effusion of right knee joint         PLAN:   Aspiration  -injection right knee:  PROCEDURE NOTE:  The risks, benefits and potential complications (including but not limited to, bleeding, infection, pain, scar, damage to adjacent structures, atrophy or necrosis of soft tissue, skin blanching, failure to relieve symptoms) of aspiration were discussed with the patient. Questions were addressed and answered.The patient elected to proceed. Written, informed consent was obtained. The correct procedural site was identified and confirmed. A Right Knee intraarticular aspiration was performed using 3mL  of local anesthetic after sterile prep, to the correct procedural site. Approximately 24 mL of serosanguinous fluid was aspirated.Then injected intra-articularly with 80mg of Depomedrol and 4cc of local anesthetic after sterile prep.Sterile bandaid applied. This was tolerated well by the patient. No apparent complications.     Follow up as needed     BREANNA Esteban MD  Dept. Orthopedic Surgery  [facility]

## 2021-09-08 NOTE — LETTER
9/8/2021         RE: Lang Christina  [address] [place] [address]        Dear Colleague,    Thank you for referring your patient, Lang Christina, to the [facility]. Please see a copy of my visit note below.    SUBJECTIVE:   Lang Christina is a 89 year old male who is seen in follow-up for evaluation of right knee pain.     Treatments tried to this point: last had corticosteroid injections by me ,bilateral knees 2018. Doesn't want surgery.  Has had several shots by Dr. Montoya since. Including aspiration.  Last corticosteroid injection was 8/16/21: bilateral knees, right knee injection didn't help at all this time. Left knee isn't too bad. Always leads with right leg on stairs he reports.      Symptoms: pain to walk, swelling, right knee.    Previous knee issues: history of severe bilateral knee osteoarthritis.    Past Medical History:   Past Medical History:   Diagnosis Date     Actinic keratosis      BCC (basal cell carcinoma of skin)     pt  unsure of year right temple and right side of nose     BCC (basal cell carcinoma) 12/2012     BPH (benign prostatic hypertrophy) with urinary obstruction      COPD (chronic obstructive pulmonary disease) (H)     9/2012 Allergy consult     Diverticulosis      ED (erectile dysfunction)      Hearing loss      Hyperlipidemia LDL goal < 130      Kidney stones      Localized osteoarthritis of both knees      Osteoarthritis      Skin cancer Pre-2009    pt unsure of type R temple and L cheek     Strabismus      Vasomotor rhinitis      Vitamin B12 deficiency      Past Surgical History:   Past Surgical History:   Procedure Laterality Date     ARTHROSCOPY KNEE RT/LT      Right     CATARACT IOL, RT/LT       CYSTOSCOPY       HERNIA REPAIR, INGUINAL RT/LT      Right     LIGATN/STRIP LONG OR SHORT SAPHEN       PHACOEMULSIFICATION WITH STANDARD INTRAOCULAR LENS IMPLANT  12-09 / 01-10    RT / LT     TURP      .     Family History:   Family  History   Problem Relation Age of Onset     Cerebrovascular Disease Brother      Social History:   Social History     Tobacco Use     Smoking status: Former Smoker     Packs/day: 1.00     Years: 47.00     Pack years: 47.00     Types: Cigarettes, Pipe     Quit date: 1994     Years since quittin.7     Smokeless tobacco: Never Used   Substance Use Topics     Alcohol use: No       Review of Systems:  Constitutional:  NEGATIVE for fever, chills, change in weight  Integumentary/Skin:  NEGATIVE for worrisome rashes, moles or lesions  Eyes:  NEGATIVE for vision changes or irritation  ENT/Mouth:  NEGATIVE for ear, mouth and throat problems  Resp:  NEGATIVE for significant cough or SOB  Breast:  NEGATIVE for masses, tenderness or discharge  CV:  NEGATIVE for chest pain, palpitations or peripheral edema  GI:  NEGATIVE for nausea, abdominal pain, heartburn, or change in bowel habits  :  Negative   Musculoskeletal:  See HPI above  Neuro:  NEGATIVE for weakness, dizziness or paresthesias  Endocrine:  NEGATIVE for temperature intolerance, skin/hair changes  Heme/allergy/immune:  NEGATIVE for bleeding problems  Psychiatric:  NEGATIVE for changes in mood or affect      OBJECTIVE:  Physical Exam:  /61 (BP Location: Left arm, Patient Position: Sitting, Cuff Size: Adult Regular)   Pulse 95   SpO2 97%   General Appearance: healthy, alert and no distress   Skin: no suspicious lesions or rashes  Neuro: Normal strength and tone, mentation intact and speech normal  Vascular: good pulses, and cappillary refill   Lymph: no lymphadenopathy   Psych:  mentation appears normal and affect normal/bright  Resp: no increased work of breathing     Bilateral Knee Exam:  Gait: walks with antalgic gait favoring right side  Alignment: unchanged     Patellofemoral joint: significant crepitations in the patellofemoral joints.  Effusion: large on right,.mild left   ROM: 7/90 right, 3-105 left   Tender: diffuse  Masses: popliteal fullness  on right     X-rays:  Obtained 2018: severe bilateral knee osteoarthritis, end stage,       ASSESSMENT:   Encounter Diagnoses   Name Primary?     Primary osteoarthritis of both knees Yes     Effusion of right knee joint         PLAN:   Aspiration -injection right knee:  PROCEDURE NOTE:  The risks, benefits and potential complications (including but not limited to, bleeding, infection, pain, scar, damage to adjacent structures, atrophy or necrosis of soft tissue, skin blanching, failure to relieve symptoms) of aspiration were discussed with the patient. Questions were addressed and answered.The patient elected to proceed. Written, informed consent was obtained. The correct procedural site was identified and confirmed. A Right Knee intraarticular aspiration was performed using 3mL  of local anesthetic after sterile prep, to the correct procedural site. Approximately 24 mL of serosanguinous fluid was aspirated.Then injected intra-articularly with 80mg of Depomedrol and 4cc of local anesthetic after sterile prep.Sterile bandaid applied. This was tolerated well by the patient. No apparent complications.     Follow up as needed     BREANNA Esteban MD  Dept. Orthopedic Surgery  [facility]           Large Joint Injection/Arthocentesis: R knee joint    Date/Time: 9/8/2021 9:11 AM  Performed by: Ham Wilson  Authorized by: Edgardo Esteban MD     Indications:  Pain and osteoarthritis  Needle Size:  22 G  Guidance: landmark guided    Approach:  Superolateral  Location:  Knee      Medications:  80 mg methylPREDNISolone 80 MG/ML; 10 mL lidocaine (PF) 1 %  Aspirate amount (mL):  24  Aspirate:  Blood-tinged  Outcome:  Tolerated well, no immediate complications  Procedure discussed: discussed risks, benefits, and alternatives    Consent Given by:  Patient  Timeout: timeout called immediately prior to procedure    Prep: patient was prepped and draped in usual sterile fashion      24 ml serosanguinous fluid   Aspirated prior to injection.         Again, thank you for allowing me to participate in the care of your patient.        Sincerely,        Edgardo Esteban MD

## 2021-09-08 NOTE — PROGRESS NOTES
Large Joint Injection/Arthocentesis: R knee joint    Date/Time: 9/8/2021 9:11 AM  Performed by: Ham Wilson  Authorized by: Edgardo Esteban MD     Indications:  Pain and osteoarthritis  Needle Size:  22 G  Guidance: landmark guided    Approach:  Superolateral  Location:  Knee      Medications:  80 mg methylPREDNISolone 80 MG/ML; 10 mL lidocaine (PF) 1 %  Aspirate amount (mL):  24  Aspirate:  Blood-tinged  Outcome:  Tolerated well, no immediate complications  Procedure discussed: discussed risks, benefits, and alternatives    Consent Given by:  Patient  Timeout: timeout called immediately prior to procedure    Prep: patient was prepped and draped in usual sterile fashion      24 ml serosanguinous fluid  Aspirated prior to injection.

## 2021-09-16 DIAGNOSIS — J30.0 VASOMOTOR RHINITIS: ICD-10-CM

## 2021-09-16 DIAGNOSIS — J43.2 CENTRILOBULAR EMPHYSEMA (H): ICD-10-CM

## 2021-09-20 RX ORDER — FLUTICASONE PROPIONATE 50 MCG
SPRAY, SUSPENSION (ML) NASAL
Qty: 16 G | Refills: 3 | Status: SHIPPED | OUTPATIENT
Start: 2021-09-20 | End: 2021-11-05

## 2021-09-20 NOTE — TELEPHONE ENCOUNTER
Patient is out of medication. Would like as soon as possible.     Thank you,  Emily Lim, PharmD  [facility]  [phone]

## 2021-09-20 NOTE — TELEPHONE ENCOUNTER
Prescription approved per [facility] Refill Protocol.    Eunice Vargas RN

## 2021-09-28 ENCOUNTER — OFFICE VISIT (OUTPATIENT)
Dept: FAMILY MEDICINE | Facility: CLINIC | Age: 86
End: 2021-09-28
Payer: COMMERCIAL

## 2021-09-28 VITALS
TEMPERATURE: 97.5 F | OXYGEN SATURATION: 98 % | HEART RATE: 45 BPM | BODY MASS INDEX: 21.22 KG/M2 | SYSTOLIC BLOOD PRESSURE: 119 MMHG | WEIGHT: 127.5 LBS | DIASTOLIC BLOOD PRESSURE: 63 MMHG

## 2021-09-28 DIAGNOSIS — L57.0 ACTINIC KERATOSES: Primary | ICD-10-CM

## 2021-09-28 DIAGNOSIS — L82.0 SEBORRHEIC KERATOSES, INFLAMED: ICD-10-CM

## 2021-09-28 PROCEDURE — 17003 DESTRUCT PREMALG LES 2-14: CPT | Mod: 59 | Performed by: FAMILY MEDICINE

## 2021-09-28 PROCEDURE — 17000 DESTRUCT PREMALG LESION: CPT | Mod: 59 | Performed by: FAMILY MEDICINE

## 2021-09-28 PROCEDURE — 17110 DESTRUCTION B9 LES UP TO 14: CPT | Performed by: FAMILY MEDICINE

## 2021-09-28 ASSESSMENT — PAIN SCALES - GENERAL: PAINLEVEL: NO PAIN (0)

## 2021-09-28 NOTE — PROGRESS NOTES
Ellen Croft is a 89 year old who presents for the following ossies    HPI     Check spot on head       Review of Systems        No pain or itching    Patient not sure how long it has been there      Objective    /63 (BP Location: Left arm, Patient Position: Chair, Cuff Size: Adult Regular)   Pulse (!) 45   Temp 97.5  F (36.4  C) (Temporal)   Wt 57.8 kg (127 lb 8 oz)   SpO2 98%   BMI 21.22 kg/m    Body mass index is 21.22 kg/m .  Physical Exam   Full physical not done     Mentation and affect are fine    No tremor of speech or extremity    Patient has a quite large 3 cm irritated seborrheic keratosis on right temple area   He has another 1 cm similar lesion middle of right cheek    He has 3 actinic keratoses on right external ear and 2 just anterior to left ear     Discussed in detail     With consent froze all 9 lesions here in clinic in usual fashion  No complications         ASSESSMENT / PLAN:  (L57.0) Actinic keratoses  (primary encounter diagnosis)  Comment: treated here   Plan: DESTRUCT BENIGN LESION, UP TO 14        Discussed in detail   Follow up prn     (L82.0) Seborrheic keratoses, inflamed  Comment: as above   Plan: DESTRUCT BENIGN LESION, UP TO 14          Call/ return to clinic if not improved in a few weeks       I reviewed the patient's medications, allergies, medical history, family history, and social history.    Deon Das MD

## 2021-10-04 ENCOUNTER — OFFICE VISIT (OUTPATIENT)
Dept: FAMILY MEDICINE | Facility: CLINIC | Age: 86
End: 2021-10-04
Payer: COMMERCIAL

## 2021-10-04 VITALS
WEIGHT: 130 LBS | TEMPERATURE: 97.7 F | DIASTOLIC BLOOD PRESSURE: 72 MMHG | SYSTOLIC BLOOD PRESSURE: 127 MMHG | BODY MASS INDEX: 21.63 KG/M2 | HEART RATE: 69 BPM | OXYGEN SATURATION: 97 %

## 2021-10-04 DIAGNOSIS — M54.2 ACUTE NECK PAIN: Primary | ICD-10-CM

## 2021-10-04 DIAGNOSIS — M50.30 DDD (DEGENERATIVE DISC DISEASE), CERVICAL: ICD-10-CM

## 2021-10-04 PROBLEM — D69.2 SENILE PURPURA (H): Status: RESOLVED | Noted: 2021-01-12 | Resolved: 2021-10-04

## 2021-10-04 PROCEDURE — 99213 OFFICE O/P EST LOW 20 MIN: CPT | Mod: 24 | Performed by: FAMILY MEDICINE

## 2021-10-04 RX ORDER — BACLOFEN 10 MG/1
5 TABLET ORAL 3 TIMES DAILY PRN
Qty: 30 TABLET | Refills: 0 | Status: SHIPPED | OUTPATIENT
Start: 2021-10-04 | End: 2021-11-05

## 2021-10-04 NOTE — PROGRESS NOTES
Assessment & Plan     Acute neck pain  DDD (degenerative disc disease), cervical  - baclofen (LIORESAL) 10 MG tablet; Take 0.5 tablets (5 mg) by mouth 3 times daily as needed for muscle spasms  -Discussed risks and benefits of this medication.   -Over the counter pain medication as needed, ice or heat as he finds helpful, avoidance of aggravating activities, and return for persistence for consideration of referral.                See Patient Instructions    Return in about 1 month (around 11/4/2021) for Wellness visit (fasting labs up to one week prior).    Chandni Edwards MD  [facility] KELLY Croft is a 89 year old who presents for the following health issues     HPI     Musculoskeletal problem/pain  Onset/Duration: this AM   Description  Location: neck pain - bilateral  Joint Swelling: no  Redness: no  Pain: YES  Warmth: no  Intensity:  severe  Progression of Symptoms:  same  Accompanying signs and symptoms:   Fevers: no  Numbness/tingling/weakness: no  History  Trauma to the area: no  Recent illness:  no  Previous similar problem: no  Previous evaluation:  no  Precipitating or alleviating factors:  Aggravating factors include: hard to turn his head   Therapies tried and outcome: acetaminophen        Review of Systems         Objective    /72 (BP Location: Right arm, Patient Position: Sitting, Cuff Size: Adult Regular)   Pulse 69   Temp 97.7  F (36.5  C) (Oral)   Wt 59 kg (130 lb)   SpO2 97%   BMI 21.63 kg/m    Body mass index is 21.63 kg/m .  Physical Exam   GENERAL: healthy, alert and no distress  NECK: no adenopathy, no asymmetry, masses, or scars and thyroid normal to palpation  RESP: lungs clear to auscultation - no rales, rhonchi or wheezes  CV: regular rate and rhythm, normal S1 S2, no S3 or S4, no murmur, click or rub, no peripheral edema    MS: no gross musculoskeletal defects noted, no edema  PSYCH: mentation appears normal, affect normal/bright

## 2021-10-04 NOTE — PATIENT INSTRUCTIONS
Patient Education     Neck Pain     Neck pain has several possible causes when there is no injury:    You can get a minor ligament sprain or muscle strain from a sudden minor neck movement. Sleeping with your neck in an awkward position can also cause this.    Some people respond to emotional stress by tensing the muscles of their neck, shoulders, and upper back. Chronic spasm in these muscles can cause neck pain and sometimes headaches.    Gradual wear and tear of the joints in the spine can cause degenerative arthritis. This can be a source of occasional or chronic neck pain.    The spinal disks may bulge and put pressure on a nearby spinal nerve. This can happen as a natural result of aging or repeated small injuries to the neck. The spinal disks are the cushions between each spinal bone. This causes tingling, pain, or numbness that spreads from the neck to the shoulder, arm, or hand on one side.  Acute neck pain usually gets better in 1 to 2 weeks. Neck pain related to disk disease, arthritis in the spinal joints, or spinal stenosis can become chronic and last for months or years. Spinal stenosis is narrowing of the spinal canal.  X-rays are usually not ordered for the initial evaluation of neck pain. But X-rays may be done if you had a forceful physical injury, such as a car accident or fall. If pain continues and doesn t respond to medical treatment, X-rays and other tests may be done at a later time.  Home care    Rest and relax the muscles. Use a comfortable pillow that supports the head. It should also help keep the spine in a neutral position. The position of the head should not be tilted forward or backward. A rolled up towel may help for a custom fit.    A soft cervical collar can help pain, especially pain with head movement. Your doctor can tell you if this is appropriate for your condition.    Some people find relief with heat. Heat can be applied with either a warm shower or bath or a moist towel  heated in the microwave and massage. Others prefer cold packs. You can make an ice pack by filling a plastic bag that seals at the top with ice cubes or crushed ice and then wrapping it with a thin towel. Try both and use the method that feels best for 15 to 20 minutes, several times a day.    Whether using ice or heat, be careful that you don't injure your skin. Never put ice directly on the skin. Always wrap the ice in a towel or other type of cloth. This is very important, especially in people with poor skin sensations.     Try to reduce your stress level. Emotional stress can lead to neck muscle tension and get in the way of or delay the healing process.    You may use over-the-counter pain medicine to control pain, unless another medicine was prescribed. If you have chronic liver or kidney disease or ever had a stomach ulcer or digestive bleeding, talk with your healthcare provider before using these medicines.    Follow-up care  Follow up with your healthcare provider if your symptoms don't show signs of improvement after one week. Physical therapy or further tests may be needed.  If X-rays, CT scans, or MRI scans were taken, you will be told of any new findings that may affect your care.  Call 911  Call 911 if you have:    Sudden weakness or numbness in one or both arms    Neck swelling, difficulty or painful swallowing    Trouble breathing    Chest pain  When to seek medical advice  Call your healthcare provider right away if any of these occur:    Pain becomes worse or spreads into one or both arm    Increasing headache    Fever of 100.4 F (38 C) or higher, or as directed by your healthcare provider  Lorene last reviewed this educational content on 11/1/2019 2000-2021 [employer]. All rights reserved. This information is not intended as a substitute for professional medical care. Always follow your healthcare professional's instructions.

## 2021-10-18 DIAGNOSIS — E78.5 HYPERLIPIDEMIA LDL GOAL <130: ICD-10-CM

## 2021-10-21 RX ORDER — SIMVASTATIN 10 MG
TABLET ORAL
Qty: 90 TABLET | Refills: 0 | Status: SHIPPED | OUTPATIENT
Start: 2021-10-21 | End: 2022-02-15

## 2021-10-25 ENCOUNTER — OFFICE VISIT (OUTPATIENT)
Dept: ORTHOPEDICS | Facility: CLINIC | Age: 86
End: 2021-10-25
Payer: COMMERCIAL

## 2021-10-25 VITALS — WEIGHT: 130 LBS | BODY MASS INDEX: 21.66 KG/M2 | RESPIRATION RATE: 16 BRPM | HEIGHT: 65 IN

## 2021-10-25 DIAGNOSIS — M17.0 PRIMARY OSTEOARTHRITIS OF BOTH KNEES: Primary | ICD-10-CM

## 2021-10-25 PROCEDURE — 20610 DRAIN/INJ JOINT/BURSA W/O US: CPT | Mod: 50 | Performed by: ORTHOPAEDIC SURGERY

## 2021-10-25 RX ORDER — LIDOCAINE HYDROCHLORIDE 10 MG/ML
5 INJECTION, SOLUTION INFILTRATION; PERINEURAL
Status: DISCONTINUED | OUTPATIENT
Start: 2021-10-25 | End: 2022-06-13

## 2021-10-25 RX ORDER — METHYLPREDNISOLONE ACETATE 80 MG/ML
80 INJECTION, SUSPENSION INTRA-ARTICULAR; INTRALESIONAL; INTRAMUSCULAR; SOFT TISSUE
Status: DISCONTINUED | OUTPATIENT
Start: 2021-10-25 | End: 2022-06-13

## 2021-10-25 RX ADMIN — METHYLPREDNISOLONE ACETATE 80 MG: 80 INJECTION, SUSPENSION INTRA-ARTICULAR; INTRALESIONAL; INTRAMUSCULAR; SOFT TISSUE at 14:20

## 2021-10-25 RX ADMIN — LIDOCAINE HYDROCHLORIDE 5 ML: 10 INJECTION, SOLUTION INFILTRATION; PERINEURAL at 14:20

## 2021-10-25 ASSESSMENT — MIFFLIN-ST. JEOR: SCORE: 1161.56

## 2021-10-25 NOTE — PROGRESS NOTES
Follow up bilateral knee primary osteoarthritis.  Last injection 9/8/21.  Range of motion 0-130.    He  desires injection today of bilateral knee(s).  Risks, benefits, potential complications and alternatives were discussed.   With the patient's consent, sterile prep was performed of bilateral knee(s).  Each knee was injected with Depo Medrol 80 mg and lidocaine at anteromedial site.  Return to clinic as needed.

## 2021-10-25 NOTE — LETTER
10/25/2021         RE: Lang Christina  [address] [place] [address]        Dear Colleague,    Thank you for referring your patient, Lang Christina, to the [facility]. Please see a copy of my visit note below.    Large Joint Injection/Arthocentesis: bilateral knee    Date/Time: 10/25/2021 2:20 PM  Performed by: Sheng Montoya MD  Authorized by: Sheng Montoya MD     Indications:  Pain  Needle Size:  22 G  Guidance: landmark guided    Approach:  Anteromedial  Location:  Knee  Laterality:  Bilateral      Medications (Right):  80 mg methylPREDNISolone 80 MG/ML; 5 mL lidocaine 1 %  Medications (Left):  80 mg methylPREDNISolone 80 MG/ML; 5 mL lidocaine 1 %  Outcome:  Tolerated well, no immediate complications  Procedure discussed: discussed risks, benefits, and alternatives    Consent Given by:  Patient  Timeout: timeout called immediately prior to procedure    Prep: patient was prepped and draped in usual sterile fashion            Follow up bilateral knee primary osteoarthritis.  Last injection 9/8/21.  Range of motion 0-130.    He  desires injection today of bilateral knee(s).  Risks, benefits, potential complications and alternatives were discussed.   With the patient's consent, sterile prep was performed of bilateral knee(s).  Each knee was injected with Depo Medrol 80 mg and lidocaine at anteromedial site.  Return to clinic as needed.         Again, thank you for allowing me to participate in the care of your patient.        Sincerely,        Sheng Montoya MD

## 2021-10-25 NOTE — PROGRESS NOTES
Large Joint Injection/Arthocentesis: bilateral knee    Date/Time: 10/25/2021 2:20 PM  Performed by: Sheng Montoya MD  Authorized by: Sheng Montoya MD     Indications:  Pain  Needle Size:  22 G  Guidance: landmark guided    Approach:  Anteromedial  Location:  Knee  Laterality:  Bilateral      Medications (Right):  80 mg methylPREDNISolone 80 MG/ML; 5 mL lidocaine 1 %  Medications (Left):  80 mg methylPREDNISolone 80 MG/ML; 5 mL lidocaine 1 %  Outcome:  Tolerated well, no immediate complications  Procedure discussed: discussed risks, benefits, and alternatives    Consent Given by:  Patient  Timeout: timeout called immediately prior to procedure    Prep: patient was prepped and draped in usual sterile fashion

## 2021-11-05 ENCOUNTER — OFFICE VISIT (OUTPATIENT)
Dept: INTERNAL MEDICINE | Facility: CLINIC | Age: 86
End: 2021-11-05
Payer: COMMERCIAL

## 2021-11-05 VITALS
TEMPERATURE: 98 F | WEIGHT: 128 LBS | BODY MASS INDEX: 21.3 KG/M2 | DIASTOLIC BLOOD PRESSURE: 64 MMHG | HEART RATE: 77 BPM | OXYGEN SATURATION: 100 % | SYSTOLIC BLOOD PRESSURE: 109 MMHG

## 2021-11-05 DIAGNOSIS — Z00.00 ENCOUNTER FOR PREVENTIVE CARE: ICD-10-CM

## 2021-11-05 DIAGNOSIS — J43.2 CENTRILOBULAR EMPHYSEMA (H): Primary | ICD-10-CM

## 2021-11-05 DIAGNOSIS — E78.5 HYPERLIPIDEMIA LDL GOAL <130: ICD-10-CM

## 2021-11-05 DIAGNOSIS — E53.8 VITAMIN B12 DEFICIENCY: ICD-10-CM

## 2021-11-05 LAB
ALT SERPL W P-5'-P-CCNC: 23 U/L (ref 0–70)
ANION GAP SERPL CALCULATED.3IONS-SCNC: 1 MMOL/L (ref 3–14)
BUN SERPL-MCNC: 12 MG/DL (ref 7–30)
CALCIUM SERPL-MCNC: 9.5 MG/DL (ref 8.5–10.1)
CHLORIDE BLD-SCNC: 105 MMOL/L (ref 94–109)
CHOLEST SERPL-MCNC: 141 MG/DL
CO2 SERPL-SCNC: 33 MMOL/L (ref 20–32)
CREAT SERPL-MCNC: 0.76 MG/DL (ref 0.66–1.25)
FASTING STATUS PATIENT QL REPORTED: NO
GFR SERPL CREATININE-BSD FRML MDRD: 79 ML/MIN/1.73M2
GLUCOSE BLD-MCNC: 85 MG/DL (ref 70–99)
HDLC SERPL-MCNC: 78 MG/DL
HGB BLD-MCNC: 13.1 G/DL (ref 13.3–17.7)
LDLC SERPL CALC-MCNC: 48 MG/DL
NONHDLC SERPL-MCNC: 63 MG/DL
POTASSIUM BLD-SCNC: 4.7 MMOL/L (ref 3.4–5.3)
SODIUM SERPL-SCNC: 139 MMOL/L (ref 133–144)
TRIGL SERPL-MCNC: 73 MG/DL
VIT B12 SERPL-MCNC: 591 PG/ML (ref 193–986)

## 2021-11-05 PROCEDURE — 84460 ALANINE AMINO (ALT) (SGPT): CPT | Performed by: INTERNAL MEDICINE

## 2021-11-05 PROCEDURE — 36415 COLL VENOUS BLD VENIPUNCTURE: CPT | Performed by: INTERNAL MEDICINE

## 2021-11-05 PROCEDURE — 85018 HEMOGLOBIN: CPT | Performed by: INTERNAL MEDICINE

## 2021-11-05 PROCEDURE — 82607 VITAMIN B-12: CPT | Performed by: INTERNAL MEDICINE

## 2021-11-05 PROCEDURE — 80048 BASIC METABOLIC PNL TOTAL CA: CPT | Performed by: INTERNAL MEDICINE

## 2021-11-05 PROCEDURE — 80061 LIPID PANEL: CPT | Performed by: INTERNAL MEDICINE

## 2021-11-05 PROCEDURE — 99214 OFFICE O/P EST MOD 30 MIN: CPT | Performed by: INTERNAL MEDICINE

## 2021-11-05 RX ORDER — ALBUTEROL SULFATE 0.83 MG/ML
SOLUTION RESPIRATORY (INHALATION)
Qty: 360 ML | Refills: 11 | Status: SHIPPED | OUTPATIENT
Start: 2021-11-05 | End: 2022-01-01

## 2021-11-05 NOTE — LETTER
November 8, 2021      Lang Christina  [address] [place] [address]        Dear ,    We are writing to inform you of your test results.    All of these tests are within acceptable limits , things look good !         Resulted Orders   Vitamin B12   Result Value Ref Range    Vitamin B12 591 193 - 986 pg/mL   Hemoglobin   Result Value Ref Range    Hemoglobin 13.1 (L) 13.3 - 17.7 g/dL   Basic metabolic panel  (Ca, Cl, CO2, Creat, Gluc, K, Na, BUN)   Result Value Ref Range    Sodium 139 133 - 144 mmol/L    Potassium 4.7 3.4 - 5.3 mmol/L    Chloride 105 94 - 109 mmol/L    Carbon Dioxide (CO2) 33 (H) 20 - 32 mmol/L    Anion Gap 1 (L) 3 - 14 mmol/L    Urea Nitrogen 12 7 - 30 mg/dL    Creatinine 0.76 0.66 - 1.25 mg/dL    Calcium 9.5 8.5 - 10.1 mg/dL    Glucose 85 70 - 99 mg/dL    GFR Estimate 79 >60 mL/min/1.73m2      Comment:      As of July 11, 2021, eGFR is calculated by the CKD-EPI creatinine equation, without race adjustment. eGFR can be influenced by muscle mass, exercise, and diet. The reported eGFR is an estimation only and is only applicable if the renal function is stable.   Lipid panel reflex to direct LDL Fasting   Result Value Ref Range    Cholesterol 141 <200 mg/dL    Triglycerides 73 <150 mg/dL    Direct Measure HDL 78 >=40 mg/dL    LDL Cholesterol Calculated 48 <=100 mg/dL    Non HDL Cholesterol 63 <130 mg/dL    Patient Fasting > 8hrs? No     Narrative    Cholesterol  Desirable:  <200 mg/dL    Triglycerides  Normal:  Less than 150 mg/dL  Borderline High:  150-199 mg/dL  High:  200-499 mg/dL  Very High:  Greater than or equal to 500 mg/dL    Direct Measure HDL  Female:  Greater than or equal to 50 mg/dL   Male:  Greater than or equal to 40 mg/dL    LDL Cholesterol  Desirable:  <100mg/dL  Above Desirable:  100-129 mg/dL   Borderline High:  130-159 mg/dL   High:  160-189 mg/dL   Very High:  >= 190 mg/dL    Non HDL Cholesterol  Desirable:  130 mg/dL  Above Desirable:  130-159  mg/dL  Borderline High:  160-189 mg/dL  High:  190-219 mg/dL  Very High:  Greater than or equal to 220 mg/dL   ALT   Result Value Ref Range    ALT 23 0 - 70 U/L       If you have any questions or concerns, please call the clinic at the number listed above.       Sincerely,      Micheal Blake MD/chan

## 2021-11-05 NOTE — PROGRESS NOTES
Subjective   Lang is a 89 year old who presents for the following health issues  accompanied by his self.    (J43.2) Centrilobular emphysema (H)  (primary encounter diagnosis)  Comment: patient has some chronic dyspnea on exertion but his symptoms are stable and not apparently progressive   Plan: REVIEW OF HEALTH MAINTENANCE PROTOCOL ORDERS,         albuterol (PROVENTIL) (2.5 MG/3ML) 0.083% neb         solution, Basic metabolic panel  (Ca, Cl, CO2,         Creat, Gluc, K, Na, BUN), Hemoglobin            (E78.5) Hyperlipidemia LDL goal <130  Comment: continue current plan of care , continued treatment is protective against the emergence of ischemic vascular disease manifestations   Plan: REVIEW OF HEALTH MAINTENANCE PROTOCOL ORDERS,         ALT, Lipid panel reflex to direct LDL Fasting            (E53.8) Vitamin B12 deficiency  Comment: due for recheck   Plan: REVIEW OF HEALTH MAINTENANCE PROTOCOL ORDERS,         Basic metabolic panel  (Ca, Cl, CO2, Creat,         Gluc, K, Na, BUN), Hemoglobin, Vitamin B12        Follow up as indicated on results     (Z00.00) Encounter for preventive care  Comment: referral to ophthalmologist   Plan: OPTOMETRY REFERRAL               HPI     Medication refill    I am seeing Lang Christina today for a routine follow up office visit. He's a delightful elderly gentleman who is peaceful as he is aging and recognizes age related issues , particularly that he has lost a lot of muscle mass now and is generally weaker. At 89 he's stoical and accepting and we chatted philosophically about the aging process. He brings up no new issues today. We discussed the purposes of at the very least, annual office visits and we agreed that probably a 6 month recheck at this point makes sense.    Review of Systems   Constitutional, HEENT, cardiovascular, pulmonary, GI, , musculoskeletal, neuro, skin, endocrine and psych systems are negative, except as otherwise noted.      Objective    /64  (BP Location: Right arm, Patient Position: Chair, Cuff Size: Adult Regular)   Pulse 77   Temp 98  F (36.7  C) (Oral)   Wt 58.1 kg (128 lb)   SpO2 100%   BMI 21.30 kg/m    Body mass index is 21.3 kg/m .  Physical Exam   GENERAL: healthy, alert and no distress  RESP: lungs clear to auscultation - no rales, rhonchi or wheezes  CV: regular rate and rhythm, normal S1 S2, no S3 or S4, no murmur, click or rub, no peripheral edema and peripheral pulses strong  MS: no gross musculoskeletal defects noted, no edema, appropriate exam for a nonagenarian     Orders Placed This Encounter   Procedures     REVIEW OF HEALTH MAINTENANCE PROTOCOL ORDERS     ALT     Lipid panel reflex to direct LDL Fasting     Basic metabolic panel  (Ca, Cl, CO2, Creat, Gluc, K, Na, BUN)     Hemoglobin     Vitamin B12     OPTOMETRY REFERRAL

## 2021-11-09 ENCOUNTER — IMMUNIZATION (OUTPATIENT)
Dept: NURSING | Facility: CLINIC | Age: 86
End: 2021-11-09
Payer: COMMERCIAL

## 2021-11-09 DIAGNOSIS — Z23 HIGH PRIORITY FOR 2019-NCOV VACCINE: Primary | ICD-10-CM

## 2021-11-09 PROCEDURE — 0004A COVID-19,PF,PFIZER (12+ YRS): CPT

## 2021-11-09 PROCEDURE — 99207 PR NO CHARGE LOS: CPT

## 2021-11-09 PROCEDURE — 91300 COVID-19,PF,PFIZER (12+ YRS): CPT

## 2022-01-01 ENCOUNTER — DOCUMENTATION ONLY (OUTPATIENT)
Dept: OTHER | Facility: CLINIC | Age: 87
End: 2022-01-01

## 2022-01-01 ENCOUNTER — APPOINTMENT (OUTPATIENT)
Dept: PHYSICAL THERAPY | Facility: HOSPITAL | Age: 87
DRG: 177 | End: 2022-01-01
Attending: HOSPITALIST
Payer: COMMERCIAL

## 2022-01-01 ENCOUNTER — HOSPITAL ENCOUNTER (INPATIENT)
Facility: HOSPITAL | Age: 87
LOS: 2 days | Discharge: NURSING, REHAB OR LONG-TERM CARE | DRG: 177 | End: 2022-11-17
Attending: EMERGENCY MEDICINE | Admitting: HOSPITALIST
Payer: COMMERCIAL

## 2022-01-01 ENCOUNTER — APPOINTMENT (OUTPATIENT)
Dept: PHYSICAL THERAPY | Facility: HOSPITAL | Age: 87
DRG: 177 | End: 2022-01-01
Payer: COMMERCIAL

## 2022-01-01 ENCOUNTER — VIRTUAL VISIT (OUTPATIENT)
Dept: NEUROSURGERY | Facility: CLINIC | Age: 87
End: 2022-01-01
Payer: COMMERCIAL

## 2022-01-01 ENCOUNTER — LAB REQUISITION (OUTPATIENT)
Dept: LAB | Facility: CLINIC | Age: 87
End: 2022-01-01
Payer: COMMERCIAL

## 2022-01-01 ENCOUNTER — PATIENT OUTREACH (OUTPATIENT)
Dept: CARE COORDINATION | Facility: CLINIC | Age: 87
End: 2022-01-01

## 2022-01-01 ENCOUNTER — TELEPHONE (OUTPATIENT)
Dept: NEUROSURGERY | Facility: CLINIC | Age: 87
End: 2022-01-01

## 2022-01-01 ENCOUNTER — HOSPITAL ENCOUNTER (OUTPATIENT)
Dept: CT IMAGING | Facility: HOSPITAL | Age: 87
Discharge: HOME | End: 2022-08-29
Attending: PHYSICIAN ASSISTANT | Admitting: PHYSICIAN ASSISTANT
Payer: COMMERCIAL

## 2022-01-01 ENCOUNTER — APPOINTMENT (OUTPATIENT)
Dept: RADIOLOGY | Facility: HOSPITAL | Age: 87
DRG: 177 | End: 2022-01-01
Attending: EMERGENCY MEDICINE
Payer: COMMERCIAL

## 2022-01-01 ENCOUNTER — HOSPITAL ENCOUNTER (OUTPATIENT)
Dept: CT IMAGING | Facility: HOSPITAL | Age: 87
Discharge: HOME | End: 2022-08-01
Attending: NURSE PRACTITIONER | Admitting: NURSE PRACTITIONER
Payer: COMMERCIAL

## 2022-01-01 ENCOUNTER — APPOINTMENT (OUTPATIENT)
Dept: CT IMAGING | Facility: HOSPITAL | Age: 87
DRG: 177 | End: 2022-01-01
Attending: EMERGENCY MEDICINE
Payer: COMMERCIAL

## 2022-01-01 VITALS
RESPIRATION RATE: 18 BRPM | TEMPERATURE: 98.1 F | HEART RATE: 78 BPM | OXYGEN SATURATION: 92 % | WEIGHT: 130 LBS | BODY MASS INDEX: 20.98 KG/M2 | SYSTOLIC BLOOD PRESSURE: 114 MMHG | DIASTOLIC BLOOD PRESSURE: 70 MMHG

## 2022-01-01 VITALS — HEIGHT: 66 IN | WEIGHT: 130 LBS | BODY MASS INDEX: 20.89 KG/M2

## 2022-01-01 DIAGNOSIS — M62.81 GENERALIZED MUSCLE WEAKNESS: ICD-10-CM

## 2022-01-01 DIAGNOSIS — S12.110A CLOSED ODONTOID FRACTURE WITH TYPE II MORPHOLOGY (H): ICD-10-CM

## 2022-01-01 DIAGNOSIS — R60.9 EDEMA, UNSPECIFIED: ICD-10-CM

## 2022-01-01 DIAGNOSIS — J09.X2 INFLUENZA DUE TO IDENTIFIED NOVEL INFLUENZA A VIRUS WITH OTHER RESPIRATORY MANIFESTATIONS: ICD-10-CM

## 2022-01-01 DIAGNOSIS — I62.03 BILATERAL CHRONIC INTRACRANIAL SUBDURAL HEMATOMA (H): ICD-10-CM

## 2022-01-01 DIAGNOSIS — F03.B18 MODERATE DEMENTIA WITH OTHER BEHAVIORAL DISTURBANCE, UNSPECIFIED DEMENTIA TYPE (H): ICD-10-CM

## 2022-01-01 DIAGNOSIS — R63.5 ABNORMAL WEIGHT GAIN: ICD-10-CM

## 2022-01-01 DIAGNOSIS — S12.110A CLOSED ODONTOID FRACTURE WITH TYPE II MORPHOLOGY (H): Primary | ICD-10-CM

## 2022-01-01 DIAGNOSIS — D50.9 IRON DEFICIENCY ANEMIA, UNSPECIFIED IRON DEFICIENCY ANEMIA TYPE: Primary | ICD-10-CM
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
## 2022-01-01 DIAGNOSIS — U07.1 INFECTION DUE TO 2019 NOVEL CORONAVIRUS: ICD-10-CM

## 2022-01-01 DIAGNOSIS — I50.33 ACUTE ON CHRONIC DIASTOLIC (CONGESTIVE) HEART FAILURE (H): ICD-10-CM

## 2022-01-01 LAB
ALBUMIN SERPL BCG-MCNC: 3.3 G/DL (ref 3.5–5.2)
ALBUMIN UR-MCNC: 10 MG/DL
ALBUMIN UR-MCNC: 20 MG/DL
ALP SERPL-CCNC: 111 U/L (ref 40–129)
ALP SERPL-CCNC: 112 U/L (ref 40–129)
ALP SERPL-CCNC: 117 U/L (ref 40–129)
ALP SERPL-CCNC: 123 U/L (ref 40–129)
ALT SERPL W P-5'-P-CCNC: 19 U/L (ref 10–50)
ALT SERPL W P-5'-P-CCNC: 20 U/L (ref 10–50)
ALT SERPL W P-5'-P-CCNC: 22 U/L (ref 10–50)
ALT SERPL W P-5'-P-CCNC: 24 U/L (ref 10–50)
ANION GAP SERPL CALCULATED.3IONS-SCNC: 10 MMOL/L (ref 7–15)
ANION GAP SERPL CALCULATED.3IONS-SCNC: 8 MMOL/L (ref 7–15)
ANION GAP SERPL CALCULATED.3IONS-SCNC: 9 MMOL/L (ref 7–15)
ANION GAP SERPL CALCULATED.3IONS-SCNC: 9 MMOL/L (ref 7–15)
APPEARANCE UR: CLEAR
APPEARANCE UR: CLEAR
AST SERPL W P-5'-P-CCNC: 17 U/L (ref 10–50)
AST SERPL W P-5'-P-CCNC: 21 U/L (ref 10–50)
AST SERPL W P-5'-P-CCNC: 30 U/L (ref 10–50)
AST SERPL W P-5'-P-CCNC: 65 U/L (ref 10–50)
BACTERIA BLD CULT: NO GROWTH
BACTERIA BLD CULT: NO GROWTH
BASOPHILS # BLD AUTO: 0 10E3/UL (ref 0–0.2)
BASOPHILS NFR BLD AUTO: 0 %
BILIRUB SERPL-MCNC: 0.6 MG/DL
BILIRUB SERPL-MCNC: 0.7 MG/DL
BILIRUB SERPL-MCNC: 0.8 MG/DL
BILIRUB SERPL-MCNC: 1 MG/DL
BILIRUB UR QL STRIP: NEGATIVE
BILIRUB UR QL STRIP: NEGATIVE
BUN SERPL-MCNC: 12.5 MG/DL (ref 8–23)
BUN SERPL-MCNC: 15.3 MG/DL (ref 8–23)
BUN SERPL-MCNC: 15.5 MG/DL (ref 8–23)
BUN SERPL-MCNC: 17.4 MG/DL (ref 8–23)
BUN SERPL-MCNC: 18.2 MG/DL (ref 8–23)
BUN SERPL-MCNC: 18.3 MG/DL (ref 8–23)
BUN SERPL-MCNC: 20.8 MG/DL (ref 8–23)
CALCIUM SERPL-MCNC: 8.6 MG/DL (ref 8.2–9.6)
CALCIUM SERPL-MCNC: 8.7 MG/DL (ref 8.2–9.6)
CALCIUM SERPL-MCNC: 8.8 MG/DL (ref 8.2–9.6)
CALCIUM SERPL-MCNC: 8.9 MG/DL (ref 8.2–9.6)
CALCIUM SERPL-MCNC: 8.9 MG/DL (ref 8.2–9.6)
CALCIUM SERPL-MCNC: 9.2 MG/DL (ref 8.2–9.6)
CALCIUM SERPL-MCNC: 9.5 MG/DL (ref 8.2–9.6)
CHLORIDE SERPL-SCNC: 102 MMOL/L (ref 98–107)
CHLORIDE SERPL-SCNC: 103 MMOL/L (ref 98–107)
CHLORIDE SERPL-SCNC: 106 MMOL/L (ref 98–107)
CHLORIDE SERPL-SCNC: 98 MMOL/L (ref 98–107)
CHLORIDE SERPL-SCNC: 98 MMOL/L (ref 98–107)
COLOR UR AUTO: YELLOW
COLOR UR AUTO: YELLOW
CREAT SERPL-MCNC: 0.55 MG/DL (ref 0.67–1.17)
CREAT SERPL-MCNC: 0.57 MG/DL (ref 0.67–1.17)
CREAT SERPL-MCNC: 0.59 MG/DL (ref 0.67–1.17)
CREAT SERPL-MCNC: 0.63 MG/DL (ref 0.67–1.17)
CREAT SERPL-MCNC: 0.66 MG/DL (ref 0.67–1.17)
CREAT SERPL-MCNC: 0.67 MG/DL (ref 0.67–1.17)
CREAT SERPL-MCNC: 0.74 MG/DL (ref 0.67–1.17)
DEPRECATED CALCIDIOL+CALCIFEROL SERPL-MC: 32 UG/L (ref 20–75)
DEPRECATED HCO3 PLAS-SCNC: 27 MMOL/L (ref 22–29)
DEPRECATED HCO3 PLAS-SCNC: 28 MMOL/L (ref 22–29)
DEPRECATED HCO3 PLAS-SCNC: 28 MMOL/L (ref 22–29)
DEPRECATED HCO3 PLAS-SCNC: 29 MMOL/L (ref 22–29)
DEPRECATED HCO3 PLAS-SCNC: 30 MMOL/L (ref 22–29)
EOSINOPHIL # BLD AUTO: 0 10E3/UL (ref 0–0.7)
EOSINOPHIL # BLD AUTO: 0.1 10E3/UL (ref 0–0.7)
EOSINOPHIL NFR BLD AUTO: 0 %
EOSINOPHIL NFR BLD AUTO: 0 %
EOSINOPHIL NFR BLD AUTO: 1 %
EOSINOPHIL NFR BLD AUTO: 1 %
ERYTHROCYTE [DISTWIDTH] IN BLOOD BY AUTOMATED COUNT: 13.4 % (ref 10–15)
ERYTHROCYTE [DISTWIDTH] IN BLOOD BY AUTOMATED COUNT: 13.4 % (ref 10–15)
ERYTHROCYTE [DISTWIDTH] IN BLOOD BY AUTOMATED COUNT: 13.7 % (ref 10–15)
ERYTHROCYTE [DISTWIDTH] IN BLOOD BY AUTOMATED COUNT: 15.6 % (ref 10–15)
ERYTHROCYTE [DISTWIDTH] IN BLOOD BY AUTOMATED COUNT: 15.6 % (ref 10–15)
FLUAV RNA SPEC QL NAA+PROBE: NEGATIVE
FLUBV RNA RESP QL NAA+PROBE: NEGATIVE
FOLATE SERPL-MCNC: 13.9 NG/ML (ref 4.6–34.8)
GFR SERPL CREATININE-BSD FRML MDRD: 84 ML/MIN/1.73M2
GFR SERPL CREATININE-BSD FRML MDRD: 87 ML/MIN/1.73M2
GFR SERPL CREATININE-BSD FRML MDRD: 87 ML/MIN/1.73M2
GFR SERPL CREATININE-BSD FRML MDRD: 88 ML/MIN/1.73M2
GFR SERPL CREATININE-BSD FRML MDRD: 90 ML/MIN/1.73M2
GFR SERPL CREATININE-BSD FRML MDRD: >90 ML/MIN/1.73M2
GFR SERPL CREATININE-BSD FRML MDRD: >90 ML/MIN/1.73M2
GLUCOSE BLDC GLUCOMTR-MCNC: 148 MG/DL (ref 70–99)
GLUCOSE SERPL-MCNC: 168 MG/DL (ref 70–99)
GLUCOSE SERPL-MCNC: 69 MG/DL (ref 70–99)
GLUCOSE SERPL-MCNC: 80 MG/DL (ref 70–99)
GLUCOSE SERPL-MCNC: 84 MG/DL (ref 70–99)
GLUCOSE SERPL-MCNC: 92 MG/DL (ref 70–99)
GLUCOSE SERPL-MCNC: 94 MG/DL (ref 70–99)
GLUCOSE SERPL-MCNC: 97 MG/DL (ref 70–99)
GLUCOSE UR STRIP-MCNC: NEGATIVE MG/DL
GLUCOSE UR STRIP-MCNC: NEGATIVE MG/DL
HBA1C MFR BLD: 5.7 %
HBA1C MFR BLD: 5.8 %
HCT VFR BLD AUTO: 31.7 % (ref 40–53)
HCT VFR BLD AUTO: 33 % (ref 40–53)
HCT VFR BLD AUTO: 33.4 % (ref 40–53)
HCT VFR BLD AUTO: 33.5 % (ref 40–53)
HCT VFR BLD AUTO: 34 % (ref 40–53)
HCT VFR BLD AUTO: 34.7 % (ref 40–53)
HCT VFR BLD AUTO: 34.8 % (ref 40–53)
HGB BLD-MCNC: 10 G/DL (ref 13.3–17.7)
HGB BLD-MCNC: 10.4 G/DL (ref 13.3–17.7)
HGB BLD-MCNC: 10.8 G/DL (ref 13.3–17.7)
HGB BLD-MCNC: 10.9 G/DL (ref 13.3–17.7)
HGB BLD-MCNC: 11.1 G/DL (ref 13.3–17.7)
HGB BLD-MCNC: 11.3 G/DL (ref 13.3–17.7)
HGB BLD-MCNC: 11.6 G/DL (ref 13.3–17.7)
HGB UR QL STRIP: NEGATIVE
HGB UR QL STRIP: NEGATIVE
HYALINE CASTS: 1 /LPF
HYALINE CASTS: 3 /LPF
IMM GRANULOCYTES # BLD: 0 10E3/UL
IMM GRANULOCYTES # BLD: 0 10E3/UL
IMM GRANULOCYTES # BLD: 0.1 10E3/UL
IMM GRANULOCYTES # BLD: 0.1 10E3/UL
IMM GRANULOCYTES NFR BLD: 0 %
IMM GRANULOCYTES NFR BLD: 1 %
IRON BINDING CAPACITY (ROCHE): 176 UG/DL (ref 240–430)
IRON SATN MFR SERPL: 9 % (ref 15–46)
IRON SERPL-MCNC: 16 UG/DL (ref 61–157)
KETONES UR STRIP-MCNC: NEGATIVE MG/DL
KETONES UR STRIP-MCNC: NEGATIVE MG/DL
LACTATE SERPL-SCNC: 1.6 MMOL/L (ref 0.7–2)
LEUKOCYTE ESTERASE UR QL STRIP: NEGATIVE
LEUKOCYTE ESTERASE UR QL STRIP: NEGATIVE
LYMPHOCYTES # BLD AUTO: 0.3 10E3/UL (ref 0.8–5.3)
LYMPHOCYTES # BLD AUTO: 0.9 10E3/UL (ref 0.8–5.3)
LYMPHOCYTES # BLD AUTO: 1.2 10E3/UL (ref 0.8–5.3)
LYMPHOCYTES # BLD AUTO: 1.3 10E3/UL (ref 0.8–5.3)
LYMPHOCYTES NFR BLD AUTO: 15 %
LYMPHOCYTES NFR BLD AUTO: 16 %
LYMPHOCYTES NFR BLD AUTO: 17 %
LYMPHOCYTES NFR BLD AUTO: 4 %
MCH RBC QN AUTO: 31.4 PG (ref 26.5–33)
MCH RBC QN AUTO: 31.5 PG (ref 26.5–33)
MCH RBC QN AUTO: 31.8 PG (ref 26.5–33)
MCH RBC QN AUTO: 31.9 PG (ref 26.5–33)
MCH RBC QN AUTO: 32.3 PG (ref 26.5–33)
MCH RBC QN AUTO: 32.3 PG (ref 26.5–33)
MCH RBC QN AUTO: 32.5 PG (ref 26.5–33)
MCHC RBC AUTO-ENTMCNC: 31 G/DL (ref 31.5–36.5)
MCHC RBC AUTO-ENTMCNC: 31.5 G/DL (ref 31.5–36.5)
MCHC RBC AUTO-ENTMCNC: 32.5 G/DL (ref 31.5–36.5)
MCHC RBC AUTO-ENTMCNC: 32.6 G/DL (ref 31.5–36.5)
MCHC RBC AUTO-ENTMCNC: 32.6 G/DL (ref 31.5–36.5)
MCHC RBC AUTO-ENTMCNC: 32.7 G/DL (ref 31.5–36.5)
MCHC RBC AUTO-ENTMCNC: 33.4 G/DL (ref 31.5–36.5)
MCV RBC AUTO: 100 FL (ref 78–100)
MCV RBC AUTO: 101 FL (ref 78–100)
MCV RBC AUTO: 97 FL (ref 78–100)
MCV RBC AUTO: 97 FL (ref 78–100)
MCV RBC AUTO: 98 FL (ref 78–100)
MCV RBC AUTO: 99 FL (ref 78–100)
MCV RBC AUTO: 99 FL (ref 78–100)
MONOCYTES # BLD AUTO: 0.6 10E3/UL (ref 0–1.3)
MONOCYTES # BLD AUTO: 0.7 10E3/UL (ref 0–1.3)
MONOCYTES # BLD AUTO: 0.8 10E3/UL (ref 0–1.3)
MONOCYTES # BLD AUTO: 0.9 10E3/UL (ref 0–1.3)
MONOCYTES NFR BLD AUTO: 11 %
MONOCYTES NFR BLD AUTO: 13 %
MONOCYTES NFR BLD AUTO: 8 %
MONOCYTES NFR BLD AUTO: 9 %
MUCOUS THREADS #/AREA URNS LPF: PRESENT /LPF
MUCOUS THREADS #/AREA URNS LPF: PRESENT /LPF
NEUTROPHILS # BLD AUTO: 4.4 10E3/UL (ref 1.6–8.3)
NEUTROPHILS # BLD AUTO: 5.4 10E3/UL (ref 1.6–8.3)
NEUTROPHILS # BLD AUTO: 5.7 10E3/UL (ref 1.6–8.3)
NEUTROPHILS # BLD AUTO: 7.3 10E3/UL (ref 1.6–8.3)
NEUTROPHILS NFR BLD AUTO: 70 %
NEUTROPHILS NFR BLD AUTO: 71 %
NEUTROPHILS NFR BLD AUTO: 73 %
NEUTROPHILS NFR BLD AUTO: 88 %
NITRATE UR QL: NEGATIVE
NITRATE UR QL: NEGATIVE
NRBC # BLD AUTO: 0 10E3/UL
NRBC BLD AUTO-RTO: 0 /100
NT-PROBNP SERPL-MCNC: 1744 PG/ML (ref 0–450)
NT-PROBNP SERPL-MCNC: 1958 PG/ML (ref 0–450)
PH UR STRIP: 5.5 [PH] (ref 5–7)
PH UR STRIP: 5.5 [PH] (ref 5–7)
PLATELET # BLD AUTO: 155 10E3/UL (ref 150–450)
PLATELET # BLD AUTO: 169 10E3/UL (ref 150–450)
PLATELET # BLD AUTO: 225 10E3/UL (ref 150–450)
PLATELET # BLD AUTO: 228 10E3/UL (ref 150–450)
PLATELET # BLD AUTO: 237 10E3/UL (ref 150–450)
PLATELET # BLD AUTO: 263 10E3/UL (ref 150–450)
PLATELET # BLD AUTO: 273 10E3/UL (ref 150–450)
POTASSIUM SERPL-SCNC: 3.4 MMOL/L (ref 3.4–5.3)
POTASSIUM SERPL-SCNC: 3.8 MMOL/L (ref 3.4–5.3)
POTASSIUM SERPL-SCNC: 3.8 MMOL/L (ref 3.4–5.3)
POTASSIUM SERPL-SCNC: 3.9 MMOL/L (ref 3.4–5.3)
POTASSIUM SERPL-SCNC: 4.1 MMOL/L (ref 3.4–5.3)
POTASSIUM SERPL-SCNC: 4.3 MMOL/L (ref 3.4–5.3)
POTASSIUM SERPL-SCNC: 4.8 MMOL/L (ref 3.4–5.3)
PROT SERPL-MCNC: 5.6 G/DL (ref 6.4–8.3)
PROT SERPL-MCNC: 6 G/DL (ref 6.4–8.3)
PROT SERPL-MCNC: 6 G/DL (ref 6.4–8.3)
PROT SERPL-MCNC: 6.2 G/DL (ref 6.4–8.3)
RBC # BLD AUTO: 3.17 10E6/UL (ref 4.4–5.9)
RBC # BLD AUTO: 3.31 10E6/UL (ref 4.4–5.9)
RBC # BLD AUTO: 3.34 10E6/UL (ref 4.4–5.9)
RBC # BLD AUTO: 3.43 10E6/UL (ref 4.4–5.9)
RBC # BLD AUTO: 3.44 10E6/UL (ref 4.4–5.9)
RBC # BLD AUTO: 3.54 10E6/UL (ref 4.4–5.9)
RBC # BLD AUTO: 3.57 10E6/UL (ref 4.4–5.9)
RBC URINE: 0 /HPF
RBC URINE: 3 /HPF
RSV RNA SPEC NAA+PROBE: NEGATIVE
SARS-COV-2 RNA RESP QL NAA+PROBE: POSITIVE
SODIUM SERPL-SCNC: 134 MMOL/L (ref 136–145)
SODIUM SERPL-SCNC: 134 MMOL/L (ref 136–145)
SODIUM SERPL-SCNC: 137 MMOL/L (ref 136–145)
SODIUM SERPL-SCNC: 140 MMOL/L (ref 136–145)
SODIUM SERPL-SCNC: 140 MMOL/L (ref 136–145)
SODIUM SERPL-SCNC: 141 MMOL/L (ref 136–145)
SODIUM SERPL-SCNC: 143 MMOL/L (ref 136–145)
SP GR UR STRIP: 1.03 (ref 1–1.03)
SP GR UR STRIP: 1.03 (ref 1–1.03)
TSH SERPL DL<=0.005 MIU/L-ACNC: 1.08 UIU/ML (ref 0.3–4.2)
TSH SERPL DL<=0.005 MIU/L-ACNC: 1.96 UIU/ML (ref 0.3–4.2)
TSH SERPL DL<=0.005 MIU/L-ACNC: 2.21 UIU/ML (ref 0.3–4.2)
UROBILINOGEN UR STRIP-MCNC: 2 MG/DL
UROBILINOGEN UR STRIP-MCNC: NORMAL MG/DL
VIT B12 SERPL-MCNC: 1294 PG/ML (ref 232–1245)
VIT B12 SERPL-MCNC: 731 PG/ML (ref 232–1245)
WBC # BLD AUTO: 4.4 10E3/UL (ref 4–11)
WBC # BLD AUTO: 4.9 10E3/UL (ref 4–11)
WBC # BLD AUTO: 6.2 10E3/UL (ref 4–11)
WBC # BLD AUTO: 6.5 10E3/UL (ref 4–11)
WBC # BLD AUTO: 7.3 10E3/UL (ref 4–11)
WBC # BLD AUTO: 8 10E3/UL (ref 4–11)
WBC # BLD AUTO: 8.2 10E3/UL (ref 4–11)
WBC URINE: 1 /HPF
WBC URINE: 2 /HPF

## 2022-01-01 PROCEDURE — 97110 THERAPEUTIC EXERCISES: CPT | Mod: GP

## 2022-01-01 PROCEDURE — G0378 HOSPITAL OBSERVATION PER HR: HCPCS

## 2022-01-01 PROCEDURE — 258N000003 HC RX IP 258 OP 636: Performed by: HOSPITALIST

## 2022-01-01 PROCEDURE — 99225 PR SUBSEQUENT OBSERVATION CARE,LEVEL II: CPT | Performed by: INTERNAL MEDICINE

## 2022-01-01 PROCEDURE — 250N000013 HC RX MED GY IP 250 OP 250 PS 637

## 2022-01-01 PROCEDURE — C9803 HOPD COVID-19 SPEC COLLECT: HCPCS

## 2022-01-01 PROCEDURE — XW033E5 INTRODUCTION OF REMDESIVIR ANTI-INFECTIVE INTO PERIPHERAL VEIN, PERCUTANEOUS APPROACH, NEW TECHNOLOGY GROUP 5: ICD-10-PCS | Performed by: HOSPITALIST

## 2022-01-01 PROCEDURE — 250N000011 HC RX IP 250 OP 636: Performed by: HOSPITALIST

## 2022-01-01 PROCEDURE — 99239 HOSP IP/OBS DSCHRG MGMT >30: CPT | Performed by: INTERNAL MEDICINE

## 2022-01-01 PROCEDURE — 250N000013 HC RX MED GY IP 250 OP 250 PS 637: Performed by: HOSPITALIST

## 2022-01-01 PROCEDURE — P9604 ONE-WAY ALLOW PRORATED TRIP: HCPCS | Mod: ORL | Performed by: NURSE PRACTITIONER

## 2022-01-01 PROCEDURE — 97116 GAIT TRAINING THERAPY: CPT | Mod: GP

## 2022-01-01 PROCEDURE — 250N000011 HC RX IP 250 OP 636

## 2022-01-01 PROCEDURE — 99225 PR SUBSEQUENT OBSERVATION CARE,LEVEL II: CPT | Performed by: HOSPITALIST

## 2022-01-01 PROCEDURE — 96375 TX/PRO/DX INJ NEW DRUG ADDON: CPT

## 2022-01-01 PROCEDURE — 83880 ASSAY OF NATRIURETIC PEPTIDE: CPT | Mod: ORL | Performed by: NURSE PRACTITIONER

## 2022-01-01 PROCEDURE — 99232 SBSQ HOSP IP/OBS MODERATE 35: CPT | Performed by: FAMILY MEDICINE

## 2022-01-01 PROCEDURE — 82607 VITAMIN B-12: CPT | Performed by: HOSPITALIST

## 2022-01-01 PROCEDURE — 250N000013 HC RX MED GY IP 250 OP 250 PS 637: Performed by: INTERNAL MEDICINE

## 2022-01-01 PROCEDURE — 96372 THER/PROPH/DIAG INJ SC/IM: CPT

## 2022-01-01 PROCEDURE — 82306 VITAMIN D 25 HYDROXY: CPT | Performed by: INTERNAL MEDICINE

## 2022-01-01 PROCEDURE — 85025 COMPLETE CBC W/AUTO DIFF WBC: CPT | Performed by: INTERNAL MEDICINE

## 2022-01-01 PROCEDURE — 81001 URINALYSIS AUTO W/SCOPE: CPT | Mod: ORL | Performed by: NURSE PRACTITIONER

## 2022-01-01 PROCEDURE — 99232 SBSQ HOSP IP/OBS MODERATE 35: CPT | Performed by: INTERNAL MEDICINE

## 2022-01-01 PROCEDURE — 36415 COLL VENOUS BLD VENIPUNCTURE: CPT | Performed by: INTERNAL MEDICINE

## 2022-01-01 PROCEDURE — 82607 VITAMIN B-12: CPT | Performed by: INTERNAL MEDICINE

## 2022-01-01 PROCEDURE — 72125 CT NECK SPINE W/O DYE: CPT

## 2022-01-01 PROCEDURE — 36415 COLL VENOUS BLD VENIPUNCTURE: CPT | Mod: ORL | Performed by: NURSE PRACTITIONER

## 2022-01-01 PROCEDURE — 70450 CT HEAD/BRAIN W/O DYE: CPT

## 2022-01-01 PROCEDURE — 250N000013 HC RX MED GY IP 250 OP 250 PS 637: Performed by: FAMILY MEDICINE

## 2022-01-01 PROCEDURE — 97110 THERAPEUTIC EXERCISES: CPT | Mod: GP,CQ

## 2022-01-01 PROCEDURE — 120N000001 HC R&B MED SURG/OB

## 2022-01-01 PROCEDURE — 87040 BLOOD CULTURE FOR BACTERIA: CPT | Performed by: EMERGENCY MEDICINE

## 2022-01-01 PROCEDURE — 99213 OFFICE O/P EST LOW 20 MIN: CPT | Mod: 95 | Performed by: PHYSICIAN ASSISTANT

## 2022-01-01 PROCEDURE — 36415 COLL VENOUS BLD VENIPUNCTURE: CPT | Performed by: HOSPITALIST

## 2022-01-01 PROCEDURE — 81001 URINALYSIS AUTO W/SCOPE: CPT | Performed by: EMERGENCY MEDICINE

## 2022-01-01 PROCEDURE — 80053 COMPREHEN METABOLIC PANEL: CPT | Performed by: INTERNAL MEDICINE

## 2022-01-01 PROCEDURE — 84443 ASSAY THYROID STIM HORMONE: CPT | Mod: ORL | Performed by: NURSE PRACTITIONER

## 2022-01-01 PROCEDURE — 71045 X-RAY EXAM CHEST 1 VIEW: CPT

## 2022-01-01 PROCEDURE — 99213 OFFICE O/P EST LOW 20 MIN: CPT | Mod: 95 | Performed by: NURSE PRACTITIONER

## 2022-01-01 PROCEDURE — 99285 EMERGENCY DEPT VISIT HI MDM: CPT | Mod: 25

## 2022-01-01 PROCEDURE — 96365 THER/PROPH/DIAG IV INF INIT: CPT

## 2022-01-01 PROCEDURE — 85027 COMPLETE CBC AUTOMATED: CPT | Performed by: HOSPITALIST

## 2022-01-01 PROCEDURE — 87637 SARSCOV2&INF A&B&RSV AMP PRB: CPT | Mod: ORL

## 2022-01-01 PROCEDURE — 250N000013 HC RX MED GY IP 250 OP 250 PS 637: Performed by: EMERGENCY MEDICINE

## 2022-01-01 PROCEDURE — 84443 ASSAY THYROID STIM HORMONE: CPT | Performed by: INTERNAL MEDICINE

## 2022-01-01 PROCEDURE — 87637 SARSCOV2&INF A&B&RSV AMP PRB: CPT | Performed by: EMERGENCY MEDICINE

## 2022-01-01 PROCEDURE — 97530 THERAPEUTIC ACTIVITIES: CPT | Mod: GP

## 2022-01-01 PROCEDURE — 83605 ASSAY OF LACTIC ACID: CPT | Performed by: EMERGENCY MEDICINE

## 2022-01-01 PROCEDURE — 85027 COMPLETE CBC AUTOMATED: CPT | Mod: ORL | Performed by: NURSE PRACTITIONER

## 2022-01-01 PROCEDURE — 80053 COMPREHEN METABOLIC PANEL: CPT | Performed by: HOSPITALIST

## 2022-01-01 PROCEDURE — 99220 PR INITIAL OBSERVATION CARE,LEVEL III: CPT | Performed by: HOSPITALIST

## 2022-01-01 PROCEDURE — 80048 BASIC METABOLIC PNL TOTAL CA: CPT | Performed by: EMERGENCY MEDICINE

## 2022-01-01 PROCEDURE — 36415 COLL VENOUS BLD VENIPUNCTURE: CPT | Performed by: EMERGENCY MEDICINE

## 2022-01-01 PROCEDURE — 85025 COMPLETE CBC W/AUTO DIFF WBC: CPT | Performed by: EMERGENCY MEDICINE

## 2022-01-01 PROCEDURE — 83550 IRON BINDING TEST: CPT | Performed by: HOSPITALIST

## 2022-01-01 PROCEDURE — 97161 PT EVAL LOW COMPLEX 20 MIN: CPT | Mod: GP

## 2022-01-01 PROCEDURE — 83036 HEMOGLOBIN GLYCOSYLATED A1C: CPT | Mod: ORL | Performed by: NURSE PRACTITIONER

## 2022-01-01 PROCEDURE — 82962 GLUCOSE BLOOD TEST: CPT

## 2022-01-01 PROCEDURE — 82746 ASSAY OF FOLIC ACID SERUM: CPT | Performed by: INTERNAL MEDICINE

## 2022-01-01 PROCEDURE — 93005 ELECTROCARDIOGRAM TRACING: CPT | Performed by: EMERGENCY MEDICINE

## 2022-01-01 PROCEDURE — 96376 TX/PRO/DX INJ SAME DRUG ADON: CPT

## 2022-01-01 PROCEDURE — 80048 BASIC METABOLIC PNL TOTAL CA: CPT | Mod: ORL | Performed by: NURSE PRACTITIONER

## 2022-01-01 RX ORDER — FERROUS SULFATE 325(65) MG
325 TABLET ORAL DAILY
Status: DISCONTINUED | OUTPATIENT
Start: 2022-01-01 | End: 2022-01-01 | Stop reason: HOSPADM

## 2022-01-01 RX ORDER — POLYMYXIN B SULFATE AND TRIMETHOPRIM 1; 10000 MG/ML; [USP'U]/ML
2 SOLUTION OPHTHALMIC EVERY 6 HOURS
Status: DISPENSED | OUTPATIENT
Start: 2022-01-01 | End: 2022-01-01

## 2022-01-01 RX ORDER — ALBUTEROL SULFATE 90 UG/1
2 AEROSOL, METERED RESPIRATORY (INHALATION) EVERY 4 HOURS PRN
Status: DISCONTINUED | OUTPATIENT
Start: 2022-01-01 | End: 2022-01-01 | Stop reason: HOSPADM

## 2022-01-01 RX ORDER — FERROUS SULFATE 325(65) MG
325 TABLET ORAL DAILY
DISCHARGE
Start: 2022-01-01

## 2022-01-01 RX ORDER — POLYETHYLENE GLYCOL 3350 17 G/17G
17 POWDER, FOR SOLUTION ORAL DAILY
Status: DISCONTINUED | OUTPATIENT
Start: 2022-01-01 | End: 2022-01-01 | Stop reason: HOSPADM

## 2022-01-01 RX ORDER — ACETAMINOPHEN 325 MG/1
650 TABLET ORAL EVERY 6 HOURS PRN
Status: DISCONTINUED | OUTPATIENT
Start: 2022-01-01 | End: 2022-01-01 | Stop reason: HOSPADM

## 2022-01-01 RX ORDER — OLANZAPINE 10 MG/2ML
5 INJECTION, POWDER, FOR SOLUTION INTRAMUSCULAR ONCE
Status: COMPLETED | OUTPATIENT
Start: 2022-01-01 | End: 2022-01-01

## 2022-01-01 RX ORDER — QUETIAPINE FUMARATE 25 MG/1
25 TABLET, FILM COATED ORAL AT BEDTIME
Status: DISCONTINUED | OUTPATIENT
Start: 2022-01-01 | End: 2022-01-01 | Stop reason: HOSPADM

## 2022-01-01 RX ORDER — ACETAMINOPHEN 500 MG
2 TABLET ORAL DAILY PRN
COMMUNITY

## 2022-01-01 RX ORDER — LANOLIN ALCOHOL/MO/W.PET/CERES
1000 CREAM (GRAM) TOPICAL DAILY
Status: DISCONTINUED | OUTPATIENT
Start: 2022-01-01 | End: 2022-01-01 | Stop reason: HOSPADM

## 2022-01-01 RX ORDER — DEXAMETHASONE 2 MG/1
6 TABLET ORAL DAILY
Status: DISCONTINUED | OUTPATIENT
Start: 2022-01-01 | End: 2022-01-01

## 2022-01-01 RX ORDER — OLANZAPINE 10 MG/2ML
5 INJECTION, POWDER, FOR SOLUTION INTRAMUSCULAR
Status: COMPLETED | OUTPATIENT
Start: 2022-01-01 | End: 2022-01-01

## 2022-01-01 RX ORDER — AMOXICILLIN 250 MG
1 CAPSULE ORAL 2 TIMES DAILY PRN
COMMUNITY

## 2022-01-01 RX ORDER — UBIDECARENONE 75 MG
100 CAPSULE ORAL DAILY
Status: DISCONTINUED | OUTPATIENT
Start: 2022-01-01 | End: 2022-01-01

## 2022-01-01 RX ORDER — POLYMYXIN B SULFATE AND TRIMETHOPRIM 1; 10000 MG/ML; [USP'U]/ML
2 SOLUTION OPHTHALMIC
Status: DISCONTINUED | OUTPATIENT
Start: 2022-01-01 | End: 2022-01-01

## 2022-01-01 RX ORDER — ACETAMINOPHEN 325 MG/1
650 TABLET ORAL ONCE
Status: COMPLETED | OUTPATIENT
Start: 2022-01-01 | End: 2022-01-01

## 2022-01-01 RX ORDER — ONDANSETRON 2 MG/ML
4 INJECTION INTRAMUSCULAR; INTRAVENOUS EVERY 6 HOURS PRN
Status: DISCONTINUED | OUTPATIENT
Start: 2022-01-01 | End: 2022-01-01 | Stop reason: HOSPADM

## 2022-01-01 RX ORDER — OLANZAPINE 10 MG/2ML
2.5 INJECTION, POWDER, FOR SOLUTION INTRAMUSCULAR ONCE
Status: COMPLETED | OUTPATIENT
Start: 2022-01-01 | End: 2022-01-01

## 2022-01-01 RX ORDER — ONDANSETRON 4 MG/1
4 TABLET, ORALLY DISINTEGRATING ORAL EVERY 6 HOURS PRN
Status: DISCONTINUED | OUTPATIENT
Start: 2022-01-01 | End: 2022-01-01 | Stop reason: HOSPADM

## 2022-01-01 RX ORDER — QUETIAPINE FUMARATE 25 MG/1
25 TABLET, FILM COATED ORAL AT BEDTIME
DISCHARGE
Start: 2022-01-01 | End: 2023-01-01

## 2022-01-01 RX ORDER — LIDOCAINE 40 MG/G
CREAM TOPICAL
Status: DISCONTINUED | OUTPATIENT
Start: 2022-01-01 | End: 2022-01-01 | Stop reason: HOSPADM

## 2022-01-01 RX ORDER — DEXAMETHASONE SODIUM PHOSPHATE 10 MG/ML
6 INJECTION, SOLUTION INTRAMUSCULAR; INTRAVENOUS DAILY
Status: DISCONTINUED | OUTPATIENT
Start: 2022-01-01 | End: 2022-01-01

## 2022-01-01 RX ORDER — SIMVASTATIN 10 MG
10 TABLET ORAL AT BEDTIME
Status: DISCONTINUED | OUTPATIENT
Start: 2022-01-01 | End: 2022-01-01 | Stop reason: HOSPADM

## 2022-01-01 RX ORDER — ACETAMINOPHEN 650 MG/1
650 SUPPOSITORY RECTAL EVERY 6 HOURS PRN
Status: DISCONTINUED | OUTPATIENT
Start: 2022-01-01 | End: 2022-01-01 | Stop reason: HOSPADM

## 2022-01-01 RX ORDER — OLANZAPINE 10 MG/2ML
2.5 INJECTION, POWDER, FOR SOLUTION INTRAMUSCULAR DAILY PRN
Status: DISCONTINUED | OUTPATIENT
Start: 2022-01-01 | End: 2022-01-01

## 2022-01-01 RX ADMIN — REMDESIVIR 200 MG: 100 INJECTION, POWDER, LYOPHILIZED, FOR SOLUTION INTRAVENOUS at 01:59

## 2022-01-01 RX ADMIN — SIMVASTATIN 10 MG: 10 TABLET, FILM COATED ORAL at 21:54

## 2022-01-01 RX ADMIN — FERROUS SULFATE TAB 325 MG (65 MG ELEMENTAL FE) 325 MG: 325 (65 FE) TAB at 08:33

## 2022-01-01 RX ADMIN — DEXAMETHASONE 6 MG: 2 TABLET ORAL at 08:07

## 2022-01-01 RX ADMIN — FERROUS SULFATE TAB 325 MG (65 MG ELEMENTAL FE) 325 MG: 325 (65 FE) TAB at 09:20

## 2022-01-01 RX ADMIN — POLYMYXIN B SULFATE AND TRIMETHOPRIM SULFATE 2 DROP: 10000; 1 SOLUTION/ DROPS OPHTHALMIC at 22:15

## 2022-01-01 RX ADMIN — POLYMYXIN B SULFATE AND TRIMETHOPRIM SULFATE 2 DROP: 10000; 1 SOLUTION/ DROPS OPHTHALMIC at 16:19

## 2022-01-01 RX ADMIN — POLYETHYLENE GLYCOL 3350 17 G: 17 POWDER, FOR SOLUTION ORAL at 08:33

## 2022-01-01 RX ADMIN — OLANZAPINE 2.5 MG: 10 INJECTION, POWDER, FOR SOLUTION INTRAMUSCULAR at 02:42

## 2022-01-01 RX ADMIN — POLYMYXIN B SULFATE AND TRIMETHOPRIM SULFATE 2 DROP: 10000; 1 SOLUTION/ DROPS OPHTHALMIC at 22:55

## 2022-01-01 RX ADMIN — SIMVASTATIN 10 MG: 10 TABLET, FILM COATED ORAL at 21:22

## 2022-01-01 RX ADMIN — DEXAMETHASONE 6 MG: 2 TABLET ORAL at 08:33

## 2022-01-01 RX ADMIN — Medication 1000 MCG: at 08:30

## 2022-01-01 RX ADMIN — REMDESIVIR 100 MG: 100 INJECTION, POWDER, LYOPHILIZED, FOR SOLUTION INTRAVENOUS at 09:32

## 2022-01-01 RX ADMIN — ACETAMINOPHEN 650 MG: 325 TABLET, FILM COATED ORAL at 20:48

## 2022-01-01 RX ADMIN — SIMVASTATIN 10 MG: 10 TABLET, FILM COATED ORAL at 22:50

## 2022-01-01 RX ADMIN — QUETIAPINE 12.5 MG: 25 TABLET, FILM COATED ORAL at 21:35

## 2022-01-01 RX ADMIN — POLYMYXIN B SULFATE AND TRIMETHOPRIM SULFATE 2 DROP: 10000; 1 SOLUTION/ DROPS OPHTHALMIC at 09:21

## 2022-01-01 RX ADMIN — POLYETHYLENE GLYCOL 3350 17 G: 17 POWDER, FOR SOLUTION ORAL at 08:30

## 2022-01-01 RX ADMIN — POLYMYXIN B SULFATE AND TRIMETHOPRIM SULFATE 2 DROP: 10000; 1 SOLUTION/ DROPS OPHTHALMIC at 04:00

## 2022-01-01 RX ADMIN — SIMVASTATIN 10 MG: 10 TABLET, FILM COATED ORAL at 21:35

## 2022-01-01 RX ADMIN — POLYETHYLENE GLYCOL 3350 17 G: 17 POWDER, FOR SOLUTION ORAL at 09:20

## 2022-01-01 RX ADMIN — ACETAMINOPHEN 650 MG: 325 TABLET, FILM COATED ORAL at 20:59

## 2022-01-01 RX ADMIN — Medication 100 MCG: at 08:33

## 2022-01-01 RX ADMIN — SIMVASTATIN 10 MG: 10 TABLET, FILM COATED ORAL at 21:03

## 2022-01-01 RX ADMIN — POLYMYXIN B SULFATE AND TRIMETHOPRIM SULFATE 2 DROP: 10000; 1 SOLUTION/ DROPS OPHTHALMIC at 08:31

## 2022-01-01 RX ADMIN — QUETIAPINE FUMARATE 25 MG: 25 TABLET ORAL at 22:50

## 2022-01-01 RX ADMIN — SIMVASTATIN 10 MG: 10 TABLET, FILM COATED ORAL at 22:15

## 2022-01-01 RX ADMIN — FERROUS SULFATE TAB 325 MG (65 MG ELEMENTAL FE) 325 MG: 325 (65 FE) TAB at 08:30

## 2022-01-01 RX ADMIN — UMECLIDINIUM BROMIDE AND VILANTEROL TRIFENATATE 1 PUFF: 62.5; 25 POWDER RESPIRATORY (INHALATION) at 11:38

## 2022-01-01 RX ADMIN — POLYMYXIN B SULFATE AND TRIMETHOPRIM SULFATE 2 DROP: 10000; 1 SOLUTION/ DROPS OPHTHALMIC at 21:34

## 2022-01-01 RX ADMIN — POLYETHYLENE GLYCOL 3350 17 G: 17 POWDER, FOR SOLUTION ORAL at 09:00

## 2022-01-01 RX ADMIN — ACETAMINOPHEN 650 MG: 325 TABLET, FILM COATED ORAL at 06:36

## 2022-01-01 RX ADMIN — QUETIAPINE FUMARATE 25 MG: 25 TABLET ORAL at 22:15

## 2022-01-01 RX ADMIN — POLYETHYLENE GLYCOL 3350 17 G: 17 POWDER, FOR SOLUTION ORAL at 09:45

## 2022-01-01 RX ADMIN — QUETIAPINE 12.5 MG: 25 TABLET, FILM COATED ORAL at 01:44

## 2022-01-01 RX ADMIN — POLYMYXIN B SULFATE AND TRIMETHOPRIM SULFATE 2 DROP: 10000; 1 SOLUTION/ DROPS OPHTHALMIC at 03:52

## 2022-01-01 RX ADMIN — POLYMYXIN B SULFATE AND TRIMETHOPRIM SULFATE 2 DROP: 10000; 1 SOLUTION/ DROPS OPHTHALMIC at 15:59

## 2022-01-01 RX ADMIN — SODIUM CHLORIDE 50 ML: 900 INJECTION INTRAVENOUS at 02:40

## 2022-01-01 RX ADMIN — POLYMYXIN B SULFATE AND TRIMETHOPRIM SULFATE 2 DROP: 10000; 1 SOLUTION/ DROPS OPHTHALMIC at 21:27

## 2022-01-01 RX ADMIN — POLYMYXIN B SULFATE AND TRIMETHOPRIM SULFATE 2 DROP: 10000; 1 SOLUTION/ DROPS OPHTHALMIC at 17:02

## 2022-01-01 RX ADMIN — SIMVASTATIN 10 MG: 10 TABLET, FILM COATED ORAL at 22:39

## 2022-01-01 RX ADMIN — ACETAMINOPHEN 650 MG: 325 TABLET, FILM COATED ORAL at 14:45

## 2022-01-01 RX ADMIN — QUETIAPINE 12.5 MG: 25 TABLET, FILM COATED ORAL at 08:30

## 2022-01-01 RX ADMIN — OLANZAPINE 5 MG: 10 INJECTION, POWDER, FOR SOLUTION INTRAMUSCULAR at 03:57

## 2022-01-01 RX ADMIN — POLYMYXIN B SULFATE AND TRIMETHOPRIM SULFATE 2 DROP: 10000; 1 SOLUTION/ DROPS OPHTHALMIC at 08:34

## 2022-01-01 RX ADMIN — Medication 100 MCG: at 09:44

## 2022-01-01 RX ADMIN — QUETIAPINE 12.5 MG: 25 TABLET, FILM COATED ORAL at 17:33

## 2022-01-01 RX ADMIN — POLYMYXIN B SULFATE AND TRIMETHOPRIM SULFATE 2 DROP: 10000; 1 SOLUTION/ DROPS OPHTHALMIC at 21:54

## 2022-01-01 RX ADMIN — DEXAMETHASONE 6 MG: 2 TABLET ORAL at 08:30

## 2022-01-01 RX ADMIN — OLANZAPINE 2.5 MG: 10 INJECTION, POWDER, FOR SOLUTION INTRAMUSCULAR at 05:48

## 2022-01-01 RX ADMIN — Medication 1 MG: at 01:45

## 2022-01-01 RX ADMIN — FERROUS SULFATE TAB 325 MG (65 MG ELEMENTAL FE) 325 MG: 325 (65 FE) TAB at 09:01

## 2022-01-01 RX ADMIN — REMDESIVIR 100 MG: 100 INJECTION, POWDER, LYOPHILIZED, FOR SOLUTION INTRAVENOUS at 08:53

## 2022-01-01 RX ADMIN — Medication 100 MCG: at 07:41

## 2022-01-01 RX ADMIN — POLYMYXIN B SULFATE AND TRIMETHOPRIM SULFATE 2 DROP: 10000; 1 SOLUTION/ DROPS OPHTHALMIC at 17:26

## 2022-01-01 RX ADMIN — SODIUM CHLORIDE 50 ML: 9 INJECTION, SOLUTION INTRAVENOUS at 11:02

## 2022-01-01 RX ADMIN — SODIUM CHLORIDE 50 ML: 9 INJECTION, SOLUTION INTRAVENOUS at 09:34

## 2022-01-01 RX ADMIN — Medication 1000 MCG: at 09:01

## 2022-01-01 RX ADMIN — FERROUS SULFATE TAB 325 MG (65 MG ELEMENTAL FE) 325 MG: 325 (65 FE) TAB at 07:41

## 2022-01-01 RX ADMIN — QUETIAPINE 12.5 MG: 25 TABLET, FILM COATED ORAL at 18:50

## 2022-01-01 RX ADMIN — FERROUS SULFATE TAB 325 MG (65 MG ELEMENTAL FE) 325 MG: 325 (65 FE) TAB at 09:44

## 2022-01-01 RX ADMIN — Medication 1000 MCG: at 09:20

## 2022-01-01 RX ADMIN — UMECLIDINIUM BROMIDE AND VILANTEROL TRIFENATATE 1 PUFF: 62.5; 25 POWDER RESPIRATORY (INHALATION) at 09:01

## 2022-01-01 RX ADMIN — QUETIAPINE 12.5 MG: 25 TABLET, FILM COATED ORAL at 13:47

## 2022-01-01 RX ADMIN — DEXAMETHASONE 6 MG: 2 TABLET ORAL at 11:42

## 2022-01-01 RX ADMIN — POLYMYXIN B SULFATE AND TRIMETHOPRIM SULFATE 2 DROP: 10000; 1 SOLUTION/ DROPS OPHTHALMIC at 11:03

## 2022-01-01 RX ADMIN — OLANZAPINE 5 MG: 10 INJECTION, POWDER, FOR SOLUTION INTRAMUSCULAR at 22:13

## 2022-01-01 RX ADMIN — FERROUS SULFATE TAB 325 MG (65 MG ELEMENTAL FE) 325 MG: 325 (65 FE) TAB at 09:33

## 2022-01-01 RX ADMIN — Medication 100 MCG: at 09:33

## 2022-01-01 RX ADMIN — POLYMYXIN B SULFATE AND TRIMETHOPRIM SULFATE 2 DROP: 10000; 1 SOLUTION/ DROPS OPHTHALMIC at 21:04

## 2022-01-01 RX ADMIN — DEXAMETHASONE SODIUM PHOSPHATE 6 MG: 10 INJECTION, SOLUTION INTRAMUSCULAR; INTRAVENOUS at 14:39

## 2022-01-01 ASSESSMENT — ACTIVITIES OF DAILY LIVING (ADL)
ADLS_ACUITY_SCORE: 43
ADLS_ACUITY_SCORE: 45
ADLS_ACUITY_SCORE: 43
ADLS_ACUITY_SCORE: 41
ADLS_ACUITY_SCORE: 35
ADLS_ACUITY_SCORE: 43
ADLS_ACUITY_SCORE: 42
ADLS_ACUITY_SCORE: 43
ADLS_ACUITY_SCORE: 35
ADLS_ACUITY_SCORE: 41
ADLS_ACUITY_SCORE: 42
ADLS_ACUITY_SCORE: 42
ADLS_ACUITY_SCORE: 41
ADLS_ACUITY_SCORE: 41
ADLS_ACUITY_SCORE: 43
ADLS_ACUITY_SCORE: 45
ADLS_ACUITY_SCORE: 41
ADLS_ACUITY_SCORE: 45
ADLS_ACUITY_SCORE: 41
ADLS_ACUITY_SCORE: 45
ADLS_ACUITY_SCORE: 35
ADLS_ACUITY_SCORE: 41
ADLS_ACUITY_SCORE: 50
ADLS_ACUITY_SCORE: 45
ADLS_ACUITY_SCORE: 41
ADLS_ACUITY_SCORE: 43
ADLS_ACUITY_SCORE: 41
ADLS_ACUITY_SCORE: 51
ADLS_ACUITY_SCORE: 41
ADLS_ACUITY_SCORE: 41
ADLS_ACUITY_SCORE: 43
ADLS_ACUITY_SCORE: 42
ADLS_ACUITY_SCORE: 42
ADLS_ACUITY_SCORE: 43
ADLS_ACUITY_SCORE: 41
ADLS_ACUITY_SCORE: 43
ADLS_ACUITY_SCORE: 41
ADLS_ACUITY_SCORE: 45
ADLS_ACUITY_SCORE: 43
ADLS_ACUITY_SCORE: 45
ADLS_ACUITY_SCORE: 41
ADLS_ACUITY_SCORE: 43
ADLS_ACUITY_SCORE: 45
ADLS_ACUITY_SCORE: 42
ADLS_ACUITY_SCORE: 41
ADLS_ACUITY_SCORE: 41
ADLS_ACUITY_SCORE: 35
ADLS_ACUITY_SCORE: 35
ADLS_ACUITY_SCORE: 45
ADLS_ACUITY_SCORE: 43
ADLS_ACUITY_SCORE: 41
ADLS_ACUITY_SCORE: 41
ADLS_ACUITY_SCORE: 35
ADLS_ACUITY_SCORE: 41
ADLS_ACUITY_SCORE: 35
ADLS_ACUITY_SCORE: 35
ADLS_ACUITY_SCORE: 41
ADLS_ACUITY_SCORE: 43
ADLS_ACUITY_SCORE: 43
ADLS_ACUITY_SCORE: 51
ADLS_ACUITY_SCORE: 51
ADLS_ACUITY_SCORE: 35
ADLS_ACUITY_SCORE: 41
ADLS_ACUITY_SCORE: 43
ADLS_ACUITY_SCORE: 41
ADLS_ACUITY_SCORE: 51
ADLS_ACUITY_SCORE: 43
ADLS_ACUITY_SCORE: 42
ADLS_ACUITY_SCORE: 41
ADLS_ACUITY_SCORE: 42
ADLS_ACUITY_SCORE: 43
ADLS_ACUITY_SCORE: 42
ADLS_ACUITY_SCORE: 41
ADLS_ACUITY_SCORE: 42
ADLS_ACUITY_SCORE: 43
ADLS_ACUITY_SCORE: 41
ADLS_ACUITY_SCORE: 51
ADLS_ACUITY_SCORE: 42
ADLS_ACUITY_SCORE: 41
ADLS_ACUITY_SCORE: 41
ADLS_ACUITY_SCORE: 43
ADLS_ACUITY_SCORE: 45
ADLS_ACUITY_SCORE: 41
ADLS_ACUITY_SCORE: 45
ADLS_ACUITY_SCORE: 35
ADLS_ACUITY_SCORE: 35
ADLS_ACUITY_SCORE: 41
ADLS_ACUITY_SCORE: 45
ADLS_ACUITY_SCORE: 35
ADLS_ACUITY_SCORE: 41
ADLS_ACUITY_SCORE: 51
ADLS_ACUITY_SCORE: 45

## 2022-01-01 ASSESSMENT — ENCOUNTER SYMPTOMS
BACK PAIN: 0
CONFUSION: 0
DIFFICULTY URINATING: 0
HEADACHES: 0
FEVER: 1
COLOR CHANGE: 0
NECK STIFFNESS: 0
ARTHRALGIAS: 0
ABDOMINAL PAIN: 0
SHORTNESS OF BREATH: 0
EYE REDNESS: 0
NECK PAIN: 0

## 2022-01-31 ENCOUNTER — TELEPHONE (OUTPATIENT)
Dept: FAMILY MEDICINE | Facility: CLINIC | Age: 87
End: 2022-01-31

## 2022-01-31 ENCOUNTER — OFFICE VISIT (OUTPATIENT)
Dept: ORTHOPEDICS | Facility: CLINIC | Age: 87
End: 2022-01-31
Payer: COMMERCIAL

## 2022-01-31 VITALS — DIASTOLIC BLOOD PRESSURE: 62 MMHG | SYSTOLIC BLOOD PRESSURE: 118 MMHG | RESPIRATION RATE: 16 BRPM | HEART RATE: 82 BPM

## 2022-01-31 DIAGNOSIS — M17.0 PRIMARY OSTEOARTHRITIS OF BOTH KNEES: Primary | ICD-10-CM

## 2022-01-31 PROCEDURE — 20610 DRAIN/INJ JOINT/BURSA W/O US: CPT | Mod: 50 | Performed by: ORTHOPAEDIC SURGERY

## 2022-01-31 RX ORDER — METHYLPREDNISOLONE ACETATE 80 MG/ML
80 INJECTION, SUSPENSION INTRA-ARTICULAR; INTRALESIONAL; INTRAMUSCULAR; SOFT TISSUE
Status: DISCONTINUED | OUTPATIENT
Start: 2022-01-31 | End: 2022-06-13

## 2022-01-31 RX ORDER — LIDOCAINE HYDROCHLORIDE 10 MG/ML
5 INJECTION, SOLUTION INFILTRATION; PERINEURAL
Status: DISCONTINUED | OUTPATIENT
Start: 2022-01-31 | End: 2022-06-13

## 2022-01-31 RX ADMIN — METHYLPREDNISOLONE ACETATE 80 MG: 80 INJECTION, SUSPENSION INTRA-ARTICULAR; INTRALESIONAL; INTRAMUSCULAR; SOFT TISSUE at 13:33

## 2022-01-31 RX ADMIN — LIDOCAINE HYDROCHLORIDE 5 ML: 10 INJECTION, SOLUTION INFILTRATION; PERINEURAL at 13:33

## 2022-01-31 NOTE — PROGRESS NOTES
Large Joint Injection/Arthocentesis: bilateral knee    Date/Time: 1/31/2022 1:33 PM  Performed by: Sheng Montoya MD  Authorized by: Sheng Montoya MD     Indications:  Pain  Needle Size:  22 G  Guidance: landmark guided    Location:  Knee  Laterality:  Bilateral      Medications (Right):  80 mg methylPREDNISolone 80 MG/ML; 5 mL lidocaine 1 %  Medications (Left):  80 mg methylPREDNISolone 80 MG/ML; 5 mL lidocaine 1 %  Outcome:  Tolerated well, no immediate complications  Procedure discussed: discussed risks, benefits, and alternatives    Consent Given by:  Patient  Timeout: timeout called immediately prior to procedure    Prep: patient was prepped and draped in usual sterile fashion

## 2022-01-31 NOTE — TELEPHONE ENCOUNTER
Reason for Call:  Other call back    Detailed comments: Patients daughter brought Lang in today to see Dr Montoya and asked if a message could be put in for her father cause she is concerned about all the medications he was told to take when he left the hospital. So if Dr Blake could look over everything and then give a call to the facility he is staying at and let them know of any changes that would be greatly appreciated.    Phone Number Patient can be reached at: Other phone number:  [phone]* can also call daughter at [phone] Sharon Christina    Best Time: anytime    Can we leave a detailed message on this number? Not Applicable    Call taken on 1/31/2022 at 1:07 PM by Karen Mccain

## 2022-01-31 NOTE — PROGRESS NOTES
Follow up bilateral knee primary osteoarthritis.  Last injection 10/25/21.  Range of motion 0-130.    He  desires injection today of bilateral knee(s).  Risks, benefits, potential complications and alternatives were discussed.   With the patient's consent, sterile prep was performed of bilateral knee(s).  Each knee was injected with Depo Medrol 80 mg and lidocaine at anteromedial site.  Return to clinic as needed.

## 2022-01-31 NOTE — Clinical Note
1/31/2022         RE: Lang Christina  [address] [place] [address]        Dear Colleague,    Thank you for referring your patient, Lang Christina, to the [facility]. Please see a copy of my visit note below.    Follow up bilateral knee primary osteoarthritis.  Last injection 10/25/21.  Range of motion 0-130.    He  desires injection today of bilateral knee(s).  Risks, benefits, potential complications and alternatives were discussed.   With the patient's consent, sterile prep was performed of bilateral knee(s).  Each knee was injected with Depo Medrol 80 mg and lidocaine at anteromedial site.  Return to clinic as needed.       Large Joint Injection/Arthocentesis: bilateral knee    Date/Time: 1/31/2022 1:33 PM  Performed by: Sheng Montoya MD  Authorized by: Sheng Montoya MD     Indications:  Pain  Needle Size:  22 G  Guidance: landmark guided    Location:  Knee  Laterality:  Bilateral      Medications (Right):  80 mg methylPREDNISolone 80 MG/ML; 5 mL lidocaine 1 %  Medications (Left):  80 mg methylPREDNISolone 80 MG/ML; 5 mL lidocaine 1 %  Outcome:  Tolerated well, no immediate complications  Procedure discussed: discussed risks, benefits, and alternatives    Consent Given by:  Patient  Timeout: timeout called immediately prior to procedure    Prep: patient was prepped and draped in usual sterile fashion              Again, thank you for allowing me to participate in the care of your patient.        Sincerely,        Sheng Montyoa MD

## 2022-01-31 NOTE — TELEPHONE ENCOUNTER
There are far too many issues to deal with informally. Lets go for a telephone MD visit or video office visit or possibly even face to face encounter     See what you can find out, lets get something set up as soon as possible     Micheal Blake MD

## 2022-02-01 NOTE — TELEPHONE ENCOUNTER
OUMOU for daughter, Sharon (ph: [phone]) with Dr. Blake's message below to call back and schedule appointment.    Francy Heard,

## 2022-02-04 NOTE — TELEPHONE ENCOUNTER
Patient is scheduled for appointment with Dr. Blake on Monday, February 7th at 11:20 a.m.      Francy Heard,

## 2022-02-07 ENCOUNTER — OFFICE VISIT (OUTPATIENT)
Dept: INTERNAL MEDICINE | Facility: CLINIC | Age: 87
End: 2022-02-07
Payer: COMMERCIAL

## 2022-02-07 VITALS
SYSTOLIC BLOOD PRESSURE: 104 MMHG | BODY MASS INDEX: 20.14 KG/M2 | TEMPERATURE: 98.1 F | RESPIRATION RATE: 14 BRPM | HEART RATE: 44 BPM | DIASTOLIC BLOOD PRESSURE: 60 MMHG | WEIGHT: 121 LBS

## 2022-02-07 DIAGNOSIS — S06.5XAA SUBDURAL HEMATOMA (H): ICD-10-CM

## 2022-02-07 DIAGNOSIS — I63.9 CEREBROVASCULAR ACCIDENT (CVA), UNSPECIFIED MECHANISM (H): Primary | ICD-10-CM

## 2022-02-07 DIAGNOSIS — J43.8 OTHER EMPHYSEMA (H): ICD-10-CM

## 2022-02-07 DIAGNOSIS — I48.0 PAROXYSMAL ATRIAL FIBRILLATION (H): ICD-10-CM

## 2022-02-07 DIAGNOSIS — J96.01 ACUTE RESPIRATORY FAILURE WITH HYPOXIA (H): ICD-10-CM

## 2022-02-07 PROCEDURE — 99215 OFFICE O/P EST HI 40 MIN: CPT | Performed by: INTERNAL MEDICINE

## 2022-02-07 NOTE — PROGRESS NOTES
Assessment & Plan     Cerebrovascular accident (CVA), unspecified mechanism (H)  I am seeing this patient on the heels of a complicated hospital admission and discharge. I feel his discharge diagnosis isn't including the key central diagnosis, that he has had a stroke. We spent a good bit of time in review. This is al well delineated history in his discharge summary from [facility], see care everywhere. Basically the patient a very abrupt onset of symptoms of aphasia, right sided facial droop and some clear lateralizing focal neurological deficits with his right hand. Emergency medical personnel called, patient brought to hospital and evaluated. His symptoms improved to a significant degree but he has been left with a tiny amount of word finding difficulty and some clear cut residual facial droop. 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 No single cerebrovascular accident finding was clearly the culprit lesion however subdural hematomas don't cause these symptoms and he had a cerebrovascular accident. He's been treated now aspirin and there was no neurological consultation ordered. Also noted was paroxysmal atrial fibrillation. Treatment with coumadin or direct oral anticoagulant (DOAC) was considered but not pursued secondary to the subdural hematoma. This is a complicated logic in my opinion and I need help from this with neurologist , referral order placed  - Adult Neurology  Referral; Future    Paroxysmal atrial fibrillation (H)  To add to the stress, patient manifested a heart rate between 40 to 44 for most of this appointment. His pulse is irregularly irregular rhythm consistent with atrial fibrillation . 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 Patient has been through a lot. I am  having him seen my cardiology  To review his case and decide on further workup and / or questions need for pacemaker. We discussed what to be on the watch for  And to following heart rate and patient would need urgent evaluate / 911 if we are seeing patient with loss of consciousness or heart rate below 40  - Adult Cardiology Eval Referral; Future    Subdural hematoma (H)  To be reviewed with cardiologist   - Adult Neurology  Referral; Future    Other emphysema (H)  Ultimately this patient to makes matters worse is currently going through a lot with his wife recently sent into memory care facility for rapidly progressing advanced Alzhemiers dementia and patients 2 daughters are trying to get patient to see the need for assisted care living facility. I stressed to patient that I absolutely totally agree with this need    Acute respiratory failure with hypoxia (H)  He had at one point qualified for oxygen therapy but at this point using hallway ambulation to test for oxsa I could not push his saturation any lower then 89% so he's not going to be covered at this point for home oxygen       Review of the result(s) of each unique test - todays test results  Prescription drug management  46 minutes spent on the date of the encounter doing chart review, history and exam, documentation and further activities per the note  6}       Return in about 4 weeks (around 3/7/2022), or to 6 weeks.    Micheal Blake MD  [facility] KELLY Croft is a 89 year old who presents for the following health issues   Encounter Diagnoses   Name Primary?     Cerebrovascular accident (CVA), unspecified mechanism (H) Yes     Paroxysmal atrial fibrillation (H)      Subdural hematoma (H)      Other emphysema (H)      Acute respiratory failure with hypoxia (H)       accompanied by his daughter.    HPI     Here with daughter Cailin    [facility] Follow-up Visit:    Hospital/Nursing Home/IP Rehab Facility: [facility]   Date of Admission: 01/20/2022  Date of Discharge: 01/25/2022  Reason(s) for Admission: Subdural hematoma (HC) (Primary Dx);   Delirium;   Paroxysmal atrial fibrillation (HC)      Was your hospitalization related to COVID-19? No   Problems taking medications regularly:  None  Medication changes since discharge: None  Problems adhering to non-medication therapy:  None    Summary of hospitalization:  CareEverywhere information obtained and reviewed  Diagnostic Tests/Treatments reviewed.  Follow up needed: cards and neurologist   Other Healthcare Providers Involved in Patient s Care:         PCP  Update since discharge: improved.       Post Discharge Medication Reconciliation: discharge medications reconciled and changed, per note/orders.  Plan of care communicated with patient and family              Previously was on home oxygen 2 liters per nasal cannula  At night, decided he didn't need it and stopped using this some time ago  He also stopped the Advair [ fluticasone / serevent ]   Chronic obstructive pulmonary disease is relatively mild  Not needing the nebulizer treatments   Heart rate  - atrial fibrillation - heart rate is 40 currently        Review of Systems   Constitutional, HEENT, cardiovascular, pulmonary, gi and gu systems are negative, except as otherwise noted.      Objective    /60   Pulse (!) 44   Temp 98.1  F (36.7  C) (Oral)   Resp 14   Wt 54.9 kg (121 lb)   BMI 20.14 kg/m    There is no height or weight on file to calculate BMI.  Physical Exam   GENERAL: healthy, alert and no distress, appears his stated age   NECK: no adenopathy, no asymmetry, masses, or scars and thyroid normal to palpation  RESP: lungs clear to auscultation - no rales, rhonchi or wheezes  CV: bradycardia, irregularly irregular rhythm, normal S1 S2, no S3 or S4, no murmur, click or rub, peripheral pulses strong and no peripheral edema  ABDOMEN: soft, nontender, no hepatosplenomegaly, no masses and bowel sounds  normal  MS: no gross musculoskeletal defects noted, no edema    Orders Placed This Encounter   Procedures     Adult Cardiology Eval Referral     Adult Neurology  Referral

## 2022-02-15 DIAGNOSIS — E78.5 HYPERLIPIDEMIA LDL GOAL <130: ICD-10-CM

## 2022-02-15 RX ORDER — SIMVASTATIN 10 MG
TABLET ORAL
Qty: 90 TABLET | Refills: 2 | Status: SHIPPED | OUTPATIENT
Start: 2022-02-15

## 2022-02-15 NOTE — TELEPHONE ENCOUNTER
"Requested Prescriptions   Pending Prescriptions Disp Refills     simvastatin (ZOCOR) 10 MG tablet [Pharmacy Med Name: SIMVASTATIN 10MG TABS] 90 tablet 0     Sig: TAKE ONE TABLET BY MOUTH AT BEDTIME. +++NEED ANNUAL EXAM+++       Statins Protocol Passed - 2/15/2022 10:27 AM        Passed - LDL on file in past 12 months     Recent Labs   Lab Test 11/05/21  1205   LDL 48           Passed - No abnormal creatine kinase in past 12 months     No lab results found.           Passed - Recent (12 mo) or future (30 days) visit within the authorizing provider's specialty     Patient has had an office visit with the authorizing provider or a provider within the authorizing providers department within the previous 12 mos or has a future within next 30 days. See \"Patient Info\" tab in inbasket, or \"Choose Columns\" in Meds & Orders section of the refill encounter.              Passed - Medication is active on med list        Passed - Patient is age 18 or older           Prescription approved per [facility] Refill Protocol.    "

## 2022-03-08 ENCOUNTER — MEDICAL CORRESPONDENCE (OUTPATIENT)
Dept: HEALTH INFORMATION MANAGEMENT | Facility: CLINIC | Age: 87
End: 2022-03-08

## 2022-04-23 ENCOUNTER — LAB REQUISITION (OUTPATIENT)
Dept: LAB | Facility: CLINIC | Age: 87
End: 2022-04-23
Payer: COMMERCIAL

## 2022-04-23 DIAGNOSIS — I50.22 CHRONIC SYSTOLIC (CONGESTIVE) HEART FAILURE (H): ICD-10-CM

## 2022-04-25 LAB — BNP SERPL-MCNC: 215 PG/ML (ref 0–93)

## 2022-04-25 PROCEDURE — 83880 ASSAY OF NATRIURETIC PEPTIDE: CPT | Mod: ORL | Performed by: NURSE PRACTITIONER

## 2022-04-25 PROCEDURE — P9603 ONE-WAY ALLOW PRORATED MILES: HCPCS | Mod: ORL | Performed by: NURSE PRACTITIONER

## 2022-04-25 PROCEDURE — 36415 COLL VENOUS BLD VENIPUNCTURE: CPT | Mod: ORL | Performed by: NURSE PRACTITIONER

## 2022-04-25 PROCEDURE — U0005 INFEC AGEN DETEC AMPLI PROBE: HCPCS | Mod: ORL | Performed by: INTERNAL MEDICINE

## 2022-04-26 ENCOUNTER — LAB REQUISITION (OUTPATIENT)
Dept: LAB | Facility: CLINIC | Age: 87
End: 2022-04-26
Payer: COMMERCIAL

## 2022-04-26 DIAGNOSIS — R50.9 FEVER, UNSPECIFIED: ICD-10-CM

## 2022-04-26 LAB
FLUAV AG SPEC QL IA: NEGATIVE
FLUBV AG SPEC QL IA: NEGATIVE
SARS-COV-2 RNA RESP QL NAA+PROBE: NEGATIVE

## 2022-04-26 PROCEDURE — 81001 URINALYSIS AUTO W/SCOPE: CPT | Mod: ORL | Performed by: INTERNAL MEDICINE

## 2022-04-26 PROCEDURE — 87086 URINE CULTURE/COLONY COUNT: CPT | Mod: ORL | Performed by: INTERNAL MEDICINE

## 2022-04-26 PROCEDURE — 87804 INFLUENZA ASSAY W/OPTIC: CPT | Mod: ORL | Performed by: NURSE PRACTITIONER

## 2022-04-27 ENCOUNTER — LAB REQUISITION (OUTPATIENT)
Dept: LAB | Facility: CLINIC | Age: 87
End: 2022-04-27
Payer: COMMERCIAL

## 2022-04-27 DIAGNOSIS — R50.81 FEVER PRESENTING WITH CONDITIONS CLASSIFIED ELSEWHERE: ICD-10-CM

## 2022-04-27 LAB
ALBUMIN UR-MCNC: NEGATIVE MG/DL
ANION GAP SERPL CALCULATED.3IONS-SCNC: 11 MMOL/L (ref 5–18)
APPEARANCE UR: CLEAR
BASOPHILS # BLD AUTO: 0 10E3/UL (ref 0–0.2)
BASOPHILS NFR BLD AUTO: 1 %
BILIRUB UR QL STRIP: NEGATIVE
BUN SERPL-MCNC: 14 MG/DL (ref 8–28)
CALCIUM SERPL-MCNC: 9.8 MG/DL (ref 8.5–10.5)
CHLORIDE BLD-SCNC: 101 MMOL/L (ref 98–107)
CO2 SERPL-SCNC: 30 MMOL/L (ref 22–31)
COLOR UR AUTO: NORMAL
CREAT SERPL-MCNC: 0.62 MG/DL (ref 0.7–1.3)
EOSINOPHIL # BLD AUTO: 0.4 10E3/UL (ref 0–0.7)
EOSINOPHIL NFR BLD AUTO: 7 %
ERYTHROCYTE [DISTWIDTH] IN BLOOD BY AUTOMATED COUNT: 14 % (ref 10–15)
GFR SERPL CREATININE-BSD FRML MDRD: 89 ML/MIN/1.73M2
GLUCOSE BLD-MCNC: 96 MG/DL (ref 70–125)
GLUCOSE UR STRIP-MCNC: NEGATIVE MG/DL
HCT VFR BLD AUTO: 36.5 % (ref 40–53)
HGB BLD-MCNC: 11.7 G/DL (ref 13.3–17.7)
HGB UR QL STRIP: NEGATIVE
IMM GRANULOCYTES # BLD: 0 10E3/UL
IMM GRANULOCYTES NFR BLD: 0 %
KETONES UR STRIP-MCNC: NEGATIVE MG/DL
LEUKOCYTE ESTERASE UR QL STRIP: NEGATIVE
LYMPHOCYTES # BLD AUTO: 1 10E3/UL (ref 0.8–5.3)
LYMPHOCYTES NFR BLD AUTO: 18 %
MCH RBC QN AUTO: 32.7 PG (ref 26.5–33)
MCHC RBC AUTO-ENTMCNC: 32.1 G/DL (ref 31.5–36.5)
MCV RBC AUTO: 102 FL (ref 78–100)
MONOCYTES # BLD AUTO: 0.7 10E3/UL (ref 0–1.3)
MONOCYTES NFR BLD AUTO: 12 %
NEUTROPHILS # BLD AUTO: 3.5 10E3/UL (ref 1.6–8.3)
NEUTROPHILS NFR BLD AUTO: 62 %
NITRATE UR QL: NEGATIVE
NRBC # BLD AUTO: 0 10E3/UL
NRBC BLD AUTO-RTO: 0 /100
PH UR STRIP: 5.5 [PH] (ref 5–7)
PLATELET # BLD AUTO: 332 10E3/UL (ref 150–450)
POTASSIUM BLD-SCNC: 4.4 MMOL/L (ref 3.5–5)
PROCALCITONIN SERPL-MCNC: 0.47 NG/ML (ref 0–0.49)
RBC # BLD AUTO: 3.58 10E6/UL (ref 4.4–5.9)
RBC URINE: 0 /HPF
SODIUM SERPL-SCNC: 142 MMOL/L (ref 136–145)
SP GR UR STRIP: 1.01 (ref 1–1.03)
SQUAMOUS EPITHELIAL: <1 /HPF
UROBILINOGEN UR STRIP-MCNC: <2 MG/DL
WBC # BLD AUTO: 5.6 10E3/UL (ref 4–11)
WBC URINE: <1 /HPF

## 2022-04-27 PROCEDURE — P9604 ONE-WAY ALLOW PRORATED TRIP: HCPCS | Mod: ORL | Performed by: NURSE PRACTITIONER

## 2022-04-27 PROCEDURE — 36415 COLL VENOUS BLD VENIPUNCTURE: CPT | Mod: ORL | Performed by: NURSE PRACTITIONER

## 2022-04-27 PROCEDURE — 85025 COMPLETE CBC W/AUTO DIFF WBC: CPT | Mod: ORL | Performed by: NURSE PRACTITIONER

## 2022-04-27 PROCEDURE — 80048 BASIC METABOLIC PNL TOTAL CA: CPT | Mod: ORL | Performed by: NURSE PRACTITIONER

## 2022-04-27 PROCEDURE — 84145 PROCALCITONIN (PCT): CPT | Mod: ORL | Performed by: NURSE PRACTITIONER

## 2022-04-28 LAB — BACTERIA UR CULT: NO GROWTH

## 2022-04-30 ENCOUNTER — LAB REQUISITION (OUTPATIENT)
Dept: LAB | Facility: CLINIC | Age: 87
End: 2022-04-30
Payer: COMMERCIAL

## 2022-04-30 DIAGNOSIS — I50.9 HEART FAILURE, UNSPECIFIED (H): ICD-10-CM

## 2022-05-02 LAB
ANION GAP SERPL CALCULATED.3IONS-SCNC: 12 MMOL/L (ref 5–18)
BUN SERPL-MCNC: 13 MG/DL (ref 8–28)
CALCIUM SERPL-MCNC: 9.8 MG/DL (ref 8.5–10.5)
CHLORIDE BLD-SCNC: 99 MMOL/L (ref 98–107)
CO2 SERPL-SCNC: 28 MMOL/L (ref 22–31)
CREAT SERPL-MCNC: 0.69 MG/DL (ref 0.7–1.3)
GFR SERPL CREATININE-BSD FRML MDRD: 86 ML/MIN/1.73M2
GLUCOSE BLD-MCNC: 136 MG/DL (ref 70–125)
POTASSIUM BLD-SCNC: 4.4 MMOL/L (ref 3.5–5)
SODIUM SERPL-SCNC: 139 MMOL/L (ref 136–145)

## 2022-05-02 PROCEDURE — 80048 BASIC METABOLIC PNL TOTAL CA: CPT | Mod: ORL | Performed by: NURSE PRACTITIONER

## 2022-05-02 PROCEDURE — 36415 COLL VENOUS BLD VENIPUNCTURE: CPT | Mod: ORL | Performed by: NURSE PRACTITIONER

## 2022-05-02 PROCEDURE — P9603 ONE-WAY ALLOW PRORATED MILES: HCPCS | Mod: ORL | Performed by: NURSE PRACTITIONER

## 2022-05-26 PROCEDURE — 81001 URINALYSIS AUTO W/SCOPE: CPT | Mod: ORL | Performed by: NURSE PRACTITIONER

## 2022-05-26 PROCEDURE — 81001 URINALYSIS AUTO W/SCOPE: CPT | Mod: ORL

## 2022-05-27 ENCOUNTER — LAB REQUISITION (OUTPATIENT)
Dept: LAB | Facility: CLINIC | Age: 87
End: 2022-05-27
Payer: COMMERCIAL

## 2022-05-27 DIAGNOSIS — Z91.81 HISTORY OF FALLING: ICD-10-CM

## 2022-05-27 LAB
ALBUMIN UR-MCNC: 30 MG/DL
APPEARANCE UR: ABNORMAL
BILIRUB UR QL STRIP: NEGATIVE
CAOX CRY #/AREA URNS HPF: ABNORMAL /HPF
COLOR UR AUTO: YELLOW
GLUCOSE UR STRIP-MCNC: NEGATIVE MG/DL
HGB UR QL STRIP: NEGATIVE
HYALINE CASTS: 13 /LPF
KETONES UR STRIP-MCNC: NEGATIVE MG/DL
LEUKOCYTE ESTERASE UR QL STRIP: NEGATIVE
MUCOUS THREADS #/AREA URNS LPF: PRESENT /LPF
NITRATE UR QL: NEGATIVE
PH UR STRIP: 5.5 [PH] (ref 5–7)
RBC URINE: 31 /HPF
SP GR UR STRIP: 1.03 (ref 1–1.03)
UROBILINOGEN UR STRIP-MCNC: 3 MG/DL
WBC URINE: 7 /HPF

## 2022-05-31 ENCOUNTER — LAB REQUISITION (OUTPATIENT)
Dept: LAB | Facility: CLINIC | Age: 87
End: 2022-05-31
Payer: COMMERCIAL

## 2022-05-31 DIAGNOSIS — Z91.81 HISTORY OF FALLING: ICD-10-CM

## 2022-06-01 LAB
ANION GAP SERPL CALCULATED.3IONS-SCNC: 7 MMOL/L (ref 5–18)
BASOPHILS # BLD AUTO: 0 10E3/UL (ref 0–0.2)
BASOPHILS NFR BLD AUTO: 1 %
BNP SERPL-MCNC: 254 PG/ML (ref 0–93)
BUN SERPL-MCNC: 11 MG/DL (ref 8–28)
CALCIUM SERPL-MCNC: 9 MG/DL (ref 8.5–10.5)
CHLORIDE BLD-SCNC: 101 MMOL/L (ref 98–107)
CO2 SERPL-SCNC: 29 MMOL/L (ref 22–31)
CREAT SERPL-MCNC: 0.59 MG/DL (ref 0.7–1.3)
EOSINOPHIL # BLD AUTO: 0.2 10E3/UL (ref 0–0.7)
EOSINOPHIL NFR BLD AUTO: 4 %
ERYTHROCYTE [DISTWIDTH] IN BLOOD BY AUTOMATED COUNT: 13.6 % (ref 10–15)
GFR SERPL CREATININE-BSD FRML MDRD: 90 ML/MIN/1.73M2
GLUCOSE BLD-MCNC: 78 MG/DL (ref 70–125)
HCT VFR BLD AUTO: 30.9 % (ref 40–53)
HGB BLD-MCNC: 9.7 G/DL (ref 13.3–17.7)
IMM GRANULOCYTES # BLD: 0 10E3/UL
IMM GRANULOCYTES NFR BLD: 0 %
LYMPHOCYTES # BLD AUTO: 1 10E3/UL (ref 0.8–5.3)
LYMPHOCYTES NFR BLD AUTO: 18 %
MCH RBC QN AUTO: 30.9 PG (ref 26.5–33)
MCHC RBC AUTO-ENTMCNC: 31.4 G/DL (ref 31.5–36.5)
MCV RBC AUTO: 98 FL (ref 78–100)
MONOCYTES # BLD AUTO: 0.7 10E3/UL (ref 0–1.3)
MONOCYTES NFR BLD AUTO: 12 %
NEUTROPHILS # BLD AUTO: 3.6 10E3/UL (ref 1.6–8.3)
NEUTROPHILS NFR BLD AUTO: 65 %
NRBC # BLD AUTO: 0 10E3/UL
NRBC BLD AUTO-RTO: 0 /100
PLATELET # BLD AUTO: 286 10E3/UL (ref 150–450)
POTASSIUM BLD-SCNC: 4.1 MMOL/L (ref 3.5–5)
RBC # BLD AUTO: 3.14 10E6/UL (ref 4.4–5.9)
SODIUM SERPL-SCNC: 137 MMOL/L (ref 136–145)
WBC # BLD AUTO: 5.5 10E3/UL (ref 4–11)

## 2022-06-01 PROCEDURE — P9604 ONE-WAY ALLOW PRORATED TRIP: HCPCS | Mod: ORL | Performed by: NURSE PRACTITIONER

## 2022-06-01 PROCEDURE — 80048 BASIC METABOLIC PNL TOTAL CA: CPT | Mod: ORL | Performed by: NURSE PRACTITIONER

## 2022-06-01 PROCEDURE — 85025 COMPLETE CBC W/AUTO DIFF WBC: CPT | Mod: ORL | Performed by: NURSE PRACTITIONER

## 2022-06-01 PROCEDURE — 83880 ASSAY OF NATRIURETIC PEPTIDE: CPT | Mod: ORL | Performed by: NURSE PRACTITIONER

## 2022-06-01 PROCEDURE — 36415 COLL VENOUS BLD VENIPUNCTURE: CPT | Mod: ORL | Performed by: NURSE PRACTITIONER

## 2022-06-02 ENCOUNTER — LAB REQUISITION (OUTPATIENT)
Dept: LAB | Facility: CLINIC | Age: 87
End: 2022-06-02
Payer: COMMERCIAL

## 2022-06-02 DIAGNOSIS — D64.9 ANEMIA, UNSPECIFIED: ICD-10-CM

## 2022-06-03 LAB
FERRITIN SERPL-MCNC: 682 NG/ML (ref 27–300)
FOLATE SERPL-MCNC: 10.6 NG/ML
IRON SATN MFR SERPL: 16 % (ref 20–50)
IRON SERPL-MCNC: 29 UG/DL (ref 42–175)
TIBC SERPL-MCNC: 183 UG/DL (ref 313–563)
TRANSFERRIN SERPL-MCNC: 146 MG/DL (ref 212–360)
VIT B12 SERPL-MCNC: 604 PG/ML (ref 213–816)

## 2022-06-03 PROCEDURE — 82607 VITAMIN B-12: CPT | Mod: ORL | Performed by: NURSE PRACTITIONER

## 2022-06-03 PROCEDURE — 83540 ASSAY OF IRON: CPT | Mod: ORL | Performed by: NURSE PRACTITIONER

## 2022-06-03 PROCEDURE — 82728 ASSAY OF FERRITIN: CPT | Mod: ORL | Performed by: NURSE PRACTITIONER

## 2022-06-03 PROCEDURE — P9603 ONE-WAY ALLOW PRORATED MILES: HCPCS | Mod: ORL | Performed by: NURSE PRACTITIONER

## 2022-06-03 PROCEDURE — 36415 COLL VENOUS BLD VENIPUNCTURE: CPT | Mod: ORL | Performed by: NURSE PRACTITIONER

## 2022-06-03 PROCEDURE — 82746 ASSAY OF FOLIC ACID SERUM: CPT | Mod: ORL | Performed by: NURSE PRACTITIONER

## 2022-06-04 ENCOUNTER — LAB REQUISITION (OUTPATIENT)
Dept: LAB | Facility: CLINIC | Age: 87
End: 2022-06-04
Payer: COMMERCIAL

## 2022-06-04 DIAGNOSIS — D64.9 ANEMIA, UNSPECIFIED: ICD-10-CM

## 2022-06-06 LAB
HGB BLD-MCNC: 10.5 G/DL (ref 13.3–17.7)
RETICS # AUTO: 0.04 10E6/UL (ref 0.01–0.11)
RETICS/RBC NFR AUTO: 1.1 % (ref 0.8–2.7)

## 2022-06-06 PROCEDURE — 85045 AUTOMATED RETICULOCYTE COUNT: CPT | Mod: ORL | Performed by: NURSE PRACTITIONER

## 2022-06-06 PROCEDURE — 85018 HEMOGLOBIN: CPT | Mod: ORL | Performed by: NURSE PRACTITIONER

## 2022-06-06 PROCEDURE — 36415 COLL VENOUS BLD VENIPUNCTURE: CPT | Mod: ORL | Performed by: NURSE PRACTITIONER

## 2022-06-06 PROCEDURE — P9603 ONE-WAY ALLOW PRORATED MILES: HCPCS | Mod: ORL | Performed by: NURSE PRACTITIONER

## 2022-06-13 ENCOUNTER — HOSPITAL ENCOUNTER (INPATIENT)
Facility: HOSPITAL | Age: 87
LOS: 8 days | Discharge: NURSING, REHAB OR LONG-TERM CARE | DRG: 085 | End: 2022-06-21
Attending: FAMILY MEDICINE | Admitting: STUDENT IN AN ORGANIZED HEALTH CARE EDUCATION/TRAINING PROGRAM
Payer: COMMERCIAL

## 2022-06-13 ENCOUNTER — APPOINTMENT (OUTPATIENT)
Dept: CT IMAGING | Facility: HOSPITAL | Age: 87
DRG: 085 | End: 2022-06-13
Attending: FAMILY MEDICINE
Payer: COMMERCIAL

## 2022-06-13 ENCOUNTER — APPOINTMENT (OUTPATIENT)
Dept: RADIOLOGY | Facility: HOSPITAL | Age: 87
DRG: 085 | End: 2022-06-13
Attending: FAMILY MEDICINE
Payer: COMMERCIAL

## 2022-06-13 ENCOUNTER — APPOINTMENT (OUTPATIENT)
Dept: MRI IMAGING | Facility: HOSPITAL | Age: 87
DRG: 085 | End: 2022-06-13
Attending: NURSE PRACTITIONER
Payer: COMMERCIAL

## 2022-06-13 ENCOUNTER — APPOINTMENT (OUTPATIENT)
Dept: RADIOLOGY | Facility: HOSPITAL | Age: 87
DRG: 085 | End: 2022-06-13
Attending: NURSE PRACTITIONER
Payer: COMMERCIAL

## 2022-06-13 ENCOUNTER — APPOINTMENT (OUTPATIENT)
Dept: CT IMAGING | Facility: HOSPITAL | Age: 87
DRG: 085 | End: 2022-06-13
Attending: STUDENT IN AN ORGANIZED HEALTH CARE EDUCATION/TRAINING PROGRAM
Payer: COMMERCIAL

## 2022-06-13 ENCOUNTER — MEDICAL CORRESPONDENCE (OUTPATIENT)
Dept: MEDSURG UNIT | Facility: HOSPITAL | Age: 87
End: 2022-06-13

## 2022-06-13 DIAGNOSIS — I62.03 BILATERAL CHRONIC INTRACRANIAL SUBDURAL HEMATOMA (H): ICD-10-CM

## 2022-06-13 DIAGNOSIS — H04.123 DRY EYES: Primary | ICD-10-CM

## 2022-06-13 DIAGNOSIS — M25.562 CHRONIC PAIN OF BOTH KNEES: ICD-10-CM

## 2022-06-13 DIAGNOSIS — M17.0 PRIMARY OSTEOARTHRITIS OF BOTH KNEES: ICD-10-CM

## 2022-06-13 DIAGNOSIS — S01.81XA LACERATION OF FOREHEAD, INITIAL ENCOUNTER: ICD-10-CM

## 2022-06-13 DIAGNOSIS — S61.411A SKIN TEAR OF RIGHT HAND WITHOUT COMPLICATION, INITIAL ENCOUNTER: ICD-10-CM

## 2022-06-13 DIAGNOSIS — G89.29 CHRONIC PAIN OF BOTH KNEES: ICD-10-CM

## 2022-06-13 DIAGNOSIS — M25.561 CHRONIC PAIN OF BOTH KNEES: ICD-10-CM

## 2022-06-13 DIAGNOSIS — G47.00 INSOMNIA, UNSPECIFIED TYPE: ICD-10-CM

## 2022-06-13 DIAGNOSIS — R41.0 DELIRIUM: ICD-10-CM

## 2022-06-13 DIAGNOSIS — S12.110A CLOSED ODONTOID FRACTURE WITH TYPE II MORPHOLOGY (H): ICD-10-CM

## 2022-06-13 PROBLEM — H93.19 TINNITUS: Status: ACTIVE | Noted: 2022-06-13

## 2022-06-13 PROBLEM — H91.90 HARD OF HEARING: Status: ACTIVE | Noted: 2022-06-13

## 2022-06-13 PROBLEM — I49.9 CARDIAC DYSRHYTHMIA: Status: ACTIVE | Noted: 2019-08-16

## 2022-06-13 PROBLEM — I63.9 CVA (CEREBRAL VASCULAR ACCIDENT) (H): Status: ACTIVE | Noted: 2022-01-20

## 2022-06-13 PROBLEM — Z96.659 KNEE JOINT REPLACED BY OTHER MEANS: Status: ACTIVE | Noted: 2022-06-13

## 2022-06-13 PROBLEM — Z87.448 PERSONAL HISTORY OF OTHER DISORDER OF URINARY SYSTEM: Status: ACTIVE | Noted: 2022-06-13

## 2022-06-13 PROBLEM — R97.20 HIGH PROSTATE SPECIFIC ANTIGEN (PSA): Status: ACTIVE | Noted: 2022-06-13
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
## 2022-06-13 PROBLEM — Z87.891 PERSONAL HISTORY OF TOBACCO USE, PRESENTING HAZARDS TO HEALTH: Status: ACTIVE | Noted: 2022-06-13

## 2022-06-13 PROBLEM — R79.89 ELEVATED TROPONIN: Status: ACTIVE | Noted: 2019-08-16

## 2022-06-13 LAB
ANION GAP SERPL CALCULATED.3IONS-SCNC: 7 MMOL/L (ref 5–18)
APTT PPP: 35 SECONDS (ref 22–38)
ATRIAL RATE - MUSE: 117 BPM
BASOPHILS # BLD AUTO: 0 10E3/UL (ref 0–0.2)
BASOPHILS NFR BLD AUTO: 0 %
BNP SERPL-MCNC: 348 PG/ML (ref 0–93)
BUN SERPL-MCNC: 19 MG/DL (ref 8–28)
CALCIUM SERPL-MCNC: 9 MG/DL (ref 8.5–10.5)
CHLORIDE BLD-SCNC: 98 MMOL/L (ref 98–107)
CO2 SERPL-SCNC: 26 MMOL/L (ref 22–31)
CREAT SERPL-MCNC: 0.67 MG/DL (ref 0.7–1.3)
DIASTOLIC BLOOD PRESSURE - MUSE: NORMAL MMHG
EOSINOPHIL # BLD AUTO: 0 10E3/UL (ref 0–0.7)
EOSINOPHIL NFR BLD AUTO: 0 %
ERYTHROCYTE [DISTWIDTH] IN BLOOD BY AUTOMATED COUNT: 13.6 % (ref 10–15)
GFR SERPL CREATININE-BSD FRML MDRD: 87 ML/MIN/1.73M2
GLUCOSE BLD-MCNC: 132 MG/DL (ref 70–125)
HCT VFR BLD AUTO: 30.9 % (ref 40–53)
HGB BLD-MCNC: 10.1 G/DL (ref 13.3–17.7)
IMM GRANULOCYTES # BLD: 0 10E3/UL
IMM GRANULOCYTES NFR BLD: 0 %
INR PPP: 1.41 (ref 0.85–1.15)
INTERPRETATION ECG - MUSE: NORMAL
LYMPHOCYTES # BLD AUTO: 0.4 10E3/UL (ref 0.8–5.3)
LYMPHOCYTES NFR BLD AUTO: 4 %
MAGNESIUM SERPL-MCNC: 1.6 MG/DL (ref 1.8–2.6)
MCH RBC QN AUTO: 31.7 PG (ref 26.5–33)
MCHC RBC AUTO-ENTMCNC: 32.7 G/DL (ref 31.5–36.5)
MCV RBC AUTO: 97 FL (ref 78–100)
MONOCYTES # BLD AUTO: 0.9 10E3/UL (ref 0–1.3)
MONOCYTES NFR BLD AUTO: 10 %
NEUTROPHILS # BLD AUTO: 7.8 10E3/UL (ref 1.6–8.3)
NEUTROPHILS NFR BLD AUTO: 86 %
NRBC # BLD AUTO: 0 10E3/UL
NRBC BLD AUTO-RTO: 0 /100
P AXIS - MUSE: NORMAL DEGREES
PLATELET # BLD AUTO: 263 10E3/UL (ref 150–450)
POTASSIUM BLD-SCNC: 4.5 MMOL/L (ref 3.5–5)
PR INTERVAL - MUSE: NORMAL MS
QRS DURATION - MUSE: 90 MS
QT - MUSE: 356 MS
QTC - MUSE: 449 MS
R AXIS - MUSE: -10 DEGREES
RADIOLOGIST FLAGS: ABNORMAL
RADIOLOGIST FLAGS: ABNORMAL
RBC # BLD AUTO: 3.19 10E6/UL (ref 4.4–5.9)
SARS-COV-2 RNA RESP QL NAA+PROBE: NEGATIVE
SODIUM SERPL-SCNC: 131 MMOL/L (ref 136–145)
SYSTOLIC BLOOD PRESSURE - MUSE: NORMAL MMHG
T AXIS - MUSE: 49 DEGREES
TROPONIN I SERPL-MCNC: 0.07 NG/ML (ref 0–0.29)
VENTRICULAR RATE- MUSE: 96 BPM
WBC # BLD AUTO: 9.1 10E3/UL (ref 4–11)

## 2022-06-13 PROCEDURE — 73562 X-RAY EXAM OF KNEE 3: CPT | Mod: 50

## 2022-06-13 PROCEDURE — 72125 CT NECK SPINE W/O DYE: CPT

## 2022-06-13 PROCEDURE — 80048 BASIC METABOLIC PNL TOTAL CA: CPT | Performed by: FAMILY MEDICINE

## 2022-06-13 PROCEDURE — 250N000013 HC RX MED GY IP 250 OP 250 PS 637: Performed by: CLINICAL NURSE SPECIALIST

## 2022-06-13 PROCEDURE — 85730 THROMBOPLASTIN TIME PARTIAL: CPT | Performed by: FAMILY MEDICINE

## 2022-06-13 PROCEDURE — 83735 ASSAY OF MAGNESIUM: CPT | Performed by: FAMILY MEDICINE

## 2022-06-13 PROCEDURE — 85025 COMPLETE CBC W/AUTO DIFF WBC: CPT | Performed by: FAMILY MEDICINE

## 2022-06-13 PROCEDURE — 0HQ1XZZ REPAIR FACE SKIN, EXTERNAL APPROACH: ICD-10-PCS | Performed by: FAMILY MEDICINE

## 2022-06-13 PROCEDURE — 93005 ELECTROCARDIOGRAM TRACING: CPT | Performed by: FAMILY MEDICINE

## 2022-06-13 PROCEDURE — 99285 EMERGENCY DEPT VISIT HI MDM: CPT | Mod: 25

## 2022-06-13 PROCEDURE — C9803 HOPD COVID-19 SPEC COLLECT: HCPCS

## 2022-06-13 PROCEDURE — 12013 RPR F/E/E/N/L/M 2.6-5.0 CM: CPT

## 2022-06-13 PROCEDURE — 83880 ASSAY OF NATRIURETIC PEPTIDE: CPT | Performed by: FAMILY MEDICINE

## 2022-06-13 PROCEDURE — 250N000013 HC RX MED GY IP 250 OP 250 PS 637: Performed by: INTERNAL MEDICINE

## 2022-06-13 PROCEDURE — 250N000011 HC RX IP 250 OP 636: Performed by: STUDENT IN AN ORGANIZED HEALTH CARE EDUCATION/TRAINING PROGRAM

## 2022-06-13 PROCEDURE — 250N000011 HC RX IP 250 OP 636: Performed by: FAMILY MEDICINE

## 2022-06-13 PROCEDURE — 99223 1ST HOSP IP/OBS HIGH 75: CPT | Performed by: CLINICAL NURSE SPECIALIST

## 2022-06-13 PROCEDURE — 70450 CT HEAD/BRAIN W/O DYE: CPT | Mod: 76

## 2022-06-13 PROCEDURE — 71045 X-RAY EXAM CHEST 1 VIEW: CPT

## 2022-06-13 PROCEDURE — 36415 COLL VENOUS BLD VENIPUNCTURE: CPT | Performed by: FAMILY MEDICINE

## 2022-06-13 PROCEDURE — 99222 1ST HOSP IP/OBS MODERATE 55: CPT | Performed by: STUDENT IN AN ORGANIZED HEALTH CARE EDUCATION/TRAINING PROGRAM

## 2022-06-13 PROCEDURE — 70450 CT HEAD/BRAIN W/O DYE: CPT

## 2022-06-13 PROCEDURE — 72040 X-RAY EXAM NECK SPINE 2-3 VW: CPT

## 2022-06-13 PROCEDURE — 99221 1ST HOSP IP/OBS SF/LOW 40: CPT | Performed by: NURSE PRACTITIONER

## 2022-06-13 PROCEDURE — 96374 THER/PROPH/DIAG INJ IV PUSH: CPT

## 2022-06-13 PROCEDURE — 72141 MRI NECK SPINE W/O DYE: CPT

## 2022-06-13 PROCEDURE — 87635 SARS-COV-2 COVID-19 AMP PRB: CPT | Performed by: FAMILY MEDICINE

## 2022-06-13 PROCEDURE — 74177 CT ABD & PELVIS W/CONTRAST: CPT

## 2022-06-13 PROCEDURE — 72070 X-RAY EXAM THORAC SPINE 2VWS: CPT

## 2022-06-13 PROCEDURE — G0463 HOSPITAL OUTPT CLINIC VISIT: HCPCS

## 2022-06-13 PROCEDURE — 250N000009 HC RX 250: Performed by: CLINICAL NURSE SPECIALIST

## 2022-06-13 PROCEDURE — 96376 TX/PRO/DX INJ SAME DRUG ADON: CPT

## 2022-06-13 PROCEDURE — 85610 PROTHROMBIN TIME: CPT | Performed by: FAMILY MEDICINE

## 2022-06-13 PROCEDURE — 999N000157 HC STATISTIC RCP TIME EA 10 MIN

## 2022-06-13 PROCEDURE — 96375 TX/PRO/DX INJ NEW DRUG ADDON: CPT

## 2022-06-13 PROCEDURE — 84484 ASSAY OF TROPONIN QUANT: CPT | Performed by: FAMILY MEDICINE

## 2022-06-13 PROCEDURE — 120N000001 HC R&B MED SURG/OB

## 2022-06-13 PROCEDURE — 73130 X-RAY EXAM OF HAND: CPT | Mod: RT

## 2022-06-13 PROCEDURE — 250N000009 HC RX 250: Performed by: FAMILY MEDICINE

## 2022-06-13 PROCEDURE — 258N000003 HC RX IP 258 OP 636: Performed by: STUDENT IN AN ORGANIZED HEALTH CARE EDUCATION/TRAINING PROGRAM

## 2022-06-13 RX ORDER — BISACODYL 10 MG
10 SUPPOSITORY, RECTAL RECTAL DAILY PRN
COMMUNITY

## 2022-06-13 RX ORDER — LANOLIN ALCOHOL/MO/W.PET/CERES
3 CREAM (GRAM) TOPICAL
Status: DISCONTINUED | OUTPATIENT
Start: 2022-06-13 | End: 2022-06-21 | Stop reason: HOSPADM

## 2022-06-13 RX ORDER — POLYETHYLENE GLYCOL 3350 17 G/17G
1 POWDER, FOR SOLUTION ORAL DAILY PRN
COMMUNITY

## 2022-06-13 RX ORDER — CARBOXYMETHYLCELLULOSE SODIUM 5 MG/ML
2 SOLUTION/ DROPS OPHTHALMIC 4 TIMES DAILY
Status: DISCONTINUED | OUTPATIENT
Start: 2022-06-13 | End: 2022-06-21 | Stop reason: HOSPADM

## 2022-06-13 RX ORDER — MORPHINE SULFATE 2 MG/ML
2 INJECTION, SOLUTION INTRAMUSCULAR; INTRAVENOUS ONCE
Status: COMPLETED | OUTPATIENT
Start: 2022-06-13 | End: 2022-06-13

## 2022-06-13 RX ORDER — AMOXICILLIN 250 MG
2 CAPSULE ORAL 2 TIMES DAILY PRN
Status: DISCONTINUED | OUTPATIENT
Start: 2022-06-13 | End: 2022-06-21 | Stop reason: HOSPADM

## 2022-06-13 RX ORDER — LIDOCAINE 40 MG/G
CREAM TOPICAL
Status: DISCONTINUED | OUTPATIENT
Start: 2022-06-13 | End: 2022-06-21 | Stop reason: HOSPADM

## 2022-06-13 RX ORDER — IPRATROPIUM BROMIDE AND ALBUTEROL SULFATE 2.5; .5 MG/3ML; MG/3ML
3 SOLUTION RESPIRATORY (INHALATION) ONCE
Status: COMPLETED | OUTPATIENT
Start: 2022-06-13 | End: 2022-06-13

## 2022-06-13 RX ORDER — HYDROCODONE BITARTRATE AND ACETAMINOPHEN 10; 325 MG/1; MG/1
1 TABLET ORAL 3 TIMES DAILY PRN
Status: DISCONTINUED | OUTPATIENT
Start: 2022-06-13 | End: 2022-06-14

## 2022-06-13 RX ORDER — AMOXICILLIN 250 MG
1 CAPSULE ORAL 2 TIMES DAILY PRN
Status: DISCONTINUED | OUTPATIENT
Start: 2022-06-13 | End: 2022-06-21 | Stop reason: HOSPADM

## 2022-06-13 RX ORDER — SENNOSIDES 8.6 MG
2 TABLET ORAL 2 TIMES DAILY
Status: DISCONTINUED | OUTPATIENT
Start: 2022-06-13 | End: 2022-06-14

## 2022-06-13 RX ORDER — BISACODYL 10 MG
10 SUPPOSITORY, RECTAL RECTAL DAILY PRN
Status: DISCONTINUED | OUTPATIENT
Start: 2022-06-13 | End: 2022-06-21 | Stop reason: HOSPADM

## 2022-06-13 RX ORDER — ALBUTEROL SULFATE 90 UG/1
2 AEROSOL, METERED RESPIRATORY (INHALATION) EVERY 4 HOURS PRN
COMMUNITY

## 2022-06-13 RX ORDER — CALCIUM CARBONATE 500 MG/1
1000 TABLET, CHEWABLE ORAL 4 TIMES DAILY PRN
Status: DISCONTINUED | OUTPATIENT
Start: 2022-06-13 | End: 2022-06-21 | Stop reason: HOSPADM

## 2022-06-13 RX ORDER — ACETAMINOPHEN 650 MG/1
650 SUPPOSITORY RECTAL EVERY 6 HOURS PRN
Status: DISCONTINUED | OUTPATIENT
Start: 2022-06-13 | End: 2022-06-14

## 2022-06-13 RX ORDER — IOPAMIDOL 755 MG/ML
100 INJECTION, SOLUTION INTRAVASCULAR ONCE
Status: COMPLETED | OUTPATIENT
Start: 2022-06-13 | End: 2022-06-13

## 2022-06-13 RX ORDER — POLYETHYLENE GLYCOL 3350 17 G/17G
17 POWDER, FOR SOLUTION ORAL 2 TIMES DAILY
Status: DISCONTINUED | OUTPATIENT
Start: 2022-06-13 | End: 2022-06-21 | Stop reason: HOSPADM

## 2022-06-13 RX ORDER — OXYCODONE HYDROCHLORIDE 5 MG/1
2.5 TABLET ORAL 2 TIMES DAILY PRN
COMMUNITY
End: 2022-01-01

## 2022-06-13 RX ORDER — ACETAMINOPHEN 325 MG/1
650 TABLET ORAL EVERY 6 HOURS PRN
Status: DISCONTINUED | OUTPATIENT
Start: 2022-06-13 | End: 2022-06-14

## 2022-06-13 RX ORDER — LIDOCAINE 50 MG/G
OINTMENT TOPICAL 4 TIMES DAILY
Status: DISCONTINUED | OUTPATIENT
Start: 2022-06-13 | End: 2022-06-21 | Stop reason: HOSPADM

## 2022-06-13 RX ORDER — IPRATROPIUM BROMIDE AND ALBUTEROL SULFATE 2.5; .5 MG/3ML; MG/3ML
3 SOLUTION RESPIRATORY (INHALATION) EVERY 4 HOURS PRN
Status: DISCONTINUED | OUTPATIENT
Start: 2022-06-13 | End: 2022-06-21 | Stop reason: HOSPADM

## 2022-06-13 RX ADMIN — Medication 2 DROP: at 15:48

## 2022-06-13 RX ADMIN — HYDROMORPHONE HYDROCHLORIDE 0.2 MG: 1 INJECTION, SOLUTION INTRAMUSCULAR; INTRAVENOUS; SUBCUTANEOUS at 06:25

## 2022-06-13 RX ADMIN — DICLOFENAC SODIUM 2 G: 10 GEL TOPICAL at 19:41

## 2022-06-13 RX ADMIN — LIDOCAINE: 50 OINTMENT TOPICAL at 19:40

## 2022-06-13 RX ADMIN — LIDOCAINE: 50 OINTMENT TOPICAL at 15:48

## 2022-06-13 RX ADMIN — DEXTROSE AND SODIUM CHLORIDE 100 ML/HR: 5; 900 INJECTION, SOLUTION INTRAVENOUS at 06:57

## 2022-06-13 RX ADMIN — HYDROMORPHONE HYDROCHLORIDE 0.2 MG: 1 INJECTION, SOLUTION INTRAMUSCULAR; INTRAVENOUS; SUBCUTANEOUS at 19:47

## 2022-06-13 RX ADMIN — MORPHINE SULFATE 2 MG: 2 INJECTION, SOLUTION INTRAMUSCULAR; INTRAVENOUS at 04:39

## 2022-06-13 RX ADMIN — HYDROCODONE BITARTRATE AND ACETAMINOPHEN 1 TABLET: 10; 325 TABLET ORAL at 18:19

## 2022-06-13 RX ADMIN — MORPHINE SULFATE 2 MG: 2 INJECTION, SOLUTION INTRAMUSCULAR; INTRAVENOUS at 02:22

## 2022-06-13 RX ADMIN — IOPAMIDOL 100 ML: 755 INJECTION, SOLUTION INTRAVENOUS at 03:25

## 2022-06-13 RX ADMIN — DEXTROSE AND SODIUM CHLORIDE: 5; 900 INJECTION, SOLUTION INTRAVENOUS at 18:45

## 2022-06-13 RX ADMIN — HYDROMORPHONE HYDROCHLORIDE 0.2 MG: 1 INJECTION, SOLUTION INTRAMUSCULAR; INTRAVENOUS; SUBCUTANEOUS at 12:08

## 2022-06-13 RX ADMIN — Medication 2 DROP: at 20:19

## 2022-06-13 RX ADMIN — IPRATROPIUM BROMIDE AND ALBUTEROL SULFATE 3 ML: 2.5; .5 SOLUTION RESPIRATORY (INHALATION) at 02:28

## 2022-06-13 RX ADMIN — HYDROMORPHONE HYDROCHLORIDE 0.2 MG: 1 INJECTION, SOLUTION INTRAMUSCULAR; INTRAVENOUS; SUBCUTANEOUS at 09:11

## 2022-06-13 RX ADMIN — Medication 2 DROP: at 09:42

## 2022-06-13 RX ADMIN — DICLOFENAC SODIUM 2 G: 10 GEL TOPICAL at 09:43

## 2022-06-13 ASSESSMENT — ACTIVITIES OF DAILY LIVING (ADL)
ADLS_ACUITY_SCORE: 37
ADLS_ACUITY_SCORE: 35
ADLS_ACUITY_SCORE: 45
ADLS_ACUITY_SCORE: 37
ADLS_ACUITY_SCORE: 37
ADLS_ACUITY_SCORE: 35
ADLS_ACUITY_SCORE: 37
ADLS_ACUITY_SCORE: 45
ADLS_ACUITY_SCORE: 37
ADLS_ACUITY_SCORE: 37
DEPENDENT_IADLS:: CLEANING;COOKING;LAUNDRY;SHOPPING;MEAL PREPARATION;MEDICATION MANAGEMENT;TRANSPORTATION

## 2022-06-13 ASSESSMENT — ENCOUNTER SYMPTOMS
WOUND: 1
ABDOMINAL PAIN: 0

## 2022-06-13 NOTE — H&P
[facility]    History and Physical - Hospitalist Service       Date of Admission:  6/13/2022    Assessment & Plan      Lang Christina is a 89 year old male admitted on 6/13/2022.     89-year-old male past medical history of CVA, paroxysmal A. fib, subdural hematoma, emphysema brought in after a fall.  He was found to have type II odontoid fracture and bilateral subdural hematomas.      Type II odontoid cervical fracture  Fall  -CT neck showing minimally displaced type II odontoid fracture which is age-indeterminate.  Patient denies any significant neck pain  -Neurosurgery consulted by ED.  Recommending with neck collar immobilization and will evaluate patient in the a.m.  -Monitor neurochecks, formal neurosurgery consult placed.    Bilateral subdural hematomas  -CT scan showed left frontal scalp hematoma, bilateral mixed attenuation of subdural hematomas which have decreased in size compared to prior CT head.  Right subdural hematoma demonstrates changes which could represent recent subacute hemorrhage.  -Neurologically stable  -Discussed with daughter regarding possible new bleed and need for repeat head CT but daughter states OK with rpeat CT but patient would not want any neurosurgical intervention if bleeding has progressed but and would pursue comfort cares if that the case.  -Pain control, elevate HOB 30 degrees,Rpt CT in 6 hrs ordered. neurochecks every  4hrs    Severe bilateral degenerative knee joint arthritis  - Has been getting steroid injections in the past.  Now with severe pain both knees.  Pain control, pain team consult, orthopedics for possible steroid injections      Paroxysmal A. fib  -Currently not on anticoagulation, resume home medications once verified by pharmacy      Hypomagnesemia-replace per protocol    Chronic anemia-hemoglobin at baseline    COPD/emphysema-resume home inhalers         Diet:  NPO until eval by NS  DVT Prophylaxis: Pneumatic Compression  Devices  Hung Catheter: Not present  Central Lines: None  Cardiac Monitoring: None  Code Status:  DNR/DNI    Clinically Significant Risk Factors Present on Admission         # Hyponatremia: Na = 131 mmol/L (Ref range: 136 - 145 mmol/L) on admission, will monitor as appropriate       # Coagulation Defect: INR = 1.41 (Ref range: 0.85 - 1.15) and/or PTT = 35 Seconds (Ref range: 22 - 38 Seconds) on admission, will monitor for bleeding        Disposition Plan   Expected Discharge:2-3 days     The patient's care was discussed with the Patient and daughter by the bed side    Lenyn Lisa MD  Hospitalist Service  [facility]  Securely message with the Vocera Web Console (learn more here)  Text page via [employer] Paging/Directory         ______________________________________________________________________    Chief Complaint   Fall    History is obtained from the patient and patients daughter by the bed side     History of Present Illness   Lang Christina is a 89-year-old male past medical history of CVA, paroxysmal A. fib, subdural hematoma, emphysema brought from nursing home after a fall.  Patient was found on the floor by the staff and reports that he has been calling out for help for about an hour.  He endorses hurting his head, hand and knees.  Sustained laceration to left side of his forehead and skin tear on dorsum of right hand having significant bilateral knee joint pain.  His scalp laceration was repaired, CT imaging showing bilateral subdurals with suspicion of subacute rebleed on the right side.  Also noted to have type II odontoid fracture.  Bilateral knee joint x-ray without acute findings but showing severe degenerative changes.  Neurosurgery was consulted and patient was admitted for further management.    Review of Systems    The 10 point Review of Systems is negative other than noted in the HPI or here.     Past Medical History    I have reviewed this patient's medical history and  updated it with pertinent information if needed.   Past Medical History:   Diagnosis Date     Actinic keratosis      BCC (basal cell carcinoma) 2012     BPH (benign prostatic hypertrophy) with urinary obstruction      COPD (chronic obstructive pulmonary disease) (H)     2012 Allergy consult     DDD (degenerative disc disease), cervical      Diverticulosis      ED (erectile dysfunction)      Hearing loss      Hyperlipidemia LDL goal < 130      Kidney stones      Localized osteoarthritis of both knees      Osteoarthritis      Senile purpura (H) 2021     Strabismus      Vasomotor rhinitis      Vitamin B12 deficiency        Past Surgical History   I have reviewed this patient's surgical history and updated it with pertinent information if needed.  Past Surgical History:   Procedure Laterality Date     ARTHROSCOPY KNEE RT/LT      Right     CATARACT IOL, RT/LT       CYSTOSCOPY       HERNIA REPAIR, INGUINAL RT/LT      Right     LIGATN/STRIP LONG OR SHORT SAPHEN       PHACOEMULSIFICATION WITH STANDARD INTRAOCULAR LENS IMPLANT  -10    RT / LT     TURP      .       Social History   I have reviewed this patient's social history and updated it with pertinent information if needed.  Social History     Tobacco Use     Smoking status: Former Smoker     Packs/day: 1.00     Years: 47.00     Pack years: 47.00     Types: Cigarettes, Pipe     Quit date: 1994     Years since quittin.4     Smokeless tobacco: Never Used   Substance Use Topics     Alcohol use: No     Drug use: No       Family History   I have reviewed this patient's family history and updated it with pertinent information if needed.  Family History   Problem Relation Age of Onset     Cerebrovascular Disease Brother        Prior to Admission Medications   Prior to Admission Medications   Prescriptions Last Dose Informant Patient Reported? Taking?   ANORO ELLIPTA 62.5-25 MCG/INH oral inhaler   No No   Sig: INHALE ONE PUFF BY MOUTH ONCE  DAILY   Patient not taking: Reported on 2/7/2022   Cholecalciferol (VITAMIN D) 1000 UNIT capsule   Yes No   Sig: Take 1 capsule by mouth daily.   Cyanocobalamin 1000 MCG TABS   Yes No   Sig: Take  by mouth daily.   acetaminophen (TYLENOL) 325 MG tablet   Yes No   Sig: Take 1-2 tablets by mouth at onset of headache.   albuterol (PROVENTIL) (2.5 MG/3ML) 0.083% neb solution   No No   Sig: TAKE 1 VIAL BY NEBULIZATION EVERY 6 HOURS AS NEEDED FOR SHORTNESS OF BREATH/DYSPNEA OR WHEEZING   hydrocortisone (ANUSOL-HC) 25 MG suppository   No No   Sig: Place 1 suppository (25 mg) rectally 2 times daily   order for DME   Yes No   Sig: Oxygen for home use. Liters per minute: 1 per nasal cannula. Frequency of use: With activity;. Length of need: 99.   simvastatin (ZOCOR) 10 MG tablet   No No   Sig: TAKE ONE TABLET BY MOUTH AT BEDTIME.      Facility-Administered Medications Last Administration Doses Remaining   lidocaine (PF) (XYLOCAINE) 1 % injection 10 mL 9/8/2021  9:11 AM    lidocaine (PF) (XYLOCAINE) 1 % injection 5 mL 5/18/2020 11:24 AM    lidocaine (PF) (XYLOCAINE) 1 % injection 5 mL 5/18/2020 11:24 AM    lidocaine (PF) (XYLOCAINE) 1 % injection 6 mL 12/7/2020  2:42 PM    lidocaine (PF) (XYLOCAINE) 1 % injection 6 mL 12/7/2020  2:42 PM    lidocaine 1 % injection 5 mL 1/20/2020  1:16 PM    lidocaine 1 % injection 5 mL 1/20/2020  1:16 PM    lidocaine 1 % injection 5 mL 9/28/2020  9:33 AM    lidocaine 1 % injection 5 mL 9/28/2020  9:33 AM    lidocaine 1 % injection 5 mL 1/25/2021  2:31 PM    lidocaine 1 % injection 5 mL 5/10/2021  2:51 PM    lidocaine 1 % injection 5 mL 5/10/2021  2:51 PM    lidocaine 1 % injection 5 mL 8/16/2021 12:59 PM    lidocaine 1 % injection 5 mL 8/16/2021 12:59 PM    lidocaine 1 % injection 5 mL 10/25/2021  2:20 PM    lidocaine 1 % injection 5 mL 10/25/2021  2:20 PM    lidocaine 1 % injection 5 mL 1/31/2022  1:33 PM    lidocaine 1 % injection 5 mL 1/31/2022  1:33 PM    methylPREDNISolone (DEPO-MEDROL)  injection 160 mg 1/20/2020  1:16 PM    methylPREDNISolone (DEPO-MEDROL) injection 160 mg 1/20/2020  1:16 PM    methylPREDNISolone (DEPO-MEDROL) injection 160 mg 5/18/2020 11:24 AM    methylPREDNISolone (DEPO-MEDROL) injection 160 mg 5/18/2020 11:24 AM    methylPREDNISolone (DEPO-MEDROL) injection 160 mg 9/28/2020  9:33 AM    methylPREDNISolone (DEPO-MEDROL) injection 160 mg 9/28/2020  9:33 AM    methylPREDNISolone (DEPO-MEDROL) injection 160 mg 12/7/2020  2:42 PM    methylPREDNISolone (DEPO-MEDROL) injection 160 mg 12/7/2020  2:42 PM    methylPREDNISolone (DEPO-MEDROL) injection 160 mg 1/25/2021  2:31 PM    methylPREDNISolone (DEPO-MEDROL) injection 160 mg 5/10/2021  2:51 PM    methylPREDNISolone (DEPO-MEDROL) injection 160 mg 5/10/2021  2:51 PM    methylPREDNISolone (DEPO-MEDROL) injection 80 mg 8/16/2021 12:59 PM    methylPREDNISolone (DEPO-MEDROL) injection 80 mg 8/16/2021 12:59 PM    methylPREDNISolone (DEPO-MEDROL) injection 80 mg 9/8/2021  9:11 AM    methylPREDNISolone (DEPO-MEDROL) injection 80 mg 10/25/2021  2:20 PM    methylPREDNISolone (DEPO-MEDROL) injection 80 mg 10/25/2021  2:20 PM    methylPREDNISolone (DEPO-MEDROL) injection 80 mg 1/31/2022  1:33 PM    methylPREDNISolone (DEPO-MEDROL) injection 80 mg 1/31/2022  1:33 PM         Allergies   Allergies   Allergen Reactions     Pcn [Penicillins]        Physical Exam   Vital Signs: Temp: 98.3  F (36.8  C) Temp src: Oral BP: 103/59 Pulse: 87   Resp: 24 SpO2: 93 % O2 Device: Nasal cannula Oxygen Delivery: 1 LPM  Weight: 128 lbs 0 oz    General Appearance: Laying in bed, not in distress  Eyes: Pink conjunctiva  HEENT: Neck collar in situ, left forehead scalp laceration stitched  Respiratory: Bilateral good air entry  Cardiovascular: S1-S2, irregular rhythm  GI: Soft abdomen  Genitourinary: No SP tenderness  Skin: No skin rash  Musculoskeletal: Right hand 3 cm skin tear dorsal aspect between second and third digit.  Bilateral knee joint tenderness and mild  swelling.  Neurologic: AOx3, nonfocal on exam      Data   Data reviewed today: I reviewed all medications, new labs and imaging results over the last 24 hours. I personally reviewed     Recent Labs   Lab 06/13/22  0423 06/13/22  0200 06/06/22  0818   WBC  --  9.1  --    HGB  --  10.1* 10.5*   MCV  --  97  --    PLT  --  263  --    INR 1.41*  --   --    NA  --  131*  --    POTASSIUM  --  4.5  --    CHLORIDE  --  98  --    CO2  --  26  --    BUN  --  19  --    CR  --  0.67*  --    ANIONGAP  --  7  --    MADELIN  --  9.0  --    GLC  --  132*  --      Recent Results (from the past 24 hour(s))   XR Chest Port 1 View    Narrative    EXAM: XR CHEST PORT 1 VIEW  LOCATION: [facility]  DATE/TIME: 6/13/2022 2:27 AM    INDICATION: dyspnea  COMPARISON: 01/20/2022      Impression    IMPRESSION: No acute cardiopulmonary findings. Stable significant scattered bilateral calcified pleural plaques. Advanced degenerative changes left shoulder. Partially calcified thoracic aorta.   Head CT w/o contrast   Result Value    Radiologist flags (AA)     Intracranial hemorrhage and type II odontoid fracture    Narrative    EXAM: CT HEAD W/O CONTRAST, CT CERVICAL SPINE W/O CONTRAST  LOCATION: [facility]  DATE/TIME: 6/13/2022 3:25 AM    INDICATION: Trauma, head and neck injury, scalp laceration.  COMPARISON: Head CT 02/22/2022.  TECHNIQUE:   1) Routine CT Head without IV contrast. Multiplanar reformats. Dose reduction techniques were used.  2) Routine CT Cervical Spine without IV contrast. Multiplanar reformats. Dose reduction techniques were used.    FINDINGS:   HEAD CT:   INTRACRANIAL CONTENTS: No CT evidence of acute infarct. Mixed attenuation right parietal convexity subdural collection measures 7 mm in maximum thickness which is mildly decreased compared to the prior exam where it measured 10 mm, however, this is   layering more posteriorly with some degree of internal high attenuation suggesting  possibility of interval bleeding since 02/22/2022. Thin left convexity subdural hematoma has decreased in size compared to the prior study now measuring 3 mm compared to   approximately 5 mm previously. No significant midline shift. Chronic lacunar infarct in the left basal ganglia and corona radiata. Mild volume loss and presumed chronic small vessel ischemia are stable.    VISUALIZED ORBITS/SINUSES/MASTOIDS: No intraorbital abnormality. No paranasal sinus mucosal disease. No middle ear or mastoid effusion.    BONES/SOFT TISSUES: Left frontal scalp contusion and laceration. No calvarial fracture.    CERVICAL SPINE CT:   VERTEBRA: Acute to subacute type II odontoid fracture with slight anterior subluxation of the body of C2 in relation to the dens of 1 mm. No other cervical spine fracture. No significant prevertebral edema. Mild superior endplate compression of T3 is   age-indeterminate though may be subacute. Mild to moderate multilevel degenerative disc disease apart from C6-C7 where it is moderate to marked. Marked right C2-C3 and C5-C6 facet arthropathy and lower grade changes elsewhere.    CANAL/FORAMINA: No significant canal narrowing or high-grade foraminal narrowing.    PARASPINAL: No extraspinal abnormality. Visualized lung fields are clear.      Impression    IMPRESSION:  HEAD CT:  1.  Left frontal scalp hematoma and laceration. Bilateral mixed attenuation subdural hematomas have decreased in size compared to the prior CT head. The right subdural demonstrates more posterior location and layering hyperdensity which could represent   some recent subacute hemorrhage though there is no mass effect.    2.  Otherwise stable age-related changes.    CERVICAL SPINE CT:  1.  Minimally displaced type II odontoid fracture which is age-indeterminate. Of note, there is no prevertebral edema though this is likely acute to subacute in nature.    2.  Mild superior endplate height loss at T3 is age-indeterminate. Correlate  for tenderness. Additionally, CT thoracic spine could be obtained for further evaluation.      [Critical Result: Intracranial hemorrhage and type II odontoid fracture]    Finding was identified on 6/13/2022 3:45 AM.     Dr. Brown was contacted by me on 6/13/2022 3:55 AM and verbalized understanding of the critical result.    Cervical spine CT w/o contrast   Result Value    Radiologist flags (AA)     Intracranial hemorrhage and type II odontoid fracture    Narrative    EXAM: CT HEAD W/O CONTRAST, CT CERVICAL SPINE W/O CONTRAST  LOCATION: [facility]  DATE/TIME: 6/13/2022 3:25 AM    INDICATION: Trauma, head and neck injury, scalp laceration.  COMPARISON: Head CT 02/22/2022.  TECHNIQUE:   1) Routine CT Head without IV contrast. Multiplanar reformats. Dose reduction techniques were used.  2) Routine CT Cervical Spine without IV contrast. Multiplanar reformats. Dose reduction techniques were used.    FINDINGS:   HEAD CT:   INTRACRANIAL CONTENTS: No CT evidence of acute infarct. Mixed attenuation right parietal convexity subdural collection measures 7 mm in maximum thickness which is mildly decreased compared to the prior exam where it measured 10 mm, however, this is   layering more posteriorly with some degree of internal high attenuation suggesting possibility of interval bleeding since 02/22/2022. Thin left convexity subdural hematoma has decreased in size compared to the prior study now measuring 3 mm compared to   approximately 5 mm previously. No significant midline shift. Chronic lacunar infarct in the left basal ganglia and corona radiata. Mild volume loss and presumed chronic small vessel ischemia are stable.    VISUALIZED ORBITS/SINUSES/MASTOIDS: No intraorbital abnormality. No paranasal sinus mucosal disease. No middle ear or mastoid effusion.    BONES/SOFT TISSUES: Left frontal scalp contusion and laceration. No calvarial fracture.    CERVICAL SPINE CT:   VERTEBRA: Acute to  subacute type II odontoid fracture with slight anterior subluxation of the body of C2 in relation to the dens of 1 mm. No other cervical spine fracture. No significant prevertebral edema. Mild superior endplate compression of T3 is   age-indeterminate though may be subacute. Mild to moderate multilevel degenerative disc disease apart from C6-C7 where it is moderate to marked. Marked right C2-C3 and C5-C6 facet arthropathy and lower grade changes elsewhere.    CANAL/FORAMINA: No significant canal narrowing or high-grade foraminal narrowing.    PARASPINAL: No extraspinal abnormality. Visualized lung fields are clear.      Impression    IMPRESSION:  HEAD CT:  1.  Left frontal scalp hematoma and laceration. Bilateral mixed attenuation subdural hematomas have decreased in size compared to the prior CT head. The right subdural demonstrates more posterior location and layering hyperdensity which could represent   some recent subacute hemorrhage though there is no mass effect.    2.  Otherwise stable age-related changes.    CERVICAL SPINE CT:  1.  Minimally displaced type II odontoid fracture which is age-indeterminate. Of note, there is no prevertebral edema though this is likely acute to subacute in nature.    2.  Mild superior endplate height loss at T3 is age-indeterminate. Correlate for tenderness. Additionally, CT thoracic spine could be obtained for further evaluation.      [Critical Result: Intracranial hemorrhage and type II odontoid fracture]    Finding was identified on 6/13/2022 3:45 AM.     Dr. Brown was contacted by me on 6/13/2022 3:55 AM and verbalized understanding of the critical result.    CT Chest/Abdomen/Pelvis w Contrast    Narrative    EXAM: CT CHEST/ABDOMEN/PELVIS W CONTRAST  LOCATION: [facility]  DATE/TIME: 6/13/2022 3:25 AM    INDICATION: Shortness of breath. Pain.  COMPARISON:   1. Portable chest single view 6/13/2022 at 0222 hours.  2. Portable abdomen single view  3/2/2022 at 1956 hours.  TECHNIQUE: CT scan of the chest, abdomen, and pelvis was performed following injection of IV contrast. Multiplanar reformats were obtained. Dose reduction techniques were used.   CONTRAST: 100 mL Isovue-370 IV.    FINDINGS:   LUNGS AND PLEURA: Motion artifact degrades several images. Mild emphysematous changes. Mild diffuse thickening of the bronchi. 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    MEDIASTINUM/AXILLAE: Mild cardiac enlargement. Dense coronary artery calcifications. No pericardial effusion. No suspicious adenopathy. Trachea is midline.    CORONARY ARTERY CALCIFICATION: Severe.    HEPATOBILIARY: Tiny calcified granuloma in the right hepatic lobe. Dependent calcified gallstones without biliary dilatation or adjacent inflammation. No discrete hepatic lesion. Patent portal veins.    PANCREAS: Normal.    SPLEEN: Calcified granulomas in the spleen.    ADRENAL GLANDS: Normal.    KIDNEYS/BLADDER: No urinary tract calculi or obstruction. Both kidneys are well perfused without hydronephrosis or hydroureter. Normal urinary bladder.    BOWEL: Formed stool material throughout normal caliber redundant colon. No mechanical obstruction or free gas.    LYMPH NODES: No suspicious abdominopelvic adenopathy.    VASCULATURE: Atherosclerotic and ectatic distal abdominal aorta measuring 2.4 x 2.5 cm (image 68, series 2). Normal caliber IVC.    PELVIC ORGANS: Rectosigmoid diverticulosis without acute inflammation. No adenopathy or free fluid. Fat-containing left inguinal hernia.    MUSCULOSKELETAL: Degenerative changes both shoulders, spine and joints of the pelvis. Mild left convex lumbar curve.      Impression    IMPRESSION:  1.  Slightly degraded due to motion artifact. Mild emphysematous changes and rhonchi. 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    2.  Mild cardiac enlargement. Dense coronary artery calcifications. No  pericardial effusion. Atherosclerotic and ectatic distal abdominal aorta. No aneurysm.    3.  Cholelithiasis without biliary dilatation or adjacent inflammation. Evidence of remote granulomatous disease.    4.  Colonic diverticulosis without acute inflammation. Fat-containing left inguinal hernia.    5.  Degenerative changes both shoulders, spine and joints of the pelvis. Mild left convex lumbar curve.                 XR Hand Right G/E 3 Views    Narrative    EXAM: XR HAND RT G/E 3 VW  LOCATION: [facility]  DATE/TIME: 6/13/2022 3:41 AM    INDICATION: trauma  COMPARISON: None.      Impression    IMPRESSION: There are significant multifocal areas of DJD seen, most marked at the first CMC joint. Findings of chondrocalcinosis in the wrist. No obvious acute bony finding such as fracture or dislocation identified.   XR Knee Bilateral 3 Views    Narrative    EXAM: XR KNEE BILATERAL 3 VIEWS  LOCATION: [facility]  DATE/TIME: 6/13/2022 3:35 AM    INDICATION: fall, pain swelling  COMPARISON: None.      Impression    IMPRESSION:   RIGHT KNEE: There is a moderate-sized joint effusion versus synovitis. Advanced degenerative changes are seen most marked in the medial compartment. Advanced vascular calcification. Significant findings of chondrocalcinosis. The exam is otherwise   negative with no obvious acute bony finding such as fracture or dislocation identified..    LEFT KNEE: Advanced findings of chondrocalcinosis and degenerative changes, most marked in the medial compartment. Advanced vascular calcification. No acute bony finding such as fracture or dislocation seen. No significant joint effusion.

## 2022-06-13 NOTE — CONSULTS
[facility]    ([facility], [facility], [facility])   Consult Note    Date of Admission:  6/13/2022  Date of Consult: 06/13/22    Physician requesting consult: Lenny Lisa MD   Reason for consult: acute on chronic pain     Assessment/Plan:     Lang Christina is a 89 year old male who was admitted on 6/13/2022.   Pain Service is asked to see the patient for acute upon chronic pain.  Admitted for evaluation after a fall out of his bed.  He did hit his head with head, hand and knee pain.  Imaging demonstrated left frontal scalp hematoma, bilateral subdural hematomas which decreased in size compared to prior CT of head (2/22/22), Right subdural hematoma demonstrates changes which could represent recent subacute hemorrhage CT of neck minimally displaced age indeterminateType II odontoid fracture, severe bilateral arthritis in knees joints.    Neurosurgery Consulted: plan for neck brace, full Consult pending.    Ortho Consulted for possible LONNIE .   History of CVA, paroxysmal A. Fib, (not on anticoagulation therapy) subdural hematoma, emphysema, constipation.      Met with patient's daughter.  Patient slept during the entire visit.  Daughter describes her Dad as very stoic and not a complainer of pain. She is worried he won't ask for pain medications even if he might need them.  Patient had CVA in Jan. Which patient recovered from and was able to eventually return home in Feb. unfortunately had another fall with the subdural hematoma.  Patient was moved from living independently at home to a Long Term Care Facility.  He has been in wheelchair since Feb.    Daughter identifies that  his knee pain is more severe than the neck pain.  She is hoping he will be able to have the fluid drained from his knees which will help his pain tremendously.  She is worried he won't leave the neck brace on if he feels it is optional.  We discussed having Palliative Care Consult to help clarify goals  and assist with pain management needs.  She agrees this would be helpful but would prefer to wait until after he has completed medical evaluation and give time for Lang to participate in discussions.      Patient's wife was placed in a Memory Care Unit in Jan. and also had a fall with I believe a neck fracture as well/ has a neck brace and had experienced quite a bit of pain.      PLAN:   1) Pain is consistent with acute pain due to recent fall with head and neck injury with history of chronic pain associated with degenerative joint disease bilateral knees. Patient appears frail with subdural hematoma.     Palliative Care Consult at some point  2)Multimodal Medication Therapy  Topical: lidocaine cream qid neck as able alternate with diclofenac gel tid Knees  NSAID'S: avoid in elderly gentleman with history of subdural hematomas  Muscle Relaxants: consider hold for now  Adjuvants: tylenol 650 mg tid   Antidepressants/anxiolytics: none  Opioids: hydrocodone/APAP 5/325 mg tablet tid prn   IV Pain medication: hydromorphone 0.2 mg every 6 hours prn for pain not relieved by oral pain medications.   3)Non-medication interventions  Pharmacy consult- appreciate recommendations   Acupuncture consult- as available Mon and Thursday  Integrative consult - please offer  4)Constipation Prophylaxis  Daily stool softener/laxative   5) Follow up   -Opioid prescriber has been none  -Discharge Recommendations - We recommend prescribing the following at the time of discharge:             History of Present Illness (HPI):       Lang Christina is a 89 year old  male with history of previous subdural hematoma, CVA Feb. 2022 with fall from bed with acute neck, and bilateral knee pain.  The pain is reported to be acute neck pain with history of chronic knee pain due to severe degenerative joint disease bilateral knees.       MN  pulled from system on 06/13/22. Last refill on 3/19/22. This indicated one small prescription for oxycodone  5 mg tablets #9 for 10 days.        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 06/13/2022     Priority: Medium     Personal history of other disorder of urinary system 06/13/2022     Priority: Medium     Personal history of tobacco use, presenting hazards to health 06/13/2022     Priority: Medium     Tinnitus 06/13/2022     Priority: Medium     Laceration of forehead, initial encounter 06/13/2022     Priority: Medium     Skin tear of right hand without complication, initial encounter 06/13/2022     Priority: Medium     Chronic pain of both knees 06/13/2022     Priority: Medium     Closed odontoid fracture with type II morphology (H) 06/13/2022     Priority: Medium     Bilateral chronic intracranial subdural hematoma (H) 06/13/2022     Priority: Medium     Fall 02/21/2022     Priority: Medium     Acute respiratory failure with hypoxia (H) 02/07/2022     Priority: Medium     Paroxysmal atrial fibrillation (H) 02/07/2022     Priority: Medium     Subdural hematoma (H) 02/07/2022     Priority: Medium     CVA (cerebral vascular accident) (H) 01/20/2022     Priority: Medium     DDD (degenerative disc disease), cervical      Priority: Medium     Cardiac dysrhythmia 08/16/2019     Priority: Medium     Elevated troponin 08/16/2019     Priority: Medium     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     Priority: Medium     Centrilobular emphysema (H) 11/03/2015     Priority: Medium     PVD (posterior vitreous detachment) OU 02/16/2015     Priority: Medium     Vasomotor rhinitis 06/13/2013     Priority: Medium     BCC (basal cell carcinoma) 12/01/2012     Priority: Medium     OA (OSTEOARTHRITIS) OF KNEE - bilateral 07/05/2012     Priority: Medium     Vitamin  B12 deficiency      Priority: Medium     Pseudophakia OU 2011     Priority: Medium     BPH with urinary obstruction 2009     Priority: Medium        Surgical History  He  has a past surgical history that includes hernia repair, inguinal rt/lt; LIGATN/STRIP LONG OR SHORT SAPHEN; arthroscopy knee rt/lt; cystoscopy; turp; Phacoemulsification with standard intraocular lens implant (12-09 / 01-10); and cataract iol, rt/lt.     Past Surgical History:   Procedure Laterality Date     ARTHROSCOPY KNEE RT/LT      Right     CATARACT IOL, RT/LT       CYSTOSCOPY       HERNIA REPAIR, INGUINAL RT/LT      Right     LIGATN/STRIP LONG OR SHORT SAPHEN       PHACOEMULSIFICATION WITH STANDARD INTRAOCULAR LENS IMPLANT  12-09 / 01-10    RT / LT     TURP      .       Allergies  Allergies   Allergen Reactions     Pcn [Penicillins]        Prior to Admission Medications   (Not in a hospital admission)      Social History  Reviewed, and he  reports that he quit smoking about 28 years ago. His smoking use included cigarettes and pipe. He has a 47.00 pack-year smoking history. He has never used smokeless tobacco. He reports that he does not drink alcohol and does not use drugs.  Social History     Tobacco Use     Smoking status: Former Smoker     Packs/day: 1.00     Years: 47.00     Pack years: 47.00     Types: Cigarettes, Pipe     Quit date: 1994     Years since quittin.4     Smokeless tobacco: Never Used   Substance Use Topics     Alcohol use: No       Family History  Reviewed, and family history includes Cerebrovascular Disease in his brother.    Review of Systems  Complete ROS reviewed, unless noted, all other systems reviewed and found to be negative.        Objective:     Physical Exam:  /67   Pulse 86   Temp 98.3  F (36.8  C) (Oral)   Resp 24   Wt 58.1 kg (128 lb)   SpO2 93%   BMI 21.30 kg/m    Weight:   Weight change:   Body mass index is 21.3 kg/m .      General Appearance:  Asleep, awakens to  voice, did not awaken for full exam, frail, thin   Head:  Abrasion on forehead with stitches and dressing covering   Lymph/Neck: Supple, symmetrical, trachea midline   Lungs:   respirations unlabored, oxygen per nasal canula   Chest Wall:  No tenderness or deformity   Cardiovascular/Heart:  Regular rate and rhythm, on tele Edema: trace   Abdomen:   Soft, non-tender, bowel sounds active all four quadrants,  no masses, no organomegaly   Musculoskeletal: Rests on back, deconditioned   Skin: Warm, abrasion on forehead   Neurologic: Sedated, does not fully awaken to voice                Imaging Reviewed   CT Chest/Abdomen/Pelvis w Contrast    Result Date: 6/13/2022  EXAM: CT CHEST/ABDOMEN/PELVIS W CONTRAST LOCATION: [facility] DATE/TIME: 6/13/2022 3:25 AM INDICATION: Shortness of breath. Pain. COMPARISON: 1. Portable chest single view 6/13/2022 at 0222 hours. 2. Portable abdomen single view 3/2/2022 at 1956 hours. TECHNIQUE: CT scan of the chest, abdomen, and pelvis was performed following injection of IV contrast. Multiplanar reformats were obtained. Dose reduction techniques were used. CONTRAST: 100 mL Isovue-370 IV. FINDINGS: LUNGS AND PLEURA: Motion artifact degrades several images. Mild emphysematous changes. Mild diffuse thickening of the bronchi. 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 MEDIASTINUM/AXILLAE: Mild cardiac enlargement. Dense coronary artery calcifications. No pericardial effusion. No suspicious adenopathy. Trachea is midline. CORONARY ARTERY CALCIFICATION: Severe. HEPATOBILIARY: Tiny calcified granuloma in the right hepatic lobe. Dependent calcified gallstones without biliary dilatation or adjacent inflammation. No discrete hepatic lesion. Patent portal veins. PANCREAS: Normal. SPLEEN: Calcified granulomas in the spleen. ADRENAL GLANDS: Normal. KIDNEYS/BLADDER: No urinary tract calculi or obstruction. Both kidneys are well perfused without  hydronephrosis or hydroureter. Normal urinary bladder. BOWEL: Formed stool material throughout normal caliber redundant colon. No mechanical obstruction or free gas. LYMPH NODES: No suspicious abdominopelvic adenopathy. VASCULATURE: Atherosclerotic and ectatic distal abdominal aorta measuring 2.4 x 2.5 cm (image 68, series 2). Normal caliber IVC. PELVIC ORGANS: Rectosigmoid diverticulosis without acute inflammation. No adenopathy or free fluid. Fat-containing left inguinal hernia. MUSCULOSKELETAL: Degenerative changes both shoulders, spine and joints of the pelvis. Mild left convex lumbar curve.     IMPRESSION: 1.  Slightly degraded due to motion artifact. Mild emphysematous changes and rhonchi. 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 2.  Mild cardiac enlargement. Dense coronary artery calcifications. No pericardial effusion. Atherosclerotic and ectatic distal abdominal aorta. No aneurysm. 3.  Cholelithiasis without biliary dilatation or adjacent inflammation. Evidence of remote granulomatous disease. 4.  Colonic diverticulosis without acute inflammation. Fat-containing left inguinal hernia. 5.  Degenerative changes both shoulders, spine and joints of the pelvis. Mild left convex lumbar curve.          XR Chest Port 1 View    Result Date: 6/13/2022  EXAM: XR CHEST PORT 1 VIEW LOCATION: [facility] DATE/TIME: 6/13/2022 2:27 AM INDICATION: dyspnea COMPARISON: 01/20/2022     IMPRESSION: No acute cardiopulmonary findings. Stable significant scattered bilateral calcified pleural plaques. Advanced degenerative changes left shoulder. Partially calcified thoracic aorta.    XR Hand Right G/E 3 Views    Result Date: 6/13/2022  EXAM: XR HAND RT G/E 3 VW LOCATION: [facility] DATE/TIME: 6/13/2022 3:41 AM INDICATION: trauma COMPARISON: None.     IMPRESSION: There are significant multifocal areas of DJD seen, most marked at the first CMC joint.  Findings of chondrocalcinosis in the wrist. No obvious acute bony finding such as fracture or dislocation identified.    XR Knee Bilateral 3 Views    Result Date: 6/13/2022  EXAM: XR KNEE BILATERAL 3 VIEWS LOCATION: [facility] DATE/TIME: 6/13/2022 3:35 AM INDICATION: fall, pain swelling COMPARISON: None.     IMPRESSION: RIGHT KNEE: There is a moderate-sized joint effusion versus synovitis. Advanced degenerative changes are seen most marked in the medial compartment. Advanced vascular calcification. Significant findings of chondrocalcinosis. The exam is otherwise negative with no obvious acute bony finding such as fracture or dislocation identified.. LEFT KNEE: Advanced findings of chondrocalcinosis and degenerative changes, most marked in the medial compartment. Advanced vascular calcification. No acute bony finding such as fracture or dislocation seen. No significant joint effusion.    Cervical spine CT w/o contrast    Result Date: 6/13/2022  EXAM: CT HEAD W/O CONTRAST, CT CERVICAL SPINE W/O CONTRAST LOCATION: [facility] DATE/TIME: 6/13/2022 3:25 AM INDICATION: Trauma, head and neck injury, scalp laceration. COMPARISON: Head CT 02/22/2022. TECHNIQUE: 1) Routine CT Head without IV contrast. Multiplanar reformats. Dose reduction techniques were used. 2) Routine CT Cervical Spine without IV contrast. Multiplanar reformats. Dose reduction techniques were used. FINDINGS: HEAD CT: INTRACRANIAL CONTENTS: No CT evidence of acute infarct. Mixed attenuation right parietal convexity subdural collection measures 7 mm in maximum thickness which is mildly decreased compared to the prior exam where it measured 10 mm, however, this is layering more posteriorly with some degree of internal high attenuation suggesting possibility of interval bleeding since 02/22/2022. Thin left convexity subdural hematoma has decreased in size compared to the prior study now measuring 3 mm compared  to approximately 5 mm previously. No significant midline shift. Chronic lacunar infarct in the left basal ganglia and corona radiata. Mild volume loss and presumed chronic small vessel ischemia are stable. VISUALIZED ORBITS/SINUSES/MASTOIDS: No intraorbital abnormality. No paranasal sinus mucosal disease. No middle ear or mastoid effusion. BONES/SOFT TISSUES: Left frontal scalp contusion and laceration. No calvarial fracture. CERVICAL SPINE CT: VERTEBRA: Acute to subacute type II odontoid fracture with slight anterior subluxation of the body of C2 in relation to the dens of 1 mm. No other cervical spine fracture. No significant prevertebral edema. Mild superior endplate compression of T3 is age-indeterminate though may be subacute. Mild to moderate multilevel degenerative disc disease apart from C6-C7 where it is moderate to marked. Marked right C2-C3 and C5-C6 facet arthropathy and lower grade changes elsewhere. CANAL/FORAMINA: No significant canal narrowing or high-grade foraminal narrowing. PARASPINAL: No extraspinal abnormality. Visualized lung fields are clear.     IMPRESSION: HEAD CT: 1.  Left frontal scalp hematoma and laceration. Bilateral mixed attenuation subdural hematomas have decreased in size compared to the prior CT head. The right subdural demonstrates more posterior location and layering hyperdensity which could represent some recent subacute hemorrhage though there is no mass effect. 2.  Otherwise stable age-related changes. CERVICAL SPINE CT: 1.  Minimally displaced type II odontoid fracture which is age-indeterminate. Of note, there is no prevertebral edema though this is likely acute to subacute in nature. 2.  Mild superior endplate height loss at T3 is age-indeterminate. Correlate for tenderness. Additionally, CT thoracic spine could be obtained for further evaluation. [Critical Result: Intracranial hemorrhage and type II odontoid fracture] Finding was identified on 6/13/2022 3:45 AM.   Stephanie was contacted by me on 6/13/2022 3:55 AM and verbalized understanding of the critical result.     Head CT w/o contrast    Result Date: 6/13/2022  EXAM: CT HEAD W/O CONTRAST, CT CERVICAL SPINE W/O CONTRAST LOCATION: [facility] DATE/TIME: 6/13/2022 3:25 AM INDICATION: Trauma, head and neck injury, scalp laceration. COMPARISON: Head CT 02/22/2022. TECHNIQUE: 1) Routine CT Head without IV contrast. Multiplanar reformats. Dose reduction techniques were used. 2) Routine CT Cervical Spine without IV contrast. Multiplanar reformats. Dose reduction techniques were used. FINDINGS: HEAD CT: INTRACRANIAL CONTENTS: No CT evidence of acute infarct. Mixed attenuation right parietal convexity subdural collection measures 7 mm in maximum thickness which is mildly decreased compared to the prior exam where it measured 10 mm, however, this is layering more posteriorly with some degree of internal high attenuation suggesting possibility of interval bleeding since 02/22/2022. Thin left convexity subdural hematoma has decreased in size compared to the prior study now measuring 3 mm compared to approximately 5 mm previously. No significant midline shift. Chronic lacunar infarct in the left basal ganglia and corona radiata. Mild volume loss and presumed chronic small vessel ischemia are stable. VISUALIZED ORBITS/SINUSES/MASTOIDS: No intraorbital abnormality. No paranasal sinus mucosal disease. No middle ear or mastoid effusion. BONES/SOFT TISSUES: Left frontal scalp contusion and laceration. No calvarial fracture. CERVICAL SPINE CT: VERTEBRA: Acute to subacute type II odontoid fracture with slight anterior subluxation of the body of C2 in relation to the dens of 1 mm. No other cervical spine fracture. No significant prevertebral edema. Mild superior endplate compression of T3 is age-indeterminate though may be subacute. Mild to moderate multilevel degenerative disc disease apart from C6-C7 where it is  moderate to marked. Marked right C2-C3 and C5-C6 facet arthropathy and lower grade changes elsewhere. CANAL/FORAMINA: No significant canal narrowing or high-grade foraminal narrowing. PARASPINAL: No extraspinal abnormality. Visualized lung fields are clear.     IMPRESSION: HEAD CT: 1.  Left frontal scalp hematoma and laceration. Bilateral mixed attenuation subdural hematomas have decreased in size compared to the prior CT head. The right subdural demonstrates more posterior location and layering hyperdensity which could represent some recent subacute hemorrhage though there is no mass effect. 2.  Otherwise stable age-related changes. CERVICAL SPINE CT: 1.  Minimally displaced type II odontoid fracture which is age-indeterminate. Of note, there is no prevertebral edema though this is likely acute to subacute in nature. 2.  Mild superior endplate height loss at T3 is age-indeterminate. Correlate for tenderness. Additionally, CT thoracic spine could be obtained for further evaluation. [Critical Result: Intracranial hemorrhage and type II odontoid fracture] Finding was identified on 6/13/2022 3:45 AM. Dr. Brown was contacted by me on 6/13/2022 3:55 AM and verbalized understanding of the critical result.       Labs Reviewed Personally By Myself  Results for orders placed or performed during the hospital encounter of 06/13/22   Head CT w/o contrast     Status: Abnormal   Result Value Ref Range    Radiologist flags (AA)      Intracranial hemorrhage and type II odontoid fracture    Narrative    EXAM: CT HEAD W/O CONTRAST, CT CERVICAL SPINE W/O CONTRAST  LOCATION: [facility]  DATE/TIME: 6/13/2022 3:25 AM    INDICATION: Trauma, head and neck injury, scalp laceration.  COMPARISON: Head CT 02/22/2022.  TECHNIQUE:   1) Routine CT Head without IV contrast. Multiplanar reformats. Dose reduction techniques were used.  2) Routine CT Cervical Spine without IV contrast. Multiplanar reformats. Dose  reduction techniques were used.    FINDINGS:   HEAD CT:   INTRACRANIAL CONTENTS: No CT evidence of acute infarct. Mixed attenuation right parietal convexity subdural collection measures 7 mm in maximum thickness which is mildly decreased compared to the prior exam where it measured 10 mm, however, this is   layering more posteriorly with some degree of internal high attenuation suggesting possibility of interval bleeding since 02/22/2022. Thin left convexity subdural hematoma has decreased in size compared to the prior study now measuring 3 mm compared to   approximately 5 mm previously. No significant midline shift. Chronic lacunar infarct in the left basal ganglia and corona radiata. Mild volume loss and presumed chronic small vessel ischemia are stable.    VISUALIZED ORBITS/SINUSES/MASTOIDS: No intraorbital abnormality. No paranasal sinus mucosal disease. No middle ear or mastoid effusion.    BONES/SOFT TISSUES: Left frontal scalp contusion and laceration. No calvarial fracture.    CERVICAL SPINE CT:   VERTEBRA: Acute to subacute type II odontoid fracture with slight anterior subluxation of the body of C2 in relation to the dens of 1 mm. No other cervical spine fracture. No significant prevertebral edema. Mild superior endplate compression of T3 is   age-indeterminate though may be subacute. Mild to moderate multilevel degenerative disc disease apart from C6-C7 where it is moderate to marked. Marked right C2-C3 and C5-C6 facet arthropathy and lower grade changes elsewhere.    CANAL/FORAMINA: No significant canal narrowing or high-grade foraminal narrowing.    PARASPINAL: No extraspinal abnormality. Visualized lung fields are clear.      Impression    IMPRESSION:  HEAD CT:  1.  Left frontal scalp hematoma and laceration. Bilateral mixed attenuation subdural hematomas have decreased in size compared to the prior CT head. The right subdural demonstrates more posterior location and layering hyperdensity which could  represent   some recent subacute hemorrhage though there is no mass effect.    2.  Otherwise stable age-related changes.    CERVICAL SPINE CT:  1.  Minimally displaced type II odontoid fracture which is age-indeterminate. Of note, there is no prevertebral edema though this is likely acute to subacute in nature.    2.  Mild superior endplate height loss at T3 is age-indeterminate. Correlate for tenderness. Additionally, CT thoracic spine could be obtained for further evaluation.      [Critical Result: Intracranial hemorrhage and type II odontoid fracture]    Finding was identified on 6/13/2022 3:45 AM.     Dr. Brown was contacted by me on 6/13/2022 3:55 AM and verbalized understanding of the critical result.    Cervical spine CT w/o contrast     Status: Abnormal   Result Value Ref Range    Radiologist flags (AA)      Intracranial hemorrhage and type II odontoid fracture    Narrative    EXAM: CT HEAD W/O CONTRAST, CT CERVICAL SPINE W/O CONTRAST  LOCATION: [facility]  DATE/TIME: 6/13/2022 3:25 AM    INDICATION: Trauma, head and neck injury, scalp laceration.  COMPARISON: Head CT 02/22/2022.  TECHNIQUE:   1) Routine CT Head without IV contrast. Multiplanar reformats. Dose reduction techniques were used.  2) Routine CT Cervical Spine without IV contrast. Multiplanar reformats. Dose reduction techniques were used.    FINDINGS:   HEAD CT:   INTRACRANIAL CONTENTS: No CT evidence of acute infarct. Mixed attenuation right parietal convexity subdural collection measures 7 mm in maximum thickness which is mildly decreased compared to the prior exam where it measured 10 mm, however, this is   layering more posteriorly with some degree of internal high attenuation suggesting possibility of interval bleeding since 02/22/2022. Thin left convexity subdural hematoma has decreased in size compared to the prior study now measuring 3 mm compared to   approximately 5 mm previously. No significant midline  shift. Chronic lacunar infarct in the left basal ganglia and corona radiata. Mild volume loss and presumed chronic small vessel ischemia are stable.    VISUALIZED ORBITS/SINUSES/MASTOIDS: No intraorbital abnormality. No paranasal sinus mucosal disease. No middle ear or mastoid effusion.    BONES/SOFT TISSUES: Left frontal scalp contusion and laceration. No calvarial fracture.    CERVICAL SPINE CT:   VERTEBRA: Acute to subacute type II odontoid fracture with slight anterior subluxation of the body of C2 in relation to the dens of 1 mm. No other cervical spine fracture. No significant prevertebral edema. Mild superior endplate compression of T3 is   age-indeterminate though may be subacute. Mild to moderate multilevel degenerative disc disease apart from C6-C7 where it is moderate to marked. Marked right C2-C3 and C5-C6 facet arthropathy and lower grade changes elsewhere.    CANAL/FORAMINA: No significant canal narrowing or high-grade foraminal narrowing.    PARASPINAL: No extraspinal abnormality. Visualized lung fields are clear.      Impression    IMPRESSION:  HEAD CT:  1.  Left frontal scalp hematoma and laceration. Bilateral mixed attenuation subdural hematomas have decreased in size compared to the prior CT head. The right subdural demonstrates more posterior location and layering hyperdensity which could represent   some recent subacute hemorrhage though there is no mass effect.    2.  Otherwise stable age-related changes.    CERVICAL SPINE CT:  1.  Minimally displaced type II odontoid fracture which is age-indeterminate. Of note, there is no prevertebral edema though this is likely acute to subacute in nature.    2.  Mild superior endplate height loss at T3 is age-indeterminate. Correlate for tenderness. Additionally, CT thoracic spine could be obtained for further evaluation.      [Critical Result: Intracranial hemorrhage and type II odontoid fracture]    Finding was identified on 6/13/2022 3:45 AM.       Stephanie was contacted by me on 6/13/2022 3:55 AM and verbalized understanding of the critical result.    CT Chest/Abdomen/Pelvis w Contrast     Status: None    Narrative    EXAM: CT CHEST/ABDOMEN/PELVIS W CONTRAST  LOCATION: [facility]  DATE/TIME: 6/13/2022 3:25 AM    INDICATION: Shortness of breath. Pain.  COMPARISON:   1. Portable chest single view 6/13/2022 at 0222 hours.  2. Portable abdomen single view 3/2/2022 at 1956 hours.  TECHNIQUE: CT scan of the chest, abdomen, and pelvis was performed following injection of IV contrast. Multiplanar reformats were obtained. Dose reduction techniques were used.   CONTRAST: 100 mL Isovue-370 IV.    FINDINGS:   LUNGS AND PLEURA: Motion artifact degrades several images. Mild emphysematous changes. Mild diffuse thickening of the bronchi. 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    MEDIASTINUM/AXILLAE: Mild cardiac enlargement. Dense coronary artery calcifications. No pericardial effusion. No suspicious adenopathy. Trachea is midline.    CORONARY ARTERY CALCIFICATION: Severe.    HEPATOBILIARY: Tiny calcified granuloma in the right hepatic lobe. Dependent calcified gallstones without biliary dilatation or adjacent inflammation. No discrete hepatic lesion. Patent portal veins.    PANCREAS: Normal.    SPLEEN: Calcified granulomas in the spleen.    ADRENAL GLANDS: Normal.    KIDNEYS/BLADDER: No urinary tract calculi or obstruction. Both kidneys are well perfused without hydronephrosis or hydroureter. Normal urinary bladder.    BOWEL: Formed stool material throughout normal caliber redundant colon. No mechanical obstruction or free gas.    LYMPH NODES: No suspicious abdominopelvic adenopathy.    VASCULATURE: Atherosclerotic and ectatic distal abdominal aorta measuring 2.4 x 2.5 cm (image 68, series 2). Normal caliber IVC.    PELVIC ORGANS: Rectosigmoid diverticulosis without acute inflammation. No adenopathy or free  fluid. Fat-containing left inguinal hernia.    MUSCULOSKELETAL: Degenerative changes both shoulders, spine and joints of the pelvis. Mild left convex lumbar curve.      Impression    IMPRESSION:  1.  Slightly degraded due to motion artifact. Mild emphysematous changes and rhonchi. 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    2.  Mild cardiac enlargement. Dense coronary artery calcifications. No pericardial effusion. Atherosclerotic and ectatic distal abdominal aorta. No aneurysm.    3.  Cholelithiasis without biliary dilatation or adjacent inflammation. Evidence of remote granulomatous disease.    4.  Colonic diverticulosis without acute inflammation. Fat-containing left inguinal hernia.    5.  Degenerative changes both shoulders, spine and joints of the pelvis. Mild left convex lumbar curve.                 XR Knee Bilateral 3 Views     Status: None    Narrative    EXAM: XR KNEE BILATERAL 3 VIEWS  LOCATION: [facility]  DATE/TIME: 6/13/2022 3:35 AM    INDICATION: fall, pain swelling  COMPARISON: None.      Impression    IMPRESSION:   RIGHT KNEE: There is a moderate-sized joint effusion versus synovitis. Advanced degenerative changes are seen most marked in the medial compartment. Advanced vascular calcification. Significant findings of chondrocalcinosis. The exam is otherwise   negative with no obvious acute bony finding such as fracture or dislocation identified..    LEFT KNEE: Advanced findings of chondrocalcinosis and degenerative changes, most marked in the medial compartment. Advanced vascular calcification. No acute bony finding such as fracture or dislocation seen. No significant joint effusion.   XR Chest Port 1 View     Status: None    Narrative    EXAM: XR CHEST PORT 1 VIEW  LOCATION: [facility]  DATE/TIME: 6/13/2022 2:27 AM    INDICATION: dyspnea  COMPARISON: 01/20/2022      Impression    IMPRESSION: No acute  cardiopulmonary findings. Stable significant scattered bilateral calcified pleural plaques. Advanced degenerative changes left shoulder. Partially calcified thoracic aorta.   XR Hand Right G/E 3 Views     Status: None    Narrative    EXAM: XR HAND RT G/E 3 VW  LOCATION: [facility]  DATE/TIME: 6/13/2022 3:41 AM    INDICATION: trauma  COMPARISON: None.      Impression    IMPRESSION: There are significant multifocal areas of DJD seen, most marked at the first CMC joint. Findings of chondrocalcinosis in the wrist. No obvious acute bony finding such as fracture or dislocation identified.   Basic metabolic panel     Status: Abnormal   Result Value Ref Range    Sodium 131 (L) 136 - 145 mmol/L    Potassium 4.5 3.5 - 5.0 mmol/L    Chloride 98 98 - 107 mmol/L    Carbon Dioxide (CO2) 26 22 - 31 mmol/L    Anion Gap 7 5 - 18 mmol/L    Urea Nitrogen 19 8 - 28 mg/dL    Creatinine 0.67 (L) 0.70 - 1.30 mg/dL    Calcium 9.0 8.5 - 10.5 mg/dL    Glucose 132 (H) 70 - 125 mg/dL    GFR Estimate 87 >60 mL/min/1.73m2   Troponin I     Status: Normal   Result Value Ref Range    Troponin I 0.07 0.00 - 0.29 ng/mL   Magnesium     Status: Abnormal   Result Value Ref Range    Magnesium 1.6 (L) 1.8 - 2.6 mg/dL   B-Type Natriuretic Peptide ([facility] Only)     Status: Abnormal   Result Value Ref Range     (H) 0 - 93 pg/mL   CBC with platelets and differential     Status: Abnormal   Result Value Ref Range    WBC Count 9.1 4.0 - 11.0 10e3/uL    RBC Count 3.19 (L) 4.40 - 5.90 10e6/uL    Hemoglobin 10.1 (L) 13.3 - 17.7 g/dL    Hematocrit 30.9 (L) 40.0 - 53.0 %    MCV 97 78 - 100 fL    MCH 31.7 26.5 - 33.0 pg    MCHC 32.7 31.5 - 36.5 g/dL    RDW 13.6 10.0 - 15.0 %    Platelet Count 263 150 - 450 10e3/uL    % Neutrophils 86 %    % Lymphocytes 4 %    % Monocytes 10 %    % Eosinophils 0 %    % Basophils 0 %    % Immature Granulocytes 0 %    NRBCs per 100 WBC 0 <1 /100    Absolute Neutrophils 7.8 1.6 - 8.3 10e3/uL    Absolute  Lymphocytes 0.4 (L) 0.8 - 5.3 10e3/uL    Absolute Monocytes 0.9 0.0 - 1.3 10e3/uL    Absolute Eosinophils 0.0 0.0 - 0.7 10e3/uL    Absolute Basophils 0.0 0.0 - 0.2 10e3/uL    Absolute Immature Granulocytes 0.0 <=0.4 10e3/uL    Absolute NRBCs 0.0 10e3/uL   INR     Status: Abnormal   Result Value Ref Range    INR 1.41 (H) 0.85 - 1.15   PTT     Status: Normal   Result Value Ref Range    aPTT 35 22 - 38 Seconds   Asymptomatic COVID-19 Virus (Coronavirus) by PCR Nasopharyngeal     Status: Normal    Specimen: Nasopharyngeal; Swab   Result Value Ref Range    SARS CoV2 PCR Negative Negative    Narrative    Testing was performed using the altaf  SARS-CoV-2 & Influenza A/B Assay on the altaf  Fabiola  System.  This test should be ordered for the detection of SARS-COV-2 in individuals who meet SARS-CoV-2 clinical and/or epidemiological criteria. Test performance is unknown in asymptomatic patients.  This test is for in vitro diagnostic use under the FDA EUA for laboratories certified under CLIA to perform moderate and/or high complexity testing. This test has not been FDA cleared or approved.  A negative test does not rule out the presence of PCR inhibitors in the specimen or target RNA in concentration below the limit of detection for the assay. The possibility of a false negative should be considered if the patient's recent exposure or clinical presentation suggests COVID-19.  [facility] are certified under the Clinical Laboratory Improvement Amendments of 1988 (CLIA-88) as qualified to perform moderate and/or high complexity laboratory testing.   CBC with platelets differential     Status: Abnormal    Narrative    The following orders were created for panel order CBC with platelets differential.  Procedure                               Abnormality         Status                     ---------                               -----------         ------                     CBC with platelets and d...[[id number]]   Abnormal            Final result                 Please view results for these tests on the individual orders.        Total time spent 78 minutes with greater than 50% in consultation, education and coordination of care.     Also discussed with RN and MD     Thank you for this consultation.      Lanie GILLETTE, CNS-BC, DNP  Acute [facility] Pain Management Program  [facility] ([place], [place])   With questions call [phone]  Preference if for [employer] Paging - Mary  Click HERE to page [place]

## 2022-06-13 NOTE — CONSULTS
NEUROSURGERY CONSULTATION NOTE    Lang Christina   [address] [place] [address]  89 year old male  Admission Date/Time: 6/13/2022  1:29 AM  Primary Care Provider: [facility] [address] Attending Physician: Radha Lima MD    Neurosurgery was asked to see this patient by Dr Maria L LLANES for evaluation for type II Odontoid fracture.     PROBLEM LIST:  Active Problems:    Laceration of forehead, initial encounter    Skin tear of right hand without complication, initial encounter    Chronic pain of both knees    Closed odontoid fracture with type II morphology (H)    Bilateral chronic intracranial subdural hematoma (H)       Neurosurgery Attending: The patient's clinical examination, laboratory data, and plan was discussed with Dr Dior.     CONSULTATION ASSESSMENT AND PLAN:  Lang Christina is a 89 year old male who fell yesterday out of bed. He sustained a forehead laceration. CT cervical spine shows Type II Odontoid fracture with minimal displacement of 1 mm. No edema on imaging but also not seen on Jan/Feb images 2022; likely subacute. Plan to treat in a collar at all times. MRI for eval of ligament injury. May benefit from swallow eval. I do not think Lang would be a good surgical candidate. We will try to treat in a collar. May need collar for life. Discussed with patient and his daughter Sharon and they are in agreement with plan of care and agree that surgery is not of interest of any kind.     aLng also has bilateral SDH - mixed attenuation right parietal SDH 7 mm previously 10 mm but may have newer component of hemorrhage compared to images 2/22/22. Thin left SDH 3 mm compared to 5 mm. No midline shift. No surgical recommendations.     Age indeterminate mild T3 superior endplate fracture. Will obtain dedicated T spine images. No brace recommendations.     1300 ADDENDUM: reviewed recent head CT. Small Increased attentuation occipitoparietal component of the left convexity subdural  hematoma. Stable right sided mixed attenuation SDH. No surgical intervention required. Regardless Patient would not want it reportedly per daughter. Recommend HOB 30 degrees or higher. Maintain normal Blood pressure SBP less than 150. Repeat head CT tomorrow, sooner if neuro change. No blood thinners.     HPI: Lang Christina is a 89 year old male who fell out of bed last night at his care facility. CT cervical shows type II odontoid fracture with 1 mm displacement. No edema on imaging giving impression this is subacute. Fracture not present on prior images Jan/Feb of this year per reports available. Age indeterminate mild superior compression fracture of T3. CT head shows mixed attenuation right parietal SDH 7 mm previously 10 mm but may have newer component of hemorrhage compared to images 2/22/22. Thin left SDH 3 mm compared to 5 mm. No midline shift. Lang endorses some neck discomfort with movement of his neck. States he feels stiff. No arm, leg pain numbness or tingling. He has left forehead laceration repaired with sutures by ED provider. He has abrasion on his right hand, open skin surface sore X2 one on his toes. He is wearing an [place]en collar. We discussed his fracture and recommendations of wearing a collar at all times for now and see if he heals. I do not think he would be a good surgical candidate. Discussed fracture and recommendations with his daughter Sharon who is in agreement. Evidently patient and his PCP had conversation a few years ago and decided any type of surgery would be too high risk for patient. Will plan to treat in a collar at all times with OP follow up with XR. Discussed with Sharon possibility of collar for life if his fracture does not heal. She states her father would likely not comply. We discussed potential risk of additional falls and spinal cord injury. She verbalized understanding. She also reports her mother was recently as [facility] also with cervical fracture and is in a  collar.     Past Medical History:   Diagnosis Date     Actinic keratosis      BCC (basal cell carcinoma) 12/2012     BPH (benign prostatic hypertrophy) with urinary obstruction      COPD (chronic obstructive pulmonary disease) (H)     9/2012 Allergy consult     DDD (degenerative disc disease), cervical      Diverticulosis      ED (erectile dysfunction)      Hearing loss      Hyperlipidemia LDL goal < 130      Kidney stones      Localized osteoarthritis of both knees      Osteoarthritis      Senile purpura (H) 01/12/2021     Strabismus      Vasomotor rhinitis      Vitamin B12 deficiency      Past Surgical History:   Procedure Laterality Date     ARTHROSCOPY KNEE RT/LT      Right     CATARACT IOL, RT/LT       CYSTOSCOPY       HERNIA REPAIR, INGUINAL RT/LT      Right     LIGATN/STRIP LONG OR SHORT SAPHEN       PHACOEMULSIFICATION WITH STANDARD INTRAOCULAR LENS IMPLANT  12-09 / 01-10    RT / LT     TURP      .       REVIEW OF SYSTEMS:  Negative with exception of HPI     MEDICATIONS:  Current Outpatient Medications   Medication Sig Dispense Refill     acetaminophen (TYLENOL) 325 MG tablet Take 1-2 tablets by mouth at onset of headache.       albuterol (PROVENTIL) (2.5 MG/3ML) 0.083% neb solution TAKE 1 VIAL BY NEBULIZATION EVERY 6 HOURS AS NEEDED FOR SHORTNESS OF BREATH/DYSPNEA OR WHEEZING 360 mL 11     ANORO ELLIPTA 62.5-25 MCG/INH oral inhaler INHALE ONE PUFF BY MOUTH ONCE DAILY (Patient not taking: Reported on 2/7/2022) 60 each 8     Cholecalciferol (VITAMIN D) 1000 UNIT capsule Take 1 capsule by mouth daily.       Cyanocobalamin 1000 MCG TABS Take  by mouth daily.       hydrocortisone (ANUSOL-HC) 25 MG suppository Place 1 suppository (25 mg) rectally 2 times daily 12 suppository 0     order for DME Oxygen for home use. Liters per minute: 1 per nasal cannula. Frequency of use: With activity;. Length of need: 99.       simvastatin (ZOCOR) 10 MG tablet TAKE ONE TABLET BY MOUTH AT BEDTIME. 90 tablet 2          ALLERGIES/SENSITIVITIES:     Allergies   Allergen Reactions     Pcn [Penicillins]        PERTINENT SOCIAL HISTORY: personally reviewed   Social History     Socioeconomic History     Marital status:      Spouse name: None     Number of children: None     Years of education: None     Highest education level: None   Occupational History     Employer: RETIRED   Tobacco Use     Smoking status: Former Smoker     Packs/day: 1.00     Years: 47.00     Pack years: 47.00     Types: Cigarettes, Pipe     Quit date: 1994     Years since quittin.4     Smokeless tobacco: Never Used   Substance and Sexual Activity     Alcohol use: No     Drug use: No     Sexual activity: Not Currently     Partners: Female         FAMILY HISTORY:  Family History   Problem Relation Age of Onset     Cerebrovascular Disease Brother         PHYSICAL EXAM:   Constitutional: /61   Pulse 86   Temp 98.3  F (36.8  C) (Oral)   Resp 20   Wt 58.1 kg (128 lb)   SpO2 93%   BMI 21.30 kg/m       Mental Status: Alert. Able to tell me its . Unable to tell me where he is or why. Affect is appropriate.  Speech is fluent. Falls asleep while we are talking.      Cranial Nerves: CN2: No funduscopic exam performed. CN3,4 & 6: Pupillary light response, lateral and vertical gaze normal.  No nystagmus.  Visual fields are full to confrontation. CN5: Intact to touch CN7: No facial weakness, smile, facial symmetry intact. CN8: Intact to spoken voice. [place]. CN9&10: Gag reflex, uvula midline, palate rises with phonation. CN11: Shoulder shrug 5/5 intact bilaterally. CN12: Tongue midline and moves freely from side to side.     Motor: normal bulk and tone for age     Strength: limited leg lift - legs stiff. No focal weakness.   4+/5 strength all extremities      Sensory: Sensation intact bilaterally to light touch.    Skin: sclera right eye red. Left forehead laceration has been repaired. Right 2nd, 3rd finger abrasions with dressing.        Coordination: deferred      Reflexes: No hoffmans/babinski/ clonus.    IMAGING:  I personally reviewed all radiographic images and discussed with patient and his daughter Sharon via telephone.      (non critical care) I spent more than 45 minutes in this apt, examining the pt, reviewing the scans, reviewing notes from chart, discussing treatment options with risks and benefits and coordinating care. >50 % clinic time was spent in face to face counseling and coordinating care    [facility]        Cc:   No referring provider defined for this encounter.    Micheal Blake  [unfilled]

## 2022-06-13 NOTE — PROGRESS NOTES
[facility] follow up:   H&P by Dr. Lisa reviewed, patient seen and examined.  Complains of neck soreness and soreness in both knees.    MRI of C spine shows acute type II odontoid fracture and likely acute compression T3 fracture.    Appreciate neurosurgery and orthopedic surgery input  As needed straight cath for PVR over 400 mL ordered for urinary retention    Radha Lima MD

## 2022-06-13 NOTE — ED TRIAGE NOTES
Pt was found on the floor by the staff. Per report pt has been calling out for help for an hour. Pt not on blood thinner. Noted laceration on the left side of his forehead. Dressing is on, No active bleeding, pt has skin tear on the right knuckle between pointer finger and middle finger, abrasion on bilateral knees.     Triage Assessment     Row Name 06/13/22 0134       Triage Assessment (Adult)    Airway WDL WDL       Respiratory WDL    Respiratory WDL X       Cognitive/Neuro/Behavioral WDL    Cognitive/Neuro/Behavioral WDL X    Level of Consciousness confused       [place] Coma Scale    Best Eye Response 4-->(E4) spontaneous    Best Motor Response 6-->(M6) obeys commands    Best Verbal Response 4-->(V4) confused    [place] Coma Scale Score 14

## 2022-06-13 NOTE — PROGRESS NOTES
Neurosurgery Note:    MRI results reviewed, odontoid fracture is acute, T3 fracture likely acute. Continue mi[place] J collar at all times, trey for showering.     Kinsey Lozada CNP  [facility] Neurosurgery  O: [phone]  P: [phone]

## 2022-06-13 NOTE — CONSULTS
[facility] Nurse Inpatient Assessment     Consulted for: right hand skin tear    Patient History (according to provider note(s):      Lang Christina is a 89-year-old male past medical history of CVA, paroxysmal A. fib, subdural hematoma, emphysema brought from nursing home after a fall.  Patient was found on the floor by the staff and reports that he has been calling out for help for about an hour.  He endorses hurting his head, hand and knees.  Sustained laceration to left side of his forehead and skin tear on dorsum of right hand having significant bilateral knee joint pain.  His scalp laceration was repaired, CT imaging showing bilateral subdurals with suspicion of subacute rebleed on the right side.  Also noted to have type II odontoid fracture.  Bilateral knee joint x-ray without acute findings but showing severe degenerative changes.  Neurosurgery was consulted and patient was admitted for further management.       Areas Assessed:      Areas visualized during today's visit: Focused: right hand    Skin Injury Location:  Right dorsal hand    Last photo:  Today 6/13 in ED  Skin injury due to: Abrasion and Trauma  Skin history and plan of care: mechanism of injury is trauma c/w falling from the bed  Affected area:      Skin assessment: Hematoma and Maceration     Measurements (length x width x depth, in cm) - not measured      Color: dusky     Temperature  normal      Drainage: scant .      Color: not visible     Odor: none  Pain: pt lethargic  Pain interventions prior to dressing change: N/A  Treatment goal: Heal   STATUS: initial assessment  Supplies ordered: gathered and at bedside      Treatment Plan:     Right hand wound(s): Every other day wash wound with bath wipes, irrigate with normal saline and apply mepilex 3x3        Pressure Injury Prevention (PIP) Plan:      Moisture management: Perineal cleansing /protection: Follow Incontinence Protocol, Avoid brief in bed and Clean  "and dry skin folds with bathing       Mattress: Follow bed algorithm, reassess daily and order specialty mattress, if indicated.      HOB: Maintain at or below 30 degrees, unless contraindicated      Repositioning in bed: Every 1-2 hours       Heels: Keep elevated off mattress      Protective dressing: Sacral Mepilex for prevention (#[id number]),  especially for the agitated patient       Positioning equipment: None      Chair positioning: Chair cushion (#[id number])               If patient has a buttock pressure injury, or high risk for PI use chair cushion or SPS.      Under devices: Inspect skin under all medical devices during skin inspection , Ensure tubes are stabilized without tension and Ensure patient is not lying on medical devices or equipment when repositioned             Ask provider to discontinue device when no longer needed.      If patient is declining pressure injury prevention interventions: Explore reason why and address patient's concerns, Educate on pressure injury risk and prevention intervention(s) and If patient is still declining, document \"informed refusal\"     Orders: Written    RECOMMEND PRIMARY TEAM ORDER: None, at this time  Education provided: Not appropriate today   Discussed plan of care with: Nurse  WOC nurse follow-up plan: weekly  Notify WOC if wound(s) deteriorate.  Nursing to notify the Provider(s) and re-consult the WOC Nurse if new skin concern.    DATA:     Pt seen in ED on regular ED gurney  BMI: Body mass index is 21.3 kg/m .   Active diet order: Orders Placed This Encounter      NPO for Medical/Clinical Reasons Except for: Meds     Output: No intake/output data recorded.     Labs: Recent Labs   Lab 06/13/22  0423 06/13/22  0200   HGB  --  10.1*   INR 1.41*  --    WBC  --  9.1     Pressure injury risk assessment:   Sensory Perception: 3-->slightly limited  Moisture: 3-->occasionally moist  Activity: 1-->bedfast  Mobility: 3-->slightly limited  Nutrition: 2-->probably " inadequate  Friction and Shear: 2-->potential problem  Jorje Score: 14    Saritha Moran RN

## 2022-06-13 NOTE — PHARMACY-ADMISSION MEDICATION HISTORY
Pharmacy Note - Admission Medication History    Pertinent Provider Information: none     ______________________________________________________________________    Prior To Admission (PTA) med list completed and updated in EMR.       PTA Med List   Medication Sig Note Last Dose     acetaminophen (TYLENOL) 500 MG tablet Take 1,000 mg by mouth 3 times daily as needed Maximum of 4000 mg in 24 hours  Past Month at Unknown time     albuterol (PROAIR HFA/PROVENTIL HFA/VENTOLIN HFA) 108 (90 Base) MCG/ACT inhaler Inhale 2 puffs into the lungs every 4 hours as needed for shortness of breath / dyspnea or wheezing  Unknown at Unknown time     albuterol (PROVENTIL) (2.5 MG/3ML) 0.083% neb solution TAKE 1 VIAL BY NEBULIZATION EVERY 6 HOURS AS NEEDED FOR SHORTNESS OF BREATH/DYSPNEA OR WHEEZING 6/13/2022: Not on nursing home MAR Unknown at Unknown time     ANORO ELLIPTA 62.5-25 MCG/INH oral inhaler INHALE ONE PUFF BY MOUTH ONCE DAILY (Patient taking differently: Inhale 1 puff into the lungs daily)  6/12/2022 at am     bisacodyl (DULCOLAX) 10 MG suppository Place 10 mg rectally daily as needed for constipation  Unknown at Unknown time     Cholecalciferol (VITAMIN D) 1000 UNIT capsule Take 1 capsule by mouth daily.  6/12/2022 at am     Cyanocobalamin 1000 MCG TABS Take 1,000 mcg by mouth daily  6/12/2022 at am     oxyCODONE (ROXICODONE) 5 MG tablet Take 2.5 mg by mouth 2 times daily as needed for severe pain  Unknown at Unknown time     polyethylene glycol (MIRALAX) 17 g packet Take 1 packet by mouth daily  6/12/2022 at am     simvastatin (ZOCOR) 10 MG tablet TAKE ONE TABLET BY MOUTH AT BEDTIME. (Patient taking differently: Take 10 mg by mouth At Bedtime TAKE ONE TABLET BY MOUTH AT BEDTIME.)  6/12/2022 at Unknown time       Information source(s): Facility ([facility]/) medication list/MAR  Method of interview communication: N/A    Summary of Changes to PTA Med List  New: Miralax, albuterol inhaler, bisacodyl  suppository,oxycodone  Discontinued: many lidocaine and methylprednisolone clinic administrations  Changed: Tylenol    Patient was asked about OTC/herbal products specifically.  PTA med list reflects this.    In the past week, patient estimated taking medication this percent of the time:  greater than 90%.    Allergies were reviewed, assessed, and updated with the patient.      Patient did not bring any medications to the hospital and can't retrieve from home. No multi-dose medications are available for use during hospital stay.     The information provided in this note is only as accurate as the sources available at the time of the update(s).    Thank you for the opportunity to participate in the care of this patient.    Angeline Gerber RPH  6/13/2022 11:32 AM

## 2022-06-13 NOTE — CONSULTS
Care Management Initial Consult    General Information  Assessment completed with: Patient, Children,    Type of CM/SW Visit: Initial Assessment    Primary Care Provider verified and updated as needed: Yes   Readmission within the last 30 days: no previous admission in last 30 days      Reason for Consult: discharge planning  Advance Care Planning: Advance Care Planning Reviewed: other (see comments)          Communication Assessment  Patient's communication style: spoken language (English or Bilingual)             Cognitive  Cognitive/Neuro/Behavioral: .WDL except, orientation  Level of Consciousness: confused  Arousal Level: opens eyes spontaneously  Orientation: disoriented to, place  Mood/Behavior: cooperative     Speech: clear    Living Environment:   People in home:       Current living Arrangements: extended care facility  Name of Facility:  ([facility])   Able to return to prior arrangements: other (see comments)  Living Arrangement Comments: may need TCU    Family/Social Support:  Care provided by: other (see comments)  Provides care for: no one, unable/limited ability to care for self  Marital Status:              Description of Support System:           Current Resources:   Patient receiving home care services: No     Community Resources: None  Equipment currently used at home: wheelchair, manual  Supplies currently used at home: None    Employment/Financial:  Employment Status:          Financial Concerns:             Lifestyle & Psychosocial Needs:  Social Determinants of Health     Tobacco Use: Medium Risk     Smoking Tobacco Use: Former Smoker     Smokeless Tobacco Use: Never Used   Alcohol Use: Not on file   Financial Resource Strain: Not on file   Food Insecurity: Not on file   Transportation Needs: Not on file   Physical Activity: Not on file   Stress: Not on file   Social Connections: Not on file   Intimate Partner Violence: Not on file   Depression: Not at risk     PHQ-2 Score: 0    Housing Stability: Not on file       Functional Status:  Prior to admission patient needed assistance:   Dependent ADLs:: Wheelchair-independent, Bathing  Dependent IADLs:: Cleaning, Cooking, Laundry, Shopping, Meal Preparation, Medication Management, Transportation  Assesssment of Functional Status: Not at baseline with ADL Functioning    Mental Health Status:          Chemical Dependency Status:                Values/Beliefs:  Spiritual, Cultural Beliefs, [employer] Practices, Values that affect care:                 Additional Information:  AIDET completed. Writer saw patient briefly in room; sleeping soundly since pain medication. Spoke with daughter and primary contact Sharon: [phone]. Pt now resides at [facility]. He had cared for his wife with dementia until January of this year. Wife too much to care for so transferred to memory care. Pt at that time independent with cares, driving. Shortly after, pt had a  stroke per daughter which he has recovered from somewhat. His daughter had stayed with him for several days in his home, and patient experienced falls, memory changes. Daughter had her dad then placed at [facility]. He uses a wheel chair independently, has fallen several times, dresses self. He gets assist with showers, meds, meals, etc. Pt now bed bound, neck brace on.       Anticipate discharge back to LTC with PT. Daughter to transport if patient able to go by private care. Informed that CM will follow.     Danni Redman RN, CM, KATHY

## 2022-06-13 NOTE — ED PROVIDER NOTES
EMERGENCY DEPARTMENT ENCOUNTER      NAME: Lang Christina  AGE: 89 year old male  YOB: 1928  MRN: [id number]  EVALUATION DATE & TIME: 6/13/2022  1:29 AM    PCP: Micheal Blake    ED PROVIDER: Vlad Brown M.D.    Chief Complaint   Patient presents with     Fall       FINAL IMPRESSION:  1. Bilateral chronic intracranial subdural hematoma (H)    2. Closed odontoid fracture with type II morphology (H)    3. Chronic pain of both knees    4. Laceration of forehead, initial encounter    5. Skin tear of right hand without complication, initial encounter        ED COURSE & MEDICAL DECISION MAKING:    Pertinent Labs & Imaging studies personally reviewed and interpreted by me. (See chart for details)  1:34 AM Patient seen and examined, prior records reviewed.  Differential diagnosis includes but not limited to intracranial hemorrhage, skull fracture, contusion, scalp laceration, concussion, facial fracture, nasal fracture, mandible fracture, dental fracture.  Patient reports that he rolled out of bed and hit his head.  Also complains of bilateral knee pain.  On initial exam, borderline tachycardia, no hypoxia, trace crackles in the lungs bilaterally.  Bilateral knee tenderness and bilateral effusions, question if these are new or old.  Given poor historian and abnormal lung findings, CT scan of the head, neck, chest, abdomen, pelvis ordered.  X-rays of the knees will be performed as well as x-ray of the right hand.  1:57 AM Performed laceration repair procedure.  Patient is starting to appear little bit more uncomfortable and is more tachypneic.  Stat portable chest x-ray ordered along with DuoNeb.  Morphine IV ordered for pain.  2:47 AM Chest x-ray negative for acute findings, heart rate and respiratory rate improved after DuoNeb and morphine.  We will continue to monitor closely.  3:54 AM CT scan of the head shows some subacute/chronic subdural hematomas bilaterally with question of hyperdensity which  could be from a minimal recent bleed.  Also a type II odontoid fracture that is new from prior CT but without surrounding swelling so may be subacute.  Also T3 compression fracture.  Will discuss with neurosurgery.  For the subdurals, repeat head CT in 6 hours to check for progression.  3:59 AM Spoke with [facility].  Recommends cervical collar and patient will be seen by them today.  4:04 AM Checked in on and updated patient and daughter.  We discussed the findings of the subdurals with possible recent bleed, and plan for repeat CT in 6 hours.  Daughter says that patient would not want neurosurgical intervention even if the bleeds progressed, so patient will be admitted to [facility].  If the intracranial bleed gets worse, continue comfort care and medical management.  Cervical collar was placed, patient remained neurologically intact after placement.  Will admit for further evaluation and treatment.  4:28 AM Spoke with hospitalist, Dr. Lisa, who accepts the patient for admission.  4:43 AM Changed hard collar for an Aspen collar as patient was having some discomfort.  Patient remains neurologically intact.    At the conclusion of the encounter I discussed the results of all of the tests and the disposition. The questions were answered. The patient or family acknowledged understanding and was agreeable with the care plan.     PROCEDURES:   Procedures   PROCEDURE: Laceration Repair   INDICATIONS: Laceration   PROCEDURE PROVIDER: Dr Vlad Brown   SITE: Left forehead   TYPE/SIZE: simple, clean and no foreign body visualized  4 cm (total length)   FUNCTIONAL ASSESSMENT: Distal sensation, circulation and motor intact   MEDICATION: 3 mLs of 1% Lidocaine with epinephrine   PREPARATION: scrubbing with Hibiclens   DEBRIDEMENT: no debridement and wound explored, no foreign body found   CLOSURE:   Superficial layer closed with running 5-0 Ethilon sutures with 2 supplemental simple interrupted  sutures placed in areas of gap    Total number of sutures/staples placed: 3     Critical care 80 minutes excluding separately billable procedures.  Includes bedside management, time reviewing test results, review of records, case discussion with other providers, documentation in the medical record, time spent with patient, family members or surrogate decision-makers.    MEDICATIONS GIVEN IN THE EMERGENCY:  Medications   ipratropium - albuterol 0.5 mg/2.5 mg/3 mL (DUONEB) neb solution 3 mL (3 mLs Nebulization Given 6/13/22 0228)   morphine (PF) injection 2 mg (2 mg Intravenous Given 6/13/22 0222)   iopamidol (ISOVUE-370) solution 100 mL (100 mLs Intravenous Given 6/13/22 0325)   morphine (PF) injection 2 mg (2 mg Intravenous Given 6/13/22 0439)       NEW PRESCRIPTIONS STARTED AT TODAY'S ER VISIT  New Prescriptions    No medications on file       =================================================================    HPI    Patient information was obtained from: Patient       Lang Christina is a 89 year old male with a pertinent history of hyperlipidemia, COPD, and atrial fibrillation who presents to this ED by EMS for evaluation of a fall.     Patient was sleeping when he fell out of his bed. Endorses hurting his head, hand, and knees. States he called out for help for over an hour before staff found him. States his head and knees hurt the worst.     Patient denies chest pain, abdominal pain, or any other complaints at this time.     REVIEW OF SYSTEMS   Review of Systems   Cardiovascular: Negative for chest pain.   Gastrointestinal: Negative for abdominal pain.   Skin: Positive for wound (Abrasion to left forehead, knees, and right hand).   All other systems reviewed and are negative.       PAST MEDICAL HISTORY:  Past Medical History:   Diagnosis Date     Actinic keratosis      BCC (basal cell carcinoma) 12/2012     BPH (benign prostatic hypertrophy) with urinary obstruction      COPD (chronic obstructive pulmonary  disease) (H)     9/2012 Allergy consult     DDD (degenerative disc disease), cervical      Diverticulosis      ED (erectile dysfunction)      Hearing loss      Hyperlipidemia LDL goal < 130      Kidney stones      Localized osteoarthritis of both knees      Osteoarthritis      Senile purpura (H) 01/12/2021     Strabismus      Vasomotor rhinitis      Vitamin B12 deficiency        PAST SURGICAL HISTORY:  Past Surgical History:   Procedure Laterality Date     ARTHROSCOPY KNEE RT/LT      Right     CATARACT IOL, RT/LT       CYSTOSCOPY       HERNIA REPAIR, INGUINAL RT/LT      Right     LIGATN/STRIP LONG OR SHORT SAPHEN       PHACOEMULSIFICATION WITH STANDARD INTRAOCULAR LENS IMPLANT  12-09 / 01-10    RT / LT     TURP      .       CURRENT MEDICATIONS:    Current Facility-Administered Medications   Medication     lidocaine (PF) (XYLOCAINE) 1 % injection 10 mL     lidocaine (PF) (XYLOCAINE) 1 % injection 5 mL     lidocaine (PF) (XYLOCAINE) 1 % injection 5 mL     lidocaine (PF) (XYLOCAINE) 1 % injection 6 mL     lidocaine (PF) (XYLOCAINE) 1 % injection 6 mL     lidocaine 1 % injection 5 mL     lidocaine 1 % injection 5 mL     lidocaine 1 % injection 5 mL     lidocaine 1 % injection 5 mL     lidocaine 1 % injection 5 mL     lidocaine 1 % injection 5 mL     lidocaine 1 % injection 5 mL     lidocaine 1 % injection 5 mL     lidocaine 1 % injection 5 mL     lidocaine 1 % injection 5 mL     lidocaine 1 % injection 5 mL     lidocaine 1 % injection 5 mL     lidocaine 1 % injection 5 mL     methylPREDNISolone (DEPO-MEDROL) injection 160 mg     methylPREDNISolone (DEPO-MEDROL) injection 160 mg     methylPREDNISolone (DEPO-MEDROL) injection 160 mg     methylPREDNISolone (DEPO-MEDROL) injection 160 mg     methylPREDNISolone (DEPO-MEDROL) injection 160 mg     methylPREDNISolone (DEPO-MEDROL) injection 160 mg     methylPREDNISolone (DEPO-MEDROL) injection 160 mg     methylPREDNISolone (DEPO-MEDROL) injection 160 mg      methylPREDNISolone (DEPO-MEDROL) injection 160 mg     methylPREDNISolone (DEPO-MEDROL) injection 160 mg     methylPREDNISolone (DEPO-MEDROL) injection 160 mg     methylPREDNISolone (DEPO-MEDROL) injection 80 mg     methylPREDNISolone (DEPO-MEDROL) injection 80 mg     methylPREDNISolone (DEPO-MEDROL) injection 80 mg     methylPREDNISolone (DEPO-MEDROL) injection 80 mg     methylPREDNISolone (DEPO-MEDROL) injection 80 mg     methylPREDNISolone (DEPO-MEDROL) injection 80 mg     methylPREDNISolone (DEPO-MEDROL) injection 80 mg     Current Outpatient Medications   Medication     acetaminophen (TYLENOL) 325 MG tablet     albuterol (PROVENTIL) (2.5 MG/3ML) 0.083% neb solution     ANORO ELLIPTA 62.5-25 MCG/INH oral inhaler     Cholecalciferol (VITAMIN D) 1000 UNIT capsule     Cyanocobalamin 1000 MCG TABS     hydrocortisone (ANUSOL-HC) 25 MG suppository     order for DME     simvastatin (ZOCOR) 10 MG tablet       ALLERGIES:  Allergies   Allergen Reactions     Pcn [Penicillins]        FAMILY HISTORY:  Family History   Problem Relation Age of Onset     Cerebrovascular Disease Brother        SOCIAL HISTORY:   Social History     Socioeconomic History     Marital status:    Occupational History     Employer: RETIRED   Tobacco Use     Smoking status: Former Smoker     Packs/day: 1.00     Years: 47.00     Pack years: 47.00     Types: Cigarettes, Pipe     Quit date: 1994     Years since quittin.4     Smokeless tobacco: Never Used   Substance and Sexual Activity     Alcohol use: No     Drug use: No     Sexual activity: Not Currently     Partners: Female       VITALS:  /57   Pulse 94   Temp 98.3  F (36.8  C) (Oral)   Resp 24   Wt 58.1 kg (128 lb)   SpO2 97%   BMI 21.30 kg/m      PHYSICAL EXAM:  Physical Exam  Vitals and nursing note reviewed.   Constitutional:       Appearance: Normal appearance.   HENT:      Head: Normocephalic and atraumatic.      Right Ear: External ear normal.      Left Ear: External  ear normal.      Nose: Nose normal.      Mouth/Throat:      Mouth: Mucous membranes are moist.   Eyes:      Extraocular Movements: Extraocular movements intact.      Conjunctiva/sclera: Conjunctivae normal.      Pupils: Pupils are equal, round, and reactive to light.   Cardiovascular:      Rate and Rhythm: Regular rhythm. Tachycardia present.      Comments: Trace crackles bilaterally  Pulmonary:      Effort: Pulmonary effort is normal.      Breath sounds: Normal breath sounds. No wheezing or rales.   Abdominal:      General: Abdomen is flat. There is no distension.      Palpations: Abdomen is soft.      Tenderness: There is no abdominal tenderness. There is no guarding.   Musculoskeletal:         General: Normal range of motion.      Cervical back: Normal range of motion and neck supple.      Right lower leg: No edema.      Left lower leg: No edema.   Lymphadenopathy:      Cervical: No cervical adenopathy.   Skin:     General: Skin is warm and dry.      Findings: Abrasion (bilateral knees - moderate joint effusion), bruising (bilateral tongue) and laceration (left forehead 4cm gaping) present.      Comments: Skin tear (right hand) in 2nd web space and over 1st index mcp joint   Neurological:      General: No focal deficit present.      Mental Status: He is alert and oriented to person, place, and time. Mental status is at baseline.      Comments: No gross focal neurologic deficits   Psychiatric:         Mood and Affect: Mood normal.         Behavior: Behavior normal.         Thought Content: Thought content normal.          LAB:  All pertinent labs reviewed and interpreted.  Results for orders placed or performed during the hospital encounter of 06/13/22   Head CT w/o contrast   Result Value Ref Range    Radiologist flags (AA)      Intracranial hemorrhage and type II odontoid fracture    Impression    IMPRESSION:  HEAD CT:  1.  Left frontal scalp hematoma and laceration. Bilateral mixed attenuation subdural  hematomas have decreased in size compared to the prior CT head. The right subdural demonstrates more posterior location and layering hyperdensity which could represent   some recent subacute hemorrhage though there is no mass effect.    2.  Otherwise stable age-related changes.    CERVICAL SPINE CT:  1.  Minimally displaced type II odontoid fracture which is age-indeterminate. Of note, there is no prevertebral edema though this is likely acute to subacute in nature.    2.  Mild superior endplate height loss at T3 is age-indeterminate. Correlate for tenderness. Additionally, CT thoracic spine could be obtained for further evaluation.      [Critical Result: Intracranial hemorrhage and type II odontoid fracture]    Finding was identified on 6/13/2022 3:45 AM.     Dr. Brown was contacted by me on 6/13/2022 3:55 AM and verbalized understanding of the critical result.    Cervical spine CT w/o contrast   Result Value Ref Range    Radiologist flags (AA)      Intracranial hemorrhage and type II odontoid fracture    Impression    IMPRESSION:  HEAD CT:  1.  Left frontal scalp hematoma and laceration. Bilateral mixed attenuation subdural hematomas have decreased in size compared to the prior CT head. The right subdural demonstrates more posterior location and layering hyperdensity which could represent   some recent subacute hemorrhage though there is no mass effect.    2.  Otherwise stable age-related changes.    CERVICAL SPINE CT:  1.  Minimally displaced type II odontoid fracture which is age-indeterminate. Of note, there is no prevertebral edema though this is likely acute to subacute in nature.    2.  Mild superior endplate height loss at T3 is age-indeterminate. Correlate for tenderness. Additionally, CT thoracic spine could be obtained for further evaluation.      [Critical Result: Intracranial hemorrhage and type II odontoid fracture]    Finding was identified on 6/13/2022 3:45 AM.     Dr. Brown was contacted by  me on 6/13/2022 3:55 AM and verbalized understanding of the critical result.    CT Chest/Abdomen/Pelvis w Contrast    Impression    IMPRESSION:  1.  Slightly degraded due to motion artifact. Mild emphysematous changes and rhonchi. 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    2.  Mild cardiac enlargement. Dense coronary artery calcifications. No pericardial effusion. Atherosclerotic and ectatic distal abdominal aorta. No aneurysm.    3.  Cholelithiasis without biliary dilatation or adjacent inflammation. Evidence of remote granulomatous disease.    4.  Colonic diverticulosis without acute inflammation. Fat-containing left inguinal hernia.    5.  Degenerative changes both shoulders, spine and joints of the pelvis. Mild left convex lumbar curve.                 XR Knee Bilateral 3 Views    Impression    IMPRESSION:   RIGHT KNEE: There is a moderate-sized joint effusion versus synovitis. Advanced degenerative changes are seen most marked in the medial compartment. Advanced vascular calcification. Significant findings of chondrocalcinosis. The exam is otherwise   negative with no obvious acute bony finding such as fracture or dislocation identified..    LEFT KNEE: Advanced findings of chondrocalcinosis and degenerative changes, most marked in the medial compartment. Advanced vascular calcification. No acute bony finding such as fracture or dislocation seen. No significant joint effusion.   XR Chest Port 1 View    Impression    IMPRESSION: No acute cardiopulmonary findings. Stable significant scattered bilateral calcified pleural plaques. Advanced degenerative changes left shoulder. Partially calcified thoracic aorta.   XR Hand Right G/E 3 Views    Impression    IMPRESSION: There are significant multifocal areas of DJD seen, most marked at the first CMC joint. Findings of chondrocalcinosis in the wrist. No obvious acute bony finding such as fracture or dislocation identified.   Basic  metabolic panel   Result Value Ref Range    Sodium 131 (L) 136 - 145 mmol/L    Potassium 4.5 3.5 - 5.0 mmol/L    Chloride 98 98 - 107 mmol/L    Carbon Dioxide (CO2) 26 22 - 31 mmol/L    Anion Gap 7 5 - 18 mmol/L    Urea Nitrogen 19 8 - 28 mg/dL    Creatinine 0.67 (L) 0.70 - 1.30 mg/dL    Calcium 9.0 8.5 - 10.5 mg/dL    Glucose 132 (H) 70 - 125 mg/dL    GFR Estimate 87 >60 mL/min/1.73m2   Result Value Ref Range    Troponin I 0.07 0.00 - 0.29 ng/mL   Result Value Ref Range    Magnesium 1.6 (L) 1.8 - 2.6 mg/dL   B-Type Natriuretic Peptide ([facility] Only)   Result Value Ref Range     (H) 0 - 93 pg/mL   CBC with platelets and differential   Result Value Ref Range    WBC Count 9.1 4.0 - 11.0 10e3/uL    RBC Count 3.19 (L) 4.40 - 5.90 10e6/uL    Hemoglobin 10.1 (L) 13.3 - 17.7 g/dL    Hematocrit 30.9 (L) 40.0 - 53.0 %    MCV 97 78 - 100 fL    MCH 31.7 26.5 - 33.0 pg    MCHC 32.7 31.5 - 36.5 g/dL    RDW 13.6 10.0 - 15.0 %    Platelet Count 263 150 - 450 10e3/uL    % Neutrophils 86 %    % Lymphocytes 4 %    % Monocytes 10 %    % Eosinophils 0 %    % Basophils 0 %    % Immature Granulocytes 0 %    NRBCs per 100 WBC 0 <1 /100    Absolute Neutrophils 7.8 1.6 - 8.3 10e3/uL    Absolute Lymphocytes 0.4 (L) 0.8 - 5.3 10e3/uL    Absolute Monocytes 0.9 0.0 - 1.3 10e3/uL    Absolute Eosinophils 0.0 0.0 - 0.7 10e3/uL    Absolute Basophils 0.0 0.0 - 0.2 10e3/uL    Absolute Immature Granulocytes 0.0 <=0.4 10e3/uL    Absolute NRBCs 0.0 10e3/uL   Result Value Ref Range    INR 1.41 (H) 0.85 - 1.15   Result Value Ref Range    aPTT 35 22 - 38 Seconds       RADIOLOGY:  Reviewed all pertinent imaging. Please see official radiology report.  XR Knee Bilateral 3 Views   Final Result   IMPRESSION:    RIGHT KNEE: There is a moderate-sized joint effusion versus synovitis. Advanced degenerative changes are seen most marked in the medial compartment. Advanced vascular calcification. Significant findings of chondrocalcinosis. The exam is otherwise     negative with no obvious acute bony finding such as fracture or dislocation identified..      LEFT KNEE: Advanced findings of chondrocalcinosis and degenerative changes, most marked in the medial compartment. Advanced vascular calcification. No acute bony finding such as fracture or dislocation seen. No significant joint effusion.      XR Hand Right G/E 3 Views   Final Result   IMPRESSION: There are significant multifocal areas of DJD seen, most marked at the first CMC joint. Findings of chondrocalcinosis in the wrist. No obvious acute bony finding such as fracture or dislocation identified.      CT Chest/Abdomen/Pelvis w Contrast   Final Result   IMPRESSION:   1.  Slightly degraded due to motion artifact. Mild emphysematous changes and rhonchi. 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      2.  Mild cardiac enlargement. Dense coronary artery calcifications. No pericardial effusion. Atherosclerotic and ectatic distal abdominal aorta. No aneurysm.      3.  Cholelithiasis without biliary dilatation or adjacent inflammation. Evidence of remote granulomatous disease.      4.  Colonic diverticulosis without acute inflammation. Fat-containing left inguinal hernia.      5.  Degenerative changes both shoulders, spine and joints of the pelvis. Mild left convex lumbar curve.                        Cervical spine CT w/o contrast   Final Result   Abnormal   IMPRESSION:   HEAD CT:   1.  Left frontal scalp hematoma and laceration. Bilateral mixed attenuation subdural hematomas have decreased in size compared to the prior CT head. The right subdural demonstrates more posterior location and layering hyperdensity which could represent    some recent subacute hemorrhage though there is no mass effect.      2.  Otherwise stable age-related changes.      CERVICAL SPINE CT:   1.  Minimally displaced type II odontoid fracture which is age-indeterminate. Of note, there is no prevertebral edema though  this is likely acute to subacute in nature.      2.  Mild superior endplate height loss at T3 is age-indeterminate. Correlate for tenderness. Additionally, CT thoracic spine could be obtained for further evaluation.         [Critical Result: Intracranial hemorrhage and type II odontoid fracture]      Finding was identified on 6/13/2022 3:45 AM.       Dr. Brown was contacted by me on 6/13/2022 3:55 AM and verbalized understanding of the critical result.       Head CT w/o contrast   Final Result   Abnormal   IMPRESSION:   HEAD CT:   1.  Left frontal scalp hematoma and laceration. Bilateral mixed attenuation subdural hematomas have decreased in size compared to the prior CT head. The right subdural demonstrates more posterior location and layering hyperdensity which could represent    some recent subacute hemorrhage though there is no mass effect.      2.  Otherwise stable age-related changes.      CERVICAL SPINE CT:   1.  Minimally displaced type II odontoid fracture which is age-indeterminate. Of note, there is no prevertebral edema though this is likely acute to subacute in nature.      2.  Mild superior endplate height loss at T3 is age-indeterminate. Correlate for tenderness. Additionally, CT thoracic spine could be obtained for further evaluation.         [Critical Result: Intracranial hemorrhage and type II odontoid fracture]      Finding was identified on 6/13/2022 3:45 AM.       Dr. Brown was contacted by me on 6/13/2022 3:55 AM and verbalized understanding of the critical result.       XR Chest Port 1 View   Final Result   IMPRESSION: No acute cardiopulmonary findings. Stable significant scattered bilateral calcified pleural plaques. Advanced degenerative changes left shoulder. Partially calcified thoracic aorta.      Head CT w/o contrast    (Results Pending)       EKG:    Performed at: 1:54 AM  Impression: Atrial fibrillation  Rate: 96  Rhythm: Atrial fibrillation  Axis: Normal  QRS Interval: 90  QTc  Interval: 449  ST Changes: No acute ischemic changes  Comparison: No prior for comparison    I have independently reviewed and interpreted the EKG(s) documented above.    I, Celina Ashanti, am serving as a scribe to document services personally performed by Dr. Brown based on my observation and the provider's statements to me. I, Vlad Brown MD attest that Celina Burrell is acting in a scribe capacity, has observed my performance of the services and has documented them in accordance with my direction.    Vlad Brown M.D.  Emergency Medicine  [facility] EMERGENCY DEPARTMENT  [facility] [facility] [id number] [phone]  Dept: [phone]     Vlad Brown MD  06/13/22 0550

## 2022-06-13 NOTE — ED NOTES
Bed: JNEDP-07  Expected date: 6/13/22  Expected time: 1:12 AM  Means of arrival: Ambulance  Comments:  [facility]- 89yom fall, lac head, no thinners, memory care

## 2022-06-14 ENCOUNTER — DOCUMENTATION ONLY (OUTPATIENT)
Dept: ORTHOPEDICS | Facility: CLINIC | Age: 87
End: 2022-06-14
Payer: COMMERCIAL

## 2022-06-14 ENCOUNTER — APPOINTMENT (OUTPATIENT)
Dept: CT IMAGING | Facility: HOSPITAL | Age: 87
DRG: 085 | End: 2022-06-14
Attending: NURSE PRACTITIONER
Payer: COMMERCIAL

## 2022-06-14 ENCOUNTER — APPOINTMENT (OUTPATIENT)
Dept: SPEECH THERAPY | Facility: HOSPITAL | Age: 87
DRG: 085 | End: 2022-06-14
Attending: NURSE PRACTITIONER
Payer: COMMERCIAL

## 2022-06-14 LAB — MAGNESIUM SERPL-MCNC: 1.6 MG/DL (ref 1.8–2.6)

## 2022-06-14 PROCEDURE — 250N000011 HC RX IP 250 OP 636: Performed by: STUDENT IN AN ORGANIZED HEALTH CARE EDUCATION/TRAINING PROGRAM

## 2022-06-14 PROCEDURE — 250N000009 HC RX 250: Performed by: CLINICAL NURSE SPECIALIST

## 2022-06-14 PROCEDURE — 0S9D3ZX DRAINAGE OF LEFT KNEE JOINT, PERCUTANEOUS APPROACH, DIAGNOSTIC: ICD-10-PCS | Performed by: STUDENT IN AN ORGANIZED HEALTH CARE EDUCATION/TRAINING PROGRAM

## 2022-06-14 PROCEDURE — 250N000013 HC RX MED GY IP 250 OP 250 PS 637: Performed by: CLINICAL NURSE SPECIALIST

## 2022-06-14 PROCEDURE — 92610 EVALUATE SWALLOWING FUNCTION: CPT | Mod: GN

## 2022-06-14 PROCEDURE — 99233 SBSQ HOSP IP/OBS HIGH 50: CPT | Performed by: INTERNAL MEDICINE

## 2022-06-14 PROCEDURE — 3E0U33Z INTRODUCTION OF ANTI-INFLAMMATORY INTO JOINTS, PERCUTANEOUS APPROACH: ICD-10-PCS | Performed by: STUDENT IN AN ORGANIZED HEALTH CARE EDUCATION/TRAINING PROGRAM

## 2022-06-14 PROCEDURE — 250N000013 HC RX MED GY IP 250 OP 250 PS 637: Performed by: NURSE PRACTITIONER

## 2022-06-14 PROCEDURE — 250N000013 HC RX MED GY IP 250 OP 250 PS 637: Performed by: INTERNAL MEDICINE

## 2022-06-14 PROCEDURE — 36415 COLL VENOUS BLD VENIPUNCTURE: CPT | Performed by: STUDENT IN AN ORGANIZED HEALTH CARE EDUCATION/TRAINING PROGRAM

## 2022-06-14 PROCEDURE — 250N000009 HC RX 250: Performed by: STUDENT IN AN ORGANIZED HEALTH CARE EDUCATION/TRAINING PROGRAM

## 2022-06-14 PROCEDURE — 0S9C3ZX DRAINAGE OF RIGHT KNEE JOINT, PERCUTANEOUS APPROACH, DIAGNOSTIC: ICD-10-PCS | Performed by: STUDENT IN AN ORGANIZED HEALTH CARE EDUCATION/TRAINING PROGRAM

## 2022-06-14 PROCEDURE — 120N000001 HC R&B MED SURG/OB

## 2022-06-14 PROCEDURE — 99232 SBSQ HOSP IP/OBS MODERATE 35: CPT | Performed by: NURSE PRACTITIONER

## 2022-06-14 PROCEDURE — 70450 CT HEAD/BRAIN W/O DYE: CPT

## 2022-06-14 PROCEDURE — 83735 ASSAY OF MAGNESIUM: CPT | Performed by: STUDENT IN AN ORGANIZED HEALTH CARE EDUCATION/TRAINING PROGRAM

## 2022-06-14 PROCEDURE — 258N000003 HC RX IP 258 OP 636: Performed by: STUDENT IN AN ORGANIZED HEALTH CARE EDUCATION/TRAINING PROGRAM

## 2022-06-14 PROCEDURE — 92526 ORAL FUNCTION THERAPY: CPT | Mod: GN

## 2022-06-14 RX ORDER — ALBUTEROL SULFATE 90 UG/1
2 AEROSOL, METERED RESPIRATORY (INHALATION) EVERY 4 HOURS PRN
Status: DISCONTINUED | OUTPATIENT
Start: 2022-06-14 | End: 2022-06-21 | Stop reason: HOSPADM

## 2022-06-14 RX ORDER — AMOXICILLIN 250 MG
1 CAPSULE ORAL 2 TIMES DAILY
Status: DISCONTINUED | OUTPATIENT
Start: 2022-06-14 | End: 2022-06-21 | Stop reason: HOSPADM

## 2022-06-14 RX ORDER — ACETAMINOPHEN 650 MG/1
650 SUPPOSITORY RECTAL EVERY 6 HOURS PRN
Status: DISCONTINUED | OUTPATIENT
Start: 2022-06-14 | End: 2022-06-16

## 2022-06-14 RX ORDER — ACETAMINOPHEN 325 MG/1
325-650 TABLET ORAL EVERY 6 HOURS PRN
Status: DISCONTINUED | OUTPATIENT
Start: 2022-06-14 | End: 2022-06-16

## 2022-06-14 RX ORDER — METHYLPREDNISOLONE ACETATE 80 MG/ML
80 INJECTION, SUSPENSION INTRA-ARTICULAR; INTRALESIONAL; INTRAMUSCULAR; SOFT TISSUE ONCE
Status: COMPLETED | OUTPATIENT
Start: 2022-06-14 | End: 2022-06-14

## 2022-06-14 RX ORDER — CALCITONIN SALMON 200 [IU]/.09ML
1 SPRAY, METERED NASAL DAILY
Status: DISCONTINUED | OUTPATIENT
Start: 2022-06-15 | End: 2022-06-21 | Stop reason: HOSPADM

## 2022-06-14 RX ORDER — POLYETHYLENE GLYCOL 3350 17 G/17G
17 POWDER, FOR SOLUTION ORAL DAILY
Status: DISCONTINUED | OUTPATIENT
Start: 2022-06-14 | End: 2022-06-14

## 2022-06-14 RX ORDER — HYDROCODONE BITARTRATE AND ACETAMINOPHEN 5; 325 MG/1; MG/1
1-2 TABLET ORAL EVERY 4 HOURS PRN
Status: DISCONTINUED | OUTPATIENT
Start: 2022-06-14 | End: 2022-06-15

## 2022-06-14 RX ORDER — LIDOCAINE HYDROCHLORIDE 10 MG/ML
5 INJECTION, SOLUTION EPIDURAL; INFILTRATION; INTRACAUDAL; PERINEURAL ONCE
Status: COMPLETED | OUTPATIENT
Start: 2022-06-14 | End: 2022-06-14

## 2022-06-14 RX ORDER — SIMVASTATIN 10 MG
10 TABLET ORAL AT BEDTIME
Status: DISCONTINUED | OUTPATIENT
Start: 2022-06-14 | End: 2022-06-21 | Stop reason: HOSPADM

## 2022-06-14 RX ORDER — LANOLIN ALCOHOL/MO/W.PET/CERES
1000 CREAM (GRAM) TOPICAL DAILY
Status: DISCONTINUED | OUTPATIENT
Start: 2022-06-14 | End: 2022-06-21 | Stop reason: HOSPADM

## 2022-06-14 RX ADMIN — DEXTROSE AND SODIUM CHLORIDE: 5; 900 INJECTION, SOLUTION INTRAVENOUS at 14:44

## 2022-06-14 RX ADMIN — DICLOFENAC SODIUM 2 G: 10 GEL TOPICAL at 10:17

## 2022-06-14 RX ADMIN — Medication 2 DROP: at 16:15

## 2022-06-14 RX ADMIN — HYDROMORPHONE HYDROCHLORIDE 0.4 MG: 1 INJECTION, SOLUTION INTRAMUSCULAR; INTRAVENOUS; SUBCUTANEOUS at 10:02

## 2022-06-14 RX ADMIN — METHYLPREDNISOLONE ACETATE 80 MG: 80 INJECTION, SUSPENSION INTRA-ARTICULAR; INTRALESIONAL; INTRAMUSCULAR; SOFT TISSUE at 13:25

## 2022-06-14 RX ADMIN — SIMVASTATIN 10 MG: 10 TABLET, FILM COATED ORAL at 21:16

## 2022-06-14 RX ADMIN — SENNOSIDES AND DOCUSATE SODIUM 1 TABLET: 8.6; 5 TABLET ORAL at 21:16

## 2022-06-14 RX ADMIN — DEXTROSE AND SODIUM CHLORIDE: 5; 900 INJECTION, SOLUTION INTRAVENOUS at 04:38

## 2022-06-14 RX ADMIN — LIDOCAINE HYDROCHLORIDE 5 ML: 10 INJECTION, SOLUTION EPIDURAL; INFILTRATION; INTRACAUDAL; PERINEURAL at 13:22

## 2022-06-14 RX ADMIN — DICLOFENAC SODIUM 2 G: 10 GEL TOPICAL at 18:23

## 2022-06-14 RX ADMIN — DEXTROSE AND SODIUM CHLORIDE: 5; 900 INJECTION, SOLUTION INTRAVENOUS at 22:34

## 2022-06-14 RX ADMIN — POLYETHYLENE GLYCOL 3350 17 G: 17 POWDER, FOR SOLUTION ORAL at 21:16

## 2022-06-14 RX ADMIN — LIDOCAINE: 50 OINTMENT TOPICAL at 13:25

## 2022-06-14 RX ADMIN — HYDROMORPHONE HYDROCHLORIDE 0.2 MG: 1 INJECTION, SOLUTION INTRAMUSCULAR; INTRAVENOUS; SUBCUTANEOUS at 02:43

## 2022-06-14 RX ADMIN — Medication 2 DROP: at 22:29

## 2022-06-14 RX ADMIN — LIDOCAINE: 50 OINTMENT TOPICAL at 21:16

## 2022-06-14 RX ADMIN — LIDOCAINE: 50 OINTMENT TOPICAL at 18:23

## 2022-06-14 RX ADMIN — LIDOCAINE HYDROCHLORIDE 5 ML: 10 INJECTION, SOLUTION EPIDURAL; INFILTRATION; INTRACAUDAL; PERINEURAL at 13:23

## 2022-06-14 RX ADMIN — DICLOFENAC SODIUM 2 G: 10 GEL TOPICAL at 21:16

## 2022-06-14 RX ADMIN — ACETAMINOPHEN 650 MG: 325 TABLET ORAL at 16:37

## 2022-06-14 ASSESSMENT — ACTIVITIES OF DAILY LIVING (ADL)
ADLS_ACUITY_SCORE: 45
ADLS_ACUITY_SCORE: 49
ADLS_ACUITY_SCORE: 45

## 2022-06-14 NOTE — PROGRESS NOTES
Speech-Language Pathology: Clinical Swallow Evaluation     06/14/22 1000   General Information   Onset of Illness/Injury or Date of Surgery 06/13/22   Referring Physician Dr. Lima   Pertinent History of Current Problem bilateral SDH; Miami J collar d/t odontoid fracture; T3 fracture; Per MD note: 89 year old male who fell out of bed last night at his care facility. CT cervical shows type II odontoid fracture with 1 mm displacement. No edema on imaging giving impression this is subacute. Fracture not present on prior images Jan/Feb of this year per reports available. Age indeterminate mild superior compression fracture of T3. CT head shows mixed attenuation right parietal SDH 7 mm previously 10 mm but may have newer component of hemorrhage compared to images 2/22/22. Thin left SDH 3 mm compared to 5 mm. No midline shift. Lang endorses some neck discomfort with movement of his neck. States he feels stiff. No arm, leg pain numbness or tingling. He has left forehead laceration repaired with sutures by ED provider. He has abrasion on his right hand, open skin surface sore X2 one on his toes. He is wearing an Aspen collar. We discussed his fracture and recommendations of wearing a collar at all times for now and see if he heals. I do not think he would be a good surgical candidate. Discussed fracture and recommendations with his daughter Sharon who is in agreement. Evidently patient and his PCP had conversation a few years ago and decided any type of surgery would be too high risk for patient. Will plan to treat in a collar at all times with OP follow up with XR. Discussed with Sharon possibility of collar for life if his fracture does not heal. She states her father would likely not comply. We discussed potential risk of additional falls and spinal cord injury. She verbalized understanding. She also reports her mother was recently as Valarie's also with cervical fracture and is in a collar   General Observations  Alert and cooperative; reporting pain, nursing notified; general c/o discomfort and concern for his status and hospitalization; educated patient and offered support   Type of Evaluation   Type of Evaluation Swallow Evaluation   Oral Motor   Oral Musculature generally intact   Mucosal Quality cracked   Dentition (Oral Motor)   Dentition (Oral Motor) natural dentition   Facial Symmetry (Oral Motor)   Facial Symmetry (Oral Motor) WNL   Lip Function (Oral Motor)   Lip Range of Motion (Oral Motor) WNL   Tongue Function (Oral Motor)   Tongue ROM (Oral Motor) WNL   Jaw Function (Oral Motor)   Jaw Function (Oral Motor) range of motion impairment;other (see comments)  (due to Shirley J collar and head/neck positioning)   Vocal Quality/Secretion Management (Oral Motor)   Vocal Quality (Oral Motor) wet/gurgly;other (see comments)  (increasingly wet/gurgly as session progressed)   Secretion Management (Oral Motor) awareness of wet vocal quality;wet/gurgly vocal quality;oral suctioning required  (instructed on yankauer use and clearing secretions)   General Swallowing Observations   Current Diet/Method of Nutritional Intake (General Swallowing Observations, NIS) other (see comments)  (NPO except meds crushed in puree and ice chips sparingly until status stabilizes)   Swallowing Evaluation Clinical swallow evaluation   Clinical Swallow Evaluation   Clinical Swallow Evaluation Textures Trialed thin liquids   Esophageal Phase of Swallow   Patient reports or presents with symptoms of esophageal dysphagia Yes   Esophageal comments vague, delayed symptoms concerning for stasis versus esophageal dysphagia   Swallowing Recommendations   Diet Consistency Recommendations NPO;ice chips only;other (see comments)  (crucial meds crushed in puree as tolerated)   Medication Administration Recommendations, Swallowing (SLP) crucial meds only, crushed in puree, slow rate of intake, as tolerated   Instrumental Assessment Recommendations VFSS  (videofluoroscopic swallowing study)  (anticipate need for VFSS but not appropriate today as status appears tenuous with secretion management and recent onset and potential for swelling)   Comment, Swallowing Recommendations Bedside Swallow Study completed. Patient had vague s/s aspiration with small sips of thin water via straw; cup not trialed due to positioning with Solomon J and oral retrieval. No overt s/s aspiration but vague symptoms of dysphagia noted with puree and ice chips. Patient initially reported concern for feeling like he could choke with ice chips but this resolved with further trials. Noted wet/gurgle vocal quality developed as time/trials progressed which poses concern for stasis due to swelling and/or head/neck positioning constraints with Solomon J. Oral motor function was grossly intact with jaw motion being limited. Mastication was not tested for safety reasons. Hyolaryngeal elevation appears present but reduced upon visualization and palpation and numerous swallows noted per trial; this is expected with head tilted up posture in Solomon J. Recommend diet of NPO given concern for tenuous nature of status. Okay for ice chips sparingly and crucial meds crushed in puree cautiously. SLP to follow for further assessment as status and medical goals of care develop.   General Therapy Interventions   Planned Therapy Interventions Dysphagia Treatment   Dysphagia treatment   (Diagnostic therapy for oral intake)   Clinical Impression   Criteria for Skilled Therapeutic Interventions Met (SLP Cisco) Yes, treatment indicated   SLP Diagnosis dysphagia   Risks & Benefits of therapy have been explained evaluation/treatment results reviewed;patient   Clinical Impression Comments Patient is showing consistent signs of dysphagia and vague signs of aspiration with thin liquids   SLP Discharge Planning   SLP Discharge Recommendation Transitional Care Facility   SLP Rationale for DC Rec below baseline functioning   SLP  Brief overview of current status  SLP to follow for dysphagia management. Anticipate need for VFSS.    Total Evaluation Time   Total Evaluation Time (Minutes) 15   SLP Goals   Therapy Frequency (SLP Eval) daily   SLP Predicted Duration/Target Date for Goal Attainment 06/21/22   SLP Goals Swallow   SLP: Safely tolerate diet without signs/symptoms of aspiration One P.O. texture;With use of swallow precautions;With use of compensatory swallow strategies   Psychosocial Support   Trust Relationship/Rapport care explained;choices provided

## 2022-06-14 NOTE — ED NOTES
Assumed care of pt. Pt sleeping. Arouses to voice and is confused. Has miami j collar in place. Repositioned for comfort and revitaled. Oral cares performed. Tongue and lips very dry.

## 2022-06-14 NOTE — PROGRESS NOTES
Pt arrived to floor with daughter nicolas. Pt alert and opens eye and to voice but drowsy and sleeps in between conversations. Base line forgetful at times per daughter, would know year and maybe month but not day. Pt new , year and that he was at the hospital. Ak Chin J collar on and in place. Pt has throat gurgling that has been ongoing per family. LS clear. On 2L NC but 100%. Per family has used O2 at night at home in past but its been a while. BM present. Pt unaware of last BM. CMS intact. Base line numbness/tingling to hands/feet per pt. Able to move all extremities. Per family pt lives in long term care. Gets self dressed. Transfers self from bed to w/c with pivot and grabbing onto items then spends day in w/c going around facility and to dinning ngo. Would take self to toilet. Has had constipation issues in past but has been ok recently. Pt has multiple bruises, lacerations. Sutures to left front head. Currently NPO with ice chips and crushed meds. Pt has belongings and c-collar in room closet. Daughter in room. Pt denies pain at this time and states he is comfortable.

## 2022-06-14 NOTE — ED NOTES
[facility] ED Handoff Report    ED Chief Complaint: AMS    ED Diagnosis:  (I62.03) Bilateral chronic intracranial subdural hematoma (H)  Comment: na  Plan: na    (S12.110A) Closed odontoid fracture with type II morphology (H)  Comment: na  Plan: na    (M25.561,  M25.562,  G89.29) Chronic pain of both knees  Comment: na  Plan: cortisone shots    (S01.81XA) Laceration of forehead, initial encounter  Comment: na  Plan: sutured    (S61.411A) Skin tear of right hand without complication, initial encounter  Comment: na  Plan: na       PMH:    Past Medical History:   Diagnosis Date     Actinic keratosis      BCC (basal cell carcinoma) 12/2012     BPH (benign prostatic hypertrophy) with urinary obstruction      COPD (chronic obstructive pulmonary disease) (H)     9/2012 Allergy consult     DDD (degenerative disc disease), cervical      Diverticulosis      ED (erectile dysfunction)      Hearing loss      Hyperlipidemia LDL goal < 130      Kidney stones      Localized osteoarthritis of both knees      Osteoarthritis      Senile purpura (H) 01/12/2021     Strabismus      Vasomotor rhinitis      Vitamin B12 deficiency         Code Status:  No CPR- Do NOT Intubate     Falls Risk: Yes Band: Applied    Current Living Situation/Residence: lives in a skilled nursing facility     Elimination Status: Continent: No     Activity Level: 2 assist    Patients Preferred Language:  English     Needed: No    Vital Signs:  /55   Pulse 90   Temp 98.3  F (36.8  C) (Oral)   Resp 18   Wt 58.1 kg (128 lb)   SpO2 95%   BMI 21.30 kg/m       Cardiac Rhythm: na    Pain Score: 2/10    Is the Patient Confused:  Yes    Last Food or Drink: 06/14/22 at pt had speech and swallow assessment and did not pass. They said you could try to crush meds really really well and give meds with a bit of apple sauce but have not tried this    Focused Assessment:  Pt was here a few days ago after a fall and returned. Pt has C1 fx with miami  j collar on and a SDH. Pt is DNR/DNI. Pt is covered from head to toe in bruises. Have turned him multiple times. Does have male purewick in place. Pt sounds very gurgly but sats on 2l NC have remained good. Pt is confused. Did receive 0.4mg of diladid for pain. Ortho here to give cortisone shot in bilat knees for pain and swelling    Tests Performed: Done: Labs and Imaging    Treatments Provided:  Pain meds, repositioning and oral care multiple times. Tongue and lips very dry    Family Dynamics/Concerns: No    Family Updated On Visitor Policy: Yes    Plan of Care Communicated to Family: Yes    Who Was Updated about Plan of Care: daughter    Belongings Checklist Done and Signed by Patient: Yes    Medications sent with patient: lauren    Covid: asymptomatic , negative    Additional Information: lauren    RN: Garrett Denney RN  RN 6/14/2022 1:12 PM

## 2022-06-14 NOTE — PLAN OF CARE
Problem: Plan of Care - These are the overarching goals to be used throughout the patient stay.    Goal: Optimal Comfort and Wellbeing  Outcome: Ongoing, Progressing  Intervention: Monitor Pain and Promote Comfort  Recent Flowsheet Documentation  Taken 6/13/2022 1600 by Heather Alfaro RN  Pain Management Interventions: medication (see MAR)  Taken 6/13/2022 0911 by Heather Alfaro RN  Pain Management Interventions: medication (see MAR)  Taken 6/13/2022 0800 by Heather Alfaro, RN  Pain Management Interventions:   distraction   emotional support   Reporting pain in bilateral knees, and neck. PRN dilaudid given x2 this am, PRN Norco given x1 this evening. Pt slept most of the shift, able to wake pt up to do assessments. Disoriented to place. IV fluid infusing per order. CT/MRI and xrays completed. Sandoval J collar in place. Daughter at bedside for most of the shift, updated on plan of care.     Goal Outcome Evaluation:

## 2022-06-14 NOTE — PROGRESS NOTES
"[facility] ACUTE PAIN SERVICE    Daily PAIN Progress Note    Assessment/Plan:  Lang Christina is a 89 year old male who was admitted on 6/13/2022.  Pain team was asked to see the patient for fracture pain, acute pain after a fall. Admitted for after a fall out of bed sustaining type II odontoid fracture as well as mixed density subdural hematomas, T3 fracture. He did hit his head with head, hand and knee pain. History of CVA, paroxysmal A. Fib, (not on anticoagulation therapy) subdural hematoma, emphysema, constipation. Describes pain as \"ok\"/10 and aching. The patient denies nausea, vomiting, constipation, diarrhea, chest pain, shortness of breath.     Opioid Induced Respiratory Depression Risk Assessment:?high   (Low 0-1; Moderate 2-4; or High >4 or >/= 3 if two of the risk factors are age > 60 and opioid naive) due to the following risk factors: JEAN, COPD/Asthma/pulmonary disease, CHF, renal dysfunction, hepatic dysfunction, Obesity, Smoker, Age>60, >2 opioid therapies, concomitant CNS depressants, opioid naive status, or post surgical.     PLAN:   1) Pain is consistent with acute pain due to recent fall, fracture pain, chronic pain with degenerative joint disease bilateral knees. Labs and imaging indicated: I have personally reviewed pertinent labs, tests, and radiologic imaging in patient's chart. Patient fell out of bed sustaining type II odontoid fracture as well as mixed density subdural hematomas, T3 fracture likely acute. Treatment plan includes: multimodal pain approach, Hospital Medicine Service for medical management, Neelam GUDINO Patient educated regarding: multimodal pain approach, medications as listed below. Patient is understanding of the plan. All questions and concerns addressed to patient's satisfaction.   2)Multimodal Medication Therapy  Topical: lidocaine ointment qid alternate with diclofenac  NSAID'S: avoid in elderly gentleman with history of subdural hematomas  Muscle Relaxants: " "none  Adjuvants: tylenol 650 mg tid, add calcitonin daily   Antidepressants/anxiolytics: none  Opioids: hydrocodone/APAP 10/325 mg tablet tid prn - change to 5-10 q4h prn  IV Pain medication: hydromorphone 0.2-0.4 mg every 2 hours prn for pain not relieved by oral pain medications - change to 0.2 q4h prn   3)Non-medication interventions: Miami J, ice, rest   4)Constipation Prophylaxis: Scheduled and prn  5) Care Teams: Hospital Medicine Service, NRS    MN  pulled from system on 06/13/22. Last refill on 3/19/22. This indicated one small prescription for oxycodone 5 mg tablets #9 for 10 days.    Discharge Recommendations - We recommend prescribing the following at the time of discharge: TBD    Subjective:  Patient resting in bed. RASS -1. Alert, answers some questions appropriately. Reports pain \"ok\". Follows commands.     Active Problems:    Laceration of forehead, initial encounter    Skin tear of right hand without complication, initial encounter    Chronic pain of both knees    Closed odontoid fracture with type II morphology (H)    Bilateral chronic intracranial subdural hematoma (H)      Objective:  Vital signs in last 24 hours:  /57   Pulse 90   Temp 98.3  F (36.8  C) (Oral)   Resp 18   Wt 58.1 kg (128 lb)   SpO2 94%   BMI 21.30 kg/m    Weight:   Vitals:    06/13/22 0129   Weight: 58.1 kg (128 lb)      Weight change:   Body mass index is 21.3 kg/m .    Intake/Output last 3 shifts:  I/O last 3 completed shifts:  In: 1004 [I.V.:1004]  Out: 700 [Urine:700]  Intake/Output this shift:  No intake/output data recorded.    Review of Systems:   As per subjective, all others negative.    Physical Exam:  General Appearance:  RASS -1 no distress   Head:  Normocephalic, traumatic abrasion   Eyes:  PERRL, conjunctiva/corneas clear, EOM's intact   Nose: Nares normal, septum midline   Throat: Lips, mucosa, and tongue normal; teeth and gums normal   Neck: Miami J   Back:   Symmetric, no curvature, ROM normal "   Lungs:   Clear to auscultation bilaterally, respirations unlabored, NC O2   Chest Wall:  No tenderness or deformity   Heart:  Regular rate and rhythm, S1, S2 normal    Abdomen:   Soft, non-tender, bowel sounds active all four quadrants,  no masses, no organomegaly    Extremities:  traumatic abrasion   Skin: Skin pale, warm    Neurologic: Alert and oriented X 3, Moves all 4 extremities     Imaging: Reviewed I have personally reviewed pertinent labs, tests, and radiologic imaging in patient's chart.      Labs: Reviewed I have personally reviewed pertinent labs, tests, and radiologic imaging in patient's chart.  Recent Results (from the past 24 hour(s))   Magnesium    Collection Time: 06/14/22  7:47 AM   Result Value Ref Range    Magnesium 1.6 (L) 1.8 - 2.6 mg/dL         Total time spent 27 minutes with greater than 50% in consultation, education and coordination of care.     Also discussed with RN, pharmacist.       FRED Aly-C  Acute Care Pain Management Program  [facility] ([place], [place], [place])  Monday-Friday 8a-4p   Page via online paging system or call [phone]

## 2022-06-14 NOTE — PROGRESS NOTES
Hospitalist Progress Note    Assessment/Plan  89-year-old male with past medical history of hyperlipidemia, CVA, paroxysmal A. fib, SDH 1/2022, COPD/emphysema, BPH who presented after a fall, was found to have bilateral SDH with acute SDH on the left, type II odontoid fracture, likely acute T3 compression fracture.  Left SDH slightly larger on repeat CT, but there is no significant compression or midline shift.  Family declined all surgical interventions for the patient, he is DNR/DNI    1.  Bilateral mixed density SDH.  Left SDH slightly larger on repeat CT this morning on 6/14 but no significant compression or midline shift.  Neurosurgery recommends to repeat head CT tomorrow/prior to discharge.  SBP goal less than 150.  Hold all blood thinners.  PT/OT eval  2.  Type II odontoid fracture.  Morton J collar at all times, trey for showers.  Appreciate pain management team following for pain management  3.  Dysphagia with weak symptoms and signs of aspiration on speech therapy eval today.  Asked daughter to bring dentures - we will repeat swallow evaluation tomorrow, hopefully will do better with her dentures.  Keep n.p.o. for now, IV fluids  4.  T3 compression fracture.  No brace was recommended by neurosurgery  5.  Bilateral knee pain, advanced arthritis with bilateral effusion.  S/p aspiration and corticosteroid injection.  Appreciate orthopedic surgery  6.  COPD/emphysema.  Stable, no evidence of exacerbation.  Continue as needed inhalers        Barriers to Discharge: Need to address nutrition, pain control, mobility    Anticipated discharge date/Disposition: Likely back to LTC in 2 days    Subjective  Patient appears more confused today.  Denies having any neck pain or headache.  No tingling or numbness in his arms or legs.  Continues to complain of soreness in both knees.  Just received bilateral knee aspiration and corticosteroid injection.    Objective    Vital signs in last 24 hours  Temp:  [97.6  F (36.4   C)-98.7  F (37.1  C)] 97.6  F (36.4  C)  Pulse:  [76-90] 78  Resp:  [16-18] 16  BP: (101-133)/(55-83) 115/57  SpO2:  [94 %-100 %] 100 % @LASTSAO2(12)@ O2 Device: Nasal cannula    Weight:   Wt Readings from Last 3 Encounters:   06/13/22 58.1 kg (128 lb)   02/07/22 54.9 kg (121 lb)   11/05/21 58.1 kg (128 lb)      Weight change:     Intake/Output last 3 shifts  I/O last 3 completed shifts:  In: 1004 [I.V.:1004]  Out: 700 [Urine:700]  Body mass index is 20.66 kg/m .    Physical Exam    General Appearance:   Drowsy but easily arousable, laying in bed  HEENT: Left frontal laceration, small abrasion below the right eye, bilateral conjunctival injection with some crusting in both eyes, Aspen collar is on   Lungs:     Clear anteriorly   Cardiovascular:    Regular rate ands rhythm.  Nornal S1, S2.  No murmur, rub or gallop.  No edema   Abdomen:     Soft, non-tender, bowel sounds active all four quadrants  MSK: Both knees are slightly swollen    Neurologic:  Drowsy but easily arousable, confused     Pertinent Labs   Lab Results: personally reviewed.   Recent Labs   Lab 06/13/22  0200   *   CO2 26   BUN 19     Recent Labs   Lab 06/13/22  0200   WBC 9.1   HGB 10.1*   HCT 30.9*        Recent Labs   Lab 06/13/22  0200   TROPONINI 0.07     Invalid input(s): POCGLUFGR    Medications  Current Facility-Administered Medications   Medication     acetaminophen (TYLENOL) tablet 325-650 mg    Or     acetaminophen (TYLENOL) Suppository 650 mg     albuterol (PROVENTIL HFA/VENTOLIN HFA) inhaler     bisacodyl (DULCOLAX) Suppository 10 mg     [START ON 6/15/2022] calcitonin (salmon) (MIACALCIN) nasal spray 1 spray     calcium carbonate (TUMS) chewable tablet 1,000 mg     carboxymethylcellulose PF (REFRESH PLUS) 0.5 % ophthalmic solution 2 drop     cyanocobalamin (VITAMIN B-12) tablet 1,000 mcg     dextrose 5% and 0.9% NaCl infusion     diclofenac (VOLTAREN) 1 % topical gel 2 g     HYDROcodone-acetaminophen (NORCO) 5-325 MG per  tablet 1-2 tablet     HYDROmorphone (DILAUDID) injection 0.2 mg     ipratropium - albuterol 0.5 mg/2.5 mg/3 mL (DUONEB) neb solution 3 mL     lidocaine (LMX4) cream     lidocaine (XYLOCAINE) 5 % ointment     lidocaine 1 % 0.1-1 mL     melatonin tablet 3 mg     polyethylene glycol (MIRALAX) Packet 17 g     senna-docusate (SENOKOT-S/PERICOLACE) 8.6-50 MG per tablet 1 tablet     senna-docusate (SENOKOT-S/PERICOLACE) 8.6-50 MG per tablet 1 tablet    Or     senna-docusate (SENOKOT-S/PERICOLACE) 8.6-50 MG per tablet 2 tablet     simvastatin (ZOCOR) tablet 10 mg     sodium chloride (PF) 0.9% PF flush 3 mL     sodium chloride (PF) 0.9% PF flush 3 mL     umeclidinium-vilanterol (ANORO ELLIPTA) 62.5-25 MCG/INH oral inhaler 1 puff       Pertinent Radiology   Radiology Results: Personally reviewed   Results for orders placed or performed during the hospital encounter of 06/13/22   Head CT w/o contrast   Result Value Ref Range    Radiologist flags (AA)      Intracranial hemorrhage and type II odontoid fracture    Impression    IMPRESSION:  HEAD CT:  1.  Left frontal scalp hematoma and laceration. Bilateral mixed attenuation subdural hematomas have decreased in size compared to the prior CT head. The right subdural demonstrates more posterior location and layering hyperdensity which could represent   some recent subacute hemorrhage though there is no mass effect.    2.  Otherwise stable age-related changes.    CERVICAL SPINE CT:  1.  Minimally displaced type II odontoid fracture which is age-indeterminate. Of note, there is no prevertebral edema though this is likely acute to subacute in nature.    2.  Mild superior endplate height loss at T3 is age-indeterminate. Correlate for tenderness. Additionally, CT thoracic spine could be obtained for further evaluation.      [Critical Result: Intracranial hemorrhage and type II odontoid fracture]    Finding was identified on 6/13/2022 3:45 AM.     Dr. Brown was contacted by me on  6/13/2022 3:55 AM and verbalized understanding of the critical result.    Cervical spine CT w/o contrast   Result Value Ref Range    Radiologist flags (AA)      Intracranial hemorrhage and type II odontoid fracture    Impression    IMPRESSION:  HEAD CT:  1.  Left frontal scalp hematoma and laceration. Bilateral mixed attenuation subdural hematomas have decreased in size compared to the prior CT head. The right subdural demonstrates more posterior location and layering hyperdensity which could represent   some recent subacute hemorrhage though there is no mass effect.    2.  Otherwise stable age-related changes.    CERVICAL SPINE CT:  1.  Minimally displaced type II odontoid fracture which is age-indeterminate. Of note, there is no prevertebral edema though this is likely acute to subacute in nature.    2.  Mild superior endplate height loss at T3 is age-indeterminate. Correlate for tenderness. Additionally, CT thoracic spine could be obtained for further evaluation.      [Critical Result: Intracranial hemorrhage and type II odontoid fracture]    Finding was identified on 6/13/2022 3:45 AM.     Dr. Brown was contacted by me on 6/13/2022 3:55 AM and verbalized understanding of the critical result.    CT Chest/Abdomen/Pelvis w Contrast    Impression    IMPRESSION:  1.  Slightly degraded due to motion artifact. Mild emphysematous changes and rhonchi. 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    2.  Mild cardiac enlargement. Dense coronary artery calcifications. No pericardial effusion. Atherosclerotic and ectatic distal abdominal aorta. No aneurysm.    3.  Cholelithiasis without biliary dilatation or adjacent inflammation. Evidence of remote granulomatous disease.    4.  Colonic diverticulosis without acute inflammation. Fat-containing left inguinal hernia.    5.  Degenerative changes both shoulders, spine and joints of the pelvis. Mild left convex lumbar curve.                 XR  Knee Bilateral 3 Views    Impression    IMPRESSION:   RIGHT KNEE: There is a moderate-sized joint effusion versus synovitis. Advanced degenerative changes are seen most marked in the medial compartment. Advanced vascular calcification. Significant findings of chondrocalcinosis. The exam is otherwise   negative with no obvious acute bony finding such as fracture or dislocation identified..    LEFT KNEE: Advanced findings of chondrocalcinosis and degenerative changes, most marked in the medial compartment. Advanced vascular calcification. No acute bony finding such as fracture or dislocation seen. No significant joint effusion.   XR Chest Port 1 View    Impression    IMPRESSION: No acute cardiopulmonary findings. Stable significant scattered bilateral calcified pleural plaques. Advanced degenerative changes left shoulder. Partially calcified thoracic aorta.   XR Hand Right G/E 3 Views    Impression    IMPRESSION: There are significant multifocal areas of DJD seen, most marked at the first CMC joint. Findings of chondrocalcinosis in the wrist. No obvious acute bony finding such as fracture or dislocation identified.   Head CT w/o contrast    Impression    IMPRESSION:  1.  Small component of new increased attenuation in the occipitoparietal component of the left convexity subdural hematoma. This may reflect interval acute hemorrhage without significant overall enlargement of the hematoma. Close follow-up recommended.  2.  Stable mixed attenuation right-sided subdural hematoma.  3.  No new intracranial mass effect.  4.  Age-related and chronic ischemic changes as above.  5.  Redemonstrated left frontal scalp hematoma/laceration.    Results discussed with Radha Lima on 6/13/2022 12:18 PM.   MR Cervical Spine w/o Contrast    Impression    IMPRESSION:  1.  Type II odontoid fracture with slight dorsal angulation of the cephalad fracture fragment as seen previously. This is new compared to 01/20/2022. Foramen magnum  remains widely patent.    2.  Mild compression deformity anterior superior endplate of T3, new compared to 01/20/2022. No retropulsion or associated canal stenosis.    3.  Although the STIR images are degraded by motion artifact, there appears to be edema within the cephalad fracture fragment at C2 and more questionably along the superior endplate of T3 suggesting that these are recent/active fractures.    4.  Otherwise moderate lower cervical spondylosis with mild spinal canal narrowing at C4-C5. Multilevel foraminal narrowing, as described, with moderate left C3-C4 and C5-C6 and mild to moderate bilateral C7-T1 foraminal stenosis.     XR Cervical Spine 2/3 Views    Impression    IMPRESSION: Evaluation is limited by osteopenia and overlying cloth possibly representing clothing or bedsheets. Minimal degenerative anterolisthesis of C3 upon C4 and C4 upon C5. Alignment otherwise normal. Vertebral body heights normal. No evidence for   fracture.     XR Thoracic Spine 2 Views    Impression    IMPRESSION: This study is markedly limited by osteopenia and overlying soft tissue markings. Compression fracture deformities cannot be excluded.    CT Head w/o Contrast    Impression    IMPRESSION:    1.  Slight interval increased size of a mixed density left cerebral convexity subdural hematoma.  2.  Stable mixed density right cerebral convexity subdural hematoma.  3.  Stable local mass effect without significant midline shift.  4.  Stable chronic infarct in the left lentiform nucleus extending into the adjacent internal capsule and left corona radiata.  5.  Stable mild chronic small vessel ischemic change.       Advanced Care Planning:  Discharge Planning discussed with patient, daughter Sharon, nursing staff        Radha Lima MD  Internal Medicine Hospitalist  6/14/2022

## 2022-06-14 NOTE — PROGRESS NOTES
Neurosurgery Progress Note  06/14/22    A/P: Courtney Christina is a 89 year old male who is was admitted on 6/13 after a fall out of bed sustaining type II odontoid fracture as well as mixed density subdural hematomas. Slightly larger left subdural hematoma compared to prior, though still no significant compression or shift. This morning, courtney is drowsy but answers questions/follows commands. No surgery planned, okay to advance activity as tolerated.   Would plan repeat head CT prior to discharge to ensure stability.     Addendum: Discussed CT with family at bedside and plan for repeat head CT. Also discussed with speech, keeping NPO for concern with swallow. Ideal collar while eating would be miami j, but can trial trey vs aspen if that would help more with swallow. They are planning on repeating study tomorrow.     Plan:  1. Eddy J collar at all times, trey for showers  2. Repeat head CT tomorrow/prior to discharge - option to cancel this repeat head CT if no plans for intervention but would typically do to ensure stability   3. Okay for activity as tolerated  4. Okay for diet as tolerated from neurosurgery perspective, speech has been consulted - can consider trey vs aspen for swallow if it would be better  5. Cervical x-rays reviewed, alignment stable.         Neurosurgery Attending:  Dr. Dior    S: Denies headache, requires reminders of his location. Does not care for the collar.     PMH: No interval change  PSH: No interval change    ROS:  A full 14 point review of systems was otherwise completed and is negative aside from that mentioned above in the HPI    O:  /62   Pulse 76   Temp 98.7  F (37.1  C) (Oral)   Resp 18   Wt 58.1 kg (128 lb)   SpO2 94%   BMI 21.30 kg/m    General: Awake, alert, NAD  HEENT: AT/NC, EOMI, face symmetric, oral mucosa moist and pink  Heart: RRR: Nonlabored respirations without accessory muscle use.  Abd: S, NT, ND  Neuro: Awake, alert, speech is clear and content  is appropriate. MAEW x 4 Coordination: Deferred, patient resting in bed  Musculoskeletal: Negative straight leg raise bl  Psych: Appropriatemood and affect, pleasant, cooperative, alert and oriented x 3  Skin: forehead laceration     Labs: personally reviewed   Lab Results   Component Value Date     06/13/2022     06/01/2022     08/04/2020     03/04/2019    CO2 26 06/13/2022    CO2 29 06/01/2022    CO2 30 08/04/2020    CO2 28 03/04/2019    BUN 19 06/13/2022    BUN 11 06/01/2022    BUN 12 08/04/2020    BUN 11 03/04/2019     Recent Labs   Lab Test 06/13/22  0200 06/06/22  0818 06/01/22  0713 04/27/22  0636 11/05/21  1205 08/04/20  0913 03/04/19  1435 11/04/16  1056 11/03/15  1202 10/07/14  1408   WBC 9.1  --  5.5 5.6  --  5.8 5.2  --   --   --    HGB 10.1* 10.5* 9.7* 11.7* 13.1* 13.2* 13.8 13.8 13.7 13.7   HCT 30.9*  --  30.9* 36.5*  --  40.6 42.8  --   --   --    MCV 97  --  98 102*  --  100 102*  --   --   --      --  286 332  --  203 201  --   --   --      Recent Labs   Lab Test 06/13/22  0423   INR 1.41*   PTT 35         I personally visualized all imaging. Head CT reviewed today      Kinsey Lozada CNP  [facility] Neurosurgery  O: [phone]  P: [phone]

## 2022-06-14 NOTE — PROGRESS NOTES
S: Lang Christina was seen at [facility], ER Room 10, for a Barrow J Cervical Collar, [place] J replacement pad set and Ethel closed cell foam collar for shower/hygiene.      Dx: Odontoid Fracture    O:  The patient presented wearing an [place] collar and was in positioned in slight extension.  With the assistance of the patient s Nurse immobilizing the patient s cervical spine, I fit the patient with a size 400 Barrow-J Cervical Collar.  This maintained the patient s cervical spine in the same position as he presented.  The Ethel Cervical Collar was then adjusted to the patient s size.    A:  I spoke with both the patient (who was sleeping) and the patient s nurse.  I demonstrated the donning and doffing of the collars to the patient s nurse.  I then answered all Cervical Collar questions.  I provided the  s written documentation with the Barrow J replacement pad set and Ethel closed cell foam collar.     P: The orthosis should be worn according to the physician s directions.  Please contact the [facility] at [phone] if you have any questions.  Thank you, Braulio    Note electronically signed by Braulio Cabezas CPO, LPO

## 2022-06-14 NOTE — CONSULTS
ORTHOPEDIC CONSULTATION    Consultation  Lang Christina,  6/3/1928, MRN [id number]    Skin tear of right hand without complication, initial encounter [B40.018W]    PCP: Micheal Blake, [phone]   Code status:  No CPR- Do NOT Intubate       Extended Emergency Contact Information  Primary Emergency Contact: Sharon Christina  Mobile Phone: [phone]  Relation: Daughter  Secondary Emergency Contact: CAROLINA BEAN   [facility]  Home Phone: [phone]  Work Phone: [phone]  Mobile Phone: [phone]  Relation: Daughter         IMPRESSION:  Bilateral knee pain, advanced arthritis with bilateral effusion.     PLAN:  This patient was discussed with Dr. Perez, on-call surgeon for [facility] and they are in agreement with the following plan. Aspiration and injection was assisted and supervised by Louisa Lopez PA-C. The patient is suffering from age related cognitive decline and history was corroborated via his daughter. They states that he has a history of known osteoarthritis. He has been followed by orthopedics in the past and has responded well to aspiration of the bilateral knees with and without cortisone. Last known injection was in January of this year. After discussion of risks and benefits, patient and his daughter agreed to bilateral knee aspiration with corticosteroid injection. The patient tolerated the procedure well without any acute adverse events. Low concern for joint infection, aspirate fluid was disposed and not sent for analysis.    - PT/OT as tolerated  - Pain management: Elevation, ice, compression, and medication as tolerated.  - WBAT bilateral lower extremities  - Ortho will sign off at this time, please reach out with any further questions/concerns.     Thank you for including [facility] in the care of Lang Christina. It has been a pleasure participating in his care.    Procedure:  After verbal and informed consent was obtained utilizing a supero-lateral approach  the skin over the right knee was cleansed x3 with povidone swab, 5cc of 1% lidocaine used for skin anesthesia and along the aspiration tract with attention made to not infiltrating the joint. Following adequate skin anesthesia an 18gauge needle was introduced and 65cc of straw colored fluid was aspirated from the right knee.    Discussed steroid injection, including risks, potential benefits, and alternatives.  The patient expressed understanding.  Obtained verbal consent and the patient elected to proceed.  Procedure: A steroid injection was performed under aseptic technique at bilateral knees using 5ccs 1% plain Lidocaine and 80 mg of Depo-Medrol. Bandage applied. This was well tolerated.    Procedure:  After verbal and informed consent was obtained utilizing a supero-lateral approach the skin over the left knee was cleansed x3 with povidone swab, 5cc of 1% lidocaine used for skin anesthesia and along the aspiration tract with attention made to not infiltrating the joint. Following adequate skin anesthesia an 18gauge needle was introduced and 55cc of straw colored fluid was aspirated from the left knee.    Discussed steroid injection, including risks, potential benefits, and alternatives.  The patient expressed understanding.  Obtained verbal consent and the patient elected to proceed.  Procedure: A steroid injection was performed under aseptic technique at bilateral knees using 5ccs 1% plain Lidocaine and 80 mg of Depo-Medrol. Bandage applied. This was well tolerated.    CHIEF COMPLAINT: <principal problem not specified>    HISTORY OF PRESENT ILLNESS:  The patient is seen in orthopedic consultation at the request of Dr. Lima.  The patient is a 89 year old male with moderate pain and discomfort of the bilateral  knees. The patient is suffering from age related cognitive decline and history was corroborated via his daughter. They states that he has a history of known osteoarthritis. He has been followed by  orthopedics in the past and has responded well to aspiration of the bilateral knees with and without cortisone. Last known injection was in January of this year. They note that pain and swelling seemed to have worsened in the last 1-2 weeks. The patient describes their pain as constant and achy. They report that their pain does not radiate. The patient reports that their pain is alleviated by pain medication and aspiration/injection and is exacerbated by movement and attempted ambulation. He denies any numbness/tingling in the bilateral lower extremities.     ALLERGIES:   Review of patient's allergies indicates   Allergies   Allergen Reactions     Pcn [Penicillins]          MEDICATIONS UPON ADMISSION:  Medications were reviewed.  They include:   (Not in a hospital admission)    SOCIAL HISTORY:   he  reports that he quit smoking about 28 years ago. His smoking use included cigarettes and pipe. He has a 47.00 pack-year smoking history. He has never used smokeless tobacco. He reports that he does not drink alcohol and does not use drugs.      FAMILY HISTORY:  family history includes Cerebrovascular Disease in his brother.      REVIEW OF SYSTEMS:   Reviewed with patient. See HPI, otherwise negative       PHYSICAL EXAMINATION:  Vitals: Temp:  [98.3  F (36.8  C)-98.7  F (37.1  C)] 98.3  F (36.8  C)  Pulse:  [76-90] 90  Resp:  [18] 18  BP: (101-133)/(55-83) 116/55  SpO2:  [94 %-95 %] 95 %  General: On examination, the patient is resting comfortably, NAD, sleepy and lethargic and oriented to person, place, and and general circumstances   SKIN: There is no evidence of skin breakdown or ulceration about the bilateral knees. No significant erythema or ecchymosis. Moderate effusion visualized and palpated bilaterally.  Pulses:  dorsalis pedis and posterior tibial pulse is intact and equal bilaterally  Sensation: intact and equal bilaterally to the distal lower extremities.  Tenderness: TTP globally about the bilateral  knees  ROM: Bilateral knee ROM approximately 5 degrees from full extension to 100 degrees of flexion limited secondary to pain.  Motor: 5/5 strength of hamstring and quadriceps musculature bilaterally with some discomfort to resisted motion.    RADIOGRAPHIC EVALUATION:  Personally reviewed    IMPRESSION:   RIGHT KNEE: There is a moderate-sized joint effusion versus synovitis. Advanced degenerative changes are seen most marked in the medial compartment. Advanced vascular calcification. Significant findings of chondrocalcinosis. The exam is otherwise   negative with no obvious acute bony finding such as fracture or dislocation identified..     LEFT KNEE: Advanced findings of chondrocalcinosis and degenerative changes, most marked in the medial compartment. Advanced vascular calcification. No acute bony finding such as fracture or dislocation seen. No significant joint effusion.    PERTINENT LABS:  Lab Results: personally reviewed.   Lab Results   Component Value Date     06/13/2022     08/04/2020    CO2 26 06/13/2022    CO2 30 08/04/2020    BUN 19 06/13/2022    BUN 12 08/04/2020     Lab Results   Component Value Date    WBC 9.1 06/13/2022    WBC 5.8 08/04/2020    HGB 10.1 06/13/2022    HGB 13.2 08/04/2020    HCT 30.9 06/13/2022    HCT 40.6 08/04/2020    MCV 97 06/13/2022     08/04/2020     06/13/2022     08/04/2020     INR   Date Value Ref Range Status   06/13/2022 1.41 (H) 0.85 - 1.15 Final      No results found for: CRP    Carrillo Hernandez PA-C, APRIL  Date: 6/14/2022  Time: 1:30 PM    CC1:   Radha Lima MD    CC2:   Micheal Blake

## 2022-06-15 ENCOUNTER — APPOINTMENT (OUTPATIENT)
Dept: CT IMAGING | Facility: HOSPITAL | Age: 87
DRG: 085 | End: 2022-06-15
Attending: NURSE PRACTITIONER
Payer: COMMERCIAL

## 2022-06-15 ENCOUNTER — APPOINTMENT (OUTPATIENT)
Dept: SPEECH THERAPY | Facility: HOSPITAL | Age: 87
DRG: 085 | End: 2022-06-15
Payer: COMMERCIAL

## 2022-06-15 ENCOUNTER — APPOINTMENT (OUTPATIENT)
Dept: PHYSICAL THERAPY | Facility: HOSPITAL | Age: 87
DRG: 085 | End: 2022-06-15
Attending: NURSE PRACTITIONER
Payer: COMMERCIAL

## 2022-06-15 LAB
ANION GAP SERPL CALCULATED.3IONS-SCNC: 6 MMOL/L (ref 5–18)
BUN SERPL-MCNC: 14 MG/DL (ref 8–28)
CALCIUM SERPL-MCNC: 9.2 MG/DL (ref 8.5–10.5)
CHLORIDE BLD-SCNC: 107 MMOL/L (ref 98–107)
CO2 SERPL-SCNC: 27 MMOL/L (ref 22–31)
CREAT SERPL-MCNC: 0.6 MG/DL (ref 0.7–1.3)
GFR SERPL CREATININE-BSD FRML MDRD: 89 ML/MIN/1.73M2
GLUCOSE BLD-MCNC: 190 MG/DL (ref 70–125)
HOLD SPECIMEN: NORMAL
MAGNESIUM SERPL-MCNC: 1.8 MG/DL (ref 1.8–2.6)
POTASSIUM BLD-SCNC: 4 MMOL/L (ref 3.5–5)
SODIUM SERPL-SCNC: 140 MMOL/L (ref 136–145)

## 2022-06-15 PROCEDURE — 92526 ORAL FUNCTION THERAPY: CPT | Mod: GN

## 2022-06-15 PROCEDURE — 99231 SBSQ HOSP IP/OBS SF/LOW 25: CPT | Performed by: NURSE PRACTITIONER

## 2022-06-15 PROCEDURE — 70450 CT HEAD/BRAIN W/O DYE: CPT

## 2022-06-15 PROCEDURE — 97162 PT EVAL MOD COMPLEX 30 MIN: CPT | Mod: GP

## 2022-06-15 PROCEDURE — 99233 SBSQ HOSP IP/OBS HIGH 50: CPT | Performed by: INTERNAL MEDICINE

## 2022-06-15 PROCEDURE — 36415 COLL VENOUS BLD VENIPUNCTURE: CPT | Performed by: INTERNAL MEDICINE

## 2022-06-15 PROCEDURE — 258N000003 HC RX IP 258 OP 636: Performed by: STUDENT IN AN ORGANIZED HEALTH CARE EDUCATION/TRAINING PROGRAM

## 2022-06-15 PROCEDURE — 250N000013 HC RX MED GY IP 250 OP 250 PS 637: Performed by: NURSE PRACTITIONER

## 2022-06-15 PROCEDURE — 80048 BASIC METABOLIC PNL TOTAL CA: CPT | Performed by: INTERNAL MEDICINE

## 2022-06-15 PROCEDURE — 250N000013 HC RX MED GY IP 250 OP 250 PS 637: Performed by: STUDENT IN AN ORGANIZED HEALTH CARE EDUCATION/TRAINING PROGRAM

## 2022-06-15 PROCEDURE — 250N000013 HC RX MED GY IP 250 OP 250 PS 637: Performed by: INTERNAL MEDICINE

## 2022-06-15 PROCEDURE — 83735 ASSAY OF MAGNESIUM: CPT | Performed by: INTERNAL MEDICINE

## 2022-06-15 PROCEDURE — 120N000001 HC R&B MED SURG/OB

## 2022-06-15 PROCEDURE — 250N000011 HC RX IP 250 OP 636: Performed by: STUDENT IN AN ORGANIZED HEALTH CARE EDUCATION/TRAINING PROGRAM

## 2022-06-15 PROCEDURE — 250N000009 HC RX 250: Performed by: INTERNAL MEDICINE

## 2022-06-15 RX ORDER — NALOXONE HYDROCHLORIDE 0.4 MG/ML
0.2 INJECTION, SOLUTION INTRAMUSCULAR; INTRAVENOUS; SUBCUTANEOUS
Status: DISCONTINUED | OUTPATIENT
Start: 2022-06-15 | End: 2022-06-21 | Stop reason: HOSPADM

## 2022-06-15 RX ORDER — POLYMYXIN B SULFATE AND TRIMETHOPRIM 1; 10000 MG/ML; [USP'U]/ML
2 SOLUTION OPHTHALMIC EVERY 6 HOURS
Status: DISCONTINUED | OUTPATIENT
Start: 2022-06-15 | End: 2022-06-15

## 2022-06-15 RX ORDER — OLANZAPINE 10 MG/2ML
5 INJECTION, POWDER, FOR SOLUTION INTRAMUSCULAR DAILY PRN
Status: DISCONTINUED | OUTPATIENT
Start: 2022-06-15 | End: 2022-06-21 | Stop reason: HOSPADM

## 2022-06-15 RX ORDER — ERYTHROMYCIN 5 MG/G
OINTMENT OPHTHALMIC EVERY 6 HOURS SCHEDULED
Status: DISCONTINUED | OUTPATIENT
Start: 2022-06-15 | End: 2022-06-21 | Stop reason: HOSPADM

## 2022-06-15 RX ORDER — NALOXONE HYDROCHLORIDE 0.4 MG/ML
0.4 INJECTION, SOLUTION INTRAMUSCULAR; INTRAVENOUS; SUBCUTANEOUS
Status: DISCONTINUED | OUTPATIENT
Start: 2022-06-15 | End: 2022-06-21 | Stop reason: HOSPADM

## 2022-06-15 RX ORDER — HYDROCODONE BITARTRATE AND ACETAMINOPHEN 5; 325 MG/1; MG/1
1 TABLET ORAL EVERY 4 HOURS PRN
Status: DISCONTINUED | OUTPATIENT
Start: 2022-06-15 | End: 2022-06-16

## 2022-06-15 RX ADMIN — CALCITONIN SALMON 1 SPRAY: 200 SPRAY, METERED NASAL at 11:25

## 2022-06-15 RX ADMIN — HYDROCODONE BITARTRATE AND ACETAMINOPHEN 1 TABLET: 5; 325 TABLET ORAL at 20:09

## 2022-06-15 RX ADMIN — LIDOCAINE: 50 OINTMENT TOPICAL at 17:34

## 2022-06-15 RX ADMIN — ERYTHROMYCIN 1 G: 5 OINTMENT OPHTHALMIC at 17:33

## 2022-06-15 RX ADMIN — DEXTROSE AND SODIUM CHLORIDE: 5; 900 INJECTION, SOLUTION INTRAVENOUS at 18:55

## 2022-06-15 RX ADMIN — UMECLIDINIUM BROMIDE AND VILANTEROL TRIFENATATE 1 PUFF: 62.5; 25 POWDER RESPIRATORY (INHALATION) at 09:43

## 2022-06-15 RX ADMIN — LIDOCAINE: 50 OINTMENT TOPICAL at 08:30

## 2022-06-15 RX ADMIN — Medication 2 DROP: at 17:33

## 2022-06-15 RX ADMIN — Medication 2 DROP: at 11:53

## 2022-06-15 RX ADMIN — ERYTHROMYCIN 1 G: 5 OINTMENT OPHTHALMIC at 23:52

## 2022-06-15 RX ADMIN — DICLOFENAC SODIUM 2 G: 10 GEL TOPICAL at 14:57

## 2022-06-15 RX ADMIN — DEXTROSE AND SODIUM CHLORIDE: 5; 900 INJECTION, SOLUTION INTRAVENOUS at 08:37

## 2022-06-15 RX ADMIN — DICLOFENAC SODIUM 2 G: 10 GEL TOPICAL at 09:45

## 2022-06-15 RX ADMIN — Medication 3 MG: at 20:09

## 2022-06-15 RX ADMIN — LIDOCAINE: 50 OINTMENT TOPICAL at 11:54

## 2022-06-15 RX ADMIN — ERYTHROMYCIN 1 G: 5 OINTMENT OPHTHALMIC at 11:56

## 2022-06-15 RX ADMIN — OLANZAPINE 5 MG: 10 INJECTION, POWDER, LYOPHILIZED, FOR SOLUTION INTRAMUSCULAR at 22:24

## 2022-06-15 RX ADMIN — Medication 2 DROP: at 08:33

## 2022-06-15 RX ADMIN — DICLOFENAC SODIUM 2 G: 10 GEL TOPICAL at 20:17

## 2022-06-15 RX ADMIN — Medication 2 DROP: at 20:16

## 2022-06-15 ASSESSMENT — ACTIVITIES OF DAILY LIVING (ADL)
ADLS_ACUITY_SCORE: 49
ADLS_ACUITY_SCORE: 49
ADLS_ACUITY_SCORE: 45
ADLS_ACUITY_SCORE: 49
ADLS_ACUITY_SCORE: 43
ADLS_ACUITY_SCORE: 49
ADLS_ACUITY_SCORE: 45
ADLS_ACUITY_SCORE: 49
ADLS_ACUITY_SCORE: 43
ADLS_ACUITY_SCORE: 43
ADLS_ACUITY_SCORE: 49
ADLS_ACUITY_SCORE: 49

## 2022-06-15 NOTE — PROGRESS NOTES
"Neurosurgery Progress Note  06/15/22    A/P: Lang Christina is a 89 year old male who is was admitted on 6/13 after a fall out of bed sustaining type II odontoid fracture as well as mixed density subdural hematomas. Repeat CT today again with slight interval enlargement of left subdural by 1mm though decrease in right sided subdural hematoma, may have component of redistribution. Exam has remained stable. Can move towards discharge from neurosurgery perspective, will arrange short interval follow up in 7 days. Plan reviewed with Dr. Garcia who is in agreement.     Continue miami j collar at all times, trey for showering. Okay for PT/OT/Speech.       Plan:  1. [place] J collar at all times, trey for showers  2. Repeat CT 3-5 days unless change in exam  3. Okay for activity as tolerated  4. Okay for diet as tolerated from neurosurgery perspective, speech has been consulted - can consider trey vs aspen for swallow if it would be better  5. Cervical x-rays reviewed, alignment stable.         Neurosurgery Attending:  Staffed with Dr. Garcia     S: Denies headache, up in the chair. Not feeling too well today.   PMH: No interval change  PSH: No interval change    ROS:  A full 14 point review of systems was otherwise completed and is negative aside from that mentioned above in the HPI    O:  /67 (BP Location: Right arm)   Pulse 72   Temp 97.8  F (36.6  C) (Axillary)   Resp 17   Ht 1.676 m (5' 6\")   Wt 58.1 kg (128 lb)   SpO2 94%   BMI 20.66 kg/m    General: Awake, alert, NAD  HEENT: AT/NC, EOMI, face symmetric, oral mucosa moist and pink  Heart: RRR: Nonlabored respirations without accessory muscle use.  Abd: S, NT, ND  Neuro: Awake, alert, speech is clear and content is appropriate. MAEW x 4.   Musculoskeletal: Negative straight leg raise bl  Psych: Appropriatemood and affect, pleasant, cooperative, alert and oriented x 3  Skin: forehead laceration     Labs: personally reviewed   Lab Results   Component " Value Date     06/13/2022     06/01/2022     08/04/2020     03/04/2019    CO2 26 06/13/2022    CO2 29 06/01/2022    CO2 30 08/04/2020    CO2 28 03/04/2019    BUN 19 06/13/2022    BUN 11 06/01/2022    BUN 12 08/04/2020    BUN 11 03/04/2019     Recent Labs   Lab Test 06/13/22  0200 06/06/22  0818 06/01/22  0713 04/27/22  0636 11/05/21  1205 08/04/20  0913 03/04/19  1435 11/04/16  1056 11/03/15  1202 10/07/14  1408   WBC 9.1  --  5.5 5.6  --  5.8 5.2  --   --   --    HGB 10.1* 10.5* 9.7* 11.7* 13.1* 13.2* 13.8 13.8 13.7 13.7   HCT 30.9*  --  30.9* 36.5*  --  40.6 42.8  --   --   --    MCV 97  --  98 102*  --  100 102*  --   --   --      --  286 332  --  203 201  --   --   --      Recent Labs   Lab Test 06/13/22  0423   INR 1.41*   PTT 35         I personally visualized all imaging. Head CT reviewed today -   IMPRESSION:  1.  Slight interval increase in size of the left cerebral convexity subdural hematoma without significant change in amount of hyperattenuating blood products.  2.  Mildly decreased hyperattenuating blood products in the right cerebral convexity subdural hematoma without significant change in size.        Kinsey Lozada CNP  [facility] Neurosurgery  O: [phone]  P: [phone]

## 2022-06-15 NOTE — PLAN OF CARE
Patient spent a fair shift. Alert to self and place. Patient is [place] which makes it difficult to answer questions appropriately. The better he hears, the better he responds. Patient is NPO status except for medications. [place]-J collar in place. External male catheter in use. Prn tylenol 650 mg po given for discomfort.  Problem: Fracture Stabilization and Management (Orthopaedic Fracture)  Goal: Fracture Stability  Outcome: Ongoing, Progressing     Problem: Neurovascular Compromise (Orthopaedic Fracture)  Goal: Effective Tissue Perfusion  Outcome: Ongoing, Progressing     Problem: Pain (Orthopaedic Fracture)  Goal: Acceptable Pain Control  Outcome: Ongoing, Progressing     Problem: Respiratory Compromise (Orthopaedic Fracture)  Goal: Effective Oxygenation and Ventilation  Outcome: Ongoing, Progressing  Intervention: Promote Airway Secretion Clearance  Recent Flowsheet Documentation  Taken 6/14/2022 1600 by Yuki Grace RN  Cough And Deep Breathing: done independently per patient   Goal Outcome Evaluation:

## 2022-06-15 NOTE — PROGRESS NOTES
Hospitalist Progress Note    Assessment/Plan  89-year-old male with past medical history of hyperlipidemia, CVA, paroxysmal A. fib, SDH 1/2022, COPD/emphysema, BPH who presented after a fall, was found to have bilateral SDH with acute SDH on the left, type II odontoid fracture, likely acute T3 compression fracture.  Left SDH slightly larger on repeat CT, but there is no significant brain compression or midline shift.  Family declined all surgical interventions for the patient, he is DNR/DNI    1.  Bilateral mixed density SDH.  Left SDH slightly larger on repeat CT 6/14 but no significant brain compression or midline shift.  Repeat CT head report from this morning pending.  SBP goal less than 150.  Hold all blood thinners.  PT/OT eval  2.  Type II odontoid fracture.  [place] J collar at all times, trey for showers.  Appreciate pain management team following for pain management  3.  Dysphagia with weak symptoms and signs of aspiration on speech therapy eval 6/14.  Await repeat SLP evaluation today now that patient has his dentures, hopefully will do better with his dentures.  Keep n.p.o. for now, IV fluids  4.  T3 compression fracture.  No brace was recommended by neurosurgery, no complaints of thoracic spine pain  5.  Bilateral knee pain, advanced arthritis with bilateral effusion.  S/p aspiration and corticosteroid injection.  Appreciate orthopedic surgery  6.  Right conjunctival hemorrhage.  Continue artificial tears 4 times daily, add erythromycin ointment if there is possible corneal abrasion due to persistent discomfort in the right eye.  Outpatient follow-up with ophthalmology  7.  Acute urinary retention.  Required straight cath x2.  If has recurrent retention will place Hung catheter  8.  Acute metabolic encephalopathy.  Suspect multifactorial in the setting of SDH, hospital environment.  Supportive care  9.  COPD/emphysema.  Stable, no evidence of exacerbation.  Continue as needed inhalers        Barriers  to Discharge: oral intake, pain control, mobility    Anticipated discharge date/Disposition: Likely back to LTC in 2 days    Subjective  Patient's only concern this morning is discomfort in his right thigh, feeling that something is stuck there  Denies headache, neck pain or any significant discomfort in his knee is  Had to be straight cathed twice for PVR over 400 mL  Remains confused      Objective    Vital signs in last 24 hours  Temp:  [97.6  F (36.4  C)-98.3  F (36.8  C)] 97.8  F (36.6  C)  Pulse:  [72-90] 72  Resp:  [16-18] 17  BP: (113-120)/(55-67) 120/67  SpO2:  [92 %-100 %] 94 % @LASTSAO2(12)@ O2 Device: Nasal cannula    Weight:   Wt Readings from Last 3 Encounters:   06/13/22 58.1 kg (128 lb)   02/07/22 54.9 kg (121 lb)   11/05/21 58.1 kg (128 lb)      Weight change:     Intake/Output last 3 shifts  I/O last 3 completed shifts:  In: 606 [I.V.:606]  Out: 825 [Urine:825]  Body mass index is 20.66 kg/m .    Physical Exam    General Appearance:   Awake and alert, sitting in a chair  HEENT: Left frontal laceration, small abrasion below the right eye, right-sided conjunctival hemorrhage   Lungs:     Clear anteriorly   Cardiovascular:    Regular rate ands rhythm.  [place] S1, S2.  No murmur, rub or gallop.  No edema   Abdomen:     Soft, non-tender, bowel sounds active all four quadrants  MSK: Both knees are slightly swollen    Neurologic:  Awake and alert, grossly nonfocal     Pertinent Labs   Lab Results: personally reviewed.   Recent Labs   Lab 06/15/22  0554 06/13/22  0200    131*   CO2 27 26   BUN 14 19     Recent Labs   Lab 06/13/22  0200   WBC 9.1   HGB 10.1*   HCT 30.9*        Recent Labs   Lab 06/13/22 0200   TROPONINI 0.07     Invalid input(s): POCGLUFGR    Medications  Current Facility-Administered Medications   Medication     acetaminophen (TYLENOL) tablet 325-650 mg    Or     acetaminophen (TYLENOL) Suppository 650 mg     albuterol (PROVENTIL HFA/VENTOLIN HFA) inhaler     bisacodyl  (DULCOLAX) Suppository 10 mg     calcitonin (salmon) (MIACALCIN) nasal spray 1 spray     calcium carbonate (TUMS) chewable tablet 1,000 mg     carboxymethylcellulose PF (REFRESH PLUS) 0.5 % ophthalmic solution 2 drop     cyanocobalamin (VITAMIN B-12) tablet 1,000 mcg     dextrose 5% and 0.9% NaCl infusion     diclofenac (VOLTAREN) 1 % topical gel 2 g     erythromycin (ROMYCIN) ophthalmic ointment     HYDROcodone-acetaminophen (NORCO) 5-325 MG per tablet 1-2 tablet     HYDROmorphone (DILAUDID) injection 0.2 mg     ipratropium - albuterol 0.5 mg/2.5 mg/3 mL (DUONEB) neb solution 3 mL     lidocaine (LMX4) cream     lidocaine (XYLOCAINE) 5 % ointment     lidocaine 1 % 0.1-1 mL     melatonin tablet 3 mg     polyethylene glycol (MIRALAX) Packet 17 g     senna-docusate (SENOKOT-S/PERICOLACE) 8.6-50 MG per tablet 1 tablet     senna-docusate (SENOKOT-S/PERICOLACE) 8.6-50 MG per tablet 1 tablet    Or     senna-docusate (SENOKOT-S/PERICOLACE) 8.6-50 MG per tablet 2 tablet     simvastatin (ZOCOR) tablet 10 mg     sodium chloride (PF) 0.9% PF flush 3 mL     sodium chloride (PF) 0.9% PF flush 3 mL     umeclidinium-vilanterol (ANORO ELLIPTA) 62.5-25 MCG/INH oral inhaler 1 puff       Pertinent Radiology   Radiology Results: Personally reviewed   Results for orders placed or performed during the hospital encounter of 06/13/22   Head CT w/o contrast   Result Value Ref Range    Radiologist flags (AA)      Intracranial hemorrhage and type II odontoid fracture    Impression    IMPRESSION:  HEAD CT:  1.  Left frontal scalp hematoma and laceration. Bilateral mixed attenuation subdural hematomas have decreased in size compared to the prior CT head. The right subdural demonstrates more posterior location and layering hyperdensity which could represent   some recent subacute hemorrhage though there is no mass effect.    2.  Otherwise stable age-related changes.    CERVICAL SPINE CT:  1.  Minimally displaced type II odontoid fracture which is  age-indeterminate. Of note, there is no prevertebral edema though this is likely acute to subacute in nature.    2.  Mild superior endplate height loss at T3 is age-indeterminate. Correlate for tenderness. Additionally, CT thoracic spine could be obtained for further evaluation.      [Critical Result: Intracranial hemorrhage and type II odontoid fracture]    Finding was identified on 6/13/2022 3:45 AM.     Dr. Brown was contacted by me on 6/13/2022 3:55 AM and verbalized understanding of the critical result.    Cervical spine CT w/o contrast   Result Value Ref Range    Radiologist flags (AA)      Intracranial hemorrhage and type II odontoid fracture    Impression    IMPRESSION:  HEAD CT:  1.  Left frontal scalp hematoma and laceration. Bilateral mixed attenuation subdural hematomas have decreased in size compared to the prior CT head. The right subdural demonstrates more posterior location and layering hyperdensity which could represent   some recent subacute hemorrhage though there is no mass effect.    2.  Otherwise stable age-related changes.    CERVICAL SPINE CT:  1.  Minimally displaced type II odontoid fracture which is age-indeterminate. Of note, there is no prevertebral edema though this is likely acute to subacute in nature.    2.  Mild superior endplate height loss at T3 is age-indeterminate. Correlate for tenderness. Additionally, CT thoracic spine could be obtained for further evaluation.      [Critical Result: Intracranial hemorrhage and type II odontoid fracture]    Finding was identified on 6/13/2022 3:45 AM.     Dr. Brown was contacted by me on 6/13/2022 3:55 AM and verbalized understanding of the critical result.    CT Chest/Abdomen/Pelvis w Contrast    Impression    IMPRESSION:  1.  Slightly degraded due to motion artifact. Mild emphysematous changes and rhonchi. 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    2.  Mild cardiac enlargement. Dense coronary  artery calcifications. No pericardial effusion. Atherosclerotic and ectatic distal abdominal aorta. No aneurysm.    3.  Cholelithiasis without biliary dilatation or adjacent inflammation. Evidence of remote granulomatous disease.    4.  Colonic diverticulosis without acute inflammation. Fat-containing left inguinal hernia.    5.  Degenerative changes both shoulders, spine and joints of the pelvis. Mild left convex lumbar curve.                 XR Knee Bilateral 3 Views    Impression    IMPRESSION:   RIGHT KNEE: There is a moderate-sized joint effusion versus synovitis. Advanced degenerative changes are seen most marked in the medial compartment. Advanced vascular calcification. Significant findings of chondrocalcinosis. The exam is otherwise   negative with no obvious acute bony finding such as fracture or dislocation identified..    LEFT KNEE: Advanced findings of chondrocalcinosis and degenerative changes, most marked in the medial compartment. Advanced vascular calcification. No acute bony finding such as fracture or dislocation seen. No significant joint effusion.   XR Chest Port 1 View    Impression    IMPRESSION: No acute cardiopulmonary findings. Stable significant scattered bilateral calcified pleural plaques. Advanced degenerative changes left shoulder. Partially calcified thoracic aorta.   XR Hand Right G/E 3 Views    Impression    IMPRESSION: There are significant multifocal areas of DJD seen, most marked at the first CMC joint. Findings of chondrocalcinosis in the wrist. No obvious acute bony finding such as fracture or dislocation identified.   Head CT w/o contrast    Impression    IMPRESSION:  1.  Small component of new increased attenuation in the occipitoparietal component of the left convexity subdural hematoma. This may reflect interval acute hemorrhage without significant overall enlargement of the hematoma. Close follow-up recommended.  2.  Stable mixed attenuation right-sided subdural  hematoma.  3.  No new intracranial mass effect.  4.  Age-related and chronic ischemic changes as above.  5.  Redemonstrated left frontal scalp hematoma/laceration.    Results discussed with Radha Lima on 6/13/2022 12:18 PM.   MR Cervical Spine w/o Contrast    Impression    IMPRESSION:  1.  Type II odontoid fracture with slight dorsal angulation of the cephalad fracture fragment as seen previously. This is new compared to 01/20/2022. Foramen magnum remains widely patent.    2.  Mild compression deformity anterior superior endplate of T3, new compared to 01/20/2022. No retropulsion or associated canal stenosis.    3.  Although the STIR images are degraded by motion artifact, there appears to be edema within the cephalad fracture fragment at C2 and more questionably along the superior endplate of T3 suggesting that these are recent/active fractures.    4.  Otherwise moderate lower cervical spondylosis with mild spinal canal narrowing at C4-C5. Multilevel foraminal narrowing, as described, with moderate left C3-C4 and C5-C6 and mild to moderate bilateral C7-T1 foraminal stenosis.     XR Cervical Spine 2/3 Views    Impression    IMPRESSION: Evaluation is limited by osteopenia and overlying cloth possibly representing clothing or bedsheets. Minimal degenerative anterolisthesis of C3 upon C4 and C4 upon C5. Alignment otherwise normal. Vertebral body heights normal. No evidence for   fracture.     XR Thoracic Spine 2 Views    Impression    IMPRESSION: This study is markedly limited by osteopenia and overlying soft tissue markings. Compression fracture deformities cannot be excluded.    CT Head w/o Contrast    Impression    IMPRESSION:    1.  Slight interval increased size of a mixed density left cerebral convexity subdural hematoma.  2.  Stable mixed density right cerebral convexity subdural hematoma.  3.  Stable local mass effect without significant midline shift.  4.  Stable chronic infarct in the left lentiform  nucleus extending into the adjacent internal capsule and left corona radiata.  5.  Stable mild chronic small vessel ischemic change.       Advanced Care Planning:  Discharge Planning discussed with patient, nursing staff. Updated daughter Sharon over the phone        Radha Lima MD  Internal Medicine Hospitalist  6/14/2022

## 2022-06-15 NOTE — PLAN OF CARE
Problem: Fracture Stabilization and Management (Orthopaedic Fracture)  Goal: Fracture Stability  Outcome: Ongoing, Progressing     Problem: Pain (Orthopaedic Fracture)  Goal: Acceptable Pain Control  Outcome: Ongoing, Progressing     Problem: Respiratory Compromise (Orthopaedic Fracture)  Goal: Effective Oxygenation and Ventilation  Outcome: Ongoing, Progressing   Goal Outcome Evaluation:  Disoriented to time and situation. Frustrated at the start of the shift. Pt endorsed being hungry and thirsty. Explained to him that speech therapy will evaluate him and order a diet for him today. Ice chips an oral swabs offered. On continuous iv fluids. External catheter in place. Pt able to void only 150 ml dark cate urine. Bladder scan was 609, straight cathed 475 dark cate urine.  Amelia J in place.   Patient taken for head CT at about 0645.

## 2022-06-15 NOTE — PROGRESS NOTES
06/15/22 0850   Quick Adds   Type of Visit Initial PT Evaluation   Living Environment   People in Home facility resident   Current Living Arrangements extended care facility   Home Accessibility no concerns   Self-Care   Equipment Currently Used at Home wheelchair, manual   Fall history within last six months yes   Activity/Exercise/Self-Care Comment pt is poor historian; history taken from chart; independent w/c transfers and w/c mobility; assist with bathing, meals, meds   General Information   Onset of Illness/Injury or Date of Surgery 06/13/22   Referring Physician Dr. Lima   Patient/Family Therapy Goals Statement (PT) none stated   Pertinent History of Current Problem (include personal factors and/or comorbidities that impact the POC) fall out of bed with B SDH, odontoid fx, T3 comp. fx, forehead laceration; PMH of afib, anemia, COPD, B knee pain chronic   Existing Precautions/Restrictions spinal;fall;other (see comments)  ([place] J collar at all times; Wendy collar for showers)   Cognition   Affect/Mental Status (Cognition) confused   Orientation Status (Cognition) oriented to;person;disoriented to;place;situation;time   Follows Commands (Cognition) WFL   Range of Motion (ROM)   ROM Comment decreased B shld elevation rom   Strength (Manual Muscle Testing)   Strength (Manual Muscle Testing) Deficits observed during functional mobility   Strength Comments generalized weakness BLE/UE   Transfers   Transfers sit-stand transfer   Sit-Stand Transfer   Sit-Stand [place] (Transfers) moderate assist (50% patient effort);verbal cues;nonverbal cues (demo/gesture)   Assistive Device (Sit-Stand Transfers) walker, front-wheeled   Comment, (Sit-Stand Transfer) pt declines w/c mobility due to soreness   Gait/Stairs (Locomotion)   [place] Level (Gait) moderate assist (50% patient effort)   Assistive Device (Gait) walker, front-wheeled   Distance in Feet (Required for LE Total Joints) 3'x2   Pattern (Gait)  step-to   Deviations/Abnormal Patterns (Gait) sukhi decreased;gait speed decreased;weight shifting decreased;stride length decreased   Clinical Impression   Criteria for Skilled Therapeutic Intervention Yes, treatment indicated   PT Diagnosis (PT) impaired functional mobility   Influenced by the following impairments decreased strength, balance, cognition   Functional limitations due to impairments transfers, ambulation   Clinical Presentation (PT Evaluation Complexity) Stable/Uncomplicated   Clinical Presentation Rationale Pt presents as medically diagnosed   Clinical Decision Making (Complexity) moderate complexity   Planned Therapy Interventions (PT) balance training;bed mobility training;gait training;home exercise program;strengthening;stretching;transfer training;wheelchair management/propulsion training   Anticipated Equipment Needs at Discharge (PT) wheelchair;walker, rolling;gait belt   Risk & Benefits of therapy have been explained evaluation/treatment results reviewed;patient   PT Discharge Planning   PT Discharge Recommendation (DC Rec) Long term care facility;home with home care physical therapy   PT Rationale for DC Rec assist of 1 for transfers/mobility; below baseline mobility level so continue with home PT   Total Evaluation Time   Total Evaluation Time (Minutes) 15   Physical Therapy Goals   PT Frequency 3x/week   PT Predicted Duration/Target Date for Goal Attainment 06/22/22   PT Goals Bed Mobility;Transfers;Wheelchair Mobility   PT: Bed Mobility Supervision/stand-by assist;Supine to/from sit   PT: Transfers Supervision/stand-by assist;Bed to/from chair;Sit to/from stand;Assistive device   PT: Wheelchair Mobility 150 feet;Caregiver SBA;manual wheelchair

## 2022-06-15 NOTE — PROGRESS NOTES
"[facility] ACUTE PAIN SERVICE    Daily PAIN Progress Note    Assessment/Plan:  Lang Christina is a 89 year old male who was admitted on 6/13/2022.  Pain team was asked to see the patient for fracture pain, acute pain after a fall. Admitted for after a fall out of bed sustaining type II odontoid fracture as well as mixed density subdural hematomas, T3 fracture. He did hit his head with head, hand and knee pain. History of CVA, paroxysmal A. Fib, (not on anticoagulation therapy) subdural hematoma, emphysema, constipation. Describes pain as \"mild\"/10 and aching in knees. The patient denies nausea, vomiting, constipation, diarrhea, chest pain, shortness of breath.     Opioid Induced Respiratory Depression Risk Assessment:?high   (Low 0-1; Moderate 2-4; or High >4 or >/= 3 if two of the risk factors are age > 60 and opioid naive) due to the following risk factors: JEAN, COPD/Asthma/pulmonary disease, CHF, renal dysfunction, hepatic dysfunction, Obesity, Smoker, Age>60, >2 opioid therapies, concomitant CNS depressants, opioid naive status, or post surgical.     PLAN:   1) Pain is consistent with acute pain due to recent fall, fracture pain, chronic pain with degenerative joint disease bilateral knees. Labs and imaging indicated: I have personally reviewed pertinent labs, tests, and radiologic imaging in patient's chart.  Treatment plan includes: multimodal pain approach, Hospital Medicine Service for medical management, SHAHAB, Neelam CARLOS Patient educated regarding: multimodal pain approach, medications as listed below. Patient is understanding of the plan. All questions and concerns addressed to patient's satisfaction.   2)Multimodal Medication Therapy  Topical: lidocaine ointment qid alternate with diclofenac  NSAID'S: avoid in elderly gentleman with history of subdural hematomas  Muscle Relaxants: none  Adjuvants: tylenol 650 mg tid, calcitonin daily   Antidepressants/anxiolytics: none  Opioids: hydrocodone- APAP " "5-10mg q4h prn - change to 5 mg q4h prn   IV Pain medication: hydromorphone 0.2-0.4 q4h prn- discontinue not using    3)Non-medication interventions: [place] J, ice, rest   4)Constipation Prophylaxis: Scheduled and prn  5) Care Teams: Hospital Medicine Service, NRS    MN  pulled from system on 06/13/22. Last refill on 3/19/22. This indicated one small prescription for oxycodone 5 mg tablets #9 for 10 days.    Discharge Recommendations - We recommend prescribing the following at the time of discharge: TBD    Subjective:  Alert and up in chair. Neelam BARRON on. Reports minimal pain at rest, reports pain in knees when ambulating, denies neck or back pain currently. Uses walker for ambulation.     Active Problems:    Laceration of forehead, initial encounter    Skin tear of right hand without complication, initial encounter    Chronic pain of both knees    Closed odontoid fracture with type II morphology (H)    Bilateral chronic intracranial subdural hematoma (H)      Objective:  Vital signs in last 24 hours:  /67 (BP Location: Right arm)   Pulse 72   Temp 97.8  F (36.6  C) (Axillary)   Resp 17   Ht 1.676 m (5' 6\")   Wt 58.1 kg (128 lb)   SpO2 94%   BMI 20.66 kg/m    Weight:     Vitals:    06/13/22 0129   Weight: 58.1 kg (128 lb)      Weight change:   Body mass index is 20.66 kg/m .    Intake/Output last 3 shifts:  I/O last 3 completed shifts:  In: 606 [I.V.:606]  Out: 825 [Urine:825]  Intake/Output this shift:  I/O this shift:  In: 313 [I.V.:313]  Out: -     Review of Systems:   As per subjective, all others negative.    Physical Exam:  General Appearance:  RASS 0, Alert, no distress   Head:  Normocephalic, traumatic abrasion   Eyes:  PERRL, conjunctiva/corneas clear, EOM's intact   Nose: Nares normal, septum midline   Throat: Lips, mucosa, and tongue normal; teeth and gums normal   Neck: [place] J   Back:   Symmetric, no curvature, ROM normal   Lungs:   Clear to auscultation bilaterally, respirations unlabored, " room air   Chest Wall:  No tenderness or deformity   Heart:  Regular rate and rhythm, S1, S2 normal    Abdomen:   Soft, non-tender, bowel sounds active all four quadrants,  no masses, no organomegaly    Extremities:  Traumatic abrasions   Skin: Skin pale, warm    Neurologic: Alert and oriented X 3, Moves all 4 extremities     Imaging: Reviewed I have personally reviewed pertinent labs, tests, and radiologic imaging in patient's chart.      Labs: Reviewed I have personally reviewed pertinent labs, tests, and radiologic imaging in patient's chart.  Recent Results (from the past 24 hour(s))   Extra Purple Top Tube    Collection Time: 06/15/22  5:52 AM   Result Value Ref Range    Hold Specimen JIC    Magnesium    Collection Time: 06/15/22  5:54 AM   Result Value Ref Range    Magnesium 1.8 1.8 - 2.6 mg/dL   Basic metabolic panel    Collection Time: 06/15/22  5:54 AM   Result Value Ref Range    Sodium 140 136 - 145 mmol/L    Potassium 4.0 3.5 - 5.0 mmol/L    Chloride 107 98 - 107 mmol/L    Carbon Dioxide (CO2) 27 22 - 31 mmol/L    Anion Gap 6 5 - 18 mmol/L    Urea Nitrogen 14 8 - 28 mg/dL    Creatinine 0.60 (L) 0.70 - 1.30 mg/dL    Calcium 9.2 8.5 - 10.5 mg/dL    Glucose 190 (H) 70 - 125 mg/dL    GFR Estimate 89 >60 mL/min/1.73m2         Total time spent 18 minutes with greater than 50% in consultation, education and coordination of care.     Also discussed with RN, pharmacist.       FRED Aly-C  Acute Care Pain Management Program  [facility] ([facility], [place], [place])  Monday-Friday 8a-4p   Page via online paging system or call [phone]

## 2022-06-16 ENCOUNTER — APPOINTMENT (OUTPATIENT)
Dept: SPEECH THERAPY | Facility: HOSPITAL | Age: 87
DRG: 085 | End: 2022-06-16
Payer: COMMERCIAL

## 2022-06-16 ENCOUNTER — APPOINTMENT (OUTPATIENT)
Dept: RADIOLOGY | Facility: HOSPITAL | Age: 87
DRG: 085 | End: 2022-06-16
Attending: INTERNAL MEDICINE
Payer: COMMERCIAL

## 2022-06-16 LAB
GLUCOSE BLDC GLUCOMTR-MCNC: 108 MG/DL (ref 70–99)
MAGNESIUM SERPL-MCNC: 1.7 MG/DL (ref 1.8–2.6)

## 2022-06-16 PROCEDURE — 258N000003 HC RX IP 258 OP 636: Performed by: STUDENT IN AN ORGANIZED HEALTH CARE EDUCATION/TRAINING PROGRAM

## 2022-06-16 PROCEDURE — 83735 ASSAY OF MAGNESIUM: CPT | Performed by: INTERNAL MEDICINE

## 2022-06-16 PROCEDURE — 99233 SBSQ HOSP IP/OBS HIGH 50: CPT | Performed by: INTERNAL MEDICINE

## 2022-06-16 PROCEDURE — 120N000001 HC R&B MED SURG/OB

## 2022-06-16 PROCEDURE — 250N000013 HC RX MED GY IP 250 OP 250 PS 637: Performed by: INTERNAL MEDICINE

## 2022-06-16 PROCEDURE — 92611 MOTION FLUOROSCOPY/SWALLOW: CPT | Mod: GN

## 2022-06-16 PROCEDURE — 36415 COLL VENOUS BLD VENIPUNCTURE: CPT | Performed by: INTERNAL MEDICINE

## 2022-06-16 PROCEDURE — 250N000013 HC RX MED GY IP 250 OP 250 PS 637: Performed by: CLINICAL NURSE SPECIALIST

## 2022-06-16 PROCEDURE — 74230 X-RAY XM SWLNG FUNCJ C+: CPT

## 2022-06-16 PROCEDURE — 99231 SBSQ HOSP IP/OBS SF/LOW 25: CPT | Performed by: NURSE PRACTITIONER

## 2022-06-16 PROCEDURE — 250N000009 HC RX 250: Performed by: INTERNAL MEDICINE

## 2022-06-16 RX ORDER — OLANZAPINE 2.5 MG/1
5 TABLET, FILM COATED ORAL EVERY 6 HOURS PRN
Status: DISCONTINUED | OUTPATIENT
Start: 2022-06-16 | End: 2022-06-21 | Stop reason: HOSPADM

## 2022-06-16 RX ORDER — BENZTROPINE MESYLATE 0.5 MG/1
1 TABLET ORAL 3 TIMES DAILY PRN
Status: DISCONTINUED | OUTPATIENT
Start: 2022-06-16 | End: 2022-06-21 | Stop reason: HOSPADM

## 2022-06-16 RX ORDER — BARIUM SULFATE 400 MG/ML
SUSPENSION ORAL ONCE
Status: COMPLETED | OUTPATIENT
Start: 2022-06-16 | End: 2022-06-16

## 2022-06-16 RX ORDER — ACETAMINOPHEN 325 MG/1
975 TABLET ORAL EVERY 8 HOURS
Status: DISCONTINUED | OUTPATIENT
Start: 2022-06-16 | End: 2022-06-21 | Stop reason: HOSPADM

## 2022-06-16 RX ORDER — ACETAMINOPHEN 650 MG/1
650 SUPPOSITORY RECTAL EVERY 8 HOURS
Status: DISCONTINUED | OUTPATIENT
Start: 2022-06-16 | End: 2022-06-21 | Stop reason: HOSPADM

## 2022-06-16 RX ORDER — OLANZAPINE 10 MG/2ML
2.5 INJECTION, POWDER, FOR SOLUTION INTRAMUSCULAR EVERY 6 HOURS PRN
Status: DISCONTINUED | OUTPATIENT
Start: 2022-06-16 | End: 2022-06-21 | Stop reason: HOSPADM

## 2022-06-16 RX ORDER — HYDROCODONE BITARTRATE AND ACETAMINOPHEN 5; 325 MG/1; MG/1
1 TABLET ORAL EVERY 6 HOURS PRN
Status: DISCONTINUED | OUTPATIENT
Start: 2022-06-16 | End: 2022-06-21 | Stop reason: HOSPADM

## 2022-06-16 RX ORDER — QUETIAPINE FUMARATE 25 MG/1
25 TABLET, FILM COATED ORAL EVERY 6 HOURS PRN
Status: DISCONTINUED | OUTPATIENT
Start: 2022-06-16 | End: 2022-06-21 | Stop reason: HOSPADM

## 2022-06-16 RX ADMIN — DICLOFENAC SODIUM 2 G: 10 GEL TOPICAL at 09:01

## 2022-06-16 RX ADMIN — Medication 2 DROP: at 16:31

## 2022-06-16 RX ADMIN — SENNOSIDES AND DOCUSATE SODIUM 1 TABLET: 8.6; 5 TABLET ORAL at 20:17

## 2022-06-16 RX ADMIN — LIDOCAINE: 50 OINTMENT TOPICAL at 08:32

## 2022-06-16 RX ADMIN — POLYETHYLENE GLYCOL 3350 17 G: 17 POWDER, FOR SOLUTION ORAL at 20:17

## 2022-06-16 RX ADMIN — DICLOFENAC SODIUM 2 G: 10 GEL TOPICAL at 15:08

## 2022-06-16 RX ADMIN — Medication 2 DROP: at 08:29

## 2022-06-16 RX ADMIN — UMECLIDINIUM BROMIDE AND VILANTEROL TRIFENATATE 1 PUFF: 62.5; 25 POWDER RESPIRATORY (INHALATION) at 08:28

## 2022-06-16 RX ADMIN — ERYTHROMYCIN 1 G: 5 OINTMENT OPHTHALMIC at 17:54

## 2022-06-16 RX ADMIN — Medication 2 DROP: at 12:02

## 2022-06-16 RX ADMIN — CALCITONIN SALMON 1 SPRAY: 200 SPRAY, METERED NASAL at 08:30

## 2022-06-16 RX ADMIN — DEXTROSE AND SODIUM CHLORIDE: 5; 900 INJECTION, SOLUTION INTRAVENOUS at 08:56

## 2022-06-16 RX ADMIN — Medication 2 DROP: at 20:15

## 2022-06-16 RX ADMIN — DICLOFENAC SODIUM 2 G: 10 GEL TOPICAL at 20:13

## 2022-06-16 RX ADMIN — ERYTHROMYCIN 1 G: 5 OINTMENT OPHTHALMIC at 23:57

## 2022-06-16 RX ADMIN — ERYTHROMYCIN 1 G: 5 OINTMENT OPHTHALMIC at 12:06

## 2022-06-16 RX ADMIN — ACETAMINOPHEN 975 MG: 325 TABLET, FILM COATED ORAL at 12:04

## 2022-06-16 RX ADMIN — Medication 5 MG: at 20:17

## 2022-06-16 RX ADMIN — ERYTHROMYCIN 1 G: 5 OINTMENT OPHTHALMIC at 06:54

## 2022-06-16 RX ADMIN — BARIUM SULFATE 60 ML: 400 SUSPENSION ORAL at 10:17

## 2022-06-16 RX ADMIN — OLANZAPINE 5 MG: 2.5 TABLET, FILM COATED ORAL at 15:08

## 2022-06-16 RX ADMIN — LIDOCAINE: 50 OINTMENT TOPICAL at 20:58

## 2022-06-16 RX ADMIN — SIMVASTATIN 10 MG: 10 TABLET, FILM COATED ORAL at 20:16

## 2022-06-16 RX ADMIN — LIDOCAINE: 50 OINTMENT TOPICAL at 12:07

## 2022-06-16 RX ADMIN — QUETIAPINE FUMARATE 25 MG: 25 TABLET ORAL at 17:54

## 2022-06-16 RX ADMIN — ACETAMINOPHEN 975 MG: 325 TABLET, FILM COATED ORAL at 20:17

## 2022-06-16 RX ADMIN — LIDOCAINE: 50 OINTMENT TOPICAL at 15:51

## 2022-06-16 RX ADMIN — QUETIAPINE FUMARATE 25 MG: 25 TABLET ORAL at 12:01

## 2022-06-16 ASSESSMENT — ACTIVITIES OF DAILY LIVING (ADL)
ADLS_ACUITY_SCORE: 45

## 2022-06-16 NOTE — PLAN OF CARE
Problem: Plan of Care - These are the overarching goals to be used throughout the patient stay.    Goal: Absence of Hospital-Acquired Illness or Injury  Intervention: Identify and Manage Fall Risk  Recent Flowsheet Documentation  Taken 6/16/2022 0000 by Joya Velasco, RN  Safety Promotion/Fall Prevention:    assistive device/personal items within reach    bed alarm on    clutter free environment maintained    fall prevention program maintained    lighting adjusted    nonskid shoes/slippers when out of bed    sitter at bedside     Problem: Risk for Delirium  Goal: Improved Attention and Thought Clarity  Outcome: Ongoing, Progressing  Intervention: Maximize Cognitive Function  Recent Flowsheet Documentation  Taken 6/15/2022 2345 by Joya Velasco, RN  Reorientation Measures:    clock in view    glasses use encouraged    reorientation provided    Problem: Fracture Stabilization and Management (Orthopaedic Fracture)  Goal: Fracture Stability  Outcome: Ongoing, Progressing     Goal Outcome Evaluation:    Patient alert, disoriented x 4. Noted very impulsive and attempted to get out of bed and walk several times, pulling out on the [place] J collar. Redirected several times. Did okay for a while and then started all over again. Denied pain. 1:1 sitter at bedside for safety. NPO for video swallow study. Hung patent. Ambulated on and off with x 1 assist. Hallucinating overnight on and off; kept looking at the ceiling and though the lights were falling on him. Reassured several times. Will continue to monitor.

## 2022-06-16 NOTE — PROGRESS NOTES
[facility] ACUTE PAIN SERVICE    Daily PAIN Progress Note    Assessment/Plan:  Lang Christina is a 89 year old male who was admitted on 6/13/2022.  Pain team was asked to see the patient for fracture pain, acute pain after a fall. Admitted for after a fall out of bed sustaining type II odontoid fracture as well as mixed density subdural hematomas, T3 fracture. He did hit his head with head, has forehead laceration. History of CVA, paroxysmal A. Fib, (not on anticoagulation therapy) subdural hematoma, emphysema, constipation. Describes pain as 0-2/10 and aching in knees, denies neck pain. The patient denies nausea, vomiting, constipation, diarrhea, chest pain, shortness of breath.     Opioid Induced Respiratory Depression Risk Assessment:?high   (Low 0-1; Moderate 2-4; or High >4 or >/= 3 if two of the risk factors are age > 60 and opioid naive) due to the following risk factors: JEAN, COPD/Asthma/pulmonary disease, CHF, renal dysfunction, hepatic dysfunction, Obesity, Smoker, Age>60, >2 opioid therapies, concomitant CNS depressants, opioid naive status, or post surgical.     PLAN:   1) Pain is consistent with acute pain due to recent fall, fracture pain, chronic pain with degenerative joint disease bilateral knees. Labs and imaging indicated: I have personally reviewed pertinent labs, tests, and radiologic imaging in patient's chart.  Treatment plan includes: multimodal pain approach, Hospital Medicine Service for medical management, Neelam GUDINO Patient educated regarding: multimodal pain approach, medications as listed below. Patient is understanding of the plan. All questions and concerns addressed to patient's satisfaction.   2)Multimodal Medication Therapy  Topical: lidocaine ointment qid alternate with diclofenac  NSAID'S: avoid in elderly gentleman with history of subdural hematomas  Muscle Relaxants: none  Adjuvants: tylenol 650 mg tid, calcitonin daily   Antidepressants/anxiolytics: none  Opioids:  "hydrocodone- APAP 5 mg q4h prn - not using - decrease to q6h prn   IV Pain medication: hydromorphone 0.2-0.4 q4h prn- discontinue not using    3)Non-medication interventions: [place] J, ice, rest   4)Constipation Prophylaxis: Scheduled and prn  5) Care Teams: Hospital Medicine Service, NRS    MN  pulled from system on 06/13/22. Last refill on 3/19/22. This indicated one small prescription for oxycodone 5 mg tablets #9 for 10 days.    Discharge Recommendations - We recommend prescribing the following at the time of discharge: APAP, possibly small supply Norco, calcitonin, topicals.     Subjective:  Alert and up in chair. Neelam BARRON on. Reports minimal pain.  Denies neck or back pain currently. Uses walker for ambulation.     Active Problems:    Laceration of forehead, initial encounter    Skin tear of right hand without complication, initial encounter    Chronic pain of both knees    Closed odontoid fracture with type II morphology (H)    Bilateral chronic intracranial subdural hematoma (H)      Objective:  Vital signs in last 24 hours:  /78 (BP Location: Right arm)   Pulse 65   Temp 97.6  F (36.4  C) (Oral)   Resp 18   Ht 1.676 m (5' 6\")   Wt 58.1 kg (128 lb)   SpO2 92%   BMI 20.66 kg/m    Weight:     Vitals:    06/13/22 0129   Weight: 58.1 kg (128 lb)      Weight change:   Body mass index is 20.66 kg/m .    Intake/Output last 3 shifts:  I/O last 3 completed shifts:  In: 1232 [I.V.:1232]  Out: 250 [Urine:250]  Intake/Output this shift:  I/O this shift:  In: 851 [P.O.:480; I.V.:371]  Out: -     Review of Systems:   As per subjective, all others negative.    Physical Exam:  General Appearance:  RASS 0, Alert, no distress, up in chair   Head:  Normocephalic, traumatic abrasion   Eyes:  PERRL, conjunctiva/corneas clear, EOM's intact   Nose: Nares normal, septum midline   Throat: Lips, mucosa, and tongue normal; teeth and gums normal   Neck: [place] BEATRICE   Back:   Symmetric, no curvature, ROM normal   Lungs:   " Clear to auscultation bilaterally, respirations unlabored, room air   Chest Wall:  No tenderness or deformity   Heart:  irregular rate and rhythm, S1, S2 normal    Abdomen:   Soft, non-tender, bowel sounds active all four quadrants,  no masses, no organomegaly    Extremities: Traumatic abrasions   Skin: Skin pale, warm    Neurologic: Alert and oriented to person only, Moves all 4 extremities     Imaging: Reviewed I have personally reviewed pertinent labs, tests, and radiologic imaging in patient's chart.      Labs: Reviewed I have personally reviewed pertinent labs, tests, and radiologic imaging in patient's chart.  Recent Results (from the past 24 hour(s))   Magnesium    Collection Time: 06/16/22  6:49 AM   Result Value Ref Range    Magnesium 1.7 (L) 1.8 - 2.6 mg/dL         Total time spent 15 minutes with greater than 50% in consultation, education and coordination of care.     Also discussed with RN, pharmacist.       FRED Aly-C  Acute Care Pain Management Program  [facility] ([facility], [place], [place])  Monday-Friday 8a-4p   Page via online paging system or call [phone]

## 2022-06-16 NOTE — PROGRESS NOTES
Speech-Language Pathology: Video Swallow Study     06/16/22 1100   General Information   Onset of Illness/Injury or Date of Surgery 06/13/22   Referring Physician Dr. Lima   Pertinent History of Current Problem bilateral SDH; [place] J collar d/t odontoid fracture; T3 fracture; Per MD note: 89 year old male who fell out of bed last night at his care facility. CT cervical shows type II odontoid fracture with 1 mm displacement. No edema on imaging giving impression this is subacute. Fracture not present on prior images Jan/Feb of this year per reports available. Age indeterminate mild superior compression fracture of T3. CT head shows mixed attenuation right parietal SDH 7 mm previously 10 mm but may have newer component of hemorrhage compared to images 2/22/22. Thin left SDH 3 mm compared to 5 mm. No midline shift. Lang endorses some neck discomfort with movement of his neck. States he feels stiff. No arm, leg pain numbness or tingling. He has left forehead laceration repaired with sutures by ED provider. He has abrasion on his right hand, open skin surface sore X2 one on his toes. He is wearing an [place] collar. We discussed his fracture and recommendations of wearing a collar at all times for now and see if he heals. I do not think he would be a good surgical candidate. Discussed fracture and recommendations with his daughter Sharon who is in agreement. Evidently patient and his PCP had conversation a few years ago and decided any type of surgery would be too high risk for patient. Will plan to treat in a collar at all times with OP follow up with XR. Discussed with Sharon possibility of collar for life if his fracture does not heal. She states her father would likely not comply. We discussed potential risk of additional falls and spinal cord injury. She verbalized understanding. She also reports her mother was recently as Valarie's also with cervical fracture and is in a collar   General Observations Alert  and cooperative; requesting coffee   Type of Evaluation   Type of Evaluation Swallow Evaluation   Oral Motor   Oral Musculature generally intact   Dentition (Oral Motor)   Dentition (Oral Motor) natural dentition   Facial Symmetry (Oral Motor)   Facial Symmetry (Oral Motor) WNL   Lip Function (Oral Motor)   Lip Range of Motion (Oral Motor) WNL   Tongue Function (Oral Motor)   Tongue ROM (Oral Motor) WNL   Jaw Function (Oral Motor)   Jaw Function (Oral Motor) range of motion impairment  ([place] J collar)   General Swallowing Observations   Comment, General Swallowing Observations Concern for dysphagia noted at bedside with wet, gurgle vocal quality; at risk for dysphagia due to neck positioning and [place] J collar   Current Diet/Method of Nutritional Intake (General Swallowing Observations, NIS) NPO  (except meds crushed and ice chips)   Swallowing Evaluation Videofluoroscopic swallow study (VFSS)   VFSS Evaluation   Radiologist Dr. Stanley   Views Taken left lateral   VFSS Textures Trialed thin liquids;mildly thick liquids;pureed;solid foods   VFSS Eval: Thin Liquid Texture Trial   Mode of Presentation, Thin Liquid cup;straw;self-fed;fed by clinician   Preparatory Phase WFL   Oral Phase, Thin Liquid WFL   Pharyngeal Phase, Thin Liquid Delayed swallow reflex;WFL;Residue in pyriform sinus   Rosenbek's Penetration Aspiration Scale: Thin Liquid Trial Results 2 - contrast enters airway, remains above the vocal cords, no residue remains (penetration)   Successful Strategies Trialed During Procedure, Thin Liquid other (see comments)  (small sips)   VFSS Eval: Mildly Thick Liquids   Mode of Presentation spoon   Oral Phase WFL   Pharyngeal Phase WFL;Delayed swallow reflex;Residue in pyriform sinus   Rosenbek's Penetration Aspiration Scale 1 - no aspiration, contrast does not enter airway   VFSS Evaluation: Puree Solid Texture Trial   Mode of Presentation, Puree spoon   Preparatory Phase WFL   Oral Phase, Puree WFL   Pharyngeal  Phase, Puree WFL;Residue in valleculae;Residue in pyriform sinus   Rosenbek's Penetration Aspiration Scale: Puree Food Trial Results 1 - no aspiration, contrast does not enter airway   VFSS Evaluation: Solid Food Texture Trial   Mode of Presentation, Solid self-fed   Preparatory Phase WFL  (slow mastication but functional)   Oral Phase, Solid WFL   Pharyngeal Phase, Solid WFL;Residue in valleculae;Residue in pyriform sinus   Rosenbek's Penetration Aspiration Scale: Solid Food Trial Results 1 - no aspiration, contrast does not enter airway   Esophageal Phase of Swallow   Patient reports or presents with symptoms of esophageal dysphagia Yes   Esophageal comments mild upper esophageal residuals noted; unable to complete esophageal sweep; suspect mild deficits but functional and likely baseline   Swallowing Recommendations   Diet Consistency Recommendations Minced and moist (level 5);thin liquids (level 0)   Supervision Level for Intake distant supervision needed   Mode of Delivery Recommendations bolus size, small;slow rate of intake   Medication Administration Recommendations, Swallowing (SLP) per patient preference; suspect crushed in puree would be most comfortable   Instrumental Assessment Recommendations   (repeat VFSS if symptoms worsen, consider esophagram if symptoms worsen)   Comment, Swallowing Recommendations Videofluoroscopic Swallow Study completed. Patient had no aspiration with any intake and inconsistent, trace, transient penetration with thin liquids with good clearing. Oral motor function was grossly intact and oral phase was WFL with minimal impact on jaw from [place] J collar. Tongue base retraction was mildly reduced but WFL. Swallow response was mildly delayed to they pyriforms inconsistently and epiglottic inversion was complete to nearly complete.  Mild diffuse stasis occurred with all intake but cleared. Mild residuals noted in upper esophagus near C6-C7 with prominent osteophytes noted.   Hyolaryngeal elevation was intact to minimally reduced and hyolaryngeal excursion was mildly reduced. Overall, swallow function appears minimally impacted by odontoid fracture swelling, current dysphagia appears likely to be baseline and removal of [place] Maxine collar is not likely to further improve swallow function at this time. Reduced efficiency of swallow function is noted. Patient would benefit from softer foods, slow rate of intake and alternating solids and liquids. Recommend patient remain upright for 30 minutes following meals. Recommend diet of Minced and Moist and thin liquids to start. SLP to follow for advancement as appropriate. Monitor for changes in swallow and respiratory function and contact SLP if any concerns arise.   General Therapy Interventions   Planned Therapy Interventions Dysphagia Treatment   Dysphagia treatment Compensatory strategies for swallowing;Instruction of safe swallow strategies;Modified diet education   Intervention Comments Diet f/u; Diet texture advancement   Clinical Impression   Criteria for Skilled Therapeutic Interventions Met (SLP Eval) Yes, treatment indicated   SLP Diagnosis dysphagia   Risks & Benefits of therapy have been explained evaluation/treatment results reviewed;patient   Clinical Impression Comments Inefficient but functional, safe swallow   SLP Discharge Planning   SLP Discharge Recommendation Transitional Care Facility   SLP Brief overview of current status  VFSS 6/16/22-no aspiration, limited penetration; inefficient but relatively safe and functional swallow    Total Evaluation Time   Total Evaluation Time (Minutes) 10   SLP Goals   Therapy Frequency (SLP Eval) 5 times/wk   SLP Predicted Duration/Target Date for Goal Attainment 06/21/22   SLP Goals Swallow   SLP: Safely tolerate diet without signs/symptoms of aspiration Easy to chew diet;Thin liquids;Soft & bite sized diet;Regular diet  (determine appropriate diet texture for d/c)   Psychosocial Support    Trust Relationship/Rapport care explained;emotional support provided;reassurance provided

## 2022-06-16 NOTE — PROGRESS NOTES
Hospitalist Progress Note    Assessment/Plan  89-year-old male with past medical history of hyperlipidemia, CVA, paroxysmal A. fib, SDH 1/2022, COPD/emphysema, BPH who presented after a fall, was found to have bilateral SDH with acute SDH on the left, type II odontoid fracture, likely acute T3 compression fracture.  Left SDH slightly larger on repeat CT, but there is no significant brain compression or midline shift.  Family would not want any surgical interventions for the patient, he is DNR/DNI.  Hospital course complicated by significant confusion and acute urinary retention    1.  Bilateral mixed density SDH.  Left SDH slightly larger on repeat CT 6/14 and 6/15 but no significant brain compression or midline shift.    SBP goal less than 150.  Hold all blood thinners.  Continue PT/OT - can arrange PT/OT at the LTC  2.  Type II odontoid fracture.  [place] J collar at all times, trey for showers recommended by neurosurgery.  Switch to scheduled p.o. Tylenol, try to minimize narcotics due to increased confusion  3.  Dysphagia with weak symptoms and signs of aspiration on speech therapy eval 6/14.  Per SLP did much better on VFSS this morning: Can have level 5 soft diet and thin liquids.  Monitor oral intake  4.  Acute metabolic encephalopathy.  Suspect multifactorial in the setting of SDH, opioids, being in unfamiliar environment.  Currently requires one-to-one for safety.  Continue supportive cares.  Seroquel as needed for agitation  5.  T3 compression fracture.  No brace was recommended by neurosurgery, no complaints of thoracic spine pain  6.  Bilateral knee pain, advanced arthritis with bilateral effusion.  S/p aspiration and corticosteroid injection.  Appreciate orthopedic surgery  7.  Right conjunctival hemorrhage.  Continue artificial tears 4 times daily, added erythromycin ointment if there is possible corneal abrasion due to persistent discomfort in the right eye.  Feels better today.  Outpatient follow-up  with ophthalmology  8.  Acute urinary retention.  Required straight cath x2 and ultimately had Hung catheter placed.  Can consider voiding trial prior to discharge, otherwise follow-up with urology in 1 week after discharge  9.  Paroxysmal A. Fib.  Not on any rate control medications.  No anticoagulation    Barriers to Discharge: oral intake, pain control, mobility    Anticipated discharge date/Disposition: Likely back to LTC in 2 days    Subjective  Patient reports not feeling well.  Complains of soreness in his neck.  No headache.  Right eye feels better today.  No significant knee pain.  Hung catheter placed last night for urinary retention  Per nursing staff report was having hallucinations, very impulsive, pulling on cervical collar overnight    Objective    Vital signs in last 24 hours  Temp:  [97.5  F (36.4  C)-98.3  F (36.8  C)] 97.6  F (36.4  C)  Pulse:  [65-99] 65  Resp:  [18-20] 18  BP: (124-149)/(73-81) 124/78  SpO2:  [76 %-100 %] 92 % @LASTSAO2(12)@ O2 Device: Nasal cannula    Weight:   Wt Readings from Last 3 Encounters:   06/13/22 58.1 kg (128 lb)   02/07/22 54.9 kg (121 lb)   11/05/21 58.1 kg (128 lb)      Weight change:     Intake/Output last 3 shifts  I/O last 3 completed shifts:  In: 1232 [I.V.:1232]  Out: 250 [Urine:250]  Body mass index is 20.66 kg/m .    Physical Exam    General Appearance:   Awake and alert, sitting in a chair  HEENT: Left frontal laceration, small abrasion below the right eye, right-sided conjunctival hemorrhage -improved since yesterday   Lungs:     Clear anteriorly   Cardiovascular:   Irregularly irregular.  [place] S1, S2.  No murmur, rub or gallop.  No edema   Abdomen:     Soft, non-tender, bowel sounds active all four quadrants  MSK: Both knees are slightly swollen    Neurologic:  Awake and alert, confused     Pertinent Labs   Lab Results: personally reviewed.   Recent Labs   Lab 06/15/22  0554 06/13/22  0200    131*   CO2 27 26   BUN 14 19     Recent Labs   Lab  06/13/22  0200   WBC 9.1   HGB 10.1*   HCT 30.9*        Recent Labs   Lab 06/13/22  0200   TROPONINI 0.07     Invalid input(s): POCGLUFGR    Medications  Current Facility-Administered Medications   Medication     acetaminophen (TYLENOL) tablet 325-650 mg    Or     acetaminophen (TYLENOL) Suppository 650 mg     albuterol (PROVENTIL HFA/VENTOLIN HFA) inhaler     barium sulfate (VARIBAR THIN Liquid) 40 % oral suspension 24 g     bisacodyl (DULCOLAX) Suppository 10 mg     calcitonin (salmon) (MIACALCIN) nasal spray 1 spray     calcium carbonate (TUMS) chewable tablet 1,000 mg     carboxymethylcellulose PF (REFRESH PLUS) 0.5 % ophthalmic solution 2 drop     cyanocobalamin (VITAMIN B-12) tablet 1,000 mcg     dextrose 5% and 0.9% NaCl infusion     diclofenac (VOLTAREN) 1 % topical gel 2 g     erythromycin (ROMYCIN) ophthalmic ointment     HYDROcodone-acetaminophen (NORCO) 5-325 MG per tablet 1 tablet     ipratropium - albuterol 0.5 mg/2.5 mg/3 mL (DUONEB) neb solution 3 mL     lidocaine (LMX4) cream     lidocaine (XYLOCAINE) 5 % ointment     lidocaine 1 % 0.1-1 mL     melatonin tablet 3 mg     melatonin tablet 5 mg     naloxone (NARCAN) injection 0.2 mg    Or     naloxone (NARCAN) injection 0.4 mg    Or     naloxone (NARCAN) injection 0.2 mg    Or     naloxone (NARCAN) injection 0.4 mg     OLANZapine (zyPREXA) injection 5 mg     polyethylene glycol (MIRALAX) Packet 17 g     senna-docusate (SENOKOT-S/PERICOLACE) 8.6-50 MG per tablet 1 tablet     senna-docusate (SENOKOT-S/PERICOLACE) 8.6-50 MG per tablet 1 tablet    Or     senna-docusate (SENOKOT-S/PERICOLACE) 8.6-50 MG per tablet 2 tablet     simvastatin (ZOCOR) tablet 10 mg     sodium chloride (PF) 0.9% PF flush 3 mL     sodium chloride (PF) 0.9% PF flush 3 mL     umeclidinium-vilanterol (ANORO ELLIPTA) 62.5-25 MCG/INH oral inhaler 1 puff       Pertinent Radiology   Radiology Results: Personally reviewed   Results for orders placed or performed during the hospital  encounter of 06/13/22   Head CT w/o contrast   Result Value Ref Range    Radiologist flags (AA)      Intracranial hemorrhage and type II odontoid fracture    Impression    IMPRESSION:  HEAD CT:  1.  Left frontal scalp hematoma and laceration. Bilateral mixed attenuation subdural hematomas have decreased in size compared to the prior CT head. The right subdural demonstrates more posterior location and layering hyperdensity which could represent   some recent subacute hemorrhage though there is no mass effect.    2.  Otherwise stable age-related changes.    CERVICAL SPINE CT:  1.  Minimally displaced type II odontoid fracture which is age-indeterminate. Of note, there is no prevertebral edema though this is likely acute to subacute in nature.    2.  Mild superior endplate height loss at T3 is age-indeterminate. Correlate for tenderness. Additionally, CT thoracic spine could be obtained for further evaluation.      [Critical Result: Intracranial hemorrhage and type II odontoid fracture]    Finding was identified on 6/13/2022 3:45 AM.     Dr. Brown was contacted by me on 6/13/2022 3:55 AM and verbalized understanding of the critical result.    Cervical spine CT w/o contrast   Result Value Ref Range    Radiologist flags (AA)      Intracranial hemorrhage and type II odontoid fracture    Impression    IMPRESSION:  HEAD CT:  1.  Left frontal scalp hematoma and laceration. Bilateral mixed attenuation subdural hematomas have decreased in size compared to the prior CT head. The right subdural demonstrates more posterior location and layering hyperdensity which could represent   some recent subacute hemorrhage though there is no mass effect.    2.  Otherwise stable age-related changes.    CERVICAL SPINE CT:  1.  Minimally displaced type II odontoid fracture which is age-indeterminate. Of note, there is no prevertebral edema though this is likely acute to subacute in nature.    2.  Mild superior endplate height loss at T3 is  age-indeterminate. Correlate for tenderness. Additionally, CT thoracic spine could be obtained for further evaluation.      [Critical Result: Intracranial hemorrhage and type II odontoid fracture]    Finding was identified on 6/13/2022 3:45 AM.     Dr. Brown was contacted by me on 6/13/2022 3:55 AM and verbalized understanding of the critical result.    CT Chest/Abdomen/Pelvis w Contrast    Impression    IMPRESSION:  1.  Slightly degraded due to motion artifact. Mild emphysematous changes and rhonchi. 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    2.  Mild cardiac enlargement. Dense coronary artery calcifications. No pericardial effusion. Atherosclerotic and ectatic distal abdominal aorta. No aneurysm.    3.  Cholelithiasis without biliary dilatation or adjacent inflammation. Evidence of remote granulomatous disease.    4.  Colonic diverticulosis without acute inflammation. Fat-containing left inguinal hernia.    5.  Degenerative changes both shoulders, spine and joints of the pelvis. Mild left convex lumbar curve.                 XR Knee Bilateral 3 Views    Impression    IMPRESSION:   RIGHT KNEE: There is a moderate-sized joint effusion versus synovitis. Advanced degenerative changes are seen most marked in the medial compartment. Advanced vascular calcification. Significant findings of chondrocalcinosis. The exam is otherwise   negative with no obvious acute bony finding such as fracture or dislocation identified..    LEFT KNEE: Advanced findings of chondrocalcinosis and degenerative changes, most marked in the medial compartment. Advanced vascular calcification. No acute bony finding such as fracture or dislocation seen. No significant joint effusion.   XR Chest Port 1 View    Impression    IMPRESSION: No acute cardiopulmonary findings. Stable significant scattered bilateral calcified pleural plaques. Advanced degenerative changes left shoulder. Partially calcified thoracic  aorta.   XR Hand Right G/E 3 Views    Impression    IMPRESSION: There are significant multifocal areas of DJD seen, most marked at the first CMC joint. Findings of chondrocalcinosis in the wrist. No obvious acute bony finding such as fracture or dislocation identified.   Head CT w/o contrast    Impression    IMPRESSION:  1.  Small component of new increased attenuation in the occipitoparietal component of the left convexity subdural hematoma. This may reflect interval acute hemorrhage without significant overall enlargement of the hematoma. Close follow-up recommended.  2.  Stable mixed attenuation right-sided subdural hematoma.  3.  No new intracranial mass effect.  4.  Age-related and chronic ischemic changes as above.  5.  Redemonstrated left frontal scalp hematoma/laceration.    Results discussed with Radha Lima on 6/13/2022 12:18 PM.   MR Cervical Spine w/o Contrast    Impression    IMPRESSION:  1.  Type II odontoid fracture with slight dorsal angulation of the cephalad fracture fragment as seen previously. This is new compared to 01/20/2022. Foramen magnum remains widely patent.    2.  Mild compression deformity anterior superior endplate of T3, new compared to 01/20/2022. No retropulsion or associated canal stenosis.    3.  Although the STIR images are degraded by motion artifact, there appears to be edema within the cephalad fracture fragment at C2 and more questionably along the superior endplate of T3 suggesting that these are recent/active fractures.    4.  Otherwise moderate lower cervical spondylosis with mild spinal canal narrowing at C4-C5. Multilevel foraminal narrowing, as described, with moderate left C3-C4 and C5-C6 and mild to moderate bilateral C7-T1 foraminal stenosis.     XR Cervical Spine 2/3 Views    Impression    IMPRESSION: Evaluation is limited by osteopenia and overlying cloth possibly representing clothing or bedsheets. Minimal degenerative anterolisthesis of C3 upon C4 and C4  upon C5. Alignment otherwise normal. Vertebral body heights normal. No evidence for   fracture.     XR Thoracic Spine 2 Views    Impression    IMPRESSION: This study is markedly limited by osteopenia and overlying soft tissue markings. Compression fracture deformities cannot be excluded.    CT Head w/o Contrast    Impression    IMPRESSION:    1.  Slight interval increased size of a mixed density left cerebral convexity subdural hematoma.  2.  Stable mixed density right cerebral convexity subdural hematoma.  3.  Stable local mass effect without significant midline shift.  4.  Stable chronic infarct in the left lentiform nucleus extending into the adjacent internal capsule and left corona radiata.  5.  Stable mild chronic small vessel ischemic change.       Advanced Care Planning:  Discharge Planning discussed with patient, nursing staff, speech therapy. Updated daughter Sharon over the phone        Radha Lima MD  Internal Medicine Hospitalist  6/14/2022

## 2022-06-16 NOTE — PLAN OF CARE
Problem: Risk for Delirium  Goal: Improved Attention and Thought Clarity  Outcome: Ongoing, Progressing  Repeat head CT completed this AM - providers aware of results.  Patient disoriented x3, at times nonsensical communication.  Pupils equal and reactive to light.  Daughter at bedside, states mentation at baseline.    Problem: Fracture Stabilization and Management (Orthopaedic Fracture)  Goal: Fracture Stability  Outcome: Ongoing, Progressing  Cervical collar remains in place.    Problem: Pain (Orthopaedic Fracture)  Goal: Acceptable Pain Control  Outcome: Ongoing, Progressing  Patient denies pain, appears comfortable.  Lidocaine & Voltaren gel application alternated.  Pain team following.    Problem: Plan of Care - These are the overarching goals to be used throughout the patient stay.    Goal: Readiness for Transition of Care  Outcome: Ongoing, Progressing  Bedside swallow evaluation completed - remains NPO.  Video swallow study ordered.  IVF infusing.    Lizzy Mckeon, RN    Lizzy Mckeon, RN

## 2022-06-16 NOTE — PLAN OF CARE
Problem: Attention and Thought Clarity Impairment (Delirium)  Goal: Improved Attention and Thought Clarity  Outcome: Ongoing, Not Progressing  Problem: Altered Behavior (Delirium)  Goal: Improved Behavioral Control  Outcome: Ongoing, Not Progressing  Problem: Sleep Disturbance (Delirium)  Goal: Improved Sleep  Outcome: Ongoing, Not Progressing    Confusion increased from previous day, oriented to self only.  Hyperactive, attempts to pull at rosales, IV line and cervical collar; attempts to transfer self in and out of bed. Even observed patient attempting to rip apart gown.  At times patient is redirectable, but only briefly.  At times talks nonsensical, appears to have visual hallucinations.  1:1 remains in place for patient safety.   PRN Seroquel and scheduled Tylenol given at 12pm with no improvement, update text page set to Dr. Lima shortly after 1400.  Calming music on, calming essential oil in use.    Lizzy Mckeon RN

## 2022-06-17 ENCOUNTER — TELEPHONE (OUTPATIENT)
Dept: NEUROSURGERY | Facility: CLINIC | Age: 87
End: 2022-06-17
Payer: COMMERCIAL

## 2022-06-17 DIAGNOSIS — S12.110A CLOSED ODONTOID FRACTURE WITH TYPE II MORPHOLOGY (H): Primary | ICD-10-CM

## 2022-06-17 DIAGNOSIS — I62.03 BILATERAL CHRONIC INTRACRANIAL SUBDURAL HEMATOMA (H): ICD-10-CM

## 2022-06-17 LAB — MAGNESIUM SERPL-MCNC: 1.8 MG/DL (ref 1.8–2.6)

## 2022-06-17 PROCEDURE — 99231 SBSQ HOSP IP/OBS SF/LOW 25: CPT | Performed by: NURSE PRACTITIONER

## 2022-06-17 PROCEDURE — 250N000009 HC RX 250: Performed by: INTERNAL MEDICINE

## 2022-06-17 PROCEDURE — 36415 COLL VENOUS BLD VENIPUNCTURE: CPT | Performed by: INTERNAL MEDICINE

## 2022-06-17 PROCEDURE — 99233 SBSQ HOSP IP/OBS HIGH 50: CPT | Performed by: INTERNAL MEDICINE

## 2022-06-17 PROCEDURE — 120N000001 HC R&B MED SURG/OB

## 2022-06-17 PROCEDURE — 250N000013 HC RX MED GY IP 250 OP 250 PS 637: Performed by: CLINICAL NURSE SPECIALIST

## 2022-06-17 PROCEDURE — 83735 ASSAY OF MAGNESIUM: CPT | Performed by: INTERNAL MEDICINE

## 2022-06-17 PROCEDURE — 250N000013 HC RX MED GY IP 250 OP 250 PS 637: Performed by: INTERNAL MEDICINE

## 2022-06-17 RX ADMIN — QUETIAPINE FUMARATE 25 MG: 25 TABLET ORAL at 21:40

## 2022-06-17 RX ADMIN — Medication 2 DROP: at 21:40

## 2022-06-17 RX ADMIN — CALCITONIN SALMON 1 SPRAY: 200 SPRAY, METERED NASAL at 13:08

## 2022-06-17 RX ADMIN — LIDOCAINE: 50 OINTMENT TOPICAL at 21:39

## 2022-06-17 RX ADMIN — DICLOFENAC SODIUM 2 G: 10 GEL TOPICAL at 21:39

## 2022-06-17 RX ADMIN — SIMVASTATIN 10 MG: 10 TABLET, FILM COATED ORAL at 21:41

## 2022-06-17 RX ADMIN — Medication 2 DROP: at 16:11

## 2022-06-17 RX ADMIN — QUETIAPINE FUMARATE 25 MG: 25 TABLET ORAL at 00:32

## 2022-06-17 RX ADMIN — ERYTHROMYCIN: 5 OINTMENT OPHTHALMIC at 13:07

## 2022-06-17 RX ADMIN — Medication 5 MG: at 21:39

## 2022-06-17 RX ADMIN — Medication 2 DROP: at 13:08

## 2022-06-17 RX ADMIN — ACETAMINOPHEN 975 MG: 325 TABLET, FILM COATED ORAL at 03:16

## 2022-06-17 RX ADMIN — ACETAMINOPHEN 975 MG: 325 TABLET, FILM COATED ORAL at 21:41

## 2022-06-17 RX ADMIN — ERYTHROMYCIN 1 G: 5 OINTMENT OPHTHALMIC at 06:09

## 2022-06-17 RX ADMIN — ERYTHROMYCIN: 5 OINTMENT OPHTHALMIC at 18:35

## 2022-06-17 RX ADMIN — POLYETHYLENE GLYCOL 3350 17 G: 17 POWDER, FOR SOLUTION ORAL at 21:39

## 2022-06-17 RX ADMIN — DICLOFENAC SODIUM 2 G: 10 GEL TOPICAL at 13:08

## 2022-06-17 RX ADMIN — SENNOSIDES AND DOCUSATE SODIUM 1 TABLET: 8.6; 5 TABLET ORAL at 21:41

## 2022-06-17 ASSESSMENT — ACTIVITIES OF DAILY LIVING (ADL)
ADLS_ACUITY_SCORE: 45
ADLS_ACUITY_SCORE: 45
ADLS_ACUITY_SCORE: 47
ADLS_ACUITY_SCORE: 47
ADLS_ACUITY_SCORE: 45
ADLS_ACUITY_SCORE: 47
ADLS_ACUITY_SCORE: 47
ADLS_ACUITY_SCORE: 45

## 2022-06-17 NOTE — PROGRESS NOTES
[facility]    Medicine Progress Note - Hospitalist Service    Date of Admission:  6/13/2022    Assessment & Plan        89-year-old male with past medical history of hyperlipidemia, CVA, paroxysmal A. fib, SDH 1/2022, COPD/emphysema, BPH who presented after a fall, was found to have bilateral SDH with acute SDH on the left, type II odontoid fracture, likely acute T3 compression fracture.  Left SDH slightly larger on repeat CT, but there is no significant brain compression or midline shift.  Family would not want any surgical interventions for the patient, he is DNR/DNI.  Hospital course complicated by significant confusion and acute urinary retention     1.  Bilateral mixed density SDH.  Left SDH slightly larger on repeat CT 6/14 and 6/15 but no significant brain compression or midline shift.    SBP goal less than 150.  Hold all blood thinners.  Continue PT/OT - can arrange PT/OT at the LTC  2.  Type II odontoid fracture.  [place] J collar at all times, trey for showers recommended by neurosurgery.  Switch to scheduled p.o. Tylenol, try to minimize narcotics due to increased confusion, lethargy  3.  Dysphagia with weak symptoms and signs of aspiration on speech therapy eval 6/14.  Per SLP did much better on VFSS: Can have level 5 soft diet and thin liquids.  Monitor oral intake  4.  Acute metabolic encephalopathy.  Suspect multifactorial in the setting of SDH, opioids, being in unfamiliar environment.  Currently requires one-to-one for safety.  Continue supportive cares.  Seroquel as needed for agitation  5.  T3 compression fracture.  No brace was recommended by neurosurgery, no complaints of thoracic spine pain  6.  Bilateral knee pain, advanced arthritis with bilateral effusion.  S/p aspiration and corticosteroid injection.  Appreciate orthopedic surgery  7.  Right conjunctival hemorrhage.  Continue artificial tears 4 times daily, added erythromycin ointment if there is possible corneal  abrasion due to persistent discomfort in the right eye.  Feels better today.  Outpatient follow-up with ophthalmology  8.  Acute urinary retention.  Required straight cath x2 and ultimately had Hung catheter placed.  Can consider voiding trial prior to discharge, otherwise follow-up with urology in 1 week after discharge  9.  Paroxysmal A. Fib.  Not on any rate control medications.  No anticoagulation       Diet: Combination Diet Minced and Moist Diet (level 5); Thin Liquids (level 0)    DVT Prophylaxis: Pneumatic Compression Devices  Hung Catheter: PRESENT, indication: Retention  Central Lines: None  Cardiac Monitoring: None  Code Status: No CPR- Do NOT Intubate      Disposition Plan   Expected Discharge: 06/19/2022     Anticipated discharge location:  Awaiting care coordination huddle  Delays:     Placement - LTC          The patient's care was discussed with the Patient.    Pooja Herrera MD  Hospitalist Service  [facility]  Securely message with the Vocera Web Console (learn more here)  Text page via [employer] Paging/Directory         Clinically Significant Risk Factors Present on Admission                    ______________________________________________________________________    Interval History   Mr. Christina was sleeping when I saw him today.  He had been agitated all morning and finally got to sleep around 4 AM so myself and nursing and ancillary staff for allowing him to sleep.  He did not respond to me touching him with my stethoscope to listen to his heart and lungs.  He was breathing regularly with his mouth open and his neck in the [place] collar.  He did move his feet away when I checked to see if he had any edema.  But he did not wake up.    Data reviewed today: I reviewed all medications, new labs and imaging results over the last 24 hours. I personally reviewed no images or EKG's today.    Physical Exam   Vital Signs: Temp: 97.7  F (36.5  C) Temp src: Axillary BP: 123/85  Pulse: 63   Resp: 12 SpO2: 94 % O2 Device: None (Room air)    Weight: 128 lbs 0 oz  General Appearance: Asleep, likely lethargic as he was not aroused by my examination  Respiratory: CTAB, no wheeze  Cardiovascular: RRR, no murmur noted  GI: non distended, normal bowel sounds  Skin: no jaundice, no rash      Data   Recent Labs   Lab 06/16/22  1509 06/15/22  0554 06/13/22  0423 06/13/22  0200   WBC  --   --   --  9.1   HGB  --   --   --  10.1*   MCV  --   --   --  97   PLT  --   --   --  263   INR  --   --  1.41*  --    NA  --  140  --  131*   POTASSIUM  --  4.0  --  4.5   CHLORIDE  --  107  --  98   CO2  --  27  --  26   BUN  --  14  --  19   CR  --  0.60*  --  0.67*   ANIONGAP  --  6  --  7   MADELIN  --  9.2  --  9.0   * 190*  --  132*     Medications       acetaminophen  975 mg Oral Q8H    Or     acetaminophen  650 mg Rectal Q8H     barium sulfate  60 mL Oral Once     calcitonin (salmon)  1 spray Alternating Nostrils Daily     artificial tears ophthalmic solution  2 drop Both Eyes 4x Daily     cyanocobalamin  1,000 mcg Oral Daily     diclofenac  2 g Topical TID     erythromycin   Right Eye Q6H CHIP     lidocaine   Topical 4x Daily     melatonin  5 mg Oral At Bedtime     polyethylene glycol  17 g Oral BID     senna-docusate  1 tablet Oral BID     simvastatin  10 mg Oral At Bedtime     sodium chloride (PF)  3 mL Intracatheter Q8H     umeclidinium-vilanterol  1 puff Inhalation Daily

## 2022-06-17 NOTE — PLAN OF CARE
Problem: Risk for Delirium  Goal: Improved Attention and Thought Clarity  Outcome: Ongoing, Progressing   Goal Outcome Evaluation:      Pt is lethargic and confused, arouses to voice and touch  Problem: Risk for Delirium  Goal: Improved Sleep  Outcome: Ongoing, Progressing     Pt has slept most of the day. Repositioned early afternoon, pt aroused and denied pain. Food was offered and ordered but pt is too sleepy to put food in his mouth. Did not receive oral meds due to lethargy. Pt becomes angry when awakened. Intermittently responds to questions  Problem: Respiratory Compromise (Orthopaedic Fracture)  Goal: Effective Oxygenation and Ventilation  Outcome: Ongoing, Progressing   Appears pt has apneaic periods during sleep, sats low- mid 90's, LS clear/diminished. Hung in pace with some output, no oral intake due to lethargy. Pt appears comfortable  Problem: Sleep Disturbance (Delirium)  Goal: Improved Sleep  Outcome: Ongoing, Progressing      Pt slept all day  Problem: Plan of Care - These are the overarching goals to be used throughout the patient stay.    Goal: Absence of Hospital-Acquired Illness or Injury  Intervention: Identify and Manage Fall Risk  Recent Flowsheet Documentation  Taken 6/17/2022 0900 by Alesha Rg RN  Safety Promotion/Fall Prevention:   activity supervised   assistive device/personal items within reach   bedside attendant   nonskid shoes/slippers when out of bed   patient and family education   room door open   room near nurse's station  Intervention: Prevent Skin Injury  Recent Flowsheet Documentation  Taken 6/17/2022 1301 by Alesha Rg RN  Body Position:   turned   left   position maintained   legs elevated  Intervention: Prevent and Manage VTE (Venous Thromboembolism) Risk  Recent Flowsheet Documentation  Taken 6/17/2022 0900 by Alesha Rg RN  Activity Management:   activity adjusted per tolerance   activity encouraged

## 2022-06-17 NOTE — PROGRESS NOTES
Care Management Follow Up    Length of Stay (days): 4    Expected Discharge Date: 06/18/2022     Concerns to be Addressed: discharge planning       Patient plan of care discussed at interdisciplinary rounds: Yes    Anticipated Discharge Disposition: Long Term Care     Anticipated Discharge Services: Other (see comment) (possibly home PT services)    Anticipated Discharge DME: None    Patient/family educated on Medicare website which has current facility and service quality ratings: yes    Education Provided on the Discharge Plan:  Yes    Patient/Family in Agreement with the Plan: yes    Referrals Placed by CM/SW:  (none)    Private pay costs discussed: Not applicable    Additional Information: Per hospitalist, pt on 1:1 for confusion.  SW informed hospitalist that pt must be off of 1:1 for 24 hours before can return to his LTC facility.  Hospitalist stated would speak with pt's nurse regarding attempt to wean pt off 1:1.  SW spoke with charge nurse a short time later to see if pt weaning off 1:1.  Charge nurse will look into, as hospitalist did not speak with her about this.    SW spoke with Emily at [facility] regarding pt.  Discussed that pt still on 1:1.  Emily stated that yes pt would likely need to be off 1:1 before returning to facility.  She will double check to be sure and then call SW back.      GABE informed by charge Christal and clinical manager Sasha that pt unable to be taken off 1:1 today.  Pt started on Seroquel to see if this will help pt.       AMIE Woosd, CRESENCIOSW 06/17/22 6:54 PM

## 2022-06-17 NOTE — PROGRESS NOTES
[facility] ACUTE PAIN SERVICE    Daily PAIN Progress Note    Assessment/Plan:  Lang Christina is a 89 year old male who was admitted on 6/13/2022.  Pain team was asked to see the patient for fracture pain, acute pain after a fall. Admitted for after a fall out of bed sustaining type II odontoid fracture as well as mixed density subdural hematomas, T3 fracture. He did hit his head with head, has forehead laceration. History of CVA, paroxysmal A. Fib, (not on anticoagulation therapy) subdural hematoma, emphysema, constipation. Describes pain as 0-2/10 and aching in knees, denies neck pain. The patient denies nausea, vomiting, constipation, diarrhea, chest pain, shortness of breath.     Opioid Induced Respiratory Depression Risk Assessment:?high   (Low 0-1; Moderate 2-4; or High >4 or >/= 3 if two of the risk factors are age > 60 and opioid naive) due to the following risk factors: JEAN, COPD/Asthma/pulmonary disease, CHF, renal dysfunction, hepatic dysfunction, Obesity, Smoker, Age>60, >2 opioid therapies, concomitant CNS depressants, opioid naive status, or post surgical.     PLAN:   1) Pain is consistent with acute pain due to recent fall, fracture pain, chronic pain with degenerative joint disease bilateral knees. Treatment plan includes: multimodal pain approach, Hospital Medicine Service for medical management, NRS, Neelam BARRON. Patient educated regarding: multimodal pain approach, medications as listed below. Patient is understanding of the plan. All questions and concerns addressed to patient's satisfaction.   2)Multimodal Medication Therapy  Topical: lidocaine ointment qid alternate with diclofenac  NSAID'S: avoid in elderly gentleman with history of subdural hematomas  Muscle Relaxants: none  Adjuvants: tylenol 650 mg tid, calcitonin daily   Antidepressants/anxiolytics: none  Opioids: hydrocodone- APAP 5 mg q6h prn   IV Pain medication: none  3)Non-medication interventions: [place] J, ice, rest  "  4)Constipation Prophylaxis: Scheduled and prn  5) Care Teams: Hospital Medicine Service, NRS    MN  pulled from system on 06/13/22. Last refill on 3/19/22. This indicated one small prescription for oxycodone 5 mg tablets #9 for 10 days.    Discharge Recommendations - We recommend prescribing the following at the time of discharge: APAP, possibly small supply Norco but has not been using, calcitonin, topicals.     Pain team will sign off.     Subjective:  Patient sleeping. 1:1 at bedside.   Per RN notes: Pt becomes angry when awakened. Intermittently responds to questions    Active Problems:    Laceration of forehead, initial encounter    Skin tear of right hand without complication, initial encounter    Chronic pain of both knees    Closed odontoid fracture with type II morphology (H)    Bilateral chronic intracranial subdural hematoma (H)      Objective:  Vital signs in last 24 hours:  BP (!) 147/69 (BP Location: Right arm)   Pulse 65   Temp 97.7  F (36.5  C) (Axillary)   Resp 16   Ht 1.676 m (5' 6\")   Wt 58.1 kg (128 lb)   SpO2 90%   BMI 20.66 kg/m    Weight:     Vitals:    06/13/22 0129   Weight: 58.1 kg (128 lb)      Weight change:   Body mass index is 20.66 kg/m .    Intake/Output last 3 shifts:  I/O last 3 completed shifts:  In: 1111 [P.O.:740; I.V.:371]  Out: 800 [Urine:800]  Intake/Output this shift:  No intake/output data recorded.    Review of Systems:   As per subjective, all others negative.    Physical Exam:  General Appearance:  Asleep    Head:  Normocephalic, traumatic abrasion   Eyes:  PERRL, conjunctiva/corneas clear, EOM's intact   Nose: Nares normal, septum midline   Throat: Lips, mucosa, and tongue normal; teeth and gums normal   Neck: [place] J   Back:   Symmetric, no curvature, ROM normal   Lungs:   Clear to auscultation bilaterally, respirations unlabored   Chest Wall:  No tenderness or deformity   Heart:  irregular rate and rhythm, S1, S2 normal    Abdomen:   Soft, non-tender, bowel " sounds active all four quadrants,  no masses, no organomegaly    Extremities: Traumatic abrasions   Skin: Skin pale, warm    Neurologic: Asleep     Imaging: Reviewed I have personally reviewed pertinent labs, tests, and radiologic imaging in patient's chart.      Labs: Reviewed I have personally reviewed pertinent labs, tests, and radiologic imaging in patient's chart.  Recent Results (from the past 24 hour(s))   Glucose by meter    Collection Time: 06/16/22  3:09 PM   Result Value Ref Range    GLUCOSE BY METER POCT 108 (H) 70 - 99 mg/dL   Magnesium    Collection Time: 06/17/22  5:51 AM   Result Value Ref Range    Magnesium 1.8 1.8 - 2.6 mg/dL         Total time spent 15 minutes with greater than 50% in consultation, education and coordination of care.     Also discussed with RN, pharmacist.       JAVY AlyP-C  Acute Care Pain Management Program  [facility] ([place], [place], [place])  Monday-Friday 8a-4p   Page via online paging system or call [phone]

## 2022-06-17 NOTE — PLAN OF CARE
"  Problem: Plan of Care - These are the overarching goals to be used throughout the patient stay.    Goal: Plan of Care Review/Shift Note  Description: The Plan of Care Review/Shift note should be completed every shift.  The Outcome Evaluation is a brief statement about your assessment that the patient is improving, declining, or no change.  This information will be displayed automatically on your shift note.  Outcome: Ongoing, Progressing  Goal: Patient-Specific Goal (Individualized)  Description: You can add care plan individualizations to a care plan. Examples of Individualization might be:  \"Parent requests to be called daily at 9am for status\", \"I have a hard time hearing out of my right ear\", or \"Do not touch me to wake me up as it startles me\".  Outcome: Ongoing, Progressing  Goal: Absence of Hospital-Acquired Illness or Injury  Outcome: Ongoing, Progressing  Intervention: Identify and Manage Fall Risk  Recent Flowsheet Documentation  Taken 6/17/2022 0000 by Ally Isbell, RN  Safety Promotion/Fall Prevention:    bed alarm on    nonskid shoes/slippers when out of bed    activity supervised    sitter at bedside    supervised activity  Intervention: Prevent Skin Injury  Recent Flowsheet Documentation  Taken 6/17/2022 0000 by Ally Isbell, RN  Body Position: position changed independently  Intervention: Prevent and Manage VTE (Venous Thromboembolism) Risk  Recent Flowsheet Documentation  Taken 6/17/2022 0000 by Ally Isbell, RN  Activity Management: (pushing around unit in WC)    activity adjusted per tolerance    other (see comments)  Goal: Optimal Comfort and Wellbeing  Outcome: Ongoing, Progressing  Goal: Readiness for Transition of Care  Outcome: Ongoing, Progressing     Problem: Risk for Delirium  Goal: Optimal Coping  Outcome: Ongoing, Progressing  Goal: Improved Behavioral Control  Outcome: Ongoing, Progressing  Goal: Improved Attention and Thought Clarity  Outcome: Ongoing, " Progressing  Goal: Improved Sleep  Outcome: Ongoing, Progressing   Goal Outcome Evaluation:      Pt oriented to self only with visual hallucinations. Being pushed around unit in  by staff at start of shift. Pt insistent on finding his keys so he can go home. Pt repeatedly thinks he is seeing keys and coins on the floor and stops to pick them up. Pt irritable and mildly agitated. Seroquel given. After 2 hrs of being pushed around in wc pt decided to lay in bed, but still did not sleep. Pt picking at the air and the sheets and putting his fingers to his mouth as though he is eating. Speech garbled at times and illogical. At approximately 0340 pt did fall asleep, waking occasionally, picking at the air, and going back to sleep. Periods of apnea noted. This improved when hob raised.

## 2022-06-17 NOTE — PLAN OF CARE
Problem: Risk for Delirium  Goal: Improved Behavioral Control  Outcome: Ongoing, Progressing     Problem: Pain (Orthopaedic Fracture)  Goal: Acceptable Pain Control  Outcome: Ongoing, Progressing     Problem: Altered Behavior (Delirium)  Goal: Improved Behavioral Control  Outcome: Ongoing, Progressing     Continuing 1:1. Calm and cooperative. Garbled incoherent speech. Visual hallucinations (reported seeing friends in the room). No outwards signs of pain.

## 2022-06-17 NOTE — DISCHARGE INSTRUCTIONS
WEARING THE COLLAR:   [place] J: (collar with blue pads) wear at all times   * The patient should have two sets of blue pads for the collar   Wash and exchange the pads at least daily. Wash the pads in mild soap and lay  flat to dry.   *The collar should fit snuggly.   *Clean and check the skin under the collar at least daily.     Wendy: If the patient has a Wendy collar (pink foam collar) use for showers.   ** If the patient does not have a Wendy collar, shower in the Ber[place] J wipe dry and replace pads with the patient sitting.    *The patient should sit to shower to limit the risk of falling.    Soft collar: Can be worn for meals only. To be placed once seated in a chair and not intended for long periods of time.     If your brace is not fitting call  [employer] that provided the brace    ACTIVITY  *No lifting, pushing or pulling greater than 5-10 pound (this is about a gallon of milk).  *No repetitive bending, twisting, or jarring activities  *No overhead work  *No aerobic or strenuous activity  *No activities with increased risk of falls  *You may move about your home as tolerated  *You may walk up and down stairs as tolerated    Warning  Call [phone] or seek medical attention with the following symptoms:  *Worsening pain not relieved by the pain prescription given  *Worsening or new onset of weakness, or numbness and tingling  *Loss or change in your ability to control bowel or bladder function  *Change in your ability to walk, talk, see or think    WOUND CARE  Every other day, wash wounds, pat dry, apply:  Forehead - mepielx 3x3, secure by tape  Right hand - band aids

## 2022-06-17 NOTE — CONFIDENTIAL NOTE
Two week follow up with MIMI for odontoid fracture and bilateral SDH   Still hospitalized.   May need to schedule through his daughter Sharon    Alesha Ottoniel, AG-CNP  [facility] Neurosurgery  O: [phone]

## 2022-06-18 ENCOUNTER — APPOINTMENT (OUTPATIENT)
Dept: CT IMAGING | Facility: HOSPITAL | Age: 87
DRG: 085 | End: 2022-06-18
Attending: INTERNAL MEDICINE
Payer: COMMERCIAL

## 2022-06-18 ENCOUNTER — APPOINTMENT (OUTPATIENT)
Dept: SPEECH THERAPY | Facility: HOSPITAL | Age: 87
DRG: 085 | End: 2022-06-18
Payer: COMMERCIAL

## 2022-06-18 ENCOUNTER — APPOINTMENT (OUTPATIENT)
Dept: PHYSICAL THERAPY | Facility: HOSPITAL | Age: 87
DRG: 085 | End: 2022-06-18
Payer: COMMERCIAL

## 2022-06-18 LAB
ANION GAP SERPL CALCULATED.3IONS-SCNC: 6 MMOL/L (ref 5–18)
BUN SERPL-MCNC: 18 MG/DL (ref 8–28)
CALCIUM SERPL-MCNC: 8.6 MG/DL (ref 8.5–10.5)
CHLORIDE BLD-SCNC: 108 MMOL/L (ref 98–107)
CO2 SERPL-SCNC: 28 MMOL/L (ref 22–31)
CREAT SERPL-MCNC: 0.53 MG/DL (ref 0.7–1.3)
ERYTHROCYTE [DISTWIDTH] IN BLOOD BY AUTOMATED COUNT: 14.3 % (ref 10–15)
GFR SERPL CREATININE-BSD FRML MDRD: >90 ML/MIN/1.73M2
GLUCOSE BLD-MCNC: 103 MG/DL (ref 70–125)
HCT VFR BLD AUTO: 30.3 % (ref 40–53)
HGB BLD-MCNC: 9.6 G/DL (ref 13.3–17.7)
MAGNESIUM SERPL-MCNC: 1.8 MG/DL (ref 1.8–2.6)
MCH RBC QN AUTO: 31 PG (ref 26.5–33)
MCHC RBC AUTO-ENTMCNC: 31.7 G/DL (ref 31.5–36.5)
MCV RBC AUTO: 98 FL (ref 78–100)
PLATELET # BLD AUTO: 208 10E3/UL (ref 150–450)
POTASSIUM BLD-SCNC: 3.7 MMOL/L (ref 3.5–5)
RBC # BLD AUTO: 3.1 10E6/UL (ref 4.4–5.9)
SODIUM SERPL-SCNC: 142 MMOL/L (ref 136–145)
WBC # BLD AUTO: 4.7 10E3/UL (ref 4–11)

## 2022-06-18 PROCEDURE — 85027 COMPLETE CBC AUTOMATED: CPT | Performed by: INTERNAL MEDICINE

## 2022-06-18 PROCEDURE — 92526 ORAL FUNCTION THERAPY: CPT | Mod: GN

## 2022-06-18 PROCEDURE — 36415 COLL VENOUS BLD VENIPUNCTURE: CPT | Performed by: INTERNAL MEDICINE

## 2022-06-18 PROCEDURE — 83735 ASSAY OF MAGNESIUM: CPT | Performed by: INTERNAL MEDICINE

## 2022-06-18 PROCEDURE — 99233 SBSQ HOSP IP/OBS HIGH 50: CPT | Performed by: INTERNAL MEDICINE

## 2022-06-18 PROCEDURE — 80048 BASIC METABOLIC PNL TOTAL CA: CPT | Performed by: INTERNAL MEDICINE

## 2022-06-18 PROCEDURE — 120N000001 HC R&B MED SURG/OB

## 2022-06-18 PROCEDURE — 250N000013 HC RX MED GY IP 250 OP 250 PS 637: Performed by: CLINICAL NURSE SPECIALIST

## 2022-06-18 PROCEDURE — 70450 CT HEAD/BRAIN W/O DYE: CPT

## 2022-06-18 PROCEDURE — 97116 GAIT TRAINING THERAPY: CPT | Mod: GP

## 2022-06-18 PROCEDURE — 250N000009 HC RX 250: Performed by: INTERNAL MEDICINE

## 2022-06-18 PROCEDURE — 250N000013 HC RX MED GY IP 250 OP 250 PS 637: Performed by: INTERNAL MEDICINE

## 2022-06-18 PROCEDURE — 250N000009 HC RX 250: Performed by: CLINICAL NURSE SPECIALIST

## 2022-06-18 PROCEDURE — 97530 THERAPEUTIC ACTIVITIES: CPT | Mod: GP

## 2022-06-18 RX ADMIN — SIMVASTATIN 10 MG: 10 TABLET, FILM COATED ORAL at 21:15

## 2022-06-18 RX ADMIN — DICLOFENAC SODIUM 2 G: 10 GEL TOPICAL at 13:06

## 2022-06-18 RX ADMIN — SENNOSIDES AND DOCUSATE SODIUM 1 TABLET: 8.6; 5 TABLET ORAL at 21:16

## 2022-06-18 RX ADMIN — Medication 2 DROP: at 11:14

## 2022-06-18 RX ADMIN — LIDOCAINE: 50 OINTMENT TOPICAL at 07:32

## 2022-06-18 RX ADMIN — LIDOCAINE: 50 OINTMENT TOPICAL at 21:18

## 2022-06-18 RX ADMIN — ACETAMINOPHEN 975 MG: 325 TABLET, FILM COATED ORAL at 21:16

## 2022-06-18 RX ADMIN — QUETIAPINE FUMARATE 25 MG: 25 TABLET ORAL at 21:15

## 2022-06-18 RX ADMIN — LIDOCAINE: 50 OINTMENT TOPICAL at 15:03

## 2022-06-18 RX ADMIN — ERYTHROMYCIN 1 G: 5 OINTMENT OPHTHALMIC at 07:30

## 2022-06-18 RX ADMIN — ERYTHROMYCIN 1 G: 5 OINTMENT OPHTHALMIC at 17:24

## 2022-06-18 RX ADMIN — CALCITONIN SALMON 1 SPRAY: 200 SPRAY, METERED NASAL at 07:32

## 2022-06-18 RX ADMIN — DICLOFENAC SODIUM 2 G: 10 GEL TOPICAL at 07:31

## 2022-06-18 RX ADMIN — Medication 2 DROP: at 15:04

## 2022-06-18 RX ADMIN — UMECLIDINIUM BROMIDE AND VILANTEROL TRIFENATATE 1 PUFF: 62.5; 25 POWDER RESPIRATORY (INHALATION) at 07:33

## 2022-06-18 RX ADMIN — SENNOSIDES AND DOCUSATE SODIUM 1 TABLET: 8.6; 5 TABLET ORAL at 07:32

## 2022-06-18 RX ADMIN — POLYETHYLENE GLYCOL 3350 17 G: 17 POWDER, FOR SOLUTION ORAL at 07:32

## 2022-06-18 RX ADMIN — DICLOFENAC SODIUM 2 G: 10 GEL TOPICAL at 21:18

## 2022-06-18 RX ADMIN — Medication 2 DROP: at 21:16

## 2022-06-18 RX ADMIN — Medication 1000 MCG: at 07:32

## 2022-06-18 RX ADMIN — ACETAMINOPHEN 975 MG: 325 TABLET, FILM COATED ORAL at 11:14

## 2022-06-18 RX ADMIN — Medication 2 DROP: at 07:32

## 2022-06-18 RX ADMIN — ERYTHROMYCIN 1 G: 5 OINTMENT OPHTHALMIC at 11:06

## 2022-06-18 RX ADMIN — POLYETHYLENE GLYCOL 3350 17 G: 17 POWDER, FOR SOLUTION ORAL at 21:16

## 2022-06-18 RX ADMIN — Medication 5 MG: at 21:15

## 2022-06-18 RX ADMIN — LIDOCAINE: 50 OINTMENT TOPICAL at 11:06

## 2022-06-18 ASSESSMENT — ACTIVITIES OF DAILY LIVING (ADL)
WALKING_OR_CLIMBING_STAIRS: STAIR CLIMBING DIFFICULTY, ASSISTANCE 1 PERSON
ADLS_ACUITY_SCORE: 55
EATING/SWALLOWING: SWALLOWING SOLID FOOD
ADLS_ACUITY_SCORE: 55
SWALLOWING: 2-->DIFFICULTY SWALLOWING LIQUIDS/FOODS
BATHING: 1-->ASSISTANCE NEEDED
ADLS_ACUITY_SCORE: 45
DRESSING/BATHING: BATHING DIFFICULTY, ASSISTANCE 1 PERSON;DRESSING DIFFICULTY, ASSISTANCE 1 PERSON
ADLS_ACUITY_SCORE: 55
ADLS_ACUITY_SCORE: 45
TOILETING_ASSISTANCE: TOILETING DIFFICULTY, ASSISTANCE 1 PERSON
TRANSFERRING: 1-->ASSISTANCE (EQUIPMENT/PERSON) NEEDED (NOT DEVELOPMENTALLY APPROPRIATE)
ADLS_ACUITY_SCORE: 55
TOILETING_ISSUES: YES
DIFFICULTY_EATING/SWALLOWING: YES
ADLS_ACUITY_SCORE: 55
EATING: 1-->ASSISTANCE (EQUIPMENT/PERSON) NEEDED
EQUIPMENT_CURRENTLY_USED_AT_HOME: WHEELCHAIR, MANUAL
CHANGE_IN_FUNCTIONAL_STATUS_SINCE_ONSET_OF_CURRENT_ILLNESS/INJURY: YES
SWALLOWING: 2-->DIFFICULTY SWALLOWING LIQUIDS/FOODS
EATING: 0-->ASSISTANCE NEEDED (DEVELOPMENTALLY APPROPRIATE)
WALKING_OR_CLIMBING_STAIRS_DIFFICULTY: YES
CONCENTRATING,_REMEMBERING_OR_MAKING_DECISIONS_DIFFICULTY: YES
ADLS_ACUITY_SCORE: 45
ADLS_ACUITY_SCORE: 55
TOILETING: 1-->ASSISTANCE (EQUIPMENT/PERSON) NEEDED
ADLS_ACUITY_SCORE: 47
NUMBER_OF_TIMES_PATIENT_HAS_FALLEN_WITHIN_LAST_SIX_MONTHS: 2
DRESS: 1-->ASSISTANCE (EQUIPMENT/PERSON) NEEDED
DRESS: 1-->ASSISTANCE (EQUIPMENT/PERSON) NEEDED (NOT DEVELOPMENTALLY APPROPRIATE)
FALL_HISTORY_WITHIN_LAST_SIX_MONTHS: YES
DOING_ERRANDS_INDEPENDENTLY_DIFFICULTY: YES
DRESSING/BATHING_DIFFICULTY: YES
TRANSFERRING: 1-->ASSISTANCE (EQUIPMENT/PERSON) NEEDED
TOILETING: 1-->ASSISTANCE (EQUIPMENT/PERSON) NEEDED (NOT DEVELOPMENTALLY APPROPRIATE)
ADLS_ACUITY_SCORE: 55
WEAR_GLASSES_OR_BLIND: YES
ADLS_ACUITY_SCORE: 55

## 2022-06-18 NOTE — PLAN OF CARE
"  Problem: Plan of Care - These are the overarching goals to be used throughout the patient stay.    Goal: Plan of Care Review/Shift Note  Description: The Plan of Care Review/Shift note should be completed every shift.  The Outcome Evaluation is a brief statement about your assessment that the patient is improving, declining, or no change.  This information will be displayed automatically on your shift note.  Outcome: Ongoing, Progressing  Goal: Patient-Specific Goal (Individualized)  Description: You can add care plan individualizations to a care plan. Examples of Individualization might be:  \"Parent requests to be called daily at 9am for status\", \"I have a hard time hearing out of my right ear\", or \"Do not touch me to wake me up as it startles me\".  Outcome: Ongoing, Progressing  Goal: Absence of Hospital-Acquired Illness or Injury  Outcome: Ongoing, Progressing  Intervention: Identify and Manage Fall Risk  Recent Flowsheet Documentation  Taken 6/18/2022 0203 by Elizabeth Bowie RN  Safety Promotion/Fall Prevention:   activity supervised   bed alarm on   sitter at bedside  Goal: Optimal Comfort and Wellbeing  Outcome: Ongoing, Progressing  Goal: Readiness for Transition of Care  Outcome: Ongoing, Progressing     Problem: Risk for Delirium  Goal: Optimal Coping  Outcome: Ongoing, Progressing  Goal: Improved Behavioral Control  Outcome: Ongoing, Progressing  Goal: Improved Attention and Thought Clarity  Outcome: Ongoing, Progressing  Goal: Improved Sleep  Outcome: Ongoing, Progressing     Problem: Fracture Stabilization and Management (Orthopaedic Fracture)  Goal: Fracture Stability  Outcome: Ongoing, Progressing     Problem: Neurovascular Compromise (Orthopaedic Fracture)  Goal: Effective Tissue Perfusion  Outcome: Ongoing, Progressing     Problem: Pain (Orthopaedic Fracture)  Goal: Acceptable Pain Control  Outcome: Ongoing, Progressing     Problem: Respiratory Compromise (Orthopaedic Fracture)  Goal: Effective " Oxygenation and Ventilation  Outcome: Ongoing, Progressing     Problem: Suicide Risk  Goal: Absence of Self-Harm  Outcome: Ongoing, Progressing     Problem: Adjustment to Illness (Delirium)  Goal: Optimal Coping  Outcome: Ongoing, Progressing     Problem: Altered Behavior (Delirium)  Goal: Improved Behavioral Control  Outcome: Ongoing, Progressing     Problem: Attention and Thought Clarity Impairment (Delirium)  Goal: Improved Attention and Thought Clarity  Outcome: Ongoing, Progressing     Problem: Sleep Disturbance (Delirium)  Goal: Improved Sleep  Outcome: Ongoing, Progressing   Goal Outcome Evaluation:        Pt cont to have 1:1 sitter at bedside. Pt slept throughout the shift, awake at times but fell right back to sleep. Scheduled eye medication and tylenol held overnight due to patient sleeping and becoming agitated when woken. Will cont to monitor for signs of discomfort or pain.

## 2022-06-18 NOTE — PROGRESS NOTES
[facility]    Medicine Progress Note - Hospitalist Service    Date of Admission:  6/13/2022    Assessment & Plan        89-year-old male with past medical history of hyperlipidemia, CVA, paroxysmal A. fib, SDH 1/2022, COPD/emphysema, BPH who presented after a fall, was found to have bilateral SDH with acute SDH on the left, type II odontoid fracture, likely acute T3 compression fracture.  Left SDH slightly larger on repeat CT, but there is no significant brain compression or midline shift.  Family would not want any surgical interventions for the patient, he is DNR/DNI.  Hospital course complicated by significant confusion and acute urinary retention     1.  Bilateral mixed density SDH.  Left SDH slightly larger on repeat CT 6/14 and 6/15 but no significant brain compression or midline shift.    SBP goal less than 150.  Hold all blood thinners.  Continue PT/OT - can arrange PT/OT at the LTC, having significant lethargy despite not getting medications that may cause that, will repeat head CT  2.  Type II odontoid fracture.  Chunky J collar at all times, trey for showers recommended by neurosurgery.  Switch to scheduled p.o. Tylenol, try to minimize narcotics due to increased confusion, lethargy  3.  Dysphagia with weak symptoms and signs of aspiration on speech therapy eval 6/14.  Per SLP did much better on VFSS: Can have level 5 soft diet and thin liquids.  Monitor oral intake  4.  Acute metabolic encephalopathy.  Suspect multifactorial in the setting of SDH, opioids, being in unfamiliar environment.  Currently requires one-to-one for safety.  Continue supportive cares.  Seroquel as needed for agitation  5.  T3 compression fracture.  No brace was recommended by neurosurgery, no complaints of thoracic spine pain  6.  Bilateral knee pain, advanced arthritis with bilateral effusion.  S/p aspiration and corticosteroid injection.  Appreciate orthopedic surgery  7.  Right conjunctival hemorrhage.   Continue artificial tears 4 times daily, added erythromycin ointment if there is possible corneal abrasion due to persistent discomfort in the right eye.  Feels better today.  Outpatient follow-up with ophthalmology  8.  Acute urinary retention.  Required straight cath x2 and ultimately had Hung catheter placed.  Can consider voiding trial prior to discharge, otherwise follow-up with urology in 1 week after discharge  9.  Paroxysmal A. Fib.  Not on any rate control medications.  No anticoagulation     Diet: Combination Diet Minced and Moist Diet (level 5); Thin Liquids (level 0)    DVT Prophylaxis: Pneumatic Compression Devices  Hung Catheter: PRESENT, indication: Retention  Central Lines: None  Cardiac Monitoring: None  Code Status: No CPR- Do NOT Intubate      Disposition Plan   Expected Discharge: 06/20/2022     Anticipated discharge location: [facility] facility    Delays:     Placement - LTC  1:1 Sitter still ordered - MD to assess            The patient's care was discussed with the Patient.    Pooja Herrera MD  Hospitalist Service  [facility]  Securely message with the Vocera Web Console (learn more here)  Text page via [employer] Paging/Directory         Clinically Significant Risk Factors Present on Admission                    ______________________________________________________________________    Interval History   Mr. Christina is still lethargic today despite not getting any Seroquel all day or pain medications.  I am concerned as he only wakes up for very short amount of time.  He is easily arousable but does not stay awake.  We will get repeat head CT.  Has not had any aggravated behaviors per nursing today.    Data reviewed today: I reviewed all medications, new labs and imaging results over the last 24 hours. I personally reviewed no images or EKG's today.    Physical Exam   Vital Signs: Temp: 97.9  F (36.6  C) Temp src: Oral BP: 129/69 Pulse: 68   Resp: 16 SpO2: 96  % O2 Device: None (Room air)    Weight: 128 lbs 0 oz  General Appearance: Lethargic, easily arousable, in no acute distress  Respiratory: CTAB, no wheeze  Cardiovascular: RRR, no murmur noted  GI: soft, nontender, non distended, normal bowel sounds  Skin: no jaundice, no rash, forehead injury covered with bandage, left cheek injury improving but still swollen      Data   Recent Labs   Lab 06/18/22  0547 06/16/22  1509 06/15/22  0554 06/13/22  0423 06/13/22  0200   WBC 4.7  --   --   --  9.1   HGB 9.6*  --   --   --  10.1*   MCV 98  --   --   --  97     --   --   --  263   INR  --   --   --  1.41*  --      --  140  --  131*   POTASSIUM 3.7  --  4.0  --  4.5   CHLORIDE 108*  --  107  --  98   CO2 28  --  27  --  26   BUN 18  --  14  --  19   CR 0.53*  --  0.60*  --  0.67*   ANIONGAP 6  --  6  --  7   MADELIN 8.6  --  9.2  --  9.0    108* 190*  --  132*     Medications       acetaminophen  975 mg Oral Q8H    Or     acetaminophen  650 mg Rectal Q8H     barium sulfate  60 mL Oral Once     calcitonin (salmon)  1 spray Alternating Nostrils Daily     artificial tears ophthalmic solution  2 drop Both Eyes 4x Daily     cyanocobalamin  1,000 mcg Oral Daily     diclofenac  2 g Topical TID     erythromycin   Right Eye Q6H CHIP     lidocaine   Topical 4x Daily     melatonin  5 mg Oral At Bedtime     polyethylene glycol  17 g Oral BID     senna-docusate  1 tablet Oral BID     simvastatin  10 mg Oral At Bedtime     sodium chloride (PF)  3 mL Intracatheter Q8H     umeclidinium-vilanterol  1 puff Inhalation Daily

## 2022-06-18 NOTE — PLAN OF CARE
Problem: Pain (Orthopaedic Fracture)  Goal: Acceptable Pain Control  Outcome: Ongoing, Progressing     Problem: Risk for Delirium  Goal: Optimal Coping  Outcome: Ongoing, Not Progressing   Goal Outcome Evaluation:        Patient sleeping during first hour of shift. Woke, no agitation. Confused, A and O only to self but did not resist meds or cares. Patient VSS. [place] J collar kept on through out shift. Patient was able to  get up and walk to bathroom with assist of 1. Had large bowel movement and back to bed. Hung in place. Took all meds crushed in apple sauce with out issue. Patient on one to one for safety.

## 2022-06-18 NOTE — PLAN OF CARE
Problem: Plan of Care - These are the overarching goals to be used throughout the patient stay.    Goal: Optimal Comfort and Wellbeing  Outcome: Ongoing, Progressing   Goal Outcome Evaluation:      Pt. Pleasant and cooperative this shift, remains on 1:1 supervision, denies pain, dressings intact to R hand and forehead, rosales patent, up to chair x2 this shift and tolerated well, ate well for breakfast, lunch and dinner, transfer with assist of 1 gait belt and walker, ambulated with gait belt and walker in hallway with therapy, daughter visiting and updated, [place] J collar remains in place, no inappropriate behavior to report tho pt. Has been rather lethargic throughout shift but easily woken with light touch and voice, no attempt to exit bed without assistance, will cont to monitor.

## 2022-06-19 ENCOUNTER — APPOINTMENT (OUTPATIENT)
Dept: PHYSICAL THERAPY | Facility: HOSPITAL | Age: 87
DRG: 085 | End: 2022-06-19
Payer: COMMERCIAL

## 2022-06-19 ENCOUNTER — APPOINTMENT (OUTPATIENT)
Dept: SPEECH THERAPY | Facility: HOSPITAL | Age: 87
DRG: 085 | End: 2022-06-19
Payer: COMMERCIAL

## 2022-06-19 LAB
ANION GAP SERPL CALCULATED.3IONS-SCNC: 4 MMOL/L (ref 5–18)
BUN SERPL-MCNC: 13 MG/DL (ref 8–28)
CALCIUM SERPL-MCNC: 8.7 MG/DL (ref 8.5–10.5)
CHLORIDE BLD-SCNC: 107 MMOL/L (ref 98–107)
CO2 SERPL-SCNC: 30 MMOL/L (ref 22–31)
CREAT SERPL-MCNC: 0.51 MG/DL (ref 0.7–1.3)
ERYTHROCYTE [DISTWIDTH] IN BLOOD BY AUTOMATED COUNT: 14.3 % (ref 10–15)
GFR SERPL CREATININE-BSD FRML MDRD: >90 ML/MIN/1.73M2
GLUCOSE BLD-MCNC: 104 MG/DL (ref 70–125)
HCT VFR BLD AUTO: 33 % (ref 40–53)
HGB BLD-MCNC: 10.4 G/DL (ref 13.3–17.7)
MAGNESIUM SERPL-MCNC: 1.8 MG/DL (ref 1.8–2.6)
MCH RBC QN AUTO: 30.9 PG (ref 26.5–33)
MCHC RBC AUTO-ENTMCNC: 31.5 G/DL (ref 31.5–36.5)
MCV RBC AUTO: 98 FL (ref 78–100)
PLATELET # BLD AUTO: 242 10E3/UL (ref 150–450)
POTASSIUM BLD-SCNC: 3.8 MMOL/L (ref 3.5–5)
RBC # BLD AUTO: 3.37 10E6/UL (ref 4.4–5.9)
SODIUM SERPL-SCNC: 141 MMOL/L (ref 136–145)
WBC # BLD AUTO: 4.7 10E3/UL (ref 4–11)

## 2022-06-19 PROCEDURE — 250N000013 HC RX MED GY IP 250 OP 250 PS 637: Performed by: INTERNAL MEDICINE

## 2022-06-19 PROCEDURE — 99233 SBSQ HOSP IP/OBS HIGH 50: CPT | Performed by: INTERNAL MEDICINE

## 2022-06-19 PROCEDURE — 92526 ORAL FUNCTION THERAPY: CPT | Mod: GN

## 2022-06-19 PROCEDURE — 250N000013 HC RX MED GY IP 250 OP 250 PS 637: Performed by: CLINICAL NURSE SPECIALIST

## 2022-06-19 PROCEDURE — 82310 ASSAY OF CALCIUM: CPT | Performed by: INTERNAL MEDICINE

## 2022-06-19 PROCEDURE — 36415 COLL VENOUS BLD VENIPUNCTURE: CPT | Performed by: INTERNAL MEDICINE

## 2022-06-19 PROCEDURE — 250N000009 HC RX 250: Performed by: INTERNAL MEDICINE

## 2022-06-19 PROCEDURE — 97530 THERAPEUTIC ACTIVITIES: CPT | Mod: GP

## 2022-06-19 PROCEDURE — 85018 HEMOGLOBIN: CPT | Performed by: INTERNAL MEDICINE

## 2022-06-19 PROCEDURE — 120N000001 HC R&B MED SURG/OB

## 2022-06-19 PROCEDURE — 97116 GAIT TRAINING THERAPY: CPT | Mod: GP

## 2022-06-19 PROCEDURE — 83735 ASSAY OF MAGNESIUM: CPT | Performed by: INTERNAL MEDICINE

## 2022-06-19 RX ADMIN — LIDOCAINE: 50 OINTMENT TOPICAL at 19:30

## 2022-06-19 RX ADMIN — SENNOSIDES AND DOCUSATE SODIUM 1 TABLET: 8.6; 5 TABLET ORAL at 09:16

## 2022-06-19 RX ADMIN — SENNOSIDES AND DOCUSATE SODIUM 1 TABLET: 8.6; 5 TABLET ORAL at 19:32

## 2022-06-19 RX ADMIN — ERYTHROMYCIN 1 G: 5 OINTMENT OPHTHALMIC at 05:28

## 2022-06-19 RX ADMIN — ACETAMINOPHEN 975 MG: 325 TABLET, FILM COATED ORAL at 05:24

## 2022-06-19 RX ADMIN — Medication 2 DROP: at 19:30

## 2022-06-19 RX ADMIN — Medication 2 DROP: at 16:10

## 2022-06-19 RX ADMIN — DICLOFENAC SODIUM 2 G: 10 GEL TOPICAL at 09:23

## 2022-06-19 RX ADMIN — Medication 2 DROP: at 09:28

## 2022-06-19 RX ADMIN — Medication 5 MG: at 21:03

## 2022-06-19 RX ADMIN — ERYTHROMYCIN 1 G: 5 OINTMENT OPHTHALMIC at 11:53

## 2022-06-19 RX ADMIN — ACETAMINOPHEN 975 MG: 325 TABLET, FILM COATED ORAL at 19:31

## 2022-06-19 RX ADMIN — POLYETHYLENE GLYCOL 3350 17 G: 17 POWDER, FOR SOLUTION ORAL at 19:32

## 2022-06-19 RX ADMIN — LIDOCAINE: 50 OINTMENT TOPICAL at 16:11

## 2022-06-19 RX ADMIN — CALCITONIN SALMON 1 SPRAY: 200 SPRAY, METERED NASAL at 09:20

## 2022-06-19 RX ADMIN — Medication 2 DROP: at 11:53

## 2022-06-19 RX ADMIN — Medication 1000 MCG: at 09:12

## 2022-06-19 RX ADMIN — ACETAMINOPHEN 975 MG: 325 TABLET, FILM COATED ORAL at 11:52

## 2022-06-19 RX ADMIN — UMECLIDINIUM BROMIDE AND VILANTEROL TRIFENATATE 1 PUFF: 62.5; 25 POWDER RESPIRATORY (INHALATION) at 09:22

## 2022-06-19 RX ADMIN — LIDOCAINE: 50 OINTMENT TOPICAL at 11:53

## 2022-06-19 RX ADMIN — SIMVASTATIN 10 MG: 10 TABLET, FILM COATED ORAL at 21:03

## 2022-06-19 RX ADMIN — DICLOFENAC SODIUM 2 G: 10 GEL TOPICAL at 19:30

## 2022-06-19 RX ADMIN — ERYTHROMYCIN 1 G: 5 OINTMENT OPHTHALMIC at 19:19

## 2022-06-19 ASSESSMENT — ACTIVITIES OF DAILY LIVING (ADL)
ADLS_ACUITY_SCORE: 55
ADLS_ACUITY_SCORE: 57
ADLS_ACUITY_SCORE: 55

## 2022-06-19 NOTE — PLAN OF CARE
"  Problem: Plan of Care - These are the overarching goals to be used throughout the patient stay.    Goal: Plan of Care Review/Shift Note  Description: The Plan of Care Review/Shift note should be completed every shift.  The Outcome Evaluation is a brief statement about your assessment that the patient is improving, declining, or no change.  This information will be displayed automatically on your shift note.  Outcome: Ongoing, Progressing  Goal: Patient-Specific Goal (Individualized)  Description: You can add care plan individualizations to a care plan. Examples of Individualization might be:  \"Parent requests to be called daily at 9am for status\", \"I have a hard time hearing out of my right ear\", or \"Do not touch me to wake me up as it startles me\".  Outcome: Ongoing, Progressing  Goal: Absence of Hospital-Acquired Illness or Injury  Outcome: Ongoing, Progressing  Intervention: Identify and Manage Fall Risk  Recent Flowsheet Documentation  Taken 6/19/2022 0100 by Elizabeth Bowie RN  Safety Promotion/Fall Prevention: activity supervised  Intervention: Prevent Skin Injury  Recent Flowsheet Documentation  Taken 6/19/2022 0100 by Elizabeth Bowie RN  Body Position: position changed independently  Intervention: Prevent and Manage VTE (Venous Thromboembolism) Risk  Recent Flowsheet Documentation  Taken 6/19/2022 0100 by Elizabeth Bowie RN  Activity Management: bedrest  Goal: Optimal Comfort and Wellbeing  Outcome: Ongoing, Progressing  Intervention: Provide Person-Centered Care  Recent Flowsheet Documentation  Taken 6/19/2022 0100 by Elizabeth Bowie RN  Trust Relationship/Rapport: care explained  Goal: Readiness for Transition of Care  Outcome: Ongoing, Progressing     Problem: Risk for Delirium  Goal: Optimal Coping  Outcome: Ongoing, Progressing  Goal: Improved Behavioral Control  Outcome: Ongoing, Progressing  Goal: Improved Attention and Thought Clarity  Outcome: Ongoing, Progressing  Goal: Improved Sleep  Outcome: " Ongoing, Progressing     Problem: Fracture Stabilization and Management (Orthopaedic Fracture)  Goal: Fracture Stability  Outcome: Ongoing, Progressing     Problem: Neurovascular Compromise (Orthopaedic Fracture)  Goal: Effective Tissue Perfusion  Outcome: Ongoing, Progressing     Problem: Pain (Orthopaedic Fracture)  Goal: Acceptable Pain Control  Outcome: Ongoing, Progressing     Problem: Respiratory Compromise (Orthopaedic Fracture)  Goal: Effective Oxygenation and Ventilation  Outcome: Ongoing, Progressing     Problem: Suicide Risk  Goal: Absence of Self-Harm  Outcome: Ongoing, Progressing     Problem: Adjustment to Illness (Delirium)  Goal: Optimal Coping  Outcome: Ongoing, Progressing     Problem: Altered Behavior (Delirium)  Goal: Improved Behavioral Control  Outcome: Ongoing, Progressing     Problem: Attention and Thought Clarity Impairment (Delirium)  Goal: Improved Attention and Thought Clarity  Outcome: Ongoing, Progressing     Problem: Sleep Disturbance (Delirium)  Goal: Improved Sleep  Outcome: Ongoing, Progressing   Goal Outcome Evaluation:  Pt cont to be on a 1:1 overnight. Pt has been sleeping well throughout the night, awake only with cares. Pt received scheduled tylenol and eye ointment this am as ordered. No further signs of pain noted. Pt was very polite this am.

## 2022-06-19 NOTE — PLAN OF CARE
Goal Outcome Evaluation:  Problem: Plan of Care - These are the overarching goals to be used throughout the patient stay.    Goal: Optimal Comfort and Wellbeing  Outcome: Ongoing, Progressing  Patient disoriented to place, time and situation. Long term memory intact, calm and cooperative this shift, Had 1:1 sitter for first half of shift, follows commands, no restlessness noted, Does c/o some discomfort in both knees, Up ambulating in ngo with assist of 2 and walker/gait belt and tolerated it fair. [place] J collar remains on. Denies any numbness or tingling of extremities. Bed and chair alarms on for safety,

## 2022-06-19 NOTE — PROGRESS NOTES
Care Management Follow Up    Length of Stay (days): 6    Expected Discharge Date: 06/20/2022     Concerns to be Addressed: discharge planning ; Needs to be offf 1:1 for 24 hrs before returning to LTC  Patient plan of care discussed at interdisciplinary rounds: Yes    Anticipated Discharge Disposition: Long Term Care     Anticipated Discharge Services: Other (see comment) (possibly home PT services)  Anticipated Discharge DME: None    Referrals Placed by CM/SW:  (none)  Private pay costs discussed: Not applicable    Additional Information:  Palliative tomorrow?    Anticipate pt to return to [facility] facility with family transport, once off 1:1 for 24 hrs.     CM following.      EUN Castro

## 2022-06-19 NOTE — PLAN OF CARE
Problem: Attention and Thought Clarity Impairment (Delirium)  Goal: Improved Attention and Thought Clarity  Outcome: Ongoing, Not Progressing     Problem: Pain (Orthopaedic Fracture)  Goal: Acceptable Pain Control  Outcome: Ongoing, Progressing     Problem: Neurovascular Compromise (Orthopaedic Fracture)  Goal: Effective Tissue Perfusion  Outcome: Ongoing, Progressing   Goal Outcome Evaluation:           Patient A and O to self. Patient more clear eyed and able to verbalize better, assist of 1 to and from bathroom, but still confused. Hung in place. [place] J collar in place. Lidocaine, Voltaren, tylenol used for pain control. VSS, large bowel movement previous shift. Patient on 1 to 1 for safety.

## 2022-06-20 ENCOUNTER — APPOINTMENT (OUTPATIENT)
Dept: CT IMAGING | Facility: HOSPITAL | Age: 87
DRG: 085 | End: 2022-06-20
Attending: STUDENT IN AN ORGANIZED HEALTH CARE EDUCATION/TRAINING PROGRAM
Payer: COMMERCIAL

## 2022-06-20 ENCOUNTER — APPOINTMENT (OUTPATIENT)
Dept: SPEECH THERAPY | Facility: HOSPITAL | Age: 87
DRG: 085 | End: 2022-06-20
Payer: COMMERCIAL

## 2022-06-20 ENCOUNTER — APPOINTMENT (OUTPATIENT)
Dept: PHYSICAL THERAPY | Facility: HOSPITAL | Age: 87
DRG: 085 | End: 2022-06-20
Payer: COMMERCIAL

## 2022-06-20 LAB
GLUCOSE BLDC GLUCOMTR-MCNC: 102 MG/DL (ref 70–99)
HOLD SPECIMEN: NORMAL
MAGNESIUM SERPL-MCNC: 1.7 MG/DL (ref 1.8–2.6)
SARS-COV-2 RNA RESP QL NAA+PROBE: NEGATIVE

## 2022-06-20 PROCEDURE — 87635 SARS-COV-2 COVID-19 AMP PRB: CPT | Performed by: STUDENT IN AN ORGANIZED HEALTH CARE EDUCATION/TRAINING PROGRAM

## 2022-06-20 PROCEDURE — 99232 SBSQ HOSP IP/OBS MODERATE 35: CPT | Performed by: STUDENT IN AN ORGANIZED HEALTH CARE EDUCATION/TRAINING PROGRAM

## 2022-06-20 PROCEDURE — 97116 GAIT TRAINING THERAPY: CPT | Mod: GP

## 2022-06-20 PROCEDURE — 120N000001 HC R&B MED SURG/OB

## 2022-06-20 PROCEDURE — 36415 COLL VENOUS BLD VENIPUNCTURE: CPT | Performed by: INTERNAL MEDICINE

## 2022-06-20 PROCEDURE — 83735 ASSAY OF MAGNESIUM: CPT | Performed by: INTERNAL MEDICINE

## 2022-06-20 PROCEDURE — 250N000009 HC RX 250: Performed by: INTERNAL MEDICINE

## 2022-06-20 PROCEDURE — G0463 HOSPITAL OUTPT CLINIC VISIT: HCPCS

## 2022-06-20 PROCEDURE — 250N000013 HC RX MED GY IP 250 OP 250 PS 637: Performed by: STUDENT IN AN ORGANIZED HEALTH CARE EDUCATION/TRAINING PROGRAM

## 2022-06-20 PROCEDURE — 97530 THERAPEUTIC ACTIVITIES: CPT | Mod: GP

## 2022-06-20 PROCEDURE — 250N000013 HC RX MED GY IP 250 OP 250 PS 637: Performed by: CLINICAL NURSE SPECIALIST

## 2022-06-20 PROCEDURE — 70450 CT HEAD/BRAIN W/O DYE: CPT

## 2022-06-20 PROCEDURE — 250N000013 HC RX MED GY IP 250 OP 250 PS 637: Performed by: INTERNAL MEDICINE

## 2022-06-20 PROCEDURE — 92526 ORAL FUNCTION THERAPY: CPT | Mod: GN

## 2022-06-20 RX ORDER — MULTIVITAMIN,THERAPEUTIC
1 TABLET ORAL DAILY
Status: DISCONTINUED | OUTPATIENT
Start: 2022-06-20 | End: 2022-06-21 | Stop reason: HOSPADM

## 2022-06-20 RX ADMIN — ERYTHROMYCIN 1 G: 5 OINTMENT OPHTHALMIC at 23:52

## 2022-06-20 RX ADMIN — SIMVASTATIN 10 MG: 10 TABLET, FILM COATED ORAL at 21:47

## 2022-06-20 RX ADMIN — Medication 1000 MCG: at 07:58

## 2022-06-20 RX ADMIN — Medication 2 DROP: at 11:59

## 2022-06-20 RX ADMIN — ERYTHROMYCIN 1 G: 5 OINTMENT OPHTHALMIC at 11:59

## 2022-06-20 RX ADMIN — Medication 2 DROP: at 17:09

## 2022-06-20 RX ADMIN — DICLOFENAC SODIUM 2 G: 10 GEL TOPICAL at 13:35

## 2022-06-20 RX ADMIN — ERYTHROMYCIN 1 G: 5 OINTMENT OPHTHALMIC at 00:43

## 2022-06-20 RX ADMIN — POLYETHYLENE GLYCOL 3350 17 G: 17 POWDER, FOR SOLUTION ORAL at 07:57

## 2022-06-20 RX ADMIN — ERYTHROMYCIN: 5 OINTMENT OPHTHALMIC at 05:47

## 2022-06-20 RX ADMIN — Medication 5 MG: at 21:47

## 2022-06-20 RX ADMIN — LIDOCAINE: 50 OINTMENT TOPICAL at 21:48

## 2022-06-20 RX ADMIN — LIDOCAINE: 50 OINTMENT TOPICAL at 17:13

## 2022-06-20 RX ADMIN — SENNOSIDES AND DOCUSATE SODIUM 1 TABLET: 8.6; 5 TABLET ORAL at 07:58

## 2022-06-20 RX ADMIN — LIDOCAINE: 50 OINTMENT TOPICAL at 07:58

## 2022-06-20 RX ADMIN — CALCITONIN SALMON 1 SPRAY: 200 SPRAY, METERED NASAL at 07:59

## 2022-06-20 RX ADMIN — ERYTHROMYCIN 1 G: 5 OINTMENT OPHTHALMIC at 17:10

## 2022-06-20 RX ADMIN — ACETAMINOPHEN 975 MG: 325 TABLET, FILM COATED ORAL at 03:47

## 2022-06-20 RX ADMIN — DICLOFENAC SODIUM 2 G: 10 GEL TOPICAL at 21:48

## 2022-06-20 RX ADMIN — UMECLIDINIUM BROMIDE AND VILANTEROL TRIFENATATE 1 PUFF: 62.5; 25 POWDER RESPIRATORY (INHALATION) at 07:57

## 2022-06-20 RX ADMIN — THERA TABS 1 TABLET: TAB at 12:02

## 2022-06-20 RX ADMIN — ACETAMINOPHEN 975 MG: 325 TABLET, FILM COATED ORAL at 12:02

## 2022-06-20 RX ADMIN — Medication 2 DROP: at 21:47

## 2022-06-20 RX ADMIN — DICLOFENAC SODIUM 2 G: 10 GEL TOPICAL at 07:58

## 2022-06-20 RX ADMIN — Medication 2 DROP: at 07:57

## 2022-06-20 RX ADMIN — LIDOCAINE: 50 OINTMENT TOPICAL at 12:00

## 2022-06-20 ASSESSMENT — ACTIVITIES OF DAILY LIVING (ADL)
ADLS_ACUITY_SCORE: 57
ADLS_ACUITY_SCORE: 55
ADLS_ACUITY_SCORE: 57
ADLS_ACUITY_SCORE: 55
ADLS_ACUITY_SCORE: 57

## 2022-06-20 NOTE — PLAN OF CARE
"  Problem: Plan of Care - These are the overarching goals to be used throughout the patient stay.    Goal: Plan of Care Review/Shift Note  Description: The Plan of Care Review/Shift note should be completed every shift.  The Outcome Evaluation is a brief statement about your assessment that the patient is improving, declining, or no change.  This information will be displayed automatically on your shift note.  Outcome: Ongoing, Progressing     Problem: Plan of Care - These are the overarching goals to be used throughout the patient stay.    Goal: Patient-Specific Goal (Individualized)  Description: You can add care plan individualizations to a care plan. Examples of Individualization might be:  \"Parent requests to be called daily at 9am for status\", \"I have a hard time hearing out of my right ear\", or \"Do not touch me to wake me up as it startles me\".  Outcome: Ongoing, Progressing     Problem: Plan of Care - These are the overarching goals to be used throughout the patient stay.    Goal: Absence of Hospital-Acquired Illness or Injury  Intervention: Identify and Manage Fall Risk  Recent Flowsheet Documentation  Taken 6/20/2022 0800 by Marvin Rain, RN  Safety Promotion/Fall Prevention:   activity supervised   assistive device/personal items within reach     Problem: Plan of Care - These are the overarching goals to be used throughout the patient stay.    Goal: Absence of Hospital-Acquired Illness or Injury  Intervention: Prevent and Manage VTE (Venous Thromboembolism) Risk  Recent Flowsheet Documentation  Taken 6/20/2022 0800 by Marvin Rain, RN  Activity Management: activity adjusted per tolerance     Problem: Risk for Delirium  Goal: Optimal Coping  Outcome: Ongoing, Progressing     Problem: Fracture Stabilization and Management (Orthopaedic Fracture)  Goal: Fracture Stability  Outcome: Ongoing, Progressing   Goal Outcome Evaluation:      No verbal or nonverbal expressions of pain, cooperative with cares, " rosales intact. Wounds are healing.

## 2022-06-20 NOTE — PLAN OF CARE
Speech Language Therapy Discharge Summary    Reason for therapy discharge:    All goals and outcomes met, no further needs identified.  No further expectations of functional progress.until patient is stronger and no longer requires cervical collar.    Progress towards therapy goal(s). See goals on Care Plan in [facility] electronic health record for goal details.  Goals partially met.  Barriers to achieving goals:   Encephalopathy, restrictions of cervical collar, age.    Therapy recommendation(s):    May benefit from speech pathology when he no longer needs cervical collar, and/or strength and cognition improve.

## 2022-06-20 NOTE — PROGRESS NOTES
Care Management Follow Up    Length of Stay (days): 7    Expected Discharge Date: 06/20/2022     Concerns to be Addressed: Facility needs pt off 1:1 for 48 hours  Patient plan of care discussed at interdisciplinary rounds: Yes    Anticipated Discharge Disposition: Long Term Care     Anticipated Discharge Services: Other (see comment) (possibly home PT services)  Anticipated Discharge DME: None    Patient/family educated on Medicare website which has current facility and service quality ratings: yes  Education Provided on the Discharge Plan:  yes  Patient/Family in Agreement with the Plan: yes    Referrals Placed by CM/SW:  (none)  Private pay costs discussed: transportation costs    Additional Information:  Anticipate pt will return to [facility]. Facility is requesting pt be off 1:1 for 48 hours. Pt off 1:1 as of yesterday. Transportation to be determined.  CM to follow for medical progression, recommendations and final discharge plan.      Disha Pierson RN

## 2022-06-20 NOTE — PROGRESS NOTES
Progress Note    Date of admission: 06/13/2022    Assessment/Plan  89-year-old male with past medical history of hyperlipidemia, CVA, paroxysmal A. fib, SDH 1/2022, COPD/emphysema, BPH who presented after a fall, was found to have bilateral SDH with acute SDH on the left, type II odontoid fracture, likely acute T3 compression fracture.  Left SDH slightly larger on repeat CT, but there is no significant brain compression or midline shift.  Family would not want any surgical interventions for the patient, he is DNR/DNI.  Hospital course complicated by significant confusion and acute urinary retention     Bilateral mixed density SDH.    - Left SDH slightly larger on repeat CT 6/14 and 6/15 but no significant brain compression or midline shift.    SBP goal less than 150.  Hold all blood thinners.  Continue PT/OT - can arrange PT/OT at the [facility], repeat head CT stable, lethargy improved today    Type II odontoid fracture.    - [place] J collar at all times, trey for showers recommended by neurosurgery.   - scheduled p.o. Tylenol, try to minimize narcotics due to increased confusion, lethargy     Dysphagia   -with weak symptoms and signs of aspiration on speech therapy eval 6/14.  Per SLP did much better on VFSS: Can have level 5 soft diet and thin liquids.  Monitor oral intake    Acute metabolic encephalopathy  -improving.  Suspect multifactorial in the setting of SDH, opioids, being in unfamiliar environment.   It has been 24 hours since patient has been off one-to-one observation.  Continue supportive cares.  Seroquel as needed for agitation    T3 compression fracture.    No brace was recommended by neurosurgery, no complaints of thoracic spine pain    Bilateral knee pain, advanced arthritis with bilateral effusion.    -S/p aspiration and corticosteroid injection.  Appreciate orthopedic surgery     Right conjunctival hemorrhage.    - Continue artificial tears 4 times daily,  erythromycin ointment if there is possible  corneal abrasion due to persistent discomfort in the right eye.  Outpatient follow-up with ophthalmology     Acute urinary retention.    -Voiding trial today.  Bladder scan intermittently.      Paroxysmal A. Fib.   -  Not on any rate control medications.  No anticoagulation    DVT PPX : SCD    Barriers to discharge: delirium    Anticipated Discharge date  : likely tommorrow    Subjective  Patient new to me today.  Chart, labs and imaging reviewed.  No distress noted.  Patient requested Hung catheter to be removed.  Management plan discussed with the patient and his daughter who was present at the bedside.    Objective  Vital signs in last 24 hours  Temp:  [97.5  F (36.4  C)-98  F (36.7  C)] 97.5  F (36.4  C)  Pulse:  [52-83] 52  Resp:  [16] 16  BP: (126-162)/(65-81) 162/75  SpO2:  [96 %-99 %] 99 %    Input and Output in 24 hrs     Intake/Output Summary (Last 24 hours) at 6/20/2022 1304  Last data filed at 6/20/2022 0600  Gross per 24 hour   Intake --   Output 1475 ml   Net -1475 ml       Physical Exam:  GEN: Alert and orientedX2 . Not in acute distress  HEENT: Superficial laceration on left side of forehead with clean dressing   Sclera- anicteric   Mucous membrane- moist and pink  CHEST: Clear to auscultation bilaterally  HEART: S1S2 heard  ABDOMEN: Soft. Non-tender, non-distended. No organomegaly. No guarding or rigidity. Bowel sounds- active  Extremities: No pedal oedema  CNS: No focal neurological deficit. No involuntary movements.  Neck collar in place  SKIN: No skin rash, no cyanosis or clubbing      Pertinent Labs   Lab Results: Personally Reviewed    Recent Results (from the past 24 hour(s))   Magnesium    Collection Time: 06/20/22  6:14 AM   Result Value Ref Range    Magnesium 1.7 (L) 1.8 - 2.6 mg/dL   Extra Purple Top Tube    Collection Time: 06/20/22  6:14 AM   Result Value Ref Range    Hold Specimen JI    Asymptomatic COVID-19 Virus (Coronavirus) by PCR Nasopharyngeal    Collection Time: 06/20/22  8:02  AM    Specimen: Nasopharyngeal; Swab   Result Value Ref Range    SARS CoV2 PCR Negative Negative       Pertinent Radiology   Radiology Results reviewed      EKG Results reviewed       Advanced Care Planning      Rozina Sorto MD   [facility]

## 2022-06-20 NOTE — CONSULTS
[facility] Nurse Inpatient Assessment     Consulted for: right hand skin tear    Patient History (according to provider note(s):      Lang Christina is a 89-year-old male past medical history of CVA, paroxysmal A. fib, subdural hematoma, emphysema brought from nursing home after a fall.  Patient was found on the floor by the staff and reports that he has been calling out for help for about an hour.  He endorses hurting his head, hand and knees.  Sustained laceration to left side of his forehead and skin tear on dorsum of right hand having significant bilateral knee joint pain.  His scalp laceration was repaired, CT imaging showing bilateral subdurals with suspicion of subacute rebleed on the right side.  Also noted to have type II odontoid fracture.  Bilateral knee joint x-ray without acute findings but showing severe degenerative changes.  Neurosurgery was consulted and patient was admitted for further management.     Admitted 6/13/22 and seen by [facility] same day    Areas Assessed:      Areas visualized during today's visit: Focused: right hand and forehead    Skin Injury Location:  Right dorsal hand      Last photo:  Today 6/20  Skin injury due to: Abrasion and Trauma  Skin history and plan of care: mechanism of injury is trauma c/w falling from the bed  Affected area:      Skin assessment: skin teas     Measurements (length x width x depth, in cm) - not measured      Color: wnl     Temperature  normal      Drainage: scant .      Color: not visible     Odor: none  Pain: grimacing litely  Pain interventions prior to dressing change: none  Treatment goal: Heal   STATUS: f/u, improving  Supplies ordered: gathered and at bedside     Forehead       6/20, present an admission  Suture visible in upper corner   Interventions: wound cleansed, pat dry, mepilex 3x3, secured by tape    Treatment Plan:     Right hand wound(s): Every other day wash wounds, pat dry and apply band aids  Forehead: every  "other day wash wounds, pad dry and apply mepilex 3x3, secure by tape       Pressure Injury Prevention (PIP) Plan:      Moisture management: Perineal cleansing /protection: Follow Incontinence Protocol, Avoid brief in bed and Clean and dry skin folds with bathing       Mattress: Follow bed algorithm, reassess daily and order specialty mattress, if indicated.      HOB: Maintain at or below 30 degrees, unless contraindicated      Repositioning in bed: Every 1-2 hours       Heels: Keep elevated off mattress      Protective dressing: Sacral Mepilex for prevention (#030019),  especially for the agitated patient       Positioning equipment: None      Chair positioning: Chair cushion (#[id number])               If patient has a buttock pressure injury, or high risk for PI use chair cushion or SPS.      Under devices: Inspect skin under all medical devices during skin inspection , Ensure tubes are stabilized without tension and Ensure patient is not lying on medical devices or equipment when repositioned             Ask provider to discontinue device when no longer needed.      If patient is declining pressure injury prevention interventions: Explore reason why and address patient's concerns, Educate on pressure injury risk and prevention intervention(s) and If patient is still declining, document \"informed refusal\"     Orders: Written    RECOMMEND PRIMARY TEAM ORDER: None, at this time  Education provided: Not appropriate today   Discussed plan of care with: Nurse  WOC nurse follow-up plan: weekly  Notify WOC if wound(s) deteriorate.  Nursing to notify the Provider(s) and re-consult the WOC Nurse if new skin concern.    DATA:     Pt seen in ED on regular ED gurney  BMI: Body mass index is 20.93 kg/m .   Active diet order: Orders Placed This Encounter      Combination Diet Easy to Chew (level 7); Thin Liquids (level 0)     Output: I/O last 3 completed shifts:  In: 360 [P.O.:360]  Out: 2125 [Urine:2125]     Labs:   Recent Labs "   Lab 06/19/22  0530   HGB 10.4*   WBC 4.7     Pressure injury risk assessment:   Sensory Perception: 3-->slightly limited  Moisture: 3-->occasionally moist  Activity: 3-->walks occasionally  Mobility: 2-->very limited  Nutrition: 3-->adequate  Friction and Shear: 2-->potential problem  Jorje Score: 16    Saritha Moran RN

## 2022-06-20 NOTE — PLAN OF CARE
Problem: Pain (Orthopaedic Fracture)  Goal: Acceptable Pain Control  Outcome: Ongoing, Progressing     Problem: Risk for Delirium  Goal: Optimal Coping  Outcome: Ongoing, Not Progressing     Problem: Adjustment to Illness (Delirium)  Goal: Optimal Coping  Outcome: Ongoing, Not Progressing   Goal Outcome Evaluation:           Patient A and O to self with no outward disruptive behaviors. Compliant with meds and cares. Assist of 1 for cares and tranfers. Hung in place. [place] J collar in place. Pain managed with lidocaine, Voltaren, and tylenol. Patient takes meds crushed with apple sauce. Patient no longer on 1 to 1. On bed and chair alarms. No disturbances or attempts to self transfer this shift. No complaints of pain this shift.

## 2022-06-20 NOTE — PROGRESS NOTES
Patient c/o a period of dizziness. VS WDL , . Patient did refuse lunch, coughed up moderated clear sputum. MD updated.

## 2022-06-20 NOTE — PLAN OF CARE
Problem: Risk for Delirium  Goal: Improved Behavioral Control  Outcome: Ongoing, Progressing     Problem: Risk for Delirium  Goal: Improved Sleep  Outcome: Ongoing, Progressing   Goal Outcome Evaluation:      Pt was calm and cooperative throughout the night. Pt was off his 1:1 sitter for first night and did well. Pt alert and orientated to self and the fact that he was maybe leaving today to return to [place] where he came from. Pt wanted to make sure he had his clothes and things ready to leave. Pt had a large incontinent bowel movement and still has rosales in place with adequate output. Pt denied pain and has [place] j collar in place.

## 2022-06-20 NOTE — PROGRESS NOTES
[facility]    Medicine Progress Note - Hospitalist Service    Date of Admission:  6/13/2022    Assessment & Plan        89-year-old male with past medical history of hyperlipidemia, CVA, paroxysmal A. fib, SDH 1/2022, COPD/emphysema, BPH who presented after a fall, was found to have bilateral SDH with acute SDH on the left, type II odontoid fracture, likely acute T3 compression fracture.  Left SDH slightly larger on repeat CT, but there is no significant brain compression or midline shift.  Family would not want any surgical interventions for the patient, he is DNR/DNI.  Hospital course complicated by significant confusion and acute urinary retention     1.  Bilateral mixed density SDH.  Left SDH slightly larger on repeat CT 6/14 and 6/15 but no significant brain compression or midline shift.    SBP goal less than 150.  Hold all blood thinners.  Continue PT/OT - can arrange PT/OT at the LT, repeat head CT stable, lethargy improved today  2.  Type II odontoid fracture.  [place] J collar at all times, trey for showers recommended by neurosurgery.  Switch to scheduled p.o. Tylenol, try to minimize narcotics due to increased confusion, lethargy  3.  Dysphagia with weak symptoms and signs of aspiration on speech therapy eval 6/14.  Per SLP did much better on VFSS: Can have level 5 soft diet and thin liquids.  Monitor oral intake  4.  Acute metabolic encephalopathy-improving.  Suspect multifactorial in the setting of SDH, opioids, being in unfamiliar environment.  trialing off one-to-one.  Continue supportive cares.  Seroquel as needed for agitation  5.  T3 compression fracture.  No brace was recommended by neurosurgery, no complaints of thoracic spine pain  6.  Bilateral knee pain, advanced arthritis with bilateral effusion.  S/p aspiration and corticosteroid injection.  Appreciate orthopedic surgery  7.  Right conjunctival hemorrhage.  Continue artificial tears 4 times daily, added erythromycin  ointment if there is possible corneal abrasion due to persistent discomfort in the right eye.  Feels better today.  Outpatient follow-up with ophthalmology  8.  Acute urinary retention.  Required straight cath x2 and ultimately had Hung catheter placed.  Can consider voiding trial prior to discharge, otherwise follow-up with urology in 1 week after discharge  9.  Paroxysmal A. Fib.  Not on any rate control medications.  No anticoagulation       Diet: Combination Diet Easy to Chew (level 7); Thin Liquids (level 0)    DVT Prophylaxis: Pneumatic Compression Devices  Hung Catheter: PRESENT, indication: Retention  Central Lines: None  Cardiac Monitoring: None  Code Status: No CPR- Do NOT Intubate      Disposition Plan   Expected Discharge: 06/20/2022     Anticipated discharge location: [facility] facility    Delays:     Placement - LTC  1:1 Sitter still ordered - MD to assess            The patient's care was discussed with the Patient.    Pooja Herrera MD  Hospitalist Service  [facility]  Securely message with the Vocera Web Console (learn more here)  Text page via [employer] Paging/Directory         Clinically Significant Risk Factors Present on Admission                    ______________________________________________________________________    Interval History   Mr. Christina is doing better today. HE was awake and interactive without obvious confusion. He did not remember me from my prior visits however.He was having no complaints.     Data reviewed today: I reviewed all medications, new labs and imaging results over the last 24 hours. I personally reviewed no images or EKG's today.    Physical Exam   Vital Signs: Temp: 97.8  F (36.6  C) Temp src: Oral BP: (!) 144/65 Pulse: 83   Resp: 16 SpO2: 96 % O2 Device: None (Room air)    Weight: 128 lbs 0 oz  General Appearance: Awake, alert, in no acute distress  Respiratory: CTAB, no wheeze  Cardiovascular: RRR, no murmur noted  GI: soft,  nontender, non distended, normal bowel sounds  Skin: no jaundice, no rash      Data   Recent Labs   Lab 06/19/22  0530 06/18/22  0547 06/16/22  1509 06/15/22  0554 06/13/22  0423 06/13/22  0200   WBC 4.7 4.7  --   --   --  9.1   HGB 10.4* 9.6*  --   --   --  10.1*   MCV 98 98  --   --   --  97    208  --   --   --  263   INR  --   --   --   --  1.41*  --     142  --  140  --  131*   POTASSIUM 3.8 3.7  --  4.0  --  4.5   CHLORIDE 107 108*  --  107  --  98   CO2 30 28  --  27  --  26   BUN 13 18  --  14  --  19   CR 0.51* 0.53*  --  0.60*  --  0.67*   ANIONGAP 4* 6  --  6  --  7   MADELIN 8.7 8.6  --  9.2  --  9.0    103 108* 190*  --  132*     No results found for this or any previous visit (from the past 24 hour(s)).  Medications       acetaminophen  975 mg Oral Q8H    Or     acetaminophen  650 mg Rectal Q8H     barium sulfate  60 mL Oral Once     calcitonin (salmon)  1 spray Alternating Nostrils Daily     artificial tears ophthalmic solution  2 drop Both Eyes 4x Daily     cyanocobalamin  1,000 mcg Oral Daily     diclofenac  2 g Topical TID     erythromycin   Right Eye Q6H CHIP     lidocaine   Topical 4x Daily     melatonin  5 mg Oral At Bedtime     polyethylene glycol  17 g Oral BID     senna-docusate  1 tablet Oral BID     simvastatin  10 mg Oral At Bedtime     sodium chloride (PF)  3 mL Intracatheter Q8H     umeclidinium-vilanterol  1 puff Inhalation Daily

## 2022-06-20 NOTE — PROGRESS NOTES
"CLINICAL NUTRITION SERVICES - ASSESSMENT NOTE     Nutrition Prescription    RECOMMENDATIONS FOR MDs/PROVIDERS TO ORDER:  Mvi with minerals d/t intake < 75% and to promote wound/bone healing    MALNUTRITION:  % Weight Loss:  None noted. Long term gradual weight loss noted by pt  % Intake: Decreased intake does not qualify - intake < 75% x 7 days  Subcutaneous Fat Loss:  Orbital region moderate to severe depletion, Upper arm region moderate depletion and Thoracic region moderate depletion  Muscle Loss:  Temporal region severe depletion, Acromion bone region severe depletion, Dorsal hand region moderate depletion, Patellar region moderate depletion, Anterior thigh region mild depletion and Posterior calf region moderate depletion  Fluid Retention:  None noted  At least some of current fat/muscle loss is age related sarcopenia    Malnutrition Diagnosis: Moderate malnutrition  In Context of:  Acute illness or injury    Recommendations already ordered by Registered Dietitian (RD):  Ensure enlive bid = 700 kcal, 40 g protein    Future/Additional Recommendations:  Adjust supplements pending intake, acceptance, weight, wound healing, tolerance     REASON FOR ASSESSMENT  Lang Christina is a/an 89 year old male assessed by the dietitian for Admission Nutrition Risk Screen for positive with \"unsure\" of weight loss and LOS. No decrease in appetite per screen    Pt presents with bilateral SDH and spine fx s/p fall out of bed  Hx copd, HLD, afib    NUTRITION HISTORY  Lives in LTC. No change in intake PTA  Pt reports eating 2 meals/day at baseline    CURRENT NUTRITION ORDERS  Diet: Level 7: Easy to Chew Dysphagia Diet 6/19  Minced and moist 6/18  Soft and bite sized 6/18  Minced and moist 6/16  NPO 6/13    Intake/Tolerance: Variable, improving with improving mentation, Bites to 100%.  % 6/18. Ate 75% of breakfast 6/19    Pt reports his appetite is good but eating < baseline d/t not always liking the food. Discussed increased " "nutrition needs for fx healing, muscle building    Good fluid intake, 1440 ml     LABS  Labs reviewed  Mag 1.7 (L), decreased    MEDICATIONS  Medications reviewed  B12 daily, miralax bid, pericolace, zocor    ANTHROPOMETRICS  Height: 167.6 cm (5' 6\")  Most Recent Weight: 58.8 kg (129 lb 11.2 oz)  - up 1 lb from stated weight in ER. Stable   IBW: 64.5 kg  BMI: Normal BMI  Weight History:   Wt Readings from Last 10 Encounters:   06/20/22 58.8 kg (129 lb 11.2 oz)   02/07/22 54.9 kg (121 lb)   11/05/21 58.1 kg (128 lb)   10/25/21 59 kg (130 lb)   10/04/21 59 kg (130 lb)   09/28/21 57.8 kg (127 lb 8 oz)   08/16/21 59.9 kg (132 lb)   07/07/21 59.9 kg (132 lb)   05/10/21 61.2 kg (135 lb)   03/25/21 62.1 kg (137 lb)   Pt reports he has been losing weight and \"it has been a long time since I've had a 'normal weight'\" Most recent stable weight in 130 per pt  No recent or significant weight loss noted    Dosing Weight: 58.8 kg    ASSESSED NUTRITION NEEDS  Estimated Energy Needs: 9766-2807 kcals/day (25 - 30 kcals/kg)  Justification: Maintenance  Estimated Protein Needs: 70-88 grams protein/day (1.2 - 1.5 grams of pro/kg)  Justification: Increased needs, wound healing  Estimated Fluid Needs: 6988-6564 mL/day (25 - 30 mL/kg)   Justification: Maintenance    PHYSICAL FINDINGS  See malnutrition section below.  Forehead and Hand wound from fall-healing per WOC  Pt reports decreased  strength    MALNUTRITION:  % Weight Loss:  None noted. Long term gradual weight loss noted by pt  % Intake: Decreased intake does not qualify - intake < 75% x 7 days  Subcutaneous Fat Loss:  Orbital region moderate to severe depletion, Upper arm region moderate depletion and Thoracic region moderate depletion  Muscle Loss:  Temporal region severe depletion, Acromion bone region severe depletion, Dorsal hand region moderate depletion, Patellar region moderate depletion, Anterior thigh region mild depletion and Posterior calf region moderate " depletion  Fluid Retention:  None noted  At least some of current fat/muscle loss is age related sarcopenia    Malnutrition Diagnosis: Moderate malnutrition  In Context of:  Acute illness or injury    NUTRITION DIAGNOSIS  Malnutrition related to trauma from fall as evidenced by moderate fat and muscle loss, decreased  strength     INTERVENTIONS  Implementation  Recommend mvi with minerals   Add Ensure enlive BID = 700 kcal, 40 g protein    Goals  Intake > 75% of estimated needs  Maintain weight   Wound healing    Monitoring/Evaluation  Progress toward goals will be monitored and evaluated per protocol.

## 2022-06-21 VITALS
HEART RATE: 64 BPM | WEIGHT: 129.7 LBS | OXYGEN SATURATION: 98 % | RESPIRATION RATE: 16 BRPM | DIASTOLIC BLOOD PRESSURE: 65 MMHG | TEMPERATURE: 97.5 F | SYSTOLIC BLOOD PRESSURE: 136 MMHG | BODY MASS INDEX: 20.84 KG/M2 | HEIGHT: 66 IN

## 2022-06-21 LAB — MAGNESIUM SERPL-MCNC: 1.8 MG/DL (ref 1.8–2.6)

## 2022-06-21 PROCEDURE — 83735 ASSAY OF MAGNESIUM: CPT | Performed by: STUDENT IN AN ORGANIZED HEALTH CARE EDUCATION/TRAINING PROGRAM

## 2022-06-21 PROCEDURE — 250N000013 HC RX MED GY IP 250 OP 250 PS 637: Performed by: INTERNAL MEDICINE

## 2022-06-21 PROCEDURE — 99239 HOSP IP/OBS DSCHRG MGMT >30: CPT | Performed by: STUDENT IN AN ORGANIZED HEALTH CARE EDUCATION/TRAINING PROGRAM

## 2022-06-21 PROCEDURE — 36415 COLL VENOUS BLD VENIPUNCTURE: CPT | Performed by: STUDENT IN AN ORGANIZED HEALTH CARE EDUCATION/TRAINING PROGRAM

## 2022-06-21 PROCEDURE — 250N000013 HC RX MED GY IP 250 OP 250 PS 637: Performed by: STUDENT IN AN ORGANIZED HEALTH CARE EDUCATION/TRAINING PROGRAM

## 2022-06-21 PROCEDURE — 250N000009 HC RX 250: Performed by: INTERNAL MEDICINE

## 2022-06-21 PROCEDURE — 250N000013 HC RX MED GY IP 250 OP 250 PS 637: Performed by: CLINICAL NURSE SPECIALIST

## 2022-06-21 RX ORDER — CALCITONIN SALMON 200 [IU]/.09ML
1 SPRAY, METERED NASAL DAILY
DISCHARGE
Start: 2022-06-22 | End: 2022-01-01

## 2022-06-21 RX ORDER — QUETIAPINE FUMARATE 25 MG/1
25 TABLET, FILM COATED ORAL EVERY 6 HOURS PRN
DISCHARGE
Start: 2022-06-21 | End: 2022-01-01

## 2022-06-21 RX ORDER — CARBOXYMETHYLCELLULOSE SODIUM 5 MG/ML
2 SOLUTION/ DROPS OPHTHALMIC 4 TIMES DAILY
DISCHARGE
Start: 2022-06-21 | End: 2022-01-01

## 2022-06-21 RX ORDER — LANOLIN ALCOHOL/MO/W.PET/CERES
3 CREAM (GRAM) TOPICAL
DISCHARGE
Start: 2022-06-21 | End: 2022-01-01

## 2022-06-21 RX ADMIN — CALCITONIN SALMON 1 SPRAY: 200 SPRAY, METERED NASAL at 08:59

## 2022-06-21 RX ADMIN — Medication 3 MG: at 01:09

## 2022-06-21 RX ADMIN — DICLOFENAC SODIUM 2 G: 10 GEL TOPICAL at 13:00

## 2022-06-21 RX ADMIN — LIDOCAINE: 50 OINTMENT TOPICAL at 16:13

## 2022-06-21 RX ADMIN — SENNOSIDES AND DOCUSATE SODIUM 1 TABLET: 8.6; 5 TABLET ORAL at 09:00

## 2022-06-21 RX ADMIN — Medication 2 DROP: at 12:01

## 2022-06-21 RX ADMIN — LIDOCAINE: 50 OINTMENT TOPICAL at 12:02

## 2022-06-21 RX ADMIN — ACETAMINOPHEN 975 MG: 325 TABLET, FILM COATED ORAL at 12:02

## 2022-06-21 RX ADMIN — LIDOCAINE: 50 OINTMENT TOPICAL at 08:59

## 2022-06-21 RX ADMIN — UMECLIDINIUM BROMIDE AND VILANTEROL TRIFENATATE 1 PUFF: 62.5; 25 POWDER RESPIRATORY (INHALATION) at 08:59

## 2022-06-21 RX ADMIN — Medication 2 DROP: at 16:11

## 2022-06-21 RX ADMIN — Medication 2 DROP: at 08:59

## 2022-06-21 RX ADMIN — DICLOFENAC SODIUM 2 G: 10 GEL TOPICAL at 08:59

## 2022-06-21 RX ADMIN — POLYETHYLENE GLYCOL 3350 17 G: 17 POWDER, FOR SOLUTION ORAL at 08:59

## 2022-06-21 RX ADMIN — THERA TABS 1 TABLET: TAB at 09:00

## 2022-06-21 RX ADMIN — Medication 1000 MCG: at 09:00

## 2022-06-21 RX ADMIN — ERYTHROMYCIN 1 G: 5 OINTMENT OPHTHALMIC at 12:04

## 2022-06-21 RX ADMIN — ERYTHROMYCIN 1 G: 5 OINTMENT OPHTHALMIC at 06:40

## 2022-06-21 ASSESSMENT — ACTIVITIES OF DAILY LIVING (ADL)
ADLS_ACUITY_SCORE: 57
ADLS_ACUITY_SCORE: 57
ADLS_ACUITY_SCORE: 55
ADLS_ACUITY_SCORE: 57
ADLS_ACUITY_SCORE: 55

## 2022-06-21 NOTE — PLAN OF CARE
Problem: Plan of Care - These are the overarching goals to be used throughout the patient stay.    Goal: Absence of Hospital-Acquired Illness or Injury  Outcome: Adequate for Care Transition     Problem: Plan of Care - These are the overarching goals to be used throughout the patient stay.    Goal: Optimal Comfort and Wellbeing  Outcome: Adequate for Care Transition     Problem: Plan of Care - These are the overarching goals to be used throughout the patient stay.    Goal: Readiness for Transition of Care  Outcome: Adequate for Care Transition     Problem: Risk for Delirium  Goal: Optimal Coping  Outcome: Adequate for Care Transition  Goal: Improved Behavioral Control  Outcome: Adequate for Care Transition  Goal: Improved Attention and Thought Clarity  Outcome: Adequate for Care Transition  Goal: Improved Sleep  Outcome: Adequate for Care Transition     Problem: Fracture Stabilization and Management (Orthopaedic Fracture)  Goal: Fracture Stability  Outcome: Adequate for Care Transition     Problem: Neurovascular Compromise (Orthopaedic Fracture)  Goal: Effective Tissue Perfusion  Outcome: Adequate for Care Transition     Problem: Pain (Orthopaedic Fracture)  Goal: Acceptable Pain Control  Outcome: Adequate for Care Transition     Problem: Respiratory Compromise (Orthopaedic Fracture)  Goal: Effective Oxygenation and Ventilation  Outcome: Adequate for Care Transition

## 2022-06-21 NOTE — PLAN OF CARE
Problem: Plan of Care - These are the overarching goals to be used throughout the patient stay.    Goal: Plan of Care Review/Shift Note  Description: The Plan of Care Review/Shift note should be completed every shift.  The Outcome Evaluation is a brief statement about your assessment that the patient is improving, declining, or no change.  This information will be displayed automatically on your shift note.  Outcome: Ongoing, Progressing     Problem: Plan of Care - These are the overarching goals to be used throughout the patient stay.    Goal: Absence of Hospital-Acquired Illness or Injury  Outcome: Ongoing, Progressing  Intervention: Identify and Manage Fall Risk  Recent Flowsheet Documentation  Taken 6/21/2022 0800 by Marvin Rain, RN  Safety Promotion/Fall Prevention: assistive device/personal items within reach  Intervention: Prevent Skin Injury  Recent Flowsheet Documentation  Taken 6/21/2022 0800 by Marvin Rain, RN  Body Position: position changed independently  Intervention: Prevent and Manage VTE (Venous Thromboembolism) Risk  Recent Flowsheet Documentation  Taken 6/21/2022 0800 by Marvin Rain, RN  Activity Management: ambulated to bathroom     Problem: Plan of Care - These are the overarching goals to be used throughout the patient stay.    Goal: Absence of Hospital-Acquired Illness or Injury  Intervention: Prevent and Manage VTE (Venous Thromboembolism) Risk  Recent Flowsheet Documentation  Taken 6/21/2022 0800 by Marvin Rain, RN  Activity Management: ambulated to bathroom   Goal Outcome Evaluation:  No verbal or nonverbal expressions of pain, cooperative with cares, wound dressings in place. [place] J collar on.

## 2022-06-21 NOTE — PLAN OF CARE
Physical Therapy Discharge Summary    Reason for therapy discharge:    Discharged to home with home therapy.    Progress towards therapy goal(s). See goals on Care Plan in [facility] electronic health record for goal details.  Goals partially met.  Barriers to achieving goals:   discharge from facility.    Therapy recommendation(s):    Continued therapy is recommended.  Rationale/Recommendations:  Continue with 24 hour assist for mobility and home PT as pt is progressing towards goals..

## 2022-06-21 NOTE — PLAN OF CARE
Goal Outcome Evaluation:    Problem: Plan of Care - These are the overarching goals to be used throughout the patient stay.    Goal: Readiness for Transition of Care  Outcome: Ongoing, Progressing     Problem: Pain (Orthopaedic Fracture)  Goal: Acceptable Pain Control  Outcome: Ongoing, Progressing       Patient continues to be confused however is behaviorally appropriate.  No need for 1:1 sitter.  Patient denies pain declined to take tylenol or topical creams.  Voiding well after rosales removed.  [place] J collar in place and patient is compliant to keep it on. Plan to discharge to TCU tomorrow.

## 2022-06-21 NOTE — PLAN OF CARE
Problem: Risk for Delirium  Goal: Improved Sleep  Outcome: Ongoing, Progressing     Problem: Sleep Disturbance (Delirium)  Goal: Improved Sleep  Outcome: Ongoing, Progressing   Goal Outcome Evaluation:        Patient awake and appeared to be having difficult time falling asleep. PRN Melatonin given; pt asleep when rechecked.    Pt only slept for 30 minutes. Has been awake on & off. Attempts to self transfer to bathroom without calling for help. Concerned if daughters know where he is. Redirected and reassurance given but forgets what is told to him. Refused scheduled Tylenol. Denied pain.

## 2022-06-21 NOTE — PROGRESS NOTES
Pt alert and cooperative at this time. Pt's daughter at bedside and aware of pt's discharge back to pt's facility this shift. CM ordered for transport at 1700. Pt denies pain thus far. Pt utilizing [place] J collar. Daughter in possession of pt's belongings. Report given to transport and discharge packet given to transport staff.

## 2022-06-23 NOTE — TELEPHONE ENCOUNTER
06.23.22- Spoke with patient Daughter Sharon (Consent to communicate on file) she will be seeing her dad and his nurse today and see if they would like to continue care through us. Patient was place back in his long term care home yesterday. She will call us to sched if her father agrees to it.

## 2022-06-28 ENCOUNTER — TELEPHONE (OUTPATIENT)
Dept: NEUROSURGERY | Facility: CLINIC | Age: 87
End: 2022-06-28

## 2022-06-29 ENCOUNTER — MEDICAL CORRESPONDENCE (OUTPATIENT)
Dept: NEUROSURGERY | Facility: CLINIC | Age: 87
End: 2022-06-29

## 2022-06-29 NOTE — TELEPHONE ENCOUNTER
06.29.22- Patient completed imaging at group home. Patient daughter said they would not like a f/u at this time.

## 2022-06-30 ENCOUNTER — DOCUMENTATION ONLY (OUTPATIENT)
Dept: OTHER | Facility: CLINIC | Age: 87
End: 2022-06-30

## 2022-07-06 ENCOUNTER — TELEPHONE (OUTPATIENT)
Dept: NEUROSURGERY | Facility: CLINIC | Age: 87
End: 2022-07-06

## 2022-07-06 NOTE — TELEPHONE ENCOUNTER
Mary Kam NP ([facility]) calling to see if there is anything else you need from them or what the next steps are after imaging     Mary KHAN - #[phone]

## 2022-07-06 NOTE — TELEPHONE ENCOUNTER
Returned call to Mary (NP at long term care) and clarified that we will need to have the images pushed from [facility]  and can schedule a telephone follow up appt as the pt is not easily transported. [facility] gave the local phone number for [facility] image pushing of: [phone].    After follow up telephone is completed, please fax the office note to [facility] at [phone].     Arlette Ramos RN

## 2022-07-08 NOTE — TELEPHONE ENCOUNTER
Reached out to [facility] at # [phone] to get pt XR pushed to us. Spoke with Vero, she will be pushing XR ASAP.

## 2022-07-25 NOTE — TELEPHONE ENCOUNTER
Patient's daughter, Sharon (consent on file) would like a call back to discuss plan going forward.     #[phone]

## 2022-07-25 NOTE — TELEPHONE ENCOUNTER
Per discussion with LAXMI Pineda - okay to have a telephone visit with the patient and his daughter on Friday 8/29/2022 in AM. We would prefer him to get a head CT done prior if he is able to. Left message for Sharon to call us back to discuss POC.  Latoya Rivera RN, CNRN

## 2022-08-02 NOTE — PROGRESS NOTES
Lang is a 89 year old who is being evaluated via a billable telephone visit.      What phone number would you like to be contacted at? [phone]  How would you like to obtain your AVS? Zaid        Ellen Croft is a 89 year old male that presents via telephone appointment with his daughter Sharon present for follow up of recent head CT and cervical xray. He was noted to have bilateral subdural hematoma of mixed density and C2 odontoid fracture after a fall on June 12, 2022. He was treated conservatively with a [place] J and Wendy collar to be worn at all times. He states overall he is doing very well. He has complaint of neck stiffness and continues to wear his collar at all times. He denies any radicular upper or lower extremity pain, numbness, tingling, or weakness. He denies any changes in bowel or bladder habits. He is wheelchair bound at baseline but able to complete transfers without assistance as he was prior to his fall.     Plan:    Mr. Christina has been advised to continue to wear his cervical collar at all times. Follow up has been requested in 4 weeks with repeat head CT and cervical CT. Will plan to follow up with daughter and patient via telephone appointment upon completion of CT. He understands that should any symptoms arise or worsen to contact us sooner.       HPI: Lang Christina is a 89 year old male who fell out of bed last night at his care facility. CT cervical shows type II odontoid fracture with 1 mm displacement. No edema on imaging giving impression this is subacute. Fracture not present on prior images Jan/Feb of this year per reports available. Age indeterminate mild superior compression fracture of T3. CT head shows mixed attenuation right parietal SDH 7 mm previously 10 mm but may have newer component of hemorrhage compared to images 2/22/22. Thin left SDH 3 mm compared to 5 mm. No midline shift. Lang endorses some neck discomfort with movement of his neck. States he  feels stiff. No arm, leg pain numbness or tingling. He has left forehead laceration repaired with sutures by ED provider. He has abrasion on his right hand, open skin surface sore X2 one on his toes. He is wearing an [place] collar. We discussed his fracture and recommendations of wearing a collar at all times for now and see if he heals. I do not think he would be a good surgical candidate. Discussed fracture and recommendations with his daughter Sharon who is in agreement. Evidently patient and his PCP had conversation a few years ago and decided any type of surgery would be too high risk for patient. Will plan to treat in a collar at all times with OP follow up with XR. Discussed with Sharon possibility of collar for life if his fracture does not heal. She states her father would likely not comply. We discussed potential risk of additional falls and spinal cord injury. She verbalized understanding. She also reports her mother was recently as [facility] also with cervical fracture and is in a collar.           Objective       Vitals:  No vitals were obtained today due to virtual visit.    Physical Exam   healthy, alert and no distress  PSYCH: Alert and oriented times 3; coherent speech, normal   rate and volume, able to articulate logical thoughts, able   to abstract reason, no tangential thoughts, no hallucinations   or delusions  His affect is normal  RESP: No cough, no audible wheezing, able to talk in full sentences  Remainder of exam unable to be completed due to telephone visits    Head CT reviewed personally with stable appearance of right sided mixed density subdural hematoma and slight smaller appearance of left sided subdural hygroma.   Cervical xray with stable cervical alignment          Phone call duration: 10 minutes    Miriam Young PA-C  [facility] Neurosurgery  O: [phone]      .  ..

## 2022-08-02 NOTE — LETTER
8/2/2022         RE: Lang Christina  [address] [place] [address]        Dear Colleague,    Thank you for referring your patient, Lang Christina, to the [facility]. Please see a copy of my visit note below.    Lang is a 89 year old who is being evaluated via a billable telephone visit.      What phone number would you like to be contacted at? [phone]  How would you like to obtain your AVS? Zaid Hughes   Lang is a 89 year old male that presents via telephone appointment with his daughter Sharon present for follow up of recent head CT and cervical xray. He was noted to have bilateral subdural hematoma of mixed density and C2 odontoid fracture after a fall on June 12, 2022. He was treated conservatively with a [place] J and Wendy collar to be worn at all times. He states overall he is doing very well. He has complaint of neck stiffness and continues to wear his collar at all times. He denies any radicular upper or lower extremity pain, numbness, tingling, or weakness. He denies any changes in bowel or bladder habits. He is wheelchair bound at baseline but able to complete transfers without assistance as he was prior to his fall.     Plan:    Mr. Christina has been advised to continue to wear his cervical collar at all times. Follow up has been requested in 4 weeks with repeat head CT and cervical CT. Will plan to follow up with daughter and patient via telephone appointment upon completion of CT. He understands that should any symptoms arise or worsen to contact us sooner.       HPI: Lang Christina is a 89 year old male who fell out of bed last night at his care facility. CT cervical shows type II odontoid fracture with 1 mm displacement. No edema on imaging giving impression this is subacute. Fracture not present on prior images Jan/Feb of this year per reports available. Age indeterminate mild superior compression fracture of T3. CT head shows  mixed attenuation right parietal SDH 7 mm previously 10 mm but may have newer component of hemorrhage compared to images 2/22/22. Thin left SDH 3 mm compared to 5 mm. No midline shift. Lang endorses some neck discomfort with movement of his neck. States he feels stiff. No arm, leg pain numbness or tingling. He has left forehead laceration repaired with sutures by ED provider. He has abrasion on his right hand, open skin surface sore X2 one on his toes. He is wearing an [place] collar. We discussed his fracture and recommendations of wearing a collar at all times for now and see if he heals. I do not think he would be a good surgical candidate. Discussed fracture and recommendations with his daughter Sharon who is in agreement. Evidently patient and his PCP had conversation a few years ago and decided any type of surgery would be too high risk for patient. Will plan to treat in a collar at all times with OP follow up with XR. Discussed with Sharon possibility of collar for life if his fracture does not heal. She states her father would likely not comply. We discussed potential risk of additional falls and spinal cord injury. She verbalized understanding. She also reports her mother was recently as [facility] also with cervical fracture and is in a collar.           Objective       Vitals:  No vitals were obtained today due to virtual visit.    Physical Exam   healthy, alert and no distress  PSYCH: Alert and oriented times 3; coherent speech, normal   rate and volume, able to articulate logical thoughts, able   to abstract reason, no tangential thoughts, no hallucinations   or delusions  His affect is normal  RESP: No cough, no audible wheezing, able to talk in full sentences  Remainder of exam unable to be completed due to telephone visits    Head CT reviewed personally with stable appearance of right sided mixed density subdural hematoma and slight smaller appearance of left sided subdural hygroma.   Cervical xray with  stable cervical alignment          Phone call duration: 10 minutes    Miriam Young PA-C  [facility] Neurosurgery  O: [phone]      .  ..      Again, thank you for allowing me to participate in the care of your patient.        Sincerely,        Miriam Young PA-C

## 2022-08-02 NOTE — PATIENT INSTRUCTIONS
Mr. Christina has been advised to continue to wear his cervical collar at all times. Follow up has been requested in 4 weeks with repeat head CT and cervical CT. Will plan to follow up with daughter and patient via telephone appointment upon completion of CT. He understands that should any symptoms arise or worsen to contact us sooner.

## 2022-09-01 NOTE — LETTER
9/1/2022         RE: Lang Christina  [address] [place] [address]        Dear Colleague,    Thank you for referring your patient, Lang Christina, to the [facility] SPINE [facility]UROSURGERY. Please see a copy of my visit note below.    Neurosurgery telephone note      A/P:  Lang is a 89yo M here to follow up with new cervical CT scan for his type II odontoid fracture from June 12.    Spoke with patient's daughter who was present with patient at time of appt. Lang denies neck pain, arm pain, numbness, weakness, HA, dizziness, n/v, change in vision, or numbness. He has been wearing the collar appropriately. We reviewed his cervical CT scan. Unfortunately his fracture has not shown any healing in 3mos of immobilization. We will therefore not be able to safely clear him of the collar. Reviewed risk of fracture or retropulsion worsening without collar. They are quite disappointed, but understand. Explained there would be little benefit in repeating additional cspine imaging to recheck area, given that it would have shown some healing so far. He may ultimately choose to make his own decision based on his goals for his own care in light of his advanced age.    I recommended repeating head CT in another few weeks to ensure improvement/resolution of his subdural hematomas. It is unclear why this wasn't part of his planned imaging today. They want to think about this and call us if they wish to pursue this.      Plan:  1. Recommended collar for life, no driving  2. Repeat head CT 2-4wks, telephone call to review results. They will call if they wish to pursue this.      HPI: Lang Christina is a 89 year old male who fell out of bed last night at his care facility. CT cervical shows type II odontoid fracture with 1 mm displacement. No edema on imaging giving impression this is subacute. Fracture not present on prior images Jan/Feb of this year per reports available. Age indeterminate mild superior  compression fracture of T3. CT head shows mixed attenuation right parietal SDH 7 mm previously 10 mm but may have newer component of hemorrhage compared to images 2/22/22. Thin left SDH 3 mm compared to 5 mm. No midline shift. Lang endorses some neck discomfort with movement of his neck. States he feels stiff. No arm, leg pain numbness or tingling. He has left forehead laceration repaired with sutures by ED provider. He has abrasion on his right hand, open skin surface sore X2 one on his toes. He is wearing an [place] collar. We discussed his fracture and recommendations of wearing a collar at all times for now and see if he heals. I do not think he would be a good surgical candidate. Discussed fracture and recommendations with his daughter Sharon who is in agreement. Evidently patient and his PCP had conversation a few years ago and decided any type of surgery would be too high risk for patient. Will plan to treat in a collar at all times with OP follow up with XR. Discussed with Sharon possibility of collar for life if his fracture does not heal. She states her father would likely not comply. We discussed potential risk of additional falls and spinal cord injury. She verbalized understanding. She also reports her mother was recently as [facility] also with cervical fracture and is in a collar.       Imaging:  Personally reviewed new cervical CT scan  EXAM: CT CERVICAL SPINE W/O CONTRAST  LOCATION: [facility]  DATE/TIME: 8/29/2022 6:40 PM     INDICATION:  Closed odontoid fracture with type II morphology (H)  COMPARISON: MRI cervical spine 06/13/2022.  TECHNIQUE: Routine CT Cervical Spine without IV contrast. Multiplanar reformats. Dose reduction techniques were used.     FINDINGS:  VERTEBRA: Redemonstrated type II odontoid fracture. No evidence for interval healing. Sclerosis along the margins of the fracture plane. No subluxation or angulation. Unchanged mild superior endplate compression  fracture at T3. Moderate disc space   narrowing throughout the cervical spine with moderate to advanced facet arthropathy, preferentially on the right at C2-C3 and C4-C5 and on the left at C3-C4.      CANAL/FORAMINA: Mild left foraminal stenosis at C3-C4. Moderate right foraminal stenosis at C4-C5. Mild left foraminal stenosis at C5-C6. Moderate foraminal stenoses at C7-T1.     PARASPINAL: No extraspinal abnormality.                                                                      IMPRESSION:  1.  Unchanged type II odontoid fracture without evidence for interval healing. No subluxation.  2.  Unchanged mild superior endplate compression fracture at T3.  3.  Spondylosis with multilevel foraminal stenoses as detailed.      15mins spent on the phone with patient    Nelda Valdivia [facility] Neurosurgery  O. [phone]        Again, thank you for allowing me to participate in the care of your patient.        Sincerely,        LETA Howe CNP

## 2022-09-01 NOTE — PROGRESS NOTES
Neurosurgery telephone note      A/P:  Lang is a 89yo M here to follow up with new cervical CT scan for his type II odontoid fracture from June 12.    Spoke with patient's daughter who was present with patient at time of appt. Lang denies neck pain, arm pain, numbness, weakness, HA, dizziness, n/v, change in vision, or numbness. He has been wearing the collar appropriately. We reviewed his cervical CT scan. Unfortunately his fracture has not shown any healing in 3mos of immobilization. We will therefore not be able to safely clear him of the collar. Reviewed risk of fracture or retropulsion worsening without collar. They are quite disappointed, but understand. Explained there would be little benefit in repeating additional cspine imaging to recheck area, given that it would have shown some healing so far. He may ultimately choose to make his own decision based on his goals for his own care in light of his advanced age.    I recommended repeating head CT in another few weeks to ensure improvement/resolution of his subdural hematomas. It is unclear why this wasn't part of his planned imaging today. They want to think about this and call us if they wish to pursue this.      Plan:  1. Recommended collar for life, no driving  2. Repeat head CT 2-4wks, telephone call to review results. They will call if they wish to pursue this.      HPI: Lang Christina is a 89 year old male who fell out of bed last night at his care facility. CT cervical shows type II odontoid fracture with 1 mm displacement. No edema on imaging giving impression this is subacute. Fracture not present on prior images Jan/Feb of this year per reports available. Age indeterminate mild superior compression fracture of T3. CT head shows mixed attenuation right parietal SDH 7 mm previously 10 mm but may have newer component of hemorrhage compared to images 2/22/22. Thin left SDH 3 mm compared to 5 mm. No midline shift. Lang endorses some neck  discomfort with movement of his neck. States he feels stiff. No arm, leg pain numbness or tingling. He has left forehead laceration repaired with sutures by ED provider. He has abrasion on his right hand, open skin surface sore X2 one on his toes. He is wearing an [place] collar. We discussed his fracture and recommendations of wearing a collar at all times for now and see if he heals. I do not think he would be a good surgical candidate. Discussed fracture and recommendations with his daughter Sharon who is in agreement. Evidently patient and his PCP had conversation a few years ago and decided any type of surgery would be too high risk for patient. Will plan to treat in a collar at all times with OP follow up with XR. Discussed with Sharon possibility of collar for life if his fracture does not heal. She states her father would likely not comply. We discussed potential risk of additional falls and spinal cord injury. She verbalized understanding. She also reports her mother was recently as [facility] also with cervical fracture and is in a collar.       Imaging:  Personally reviewed new cervical CT scan  EXAM: CT CERVICAL SPINE W/O CONTRAST  LOCATION: [facility]  DATE/TIME: 8/29/2022 6:40 PM     INDICATION:  Closed odontoid fracture with type II morphology (H)  COMPARISON: MRI cervical spine 06/13/2022.  TECHNIQUE: Routine CT Cervical Spine without IV contrast. Multiplanar reformats. Dose reduction techniques were used.     FINDINGS:  VERTEBRA: Redemonstrated type II odontoid fracture. No evidence for interval healing. Sclerosis along the margins of the fracture plane. No subluxation or angulation. Unchanged mild superior endplate compression fracture at T3. Moderate disc space   narrowing throughout the cervical spine with moderate to advanced facet arthropathy, preferentially on the right at C2-C3 and C4-C5 and on the left at C3-C4.      CANAL/FORAMINA: Mild left foraminal stenosis at C3-C4.  Moderate right foraminal stenosis at C4-C5. Mild left foraminal stenosis at C5-C6. Moderate foraminal stenoses at C7-T1.     PARASPINAL: No extraspinal abnormality.                                                                      IMPRESSION:  1.  Unchanged type II odontoid fracture without evidence for interval healing. No subluxation.  2.  Unchanged mild superior endplate compression fracture at T3.  3.  Spondylosis with multilevel foraminal stenoses as detailed.      15mins spent on the phone with patient    Nelda Valdivia FNP-C  [facility] Neurosurgery  O. [phone]

## 2022-11-07 NOTE — ED NOTES
----- Message from Ludivina Elkins Pt c/o C Collar digging in. Dr. Brown replaced with Aspen C collar.

## 2022-11-10 NOTE — ED NOTES
.[facility] ED Handoff Report    ED Chief Complaint: [facility] Has frequent [facility]. Pt has [place] J fracture from previous fall. Is not on thinners. Blood sugar 204. Pt has fever. covid test negative per [facility]. Dr. Sena in room. RA sats 90% and placed on 2 l    ED Diagnosis:  (U07.1) Infection due to 2019 novel coronavirus  Comment: First dose of remdesivir given  Plan: Admission    (M62.81) Generalized muscle weakness  Comment: Started working with therapies today  Plan: Admission    (W19.XXXA) Fall, initial encounter  Comment: Therapies  Plan: Admission       PMH:    Past Medical History:   Diagnosis Date    Actinic keratosis     BCC (basal cell carcinoma) 12/2012    BPH (benign prostatic hypertrophy) with urinary obstruction     COPD (chronic obstructive pulmonary disease) (H)     9/2012 Allergy consult    DDD (degenerative disc disease), cervical     Diverticulosis     ED (erectile dysfunction)     Hearing loss     Hyperlipidemia LDL goal < 130     Kidney stones     Localized osteoarthritis of both knees     Osteoarthritis     Senile purpura (H) 01/12/2021    Strabismus     Vasomotor rhinitis     Vitamin B12 deficiency         Code Status:  No CPR- Do NOT Intubate     Falls Risk: Yes Band: Applied    Current Living Situation/Residence: lives in an assisted living facility     Elimination Status: Continent: Yes     Activity Level: 2 assist    Patients Preferred Language:  English     Needed: No    Vital Signs:  /55   Pulse 69   Temp 97.7  F (36.5  C) (Oral)   Resp 26   Wt 59 kg (130 lb)   SpO2 91%   BMI 20.98 kg/m       Cardiac Rhythm: Afib with frequent PACs    Pain Score: 4/10    Is the Patient Confused:  No    Last Food or Drink: 11/10/22 at 0900    Focused Assessment:  Respiratory    Tests Performed: Done: Labs and Imaging    Treatments Provided:  Supportive    Family Dynamics/Concerns: No    Family Updated On Visitor Policy: No    Plan  of Care Communicated to Family: Yes    Who Was Updated about Plan of Care: Daughter    Belongings Checklist Done and Signed by Patient: No    Medications sent with patient: Yes    Covid: symptomatic, positive    Additional Information: Patient is alert and oriented. Takes a daily stool softener. Patient has [place] J collar on from previous falls.    RN: Heather Lopez RN 11/10/2022 3:59 PM

## 2022-11-10 NOTE — PHARMACY-ADMISSION MEDICATION HISTORY
Pharmacy Note - Admission Medication History    Pertinent Provider Information: Medications per med list from [facility].  Attempted to call facility for last dose times, but was unable to reach nursing staff for information.       ______________________________________________________________________    Prior To Admission (PTA) med list completed and updated in EMR.       PTA Med List   Medication Sig Last Dose     acetaminophen (TYLENOL) 500 MG tablet Take 1,000 mg by mouth At Bedtime Unknown     acetaminophen (TYLENOL) 500 MG tablet Take 1,000 mg by mouth 2 times daily as needed for pain Maximum of 4000 mg in 24 hours      albuterol (PROAIR HFA/PROVENTIL HFA/VENTOLIN HFA) 108 (90 Base) MCG/ACT inhaler Inhale 2 puffs into the lungs every 4 hours as needed for shortness of breath / dyspnea or wheezing Unknown     ANORO ELLIPTA 62.5-25 MCG/INH oral inhaler INHALE ONE PUFF BY MOUTH ONCE DAILY Unknown     bisacodyl (DULCOLAX) 10 MG suppository Place 10 mg rectally daily as needed for constipation      Cyanocobalamin 1000 MCG TABS Take 1,000 mcg by mouth daily Unknown     diclofenac (VOLTAREN) 1 % topical gel Apply 4 g topically 3 times daily Unknown     polyethylene glycol (MIRALAX) 17 g packet Take 1 packet by mouth daily Unknown     senna-docusate (SENOKOT-S/PERICOLACE) 8.6-50 MG tablet Take 1 tablet by mouth 2 times daily Unknown     simvastatin (ZOCOR) 10 MG tablet TAKE ONE TABLET BY MOUTH AT BEDTIME. Unknown       Information source(s): Facility ([facility] medication list/MAR  Method of interview communication: N/A    Summary of Changes to PTA Med List  New: Senna S   Discontinued: albuterol neb, calcitonin, carboxymethylcellulose, melatonin, oxycodone, quetiapine  Changed: acetaminophen, diclofenac    Patient was asked about OTC/herbal products specifically.  PTA med list reflects this.    In the past week, patient estimated taking medication this percent of the time:  Unable to assess    Allergies were  reviewed, assessed, and updated with the patient.      Patient did not bring any medications to the hospital and can't retrieve from home. No multi-dose medications are available for use during hospital stay.     The information provided in this note is only as accurate as the sources available at the time of the update(s).    Thank you for the opportunity to participate in the care of this patient.    Christina Cabezas [facility]  11/9/2022 8:52 PM

## 2022-11-10 NOTE — ED NOTES
Patient switched from Oxymask to Nasal cannula. Patient requested underwear to be cut off so he could urinate. Assisted patient with cutting off underwear and removing jeans. Patient using urinal. Patient also ordered breakfast.

## 2022-11-10 NOTE — ED TRIAGE NOTES
Pt [facility] from [facility]. Has frequent falls. Pt has miami J fracture from previous fall. Is not on thinners. Blood sugar 204. Pt has fever. covid test negative per [facility]. Dr. Sena in room. RA sats 90% and placed on 2 l     Triage Assessment       Row Name 11/09/22 2007       Triage Assessment (Adult)    Airway WDL WDL

## 2022-11-10 NOTE — H&P
[facility]    History and Physical - Hospitalist Service       Date of Admission:  11/9/2022    Assessment & Plan      Lang Christina is a 89 year old male admitted on 11/9/2022.    # Confirmed COVID-19 infection    # Acute Hypoxic Respiratory Failure secondary to COVID-19 infection     Symptom Onset 11/9/22   Date of 1st Positive Test 11/9/22   Vaccination Status Please review vaccine status on Storyboard       - COVID-19 special precautions, continuous pulse-ox  - Oxygen: continue current support with nasal cannula at 2 L/min; titrate to keep SpO2 between 90-96%  - Labs: Standard COVID admission labs ordered (CBC with diff, CMP).   - Imaging: chest x-ray ordered  - Breathing treatments: albuterol inhaler PRN; avoid nebulizers in favor of MDIs   - IV fluids: not indicated at this time  - Antibiotics: not indicated   - COVID-Focused Medications:  Decadron+remdesivir for 3 days  - DVT Prophylaxis:         - At high risk of thrombotic complications due to COVID-19 (DDimer = N/A )         - Pharmacologic DVT prophylaxis contraindicated due to SDH     Hyperlipidemia  -home zocor    Falls  History of SDH  -PT eval    Hyponatremia  -mild and monitor    Iron deficiency Anemia  -b12 pending  -start iron supplement         Diet:   regular  DVT Prophylaxis: SCD  Hung Catheter: Not present  Central Lines: None  Cardiac Monitoring: None  Code Status:   DNR/DNI    Clinically Significant Risk Factors Present on Admission         # Hyponatremia: Lowest Na = 134 mmol/L (Ref range: 136-145) in last 2 days, will monitor as appropriate                      Disposition Plan      Expected Discharge Date: 11/10/2022                The patient's care was discussed with the Patient.    Jackelyn Ontiveros MD  Hospitalist Service  [facility]  Securely message with the Vocera Web Console (learn more here)  Text page via [employer] Paging/Directory          ______________________________________________________________________    Chief Complaint   Accidental fall    History of Present Illness   Lang Christina is a 89 year old male  with history of hyperlipidemia, CVA, paroxysmal A. fib, SDH 1/2022 and 6/2022, type II odontoid fracture(on [place] Collar), COPD/emphysema, BPH who presents for evaluation of a fall at [email or web address]. He is not on thinners. Patient thinks he just slipped and fell. He did hit his head. Also has an abrasion to his left frontal scalp though this was reportedly present prior to tonight's fall. Patient denies headache or any extremity pains. Patient also has a skin tear to his right elbow.  Patient has fever(100.6F) and RA sats 90% and placed on 2 l, he tested positive for COVID, so [email or web address] declined to take him back.    Review of Systems    The 10 point Review of Systems is negative other than noted in the HPI or here.     Past Medical History    I have reviewed this patient's medical history and updated it with pertinent information if needed.   Past Medical History:   Diagnosis Date     Actinic keratosis      BCC (basal cell carcinoma) 12/2012     BPH (benign prostatic hypertrophy) with urinary obstruction      COPD (chronic obstructive pulmonary disease) (H)     9/2012 Allergy consult     DDD (degenerative disc disease), cervical      Diverticulosis      ED (erectile dysfunction)      Hearing loss      Hyperlipidemia LDL goal < 130      Kidney stones      Localized osteoarthritis of both knees      Osteoarthritis      Senile purpura (H) 01/12/2021     Strabismus      Vasomotor rhinitis      Vitamin B12 deficiency        Past Surgical History   I have reviewed this patient's surgical history and updated it with pertinent information if needed.  Past Surgical History:   Procedure Laterality Date     ARTHROSCOPY KNEE RT/LT      Right     CATARACT IOL, RT/LT       CYSTOSCOPY       HERNIA REPAIR, INGUINAL RT/LT      Right      LIGATN/STRIP LONG OR SHORT SAPHEN       PHACOEMULSIFICATION WITH STANDARD INTRAOCULAR LENS IMPLANT  12-09 / 01-10    RT / LT     TURP      .       Social History   I have reviewed this patient's social history and updated it with pertinent information if needed.  Social History     Tobacco Use     Smoking status: Former     Packs/day: 1.00     Years: 47.00     Pack years: 47.00     Types: Cigarettes, Pipe     Quit date: 1994     Years since quittin.8     Smokeless tobacco: Never   Substance Use Topics     Alcohol use: No     Drug use: No       Family History   I have reviewed this patient's family history and updated it with pertinent information if needed.  Family History   Problem Relation Age of Onset     Cerebrovascular Disease Brother        Prior to Admission Medications   Prior to Admission Medications   Prescriptions Last Dose Informant Patient Reported? Taking?   ANORO ELLIPTA 62.5-25 MCG/INH oral inhaler Unknown  No Yes   Sig: INHALE ONE PUFF BY MOUTH ONCE DAILY   Cyanocobalamin 1000 MCG TABS Unknown  Yes Yes   Sig: Take 1,000 mcg by mouth daily   acetaminophen (TYLENOL) 500 MG tablet   Yes Yes   Sig: Take 1,000 mg by mouth 2 times daily as needed for pain Maximum of 4000 mg in 24 hours   acetaminophen (TYLENOL) 500 MG tablet Unknown  Yes Yes   Sig: Take 1,000 mg by mouth At Bedtime   albuterol (PROAIR HFA/PROVENTIL HFA/VENTOLIN HFA) 108 (90 Base) MCG/ACT inhaler Unknown  Yes Yes   Sig: Inhale 2 puffs into the lungs every 4 hours as needed for shortness of breath / dyspnea or wheezing   bisacodyl (DULCOLAX) 10 MG suppository   Yes Yes   Sig: Place 10 mg rectally daily as needed for constipation   diclofenac (VOLTAREN) 1 % topical gel Unknown  Yes Yes   Sig: Apply 4 g topically 3 times daily   order for DME   Yes No   Sig: Oxygen for home use. Liters per minute: 1 per nasal cannula. Frequency of use: With activity;. Length of need: 99.   polyethylene glycol (MIRALAX) 17 g packet Unknown   Yes Yes   Sig: Take 1 packet by mouth daily   senna-docusate (SENOKOT-S/PERICOLACE) 8.6-50 MG tablet Unknown  Yes Yes   Sig: Take 1 tablet by mouth 2 times daily   simvastatin (ZOCOR) 10 MG tablet Unknown  No Yes   Sig: TAKE ONE TABLET BY MOUTH AT BEDTIME.      Facility-Administered Medications: None     Allergies   Allergies   Allergen Reactions     Pcn [Penicillins]        Physical Exam   Vital Signs: Temp: (!) 100.6  F (38.1  C) Temp src: Oral BP: 107/55 Pulse: 92   Resp: 20 SpO2: 92 % O2 Device: None (Room air)    Weight: 130 lbs 0 oz  Constitutional:  Awake, no acute distress   HENT:  Abrasion to left frontal scalp, oropharynx without exudate or erythema, membranes moist  Lymph:  No adenopathy  Eyes: EOM intact, PERRL, no injection  Neck: Supple, wearing a [place] J collar  Respiratory:  Clear to auscultation bilaterally, no wheezes or crackles   Cardiovascular:  Regular rate and rhythm, single S1 and S2   GI:  Soft, nontender, nondistended, no rebound or guarding   Musculoskeletal:  Moves all extremities, no lower extremity edema, no deformities    Skin:  Warm, dry, skin tear to right elbow.  Neurologic:  Alert and oriented x1, non verbal, not cooperative with exam    Data   Data reviewed today: I reviewed all medications, new labs and imaging results over the last 24 hours. I personally reviewed the CT of head image(s) showing as follows:.  HEAD CT:  1.  Bilateral subacute subdural hematoma is overlying lateral aspect of cerebral hemispheres measuring 9 mm in greatest thickness on the right and 4 mm in greatest thickness on the left.  2.  Size of the hematomas is similar to prior, allowing for differences in technique; previously there was acute component bilaterally, which is not definitely identified on the current exam.  3.  Moderate presumed chronic small vessel ischemia.  4.  Moderate atrophy.  5.  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     CERVICAL SPINE CT:  1.  Mild chronic  compression superior aspect of T3, similar to prior. No definite other compression fractures.  2.   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   3.  No definite other fractures.    CXR   Heart size is normal. Lung volumes are low with mild elevation of the left hemidiaphragm. Multiple calcified granulomas, mediastinal/hilar nodes, and pleural plaques.. No CHF, lobar consolidation or pneumothorax. Moderately advanced degenerative changes in the left shoulder redemonstrated.    Recent Labs   Lab 11/09/22 2021   WBC 8.2   HGB 11.6*   MCV 97      *   POTASSIUM 4.1   CHLORIDE 98   CO2 27   BUN 20.8   CR 0.74   ANIONGAP 9   MADELIN 9.2   *

## 2022-11-10 NOTE — ED PROVIDER NOTES
EMERGENCY DEPARTMENT ENCOUNTER     NAME: Lang Christina   AGE: 89 year old male   YOB: 1928   MRN: [id number]   EVALUATION DATE & TIME: 11/9/2022  8:01 PM   PCP: Micheal Blake     Chief Complaint   Patient presents with     Fall     Fever   :    FINAL IMPRESSION       1. Infection due to 2019 novel coronavirus    2. Generalized muscle weakness    3. Fall, initial encounter           ED COURSE & MEDICAL DECISION MAKING      Pertinent Labs & Imaging studies reviewed. (See chart for details)   8:07 PM I met with the patient to gather history and to perform my initial exam. We discussed plans for the ED course, including diagnostic testing and treatment.    9:56 PM I discussed the patient with Dr. Monge from the hospitalist service who agrees to admit the patient.   10:02 PM Rechecked the patient and updated him with results.     89 year old male  presents to the Emergency Department for evaluation of a fall and feeling generally weak.  On arrival it was discovered he is febrile. Initial Vitals Reviewed. Initial exam notable for elderly male who is in a [place] J collar from previous fall causing a cervical spine fracture, was found to have a temperature of 100.6.  He is following commands in all extremities and otherwise denies pain.  He has a skin tear on his right elbow that is not repairable but we did dress and clean this.  I did a CT of his head and cervical spine to rule out new intracranial hemorrhage, skull fracture, C-spine fracture and these are fortunately negative.  Lab work was done to assess the source of the fever and he has no leukocytosis, negative chest x-ray, but unfortunately is COVID-19 positive.  I discussed the results with his daughter who stated that he actually developed a cough earlier today and had tested negative for COVID at his facility.  We did end up having to put some oxygen on him right around the time of admission is when he falls asleep he becomes slightly hypoxic.   Due to the generalized weakness, falls, and COVID-19 with hypoxia I discussed the case with hospitalist for admission.        Medical Decision Making    Supplemental history from: EMS and Other: care facility    External Record(s) Reviewed: Inpatient Record and Outpatient Record    Differential Diagnosis: See MDM charting for differential considered.     I performed an independent interpretation of the: EKG: See my documentation.    Discussed with radiology regarding test interpretation: N/A    Discussion of management with another provider: See ED Course    The following testing was considered but ultimately not selected: None    I considered prescription management with: N/A    The patient's care impacted: None    Consideration of Admission/Observation: N/A - Patient admitted    Care significantly affected by Social Determinants of Health including: N/A         At the conclusion of the encounter I discussed the results of all of the tests and the disposition. The questions were answered. The patient or family acknowledged understanding and was agreeable with the care plan. PPE worn: n95 mask.     35 minutes critical care time, see procedure note below for details if relevant    MEDICATIONS GIVEN IN THE EMERGENCY:   Medications   acetaminophen (TYLENOL) tablet 650 mg (650 mg Oral Given 11/9/22 2048)      NEW PRESCRIPTIONS STARTED AT TODAY'S ER VISIT   New Prescriptions    No medications on file     ================================================================   HISTORY OF PRESENT ILLNESS       Patient information was obtained from: patient, EMS report   Use of Intrepreter: N/A   Lang Christina is a 89 year old male with history of hyperlipidemia, CVA, paroxysmal A. fib, SDH 1/2022 and 6/2022, type II odontoid fracture, COPD/emphysema, BPH who presents for evaluation of a fall.     Per chart review, patient was admitted to [facility] for 8 days in June of 2022 for a fall and was found to have  bilateral SDH and type II odontoid fracture. Hospital course was complicated by significant confusion and acute urinary retention. Patient is DNR/DNI and family did not want any surgical intervention.     Patient presents by EMS for a fall this evening. Patient thinks he just slipped and fell. He did hit his head. Also has an abrasion to his left frontal scalp though this was reportedly present prior to tonight's fall. Patient denies headache or any extremity pains. Patient also has a skin tear to his right elbow. He remains in a [place] J collar.     Of note, patient is also febrile with a temperature of 100.6 F.        ================================================================    REVIEW OF SYSTEMS       Review of Systems   Constitutional: Positive for fever.   HENT: Negative for congestion.         Positive for head injury.   Eyes: Negative for redness.   Respiratory: Negative for shortness of breath.    Cardiovascular: Negative for chest pain.   Gastrointestinal: Negative for abdominal pain.   Genitourinary: Negative for difficulty urinating.   Musculoskeletal: Negative for arthralgias, back pain, neck pain and neck stiffness.   Skin: Negative for color change.        Positive for abrasions.   Neurological: Negative for headaches.   Psychiatric/Behavioral: Negative for confusion.   All other systems reviewed and are negative.        PAST HISTORY     PAST MEDICAL HISTORY:   Past Medical History:   Diagnosis Date     Actinic keratosis      BCC (basal cell carcinoma) 12/2012     BPH (benign prostatic hypertrophy) with urinary obstruction      COPD (chronic obstructive pulmonary disease) (H)     9/2012 Allergy consult     DDD (degenerative disc disease), cervical      Diverticulosis      ED (erectile dysfunction)      Hearing loss      Hyperlipidemia LDL goal < 130      Kidney stones      Localized osteoarthritis of both knees      Osteoarthritis      Senile purpura (H) 01/12/2021     Strabismus      Vasomotor  rhinitis      Vitamin B12 deficiency       PAST SURGICAL HISTORY:   Past Surgical History:   Procedure Laterality Date     ARTHROSCOPY KNEE RT/LT      Right     CATARACT IOL, RT/LT       CYSTOSCOPY       HERNIA REPAIR, INGUINAL RT/LT      Right     LIGATN/STRIP LONG OR SHORT SAPHEN       PHACOEMULSIFICATION WITH STANDARD INTRAOCULAR LENS IMPLANT  -10    RT / LT     DAVID Ba.      CURRENT MEDICATIONS:   acetaminophen (TYLENOL) 500 MG tablet  acetaminophen (TYLENOL) 500 MG tablet  albuterol (PROAIR HFA/PROVENTIL HFA/VENTOLIN HFA) 108 (90 Base) MCG/ACT inhaler  ANORO ELLIPTA 62.5-25 MCG/INH oral inhaler  bisacodyl (DULCOLAX) 10 MG suppository  Cyanocobalamin 1000 MCG TABS  diclofenac (VOLTAREN) 1 % topical gel  polyethylene glycol (MIRALAX) 17 g packet  senna-docusate (SENOKOT-S/PERICOLACE) 8.6-50 MG tablet  simvastatin (ZOCOR) 10 MG tablet  order for DME      ALLERGIES:   Allergies   Allergen Reactions     Pcn [Penicillins]       FAMILY HISTORY:   Family History   Problem Relation Age of Onset     Cerebrovascular Disease Brother       SOCIAL HISTORY:   Social History     Socioeconomic History     Marital status:    Occupational History     Employer: RETIRED   Tobacco Use     Smoking status: Former     Packs/day: 1.00     Years: 47.00     Pack years: 47.00     Types: Cigarettes, Pipe     Quit date: 1994     Years since quittin.8     Smokeless tobacco: Never   Substance and Sexual Activity     Alcohol use: No     Drug use: No     Sexual activity: Not Currently     Partners: Female        VITALS  Patient Vitals for the past 24 hrs:   BP Temp Temp src Pulse Resp SpO2 Weight   22 107/55 -- -- 92 -- 92 % --   22 138/63 (!) 100.6  F (38.1  C) Oral 92 20 90 % 59 kg (130 lb)   22 138/63 -- -- 89 -- 94 % --        ================================================================    PHYSICAL EXAM     VITAL SIGNS: /55   Pulse 92   Temp (!) 100.6  F (38.1   C) (Oral)   Resp 20   Wt 59 kg (130 lb)   SpO2 92%   BMI 20.98 kg/m     Constitutional:  Awake, no acute distress   HENT:  Abrasion to left frontal scalp, oropharynx without exudate or erythema, membranes moist  Lymph:  No adenopathy  Eyes: EOM intact, PERRL, no injection  Neck: Supple, wearing a [place] J collar  Respiratory:  Clear to auscultation bilaterally, no wheezes or crackles   Cardiovascular:  Regular rate and rhythm, single S1 and S2   GI:  Soft, nontender, nondistended, no rebound or guarding   Musculoskeletal:  Moves all extremities, no lower extremity edema, no deformities    Skin:  Warm, dry, skin tear to right elbow.  Neurologic:  Alert and oriented x3, no focal deficits noted, GCS 15      ================================================================  LAB       All pertinent labs reviewed and interpreted.   Labs Ordered and Resulted from Time of ED Arrival to Time of ED Departure   BASIC METABOLIC PANEL - Abnormal       Result Value    Sodium 134 (*)     Potassium 4.1      Chloride 98      Carbon Dioxide (CO2) 27      Anion Gap 9      Urea Nitrogen 20.8      Creatinine 0.74      Calcium 9.2      Glucose 168 (*)     GFR Estimate 84     INFLUENZA A/B & SARS-COV2 PCR MULTIPLEX - Abnormal    Influenza A PCR Negative      Influenza B PCR Negative      RSV PCR Negative      SARS CoV2 PCR Positive (*)    CBC WITH PLATELETS AND DIFFERENTIAL - Abnormal    WBC Count 8.2      RBC Count 3.57 (*)     Hemoglobin 11.6 (*)     Hematocrit 34.7 (*)     MCV 97      MCH 32.5      MCHC 33.4      RDW 13.4      Platelet Count 169      % Neutrophils 88      % Lymphocytes 4      % Monocytes 8      % Eosinophils 0      % Basophils 0      % Immature Granulocytes 0      NRBCs per 100 WBC 0      Absolute Neutrophils 7.3      Absolute Lymphocytes 0.3 (*)     Absolute Monocytes 0.7      Absolute Eosinophils 0.0      Absolute Basophils 0.0      Absolute Immature Granulocytes 0.0      Absolute NRBCs 0.0     LACTIC ACID WHOLE  BLOOD - Normal    Lactic Acid 1.6     ROUTINE UA WITH MICROSCOPIC REFLEX TO CULTURE   BLOOD CULTURE   BLOOD CULTURE        ===============================================================  RADIOLOGY       Reviewed all pertinent imaging. Please see official radiology report.   CT Cervical Spine w/o Contrast   Final Result   IMPRESSION:   HEAD CT:   1.  Bilateral subacute subdural hematoma is overlying lateral aspect of cerebral hemispheres measuring 9 mm in greatest thickness on the right and 4 mm in greatest thickness on the left.   2.  Size of the hematomas is similar to prior, allowing for differences in technique; previously there was acute component bilaterally, which is not definitely identified on the current exam.   3.  Moderate presumed chronic small vessel ischemia.   4.  Moderate atrophy.   5.  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      CERVICAL SPINE CT:   1.  Mild chronic compression superior aspect of T3, similar to prior. No definite other compression fractures.   2.   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    3.  No definite other fractures.      CT Head w/o Contrast   Final Result   IMPRESSION:   HEAD CT:   1.  Bilateral subacute subdural hematoma is overlying lateral aspect of cerebral hemispheres measuring 9 mm in greatest thickness on the right and 4 mm in greatest thickness on the left.   2.  Size of the hematomas is similar to prior, allowing for differences in technique; previously there was acute component bilaterally, which is not definitely identified on the current exam.   3.  Moderate presumed chronic small vessel ischemia.   4.  Moderate atrophy.   5.  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      CERVICAL SPINE CT:   1.  Mild chronic compression superior aspect of T3, similar to prior. No definite other compression fractures.   2.   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    3.  No  definite other fractures.      XR Chest 1 View   Final Result   IMPRESSION: Heart size is normal. Lung volumes are low with mild elevation of the left hemidiaphragm. Multiple calcified granulomas, mediastinal/hilar nodes, and pleural plaques.. No CHF, lobar consolidation or pneumothorax. Moderately advanced    degenerative changes in the left shoulder redemonstrated.            ================================================================  EKG     EKG reviewed interpreted by me shows a motion artifact did study with frequent PVCs, rate of 76, no acute ST or T wave changes that are obvious    I have independently reviewed and interpreted the EKG(s) documented above.     ================================================================  PROCEDURES     Critical Care  Performed by: Skye Sena MD  Authorized by: Skye Sena MD  Total critical care time: 35 minutes  Critical care time was exclusive of separately billable procedures and treating other patients.  Critical care was necessary to treat or prevent imminent or life-threatening deterioration of the following conditions: covid with hypoxia  Critical care was time spent personally by me on the following activities: development of treatment plan with patient or surrogate, discussions with consultants, examination of patient, evaluation of patient's response to treatment, obtaining history from patient or surrogate, ordering and performing treatments and interventions, ordering and review of laboratory studies, ordering and review of radiographic studies and re-evaluation of patient's condition, this excludes any separately billable procedures.          I, Adam Jasso, am serving as a scribe to document services personally performed by Dr. Sena based on my observation and the provider's statements to me. I, Skye Sena MD attest that Adam Jasso is acting in a scribe capacity, has observed my performance of the services and has documented them in  accordance with my direction.   Skye Sena M.D.   Emergency Medicine   [facility] EMERGENCY DEPARTMENT  [facility] [facility] [id number] [phone]  Dept: [phone]      Skye Sena MD  11/09/22 2781

## 2022-11-10 NOTE — ED NOTES
Bed: JNED-12  Expected date: 11/9/22  Expected time: 7:56 PM  Means of arrival: Ambulance  Comments:  [facility] - vee, fever.

## 2022-11-10 NOTE — PROGRESS NOTES
Hospitalist Progress Note        CODE STATUS:  No CPR- Do NOT Intubate    Assessment/Plan:  Lang Christina is a 89 YO man with HTN, HLD, Falls, Hyponatremia and Iron Deficiency Anemia who presented with mechanical fall, found to be febrile and diagnosed with COVID-19 infection.    COVID-19 Infection   - Clinically stable. No fevers since initial.  - CXR without acute process or  infiltrates  - Placed on supplemental O2 but now doing well on room air  - Continue decadrone 6 mg daily x 10 days. Remdesivir x 3 days  - Lovenox for DVT ppx not started due to hx of SDH  - Supportive cares as needed    Falls  Hx of SDH  - Evaluated by PT/OT: TCU at discharge  - Fall precautions    Hyponatremia    Iron Deficiency Anemia  - Started on PO iron supplement. Monitor outpatient.    DVT Prophylaxis: SCDs    Disposition/Barrier to discharge: TCU at discharge    Subjective:  Interval History:   Patient seen and examined.   Feeling well. Afebrile currently. Has mild cough.  O2 turned off and sPO2 readings acceptable.    Review of Systems:   As mentioned in subjective.    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     DDD (degenerative disc disease), cervical     Acute respiratory failure with hypoxia (H)     Paroxysmal atrial fibrillation (H)     Subdural hematoma     BPH with urinary obstruction     Cardiac dysrhythmia     CVA (cerebral vascular accident) (H)     Elevated troponin     Fall     Hard of hearing     High prostate specific antigen (PSA)     Knee joint replaced by other means     Personal history of other disorder of urinary system     Personal history of tobacco use, presenting hazards to health     Tinnitus      Laceration of forehead, initial encounter     Skin tear of right hand without complication, initial encounter     Chronic pain of both knees     Closed odontoid fracture with type II morphology (H)     Bilateral chronic intracranial subdural hematoma (H)       Scheduled Meds:    [START ON 11/11/2022] remdesivir  100 mg Intravenous Q24H    And     [START ON 11/11/2022] sodium chloride 0.9%  50 mL Intravenous Q24H     cyanocobalamin  100 mcg Oral Daily     dexamethasone  6 mg Intravenous Daily     ferrous sulfate  325 mg Oral Daily     polyethylene glycol  17 g Oral Daily     simvastatin  10 mg Oral At Bedtime     sodium chloride (PF)  3 mL Intracatheter Q8H     Continuous Infusions:  PRN Meds:.acetaminophen **OR** acetaminophen, albuterol, lidocaine 4%, lidocaine (buffered or not buffered), melatonin, ondansetron **OR** ondansetron, sodium chloride (PF)    Objective:  Vital signs in last 24 hours:  Temp:  [97.5  F (36.4  C)-100.6  F (38.1  C)] 97.5  F (36.4  C)  Pulse:  [52-95] 83  Resp:  [20-36] 22  BP: ()/(49-81) 128/56  SpO2:  [90 %-100 %] 96 %        Physical Exam:  General: In NAD  HEENT: NCAT & EOMI  CV: Normal S1S2, regular rhythm, normal rate  Lungs: CTAB  Abdomen: Soft, NT, ND, +BS  Extremities: No LE edema b/l  Neuro: AAOx3.     Lab Results:    Recent Results (from the past 24 hour(s))   UA with Microscopic reflex to Culture    Collection Time: 11/09/22 12:40 PM    Specimen: Urine, Midstream   Result Value Ref Range    Color Urine Yellow Colorless, Straw, Light Yellow, Yellow    Appearance Urine Clear Clear    Glucose Urine Negative Negative mg/dL    Bilirubin Urine Negative Negative    Ketones Urine Negative Negative mg/dL    Specific Gravity Urine 1.028 1.003 - 1.035    Blood Urine Negative Negative    pH Urine 5.5 5.0 - 7.0    Protein Albumin Urine 10 (A) Negative mg/dL    Urobilinogen Urine Normal Normal, 2.0 mg/dL    Nitrite Urine Negative Negative    Leukocyte Esterase Urine Negative Negative     Mucus Urine Present (A) None Seen /LPF    RBC Urine 3 (H) <=2 /HPF    WBC Urine 1 <=5 /HPF    Hyaline Casts Urine 3 (H) <=2 /LPF   Influenza A/B & SARS-CoV2 (COVID-19) Virus PCR Multiplex Nose    Collection Time: 11/09/22  3:30 PM    Specimen: Nose; Swab   Result Value Ref Range    Influenza A PCR Negative Negative    Influenza B PCR Negative Negative    RSV PCR Negative Negative    SARS CoV2 PCR Positive (A) Negative   Basic metabolic panel    Collection Time: 11/09/22  8:21 PM   Result Value Ref Range    Sodium 134 (L) 136 - 145 mmol/L    Potassium 4.1 3.4 - 5.3 mmol/L    Chloride 98 98 - 107 mmol/L    Carbon Dioxide (CO2) 27 22 - 29 mmol/L    Anion Gap 9 7 - 15 mmol/L    Urea Nitrogen 20.8 8.0 - 23.0 mg/dL    Creatinine 0.74 0.67 - 1.17 mg/dL    Calcium 9.2 8.2 - 9.6 mg/dL    Glucose 168 (H) 70 - 99 mg/dL    GFR Estimate 84 >60 mL/min/1.73m2   Lactic acid whole blood    Collection Time: 11/09/22  8:21 PM   Result Value Ref Range    Lactic Acid 1.6 0.7 - 2.0 mmol/L   CBC with platelets and differential    Collection Time: 11/09/22  8:21 PM   Result Value Ref Range    WBC Count 8.2 4.0 - 11.0 10e3/uL    RBC Count 3.57 (L) 4.40 - 5.90 10e6/uL    Hemoglobin 11.6 (L) 13.3 - 17.7 g/dL    Hematocrit 34.7 (L) 40.0 - 53.0 %    MCV 97 78 - 100 fL    MCH 32.5 26.5 - 33.0 pg    MCHC 33.4 31.5 - 36.5 g/dL    RDW 13.4 10.0 - 15.0 %    Platelet Count 169 150 - 450 10e3/uL    % Neutrophils 88 %    % Lymphocytes 4 %    % Monocytes 8 %    % Eosinophils 0 %    % Basophils 0 %    % Immature Granulocytes 0 %    NRBCs per 100 WBC 0 <1 /100    Absolute Neutrophils 7.3 1.6 - 8.3 10e3/uL    Absolute Lymphocytes 0.3 (L) 0.8 - 5.3 10e3/uL    Absolute Monocytes 0.7 0.0 - 1.3 10e3/uL    Absolute Eosinophils 0.0 0.0 - 0.7 10e3/uL    Absolute Basophils 0.0 0.0 - 0.2 10e3/uL    Absolute Immature Granulocytes 0.0 <=0.4 10e3/uL    Absolute NRBCs 0.0 10e3/uL   Iron and iron binding capacity    Collection Time: 11/09/22  8:21 PM   Result Value Ref  Range    Iron 16 (L) 61 - 157 ug/dL    Iron Sat Index 9 (L) 15 - 46 %    Iron Binding Capacity 176 (L) 240 - 430 ug/dL   Symptomatic; Unknown Influenza A/B & SARS-CoV2 (COVID-19) Virus PCR Multiplex Nasopharyngeal    Collection Time: 11/09/22  8:23 PM    Specimen: Nasopharyngeal; Swab   Result Value Ref Range    Influenza A PCR Negative Negative    Influenza B PCR Negative Negative    RSV PCR Negative Negative    SARS CoV2 PCR Positive (A) Negative       Serum Glucose range:   Recent Labs   Lab 11/09/22 2021   *     ABG: No lab results found in last 7 days.  CBC:   Recent Labs   Lab 11/09/22 2021   WBC 8.2   HGB 11.6*   HCT 34.7*   MCV 97      NEUTROPHIL 88   LYMPH 4   MONOCYTE 8   EOSINOPHIL 0     Chemistry:   Recent Labs   Lab 11/09/22 2021   *   POTASSIUM 4.1   CHLORIDE 98   CO2 27   BUN 20.8   CR 0.74   GFRESTIMATED 84   MADELIN 9.2     Coags:  No results for input(s): INR, PROTIME, PTT in the last 168 hours.    Invalid input(s): APTT  Cardiac Markers:  No results for input(s): CKTOTAL, TROPONINI in the last 168 hours.               11/10/2022   Alis Rosario MD  Hospitalist  Pager: [phone]

## 2022-11-10 NOTE — PROGRESS NOTES
[facility]  OUTPATIENT PHYSICAL THERAPY EVALUATION  PLAN OF TREATMENT FOR OUTPATIENT REHABILITATION  (COMPLETE FOR INITIAL CLAIMS ONLY)  Patient's Last Name, First Name, M.I.  YOB: 1928  VedaLang yang                        Provider's Name  [facility] Medical Record No.  [id number]                             Onset Date:  11/09/22   Start of Care Date:  11/10/22   Type:     _X_PT   ___OT   ___SLP Medical Diagnosis:  COVID-19; Fall              PT Diagnosis:  Impaired functional mobility Visits from SOC:  1     See note for plan of treatment, functional goals and certification details    I CERTIFY THE NEED FOR THESE SERVICES FURNISHED UNDER        THIS PLAN OF TREATMENT AND WHILE UNDER MY CARE     (Physician co-signature of this document indicates review and certification of the therapy plan).               11/10/22 1115   Appointment Info   Signing Clinician's Name / Credentials (PT) Dorothy Rasheed, PT, DPT   Quick Adds   Quick Adds Certification   Living Environment   People in Home facility resident   Current Living Arrangements extended care facility   Home Accessibility no concerns   Transportation Anticipated health plan transportation   Living Environment Comments Pt is from [facility] facility.   Self-Care   Usual Activity Tolerance moderate   Current Activity Tolerance moderate   Equipment Currently Used at Home wheelchair, manual   Fall history within last six months yes   Number of times patient has fallen within last six months 1   Activity/Exercise/Self-Care Comment Per pt report, he is independent with wheelchair transfers and mobility.   General Information   Onset of Illness/Injury or Date of Surgery 11/09/22   Referring Physician Jackelyn Ontiveros MD   Patient/Family Therapy Goals Statement (PT) None stated   Pertinent History of Current Problem (include personal factors and/or comorbidities that impact the POC)  COVID-19; Fall   Existing Precautions/Restrictions brace worn when out of bed;fall  (Barnes J)   Weight-Bearing Status - LLE weight-bearing as tolerated   Weight-Bearing Status - RLE weight-bearing as tolerated   Posture    Posture Forward head position  (Barnes J)   Range of Motion (ROM)   Range of Motion ROM is WFL   Strength (Manual Muscle Testing)   Strength (Manual Muscle Testing) Deficits observed during functional mobility   Bed Mobility   Bed Mobility supine-sit   Supine-Sit Winn (Bed Mobility) minimum assist (75% patient effort)   Impairments Contributing to Impaired Bed Mobility decreased strength   Transfers   Transfers sit-stand transfer   Impairments Contributing to Impaired Transfers impaired balance;decreased strength   Sit-Stand Transfer   Sit-Stand Winn (Transfers) minimum assist (75% patient effort)   Assistive Device (Sit-Stand Transfers) walker, front-wheeled   Gait/Stairs (Locomotion)   Winn Level (Gait) minimum assist (75% patient effort)   Assistive Device (Gait) walker, front-wheeled   Distance in Feet (Required for LE Total Joints) 10'   Pattern (Gait) step-to   Deviations/Abnormal Patterns (Gait) sukhi decreased;gait speed decreased;stride length decreased;weight shifting decreased   Clinical Impression   Criteria for Skilled Therapeutic Intervention Yes, treatment indicated   PT Diagnosis (PT) Impaired functional mobility   Influenced by the following impairments COVID-19, impaired balance, decreased strength, [place] J   Functional limitations due to impairments Bed mobility, transfers, gait   Clinical Presentation (PT Evaluation Complexity) Stable/Uncomplicated   Clinical Presentation Rationale Pt presents as medically diagnosed.   Clinical Decision Making (Complexity) low complexity   Planned Therapy Interventions (PT) balance training;bed mobility training;gait training;home exercise program;patient/family education;strengthening;transfer training;home program  guidelines   Anticipated Equipment Needs at Discharge (PT) wheelchair;walker, rolling;gait belt   Risk & Benefits of therapy have been explained evaluation/treatment results reviewed;care plan/treatment goals reviewed;patient   PT Total Evaluation Time   PT Cisco, Low Complexity Minutes (67853) 10   Therapy Certification   Start of care date 11/10/22   Certification date from 11/10/22   Certification date to 11/17/22   Medical Diagnosis COVID-19; Fall   Physical Therapy Goals   PT Frequency 4x/week   PT Predicted Duration/Target Date for Goal Attainment 11/17/22   Interventions   Interventions Quick Adds Therapeutic Activity   Therapeutic Activity   Therapeutic Activities: dynamic activities to improve functional performance Minutes (55636) 25   Symptoms Noted During/After Treatment Fatigue;Shortness of breath;Significant change in vital signs   Treatment Detail/Skilled Intervention Pt encountered in supine, on room air, Neelam rosenbaum. Supine>sit with min A of 1 to assist with trunk control. HOB elevatd 30 degrees. Sit<>stand from EOB x 5 throughout session with min A of 1 and FWW. Pt found to have BM after first sit<>stand so completed additional sit<>stands to replace soiled linens and complete pericares with assist from RN. Pt able to tolerate standing 2-3 minutes at a time with min A of 1 and FWW for balance. Pt did ambulate forward/backward x 2 bouts (~10' in total) in between pericares. Sit>supine at end of session with min A of 1 to assist with LE management. Assist of 2 to boost in bed. SnO2 dropped a few times during activity but recovered quickly each time once sitting with verbal cues for PLB.   PT Discharge Planning   PT Plan IND with bed mobility/transfers, gait with FWW in room, LE ther ex   PT Discharge Recommendation (DC Rec) (S)  Long term care facility;Transitional Care Facility   PT Rationale for DC Rec Pt was previously independent with wheelchair transfers/mobility at his LTC facility, currently  requiring min A of 1. Recommend return to LTC facility when medically cleared with PT services to progress functional mobility and strength.   PT Brief overview of current status Min A of 1 for all mobility.   Total Session Time   Timed Code Treatment Minutes 25   Total Session Time (sum of timed and untimed services) 35

## 2022-11-10 NOTE — CONSULTS
10:03 AM  Pt COVID+; assessment completed via phone with family and admissions form [facility] LT facility. Pt comes form LTC and able to return when medically stable (has bed hold). Admissions stated that pt does NOT need to remain in hospital 10 days after first COVID+ test. If therapy recommending that pt discharge to Transitional Care Unit pt is able to return to LTC bed and receive said services in room.     SWCM will continue to follow through admission and for discharge planning.    CHRIST Melton     98.7 98.5

## 2022-11-10 NOTE — ED NOTES
He is complaining of pain in his low back and into the upper legs. Will give tylenol to see if that helps and he is fine with this plan.

## 2022-11-10 NOTE — ED NOTES
Cleansed and redressed skin tear/wound on right elbow.  Pt reported able to notify when he has to urinate.  Pt has not urinated.

## 2022-11-11 NOTE — PLAN OF CARE
PRIMARY DIAGNOSIS: Falls, Covid  OUTPATIENT/OBSERVATION GOALS TO BE MET BEFORE DISCHARGE:  ADLs back to baseline: Yes    Activity and level of assistance: Up with standby assistance.    Pain status: Pain free.    Return to near baseline physical activity: Yes     Discharge Planner Nurse   Safe discharge environment identified: Yes  Barriers to discharge: Yes, IV remdesivir and Covid        Entered by: Sheng Stoner RN 11/10/2022 10:57 PM

## 2022-11-11 NOTE — PROGRESS NOTES
Hospitalist Progress Note        CODE STATUS:  No CPR- Do NOT Intubate    Assessment/Plan:  Lang Christina is a 89 YO man with HTN, HLD, Falls, Hyponatremia and Iron Deficiency Anemia who presented with mechanical fall, found to be febrile and diagnosed with COVID-19 infection.    COVID-19 Infection   - Clinically stable. Tmax of 100.6F in ED but no more fevers since initial.  - CXR without acute process or  infiltrates  - Placed on supplemental O2 but was on supplemental O2 for less than 12 hours. Doing well on room air.   - Last dose of Remdesivir today. Continue decadron 6 mg daily x total of 10 days.   - Lovenox for DVT ppx not started due to hx of SDH  - Supportive cares as needed    Falls  Hx of SDH  - Evaluated by PT/OT: TCU at discharge  - Fall precautions    Hyponatremia  - Mild. Likely related to acute illness.    Iron Deficiency Anemia  - Started on PO iron supplement. Monitor outpatient.    DVT Prophylaxis: SCDs    Disposition/Barrier to discharge: TCU at discharge    Subjective:  Interval History:   Patient seen and examined.   Feeling well. Afebrile currently.   AAOx3.  Remains on room air and doing well.    Review of Systems:   As mentioned in subjective.    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     DDD (degenerative disc disease), cervical     Acute respiratory failure with hypoxia (H)     Paroxysmal atrial fibrillation (H)     Subdural hematoma     BPH with urinary obstruction     Cardiac dysrhythmia     CVA (cerebral vascular accident) (H)     Elevated troponin     Fall     Hard of hearing     High prostate specific antigen (PSA)     Knee joint replaced by other means     Personal history  of other disorder of urinary system     Personal history of tobacco use, presenting hazards to health     Tinnitus     Laceration of forehead, initial encounter     Skin tear of right hand without complication, initial encounter     Chronic pain of both knees     Closed odontoid fracture with type II morphology (H)     Bilateral chronic intracranial subdural hematoma (H)       Scheduled Meds:    remdesivir  100 mg Intravenous Q24H    And     sodium chloride 0.9%  50 mL Intravenous Q24H     cyanocobalamin  100 mcg Oral Daily     dexamethasone  6 mg Intravenous Daily     ferrous sulfate  325 mg Oral Daily     polyethylene glycol  17 g Oral Daily     simvastatin  10 mg Oral At Bedtime     sodium chloride (PF)  3 mL Intracatheter Q8H     Continuous Infusions:  PRN Meds:.acetaminophen **OR** acetaminophen, albuterol, lidocaine 4%, lidocaine (buffered or not buffered), melatonin, ondansetron **OR** ondansetron, sodium chloride (PF)    Objective:  Vital signs in last 24 hours:  Temp:  [97.7  F (36.5  C)-99.5  F (37.5  C)] 97.8  F (36.6  C)  Pulse:  [58-84] 68  Resp:  [20-40] 20  BP: (101-149)/(55-67) 114/67  SpO2:  [90 %-98 %] 98 %        Physical Exam:  General: In NAD  HEENT: NCAT & EOMI  CV: Normal S1S2, regular rhythm, normal rate  Lungs: CTAB  Abdomen: Soft, NT, ND, +BS  Extremities: No LE edema b/l  Neuro: AAOx3.     Lab Results:    Recent Results (from the past 24 hour(s))   Comprehensive metabolic panel    Collection Time: 11/10/22  8:17 AM   Result Value Ref Range    Sodium 134 (L) 136 - 145 mmol/L    Potassium 3.9 3.4 - 5.3 mmol/L    Chloride 98 98 - 107 mmol/L    Carbon Dioxide (CO2) 28 22 - 29 mmol/L    Anion Gap 8 7 - 15 mmol/L    Urea Nitrogen 18.3 8.0 - 23.0 mg/dL    Creatinine 0.59 (L) 0.67 - 1.17 mg/dL    Calcium 8.9 8.2 - 9.6 mg/dL    Glucose 92 70 - 99 mg/dL    Alkaline Phosphatase 123 40 - 129 U/L    AST 65 (H) 10 - 50 U/L    ALT 20 10 - 50 U/L    Protein Total 6.2 (L) 6.4 - 8.3 g/dL    Albumin 3.3 (L)  3.5 - 5.2 g/dL    Bilirubin Total 0.6 <=1.2 mg/dL    GFR Estimate 90 >60 mL/min/1.73m2   CBC with platelets    Collection Time: 11/10/22  8:17 AM   Result Value Ref Range    WBC Count 6.5 4.0 - 11.0 10e3/uL    RBC Count 3.34 (L) 4.40 - 5.90 10e6/uL    Hemoglobin 10.8 (L) 13.3 - 17.7 g/dL    Hematocrit 33.0 (L) 40.0 - 53.0 %    MCV 99 78 - 100 fL    MCH 32.3 26.5 - 33.0 pg    MCHC 32.7 31.5 - 36.5 g/dL    RDW 13.4 10.0 - 15.0 %    Platelet Count 155 150 - 450 10e3/uL   UA with Microscopic reflex to Culture    Collection Time: 11/10/22  8:40 AM    Specimen: Urine, Midstream   Result Value Ref Range    Color Urine Yellow Colorless, Straw, Light Yellow, Yellow    Appearance Urine Clear Clear    Glucose Urine Negative Negative mg/dL    Bilirubin Urine Negative Negative    Ketones Urine Negative Negative mg/dL    Specific Gravity Urine 1.033 (H) 1.001 - 1.030    Blood Urine Negative Negative    pH Urine 5.5 5.0 - 7.0    Protein Albumin Urine 20 (A) Negative mg/dL    Urobilinogen Urine 2.0 (A) <2.0 mg/dL    Nitrite Urine Negative Negative    Leukocyte Esterase Urine Negative Negative    Mucus Urine Present (A) None Seen /LPF    RBC Urine 0 <=2 /HPF    WBC Urine 2 <=5 /HPF    Hyaline Casts Urine 1 <=2 /LPF       Serum Glucose range:   Recent Labs   Lab 11/10/22  0817 11/09/22  2021   GLC 92 168*     ABG: No lab results found in last 7 days.  CBC:   Recent Labs   Lab 11/10/22  0817 11/09/22  2021   WBC 6.5 8.2   HGB 10.8* 11.6*   HCT 33.0* 34.7*   MCV 99 97    169   NEUTROPHIL  --  88   LYMPH  --  4   MONOCYTE  --  8   EOSINOPHIL  --  0     Chemistry:   Recent Labs   Lab 11/10/22  0817 11/09/22  2021   * 134*   POTASSIUM 3.9 4.1   CHLORIDE 98 98   CO2 28 27   BUN 18.3 20.8   CR 0.59* 0.74   GFRESTIMATED 90 84   MADELIN 8.9 9.2   PROTTOTAL 6.2*  --    ALBUMIN 3.3*  --    AST 65*  --    ALT 20  --    ALKPHOS 123  --    BILITOTAL 0.6  --      Coags:  No results for input(s): INR, PROTIME, PTT in the last 168  hours.    Invalid input(s): APTT  Cardiac Markers:  No results for input(s): CKTOTAL, TROPONINI in the last 168 hours.               11/11/2022   Alis Rosario MD  Hospitalist  Pager: [phone]

## 2022-11-11 NOTE — PLAN OF CARE
"PRIMARY DIAGNOSIS: \"GENERIC\" NURSING  OUTPATIENT/OBSERVATION GOALS TO BE MET BEFORE DISCHARGE:  ADLs back to baseline: Yes    Activity and level of assistance: Up with standby assistance.    Pain status: Pain free.    Return to near baseline physical activity: Yes     Discharge Planner Nurse   Safe discharge environment identified: Yes  Barriers to discharge: Yes         Entered by: Danitza Barreto RN 11/11/2022 10:16 AM     Please review provider order for any additional goals.   Nurse to notify provider when observation goals have been met and patient is ready for discharge.Goal Outcome Evaluation:                        "

## 2022-11-11 NOTE — PLAN OF CARE
Problem: Plan of Care - These are the overarching goals to be used throughout the patient stay.    Goal: Plan of Care Review  Description: The Plan of Care Review/Shift note should be completed every shift.  The Outcome Evaluation is a brief statement about your assessment that the patient is improving, declining, or no change.  This information will be displayed automatically on your shift note.  Outcome: Progressing   Goal Outcome Evaluation:  Plan of care reviewed with patient and later daughter Sharon. Patient is alert and generally oriented but keeps asking about his car keys. Pt is cooperative with cares. He has been up in chair and up to bathroom-with assist of 1 and walker. He is tolerating a regular diet and had a bm this morning. His right eye lid is red an sl painful.

## 2022-11-11 NOTE — PLAN OF CARE
PRIMARY DIAGNOSIS: Falls, Covid19  OUTPATIENT/OBSERVATION GOALS TO BE MET BEFORE DISCHARGE:  ADLs back to baseline: Yes    Activity and level of assistance: Up with standby assistance.    Pain status: Pain free.    Return to near baseline physical activity: Yes     Discharge Planner Nurse   Safe discharge environment identified: Yes, LTC with PT/OT  Barriers to discharge: Yes, IV medication and Covid        Entered by: Sheng Stoner RN 11/10/2022 10:53 PM     Pt is alert and oriented x2, unsure of time of day and situation, able to communicate needs but needs to be reminded how to use call light, assist of 1 with walker and gait belt. Pt denies pain, lung sounds clear with faint crackles in RLL.

## 2022-11-11 NOTE — PLAN OF CARE
PRIMARY DIAGNOSIS: GENERALIZED WEAKNESS    OUTPATIENT/OBSERVATION GOALS TO BE MET BEFORE DISCHARGE  1. Orthostatic performed: N/A    2. Tolerating PO medications: Yes    3. Return to near baseline physical activity: Yes    4. Cleared for discharge by consultants (if involved): Yes    Discharge Planner Nurse   Safe discharge environment identified: No  Barriers to discharge: Yes       Entered by: Marvin Roth RN 11/11/2022 5:41 PM     Please review provider order for any additional goals.   Nurse to notify provider when observation goals have been met and patient is ready for discharge.    Pt alert and confused to time and situation. Denies pain thus far. Pt observed to be unsteady in his gait to the bathroom. Pt assist of 1 w/ gait belt and walker. LS diminished on auscultation. Pt has non-productive cough. Bed alarm on for pt safety. Special precautions maintained for positive COVID from 11/9/2022.

## 2022-11-11 NOTE — PROGRESS NOTES
Care Management Follow Up    Length of Stay (days): 0    Expected Discharge Date: 11/11/2022     Concerns to be Addressed: discharge planning     Patient plan of care discussed at interdisciplinary rounds: Yes    Anticipated Discharge Disposition: Long Term Care     Anticipated Discharge Services: None  Anticipated Discharge DME: None    Patient/family educated on Medicare website which has current facility and service quality ratings: no  Education Provided on the Discharge Plan:    Patient/Family in Agreement with the Plan: yes    Referrals Placed by CM/SW:    Private pay costs discussed: Not applicable    Additional Information:  Spoke to provider about if pt was medically ready to discharge and provider was thinking he was ready. Pt is off O2 and his last day for remdesivir would be today. So I started the process of setting up the discharge. Spoke to [facility] and they could take him back so I set up a stretcher ride for 1645 (due to pts covid status), and alerted the doctor.     Then I reached out to the daughter Sharon to see if she wanted me to set up a ride or if she wanted to provide the ride back to the LTC. Sharon was kind of upset that the pt was being discharged and she wanted to speak to the provider.  About 20 minutes later the LTC called and said they had a staffing concern and could not take the pt back until Monday. The ride was canceled the daughter, pt and provider were notified that the discharge was canceled.          Disha Pierson RN

## 2022-11-11 NOTE — PLAN OF CARE
PRIMARY DIAGNOSIS: GENERALIZED WEAKNESS    OUTPATIENT/OBSERVATION GOALS TO BE MET BEFORE DISCHARGE  1. Orthostatic performed: Yes:                                    2. Tolerating PO medications: Yes    3. Return to near baseline physical activity: Yes    4. Cleared for discharge by consultants (if involved): Yes    Discharge Planner Nurse   Safe discharge environment identified: Yes  Barriers to discharge: NO       Entered by: Rebeca Willard RN 11/11/2022 4:10 AM     Please review provider order for any additional goals.   Nurse to notify provider when observation goals have been met and patient is ready for discharge.Goal Outcome Evaluation:       Pt is alert, confused to situation and time, pt denied pain and is calm and cooperative, wearing neck brace, vitals are stable, on room air, using the urinal, has been having frequent productive cough.

## 2022-11-11 NOTE — UTILIZATION REVIEW
Concurrent stay review; Secondary Review Determination     Under the authority of the Utilization Management Committee, the utilization review process indicated a secondary review on Lang Christina.  The review outcome is based on review of the medical records, discussions with staff, and applying clinical experience noted on the date of the review.        (x) Observation Status Appropriate - Concurrent stay review    RATIONALE FOR DETERMINATION   89 year old male  with history of hyperlipidemia, CVA, paroxysmal A. fib, SDH 1/2022 and 6/2022, type II odontoid fracture(on [place] Collar), COPD/emphysema, BPH who presents for evaluation of a fall at [facility] and found to have positive COVID, no hypoxia , stable vital signs , pending placement     Patient is clinically improving and there is no clear indication to change patient's status to inpatient. The severity of illness, intensity of service provided, expected LOS and risk for adverse outcome make the care appropriate for observation.    The information on this document is developed by the utilization review team in order for the business office to ensure compliance.  This only denotes the appropriateness of proper admission status and does not reflect the quality of care rendered.         The definitions of Inpatient Status and Observation Status used in making the determination above are those provided in the CMS Coverage Manual, Chapter 1 and Chapter 6, section 70.4.      Sincerely,   Edmond Ac MD  Utilization Review  Physician Advisor  [facility]

## 2022-11-11 NOTE — PLAN OF CARE
PRIMARY DIAGNOSIS: GENERALIZED WEAKNESS    OUTPATIENT/OBSERVATION GOALS TO BE MET BEFORE DISCHARGE  1. Orthostatic performed: Yes:                                    2. Tolerating PO medications: Yes    3. Return to near baseline physical activity: Yes    4. Cleared for discharge by consultants (if involved): Yes    Discharge Planner Nurse   Safe discharge environment identified: Yes  Barriers to discharge: No       Entered by: Rebeca Willard RN 11/11/2022 2:40 AM     Please review provider order for any additional goals.   Nurse to notify provider when observation goals have been met and patient is ready for discharge.Goal Outcome Evaluation:

## 2022-11-12 PROBLEM — U07.1 INFECTION DUE TO 2019 NOVEL CORONAVIRUS: Status: ACTIVE | Noted: 2022-01-01

## 2022-11-12 NOTE — PROGRESS NOTES
PRIMARY DIAGNOSIS: GENERALIZED WEAKNESS    OUTPATIENT/OBSERVATION GOALS TO BE MET BEFORE DISCHARGE  1. Orthostatic performed: N/A    2. Tolerating PO medications: Yes    3. Return to near baseline physical activity: Yes    4. Cleared for discharge by consultants (if involved): Yes    Discharge Planner Nurse   Safe discharge environment identified: Yes  Barriers to discharge: Yes       Entered by: Krystal Denis RN 11/12/2022 1:31 PM   Pt. Continues to be calm and cooperative. Pt. Not using call light appropriately. Pt. Confused to place, time and situation.   Please review provider order for any additional goals.   Nurse to notify provider when observation goals have been met and patient is ready for discharge.

## 2022-11-12 NOTE — PROGRESS NOTES
Pt was found at 4:20 on 11/12 on the floor crawlling to the bathroom confused and saying  he is escaping from a bomb, he did not want to be touched because he believed he was going to be killed by a bomb and needed to escape out of his room, he has a mild bleeding bruise in his left elbow, that was cleaned and a dressing was put on there, he didn't have any other noticeable injuries and denied falling nor hitting his head, his vitals were taken same time and were all WNL. He was redirected from the bathroom to bed, he was in bed for a short while then went to sit on the chair,now he is sitting on the chair where he says he prefers be be at this time...    PROGRESS..patient left his room and headed to the nurse station, he did not want to go back to his room, security was called and was able to convince him to go back to his room and was reassured he will be safe, he agreed to go back in and was in there with someone. House officer was called and he got an order for a dose of IM Zyprexa 2.5 mg which was given to him at 5:50 am.

## 2022-11-12 NOTE — PROGRESS NOTES
[facility]    PROGRESS NOTE - Hospitalist Service    Assessment and Plan    Principal Problem:    Infection due to 2019 novel coronavirus    Lang Christina is a 89 YO man with HTN, HLD, Falls, Hyponatremia and Iron Deficiency Anemia who presented with mechanical fall, found to be febrile and diagnosed with COVID-19 infection.     COVID-19 Infection   - Clinically stable. Tmax of 100.6F in ED but no more fevers since initial.  - CXR without acute process or infiltrates  -Oxygen titrated off and now doing well on room air.  -Completed remdesivir  - Continue decadron 6 mg daily x total of 10 days.   - Lovenox for DVT ppx not started due to hx of SDH  - Supportive cares as needed     Falls  Hx of SDH  - Evaluated by PT/OT: TCU at discharge  - Fall precautions     Hyponatremia  - Mild. Likely related to acute illness.     Iron Deficiency Anemia  - Started on PO iron supplement. Monitor outpatient.     DVT Prophylaxis: SCDs     Disposition/Barrier to discharge: TCU at discharge    COVID-19 PCR Results    COVID-19 PCR Results 2/21/22 2/28/22 4/25/22 6/13/22 6/20/22 11/9/22 11/9/22         1530 2023   COVID-19 Virus by PCR (External Result) Negative Negative        SARS CoV2 PCR   Negative Negative Negative Positive (A) Positive (A)   (A) Abnormal value       Comments are available for some flowsheets but are not being displayed.         COVID-19 Antibody Results, Testing for Immunity    COVID-19 Antibody Results, Testing for Immunity   No data to display.            VTE prophylaxis:  Pneumatic Compression Devices  DIET: Orders Placed This Encounter      Combination Diet Regular Diet Adult    Code Status: No CPR- Do NOT Intubate    Subjective:  No chest pain, shortness of breath or palpitations. No nausea or vomiting. Pain controlled. No fever.    PHYSICAL EXAM  Temp:  [97.1  F (36.2  C)-98.6  F (37  C)] 97.6  F (36.4  C)  Pulse:  [58-91] 58  Resp:  [17-20] 18  BP: (107-138)/(57-70)  115/57  SpO2:  [92 %-96 %] 96 %  Wt Readings from Last 1 Encounters:   11/09/22 59 kg (130 lb)       Intake/Output Summary (Last 24 hours) at 11/12/2022 1745  Last data filed at 11/12/2022 1730  Gross per 24 hour   Intake 920 ml   Output --   Net 920 ml      Body mass index is 20.98 kg/m .    Patient consented to exam.    General: Appears comfortable and in no distress.  Neuro: No new focal deficits observed.  No motor or sensory deficits observed.  Cranial nerves intact.  Psychiatric: No hallucinations or delusions.  ENT: MMM and no acute oral pathology detected.  CVS: S1 S2 with equal pulses.  Resp: CTABL with good inspiratory effort.  Abdomen: SNT with +BS. No guarding, rebound or distention.  Extremities: Good perfusion.  Radial pulses intact.      PERTINENT LABS/IMAGING:  Results for orders placed or performed during the hospital encounter of 11/09/22   CT Head w/o Contrast    Impression    IMPRESSION:  HEAD CT:  1.  Bilateral subacute subdural hematoma is overlying lateral aspect of cerebral hemispheres measuring 9 mm in greatest thickness on the right and 4 mm in greatest thickness on the left.  2.  Size of the hematomas is similar to prior, allowing for differences in technique; previously there was acute component bilaterally, which is not definitely identified on the current exam.  3.  Moderate presumed chronic small vessel ischemia.  4.  Moderate atrophy.  5.  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    CERVICAL SPINE CT:  1.  Mild chronic compression superior aspect of T3, similar to prior. No definite other compression fractures.  2.   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   3.  No definite other fractures.   CT Cervical Spine w/o Contrast    Impression    IMPRESSION:  HEAD CT:  1.  Bilateral subacute subdural hematoma is overlying lateral aspect of cerebral hemispheres measuring 9 mm in greatest thickness on the right and 4 mm in greatest thickness on  the left.  2.  Size of the hematomas is similar to prior, allowing for differences in technique; previously there was acute component bilaterally, which is not definitely identified on the current exam.  3.  Moderate presumed chronic small vessel ischemia.  4.  Moderate atrophy.  5.  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    CERVICAL SPINE CT:  1.  Mild chronic compression superior aspect of T3, similar to prior. No definite other compression fractures.  2.   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   3.  No definite other fractures.   XR Chest 1 View    Impression    IMPRESSION: Heart size is normal. Lung volumes are low with mild elevation of the left hemidiaphragm. Multiple calcified granulomas, mediastinal/hilar nodes, and pleural plaques.. No CHF, lobar consolidation or pneumothorax. Moderately advanced   degenerative changes in the left shoulder redemonstrated.       Recent Labs   Lab 11/11/22  1211 11/10/22  0817 11/09/22  2021   WBC  --  6.5 8.2   HGB  --  10.8* 11.6*   MCV  --  99 97   PLT  --  155 169   NA  --  134* 134*   POTASSIUM  --  3.9 4.1   CHLORIDE  --  98 98   CO2  --  28 27   BUN  --  18.3 20.8   CR  --  0.59* 0.74   ANIONGAP  --  8 9   MADELIN  --  8.9 9.2   * 92 168*   ALBUMIN  --  3.3*  --    PROTTOTAL  --  6.2*  --    BILITOTAL  --  0.6  --    ALKPHOS  --  123  --    ALT  --  20  --    AST  --  65*  --      Recent Labs   Lab Test 11/05/21  1205   CHOL 141   HDL 78   LDL 48   TRIG 73     Recent Labs   Lab Test 11/05/21  1205 08/04/20  0913 03/04/19  1435   LDL 48 64 69     Recent Labs   Lab Test 11/11/22  1211 11/10/22  0817   NA  --  134*   POTASSIUM  --  3.9   CHLORIDE  --  98   CO2  --  28   * 92   BUN  --  18.3   CR  --  0.59*   GFRESTIMATED  --  90   MADELIN  --  8.9     Recent Labs   Lab Test 08/12/22  0544 08/10/22  0736 10/07/14  1408   A1C 5.7* 5.8* 5.8     Recent Labs   Lab Test 11/10/22  0817 11/09/22 2021 08/12/22  0544   HGB  10.8* 11.6* 10.0*     Recent Labs   Lab Test 06/13/22  0200   TROPONINI 0.07     Recent Labs   Lab Test 08/12/22  0544 08/10/22  0736 06/13/22  0200 06/01/22  0713 04/25/22  0838   BNP  --   --  348* 254* 215*   NTBNP 1,744* 1,958*  --   --   --      Recent Labs   Lab Test 08/12/22  0544   TSH 2.21     Recent Labs   Lab Test 06/13/22  0423   INR 1.41*       Belgica Avila MD  [facility] Medicine Service  [phone]

## 2022-11-12 NOTE — PLAN OF CARE
PRIMARY DIAGNOSIS: GENERALIZED WEAKNESS    OUTPATIENT/OBSERVATION GOALS TO BE MET BEFORE DISCHARGE  1. Orthostatic performed: N/A    2. Tolerating PO medications: Yes    3. Return to near baseline physical activity: Yes    4. Cleared for discharge by consultants (if involved): Yes    Discharge Planner Nurse   Safe discharge environment identified: No  Barriers to discharge: Yes       Entered by: Marvin Roth RN 11/11/2022 10:09 PM     Please review provider order for any additional goals.   Nurse to notify provider when observation goals have been met and patient is ready for discharge.    Pt remains to be alert, but pleasantly confused. Pt following commands from staff. Pt utilizes the call light for transfers to the bathroom. Pt is an assist of 1 w/ gait belt and walker. Denied pain. Cervical collar on. Bed alarm on for pt safety.

## 2022-11-12 NOTE — PLAN OF CARE
Goal Outcome Evaluation:    Problem: Confusion Chronic  Goal: Optimal Cognitive Function  Outcome: Progressing  Intervention: Minimize Injury Risk and Provide Safety  Recent Flowsheet Documentation  Taken 11/12/2022 1120 by Krystal Denis RN  Enhanced Safety Measures:   chair alarm set    at bedside  Taken 11/12/2022 0731 by Krystal Denis, RN  Enhanced Safety Measures:   chair alarm set    at bedside   Pt. Continues to be confused. Pt. Disoriented to place, time and situation. Pt. Cooperative with all cares. Pt. Continues to be a 1:1 for pt. Safety.   Pt. Up with assist of one and a walker. Pt. Very unsteady on his own. Cervical collar on at all times, skin checked underneath collar.     Krystal Denis, ANTOINE

## 2022-11-12 NOTE — PROGRESS NOTES
Pt is on a 1:1 for safety and he can go back to [facility] with covid and  as long as he is close to his baseline. Should try to wean off 1:1 if we can. Pt will need stretcher ride due to his covid status but will wait to set up once pt is closer to his baseline.

## 2022-11-12 NOTE — PLAN OF CARE
PRIMARY DIAGNOSIS: GENERALIZED WEAKNESS    OUTPATIENT/OBSERVATION GOALS TO BE MET BEFORE DISCHARGE  1. Orthostatic performed: Yes:                                    2. Tolerating PO medications: Yes    3. Return to near baseline physical activity: Yes    4. Cleared for discharge by consultants (if involved): Yes    Discharge Planner Nurse   Safe discharge environment identified: Yes  Barriers to discharge: No       Entered by: Rebeca Willard RN 11/12/2022 3:05 AM     Please review provider order for any additional goals.   Nurse to notify provider when observation goals have been met and patient is ready for discharge.Goal Outcome Evaluation:

## 2022-11-12 NOTE — PROGRESS NOTES
PRIMARY DIAGNOSIS: GENERALIZED WEAKNESS    OUTPATIENT/OBSERVATION GOALS TO BE MET BEFORE DISCHARGE  1. Orthostatic performed: N/A    2. Tolerating PO medications: Yes    3. Return to near baseline physical activity: Yes    4. Cleared for discharge by consultants (if involved): N/A    Discharge Planner Nurse   Safe discharge environment identified: Yes  Barriers to discharge: Yes       Entered by: Krystal Denis RN 11/12/2022 8:34 AM   Pt. Is calm and cooperative with all cares. Currently a 1:1 is in his room with him for safety. Pt. Has not attempted to leave his room.   Please review provider order for any additional goals.   Nurse to notify provider when observation goals have been met and patient is ready for discharge.

## 2022-11-13 NOTE — PROGRESS NOTES
PRIMARY DIAGNOSIS: GENERALIZED WEAKNESS    OUTPATIENT/OBSERVATION GOALS TO BE MET BEFORE DISCHARGE  1. Orthostatic performed: No    2. Tolerating PO medications: Yes    3. Return to near baseline physical activity: Yes    4. Cleared for discharge by consultants (if involved): N/A    Discharge Planner Nurse   Safe discharge environment identified: Yes  Barriers to discharge: Yes       Entered by: Krystal Denis RN 11/12/2022 6:57 PM     Please review provider order for any additional goals.   Nurse to notify provider when observation goals have been met and patient is ready for discharge.

## 2022-11-13 NOTE — PROGRESS NOTES
Hospitalist Progress Note    ASSESSMENT & PLAN    Principal Problem:    Infection due to 2019 novel coronavirus      Lang Christina is a 89 YO man with HTN, HLD, Falls, Hyponatremia and Iron Deficiency Anemia who presented with mechanical fall, found to be febrile and diagnosed with COVID-19 infection.     COVID-19 Infection   - Clinically stable. Tmax of 100.6F in ED but no more fevers since initial.  - CXR without acute process or infiltrates  -Oxygen titrated off and now doing well on room air.  Does not appear to be in distress.  Breathing comfortably.  -Completed remdesivir  - Continue decadron 6 mg daily x total of 10 days.   - Lovenox for DVT ppx not started due to hx of SDH  - Supportive cares as needed     Falls  Hx of SDH  - Evaluated by PT/OT: TCU at discharge  - Fall precautions     Hyponatremia  - Mild. Likely related to acute illness.     Iron Deficiency Anemia  - Started on PO iron supplement. Monitor outpatient.    Dementia and insomnia.  - Seroquel 12.5 mg at night.       DVT Prophylaxis: SCDs     Disposition: TCU per PT.       SUBJECTIVE  No chest pain, shortness of breath or palpitations. No nausea or vomiting. Pain controlled. No fever.    Patient appears to be comfortable this morning.  Has difficulty sleeping at night.    OBJECTIVE    Vital signs in last 24 hours  Temp:  [97.5  F (36.4  C)-98.4  F (36.9  C)] 97.8  F (36.6  C)  Pulse:  [65-89] 65  Resp:  [17-20] 17  BP: (100-136)/(60-77) 125/77  SpO2:  [93 %-96 %] 95 % @LASTSAO2(12)@ O2 Device: None (Room air)    Weight:   Wt Readings from Last 3 Encounters:   11/09/22 59 kg (130 lb)   08/02/22 59 kg (130 lb)   06/20/22 58.8 kg (129 lb 11.2 oz)      Weight change:     Intake/Output last 3 shifts  I/O last 3 completed shifts:  In: 1000 [P.O.:1000]  Out: -   Body mass index is 20.98 kg/m .      PHYSICAL EXAM    Patient consented to exam.    General: Appears comfortable and in no distress.  Neuro: No new focal deficits observed.  No motor or  sensory deficits observed.  Cranial nerves intact.  Psychiatric: No hallucinations or delusions.  ENT: MMM and no acute oral pathology detected.  CVS: S1 S2 with equal pulses.  Resp: CTABL with good inspiratory effort.  Abdomen: SNT with +BS. No guarding, rebound or distention.  Extremities: Good perfusion.  Radial pulses intact.      Pertinent Labs   Lab Results: personally reviewed.   Recent Labs   Lab 11/10/22  0817 11/09/22  2021   * 134*   CO2 28 27   BUN 18.3 20.8   ALBUMIN 3.3*  --    ALKPHOS 123  --    ALT 20  --    AST 65*  --      Recent Labs   Lab 11/10/22  0817 11/09/22  2021   WBC 6.5 8.2   HGB 10.8* 11.6*   HCT 33.0* 34.7*    169     No results for input(s): CKTOTAL, TROPONINI in the last 168 hours.    Invalid input(s): TROPONINT, CKMBINDEX  Invalid input(s): POCGLUFGR    Medications  Current Facility-Administered Medications   Medication     acetaminophen (TYLENOL) tablet 650 mg    Or     acetaminophen (TYLENOL) Suppository 650 mg     albuterol (PROVENTIL HFA/VENTOLIN HFA) inhaler     cyanocobalamin (VITAMIN B-12) tablet 100 mcg     dexamethasone (DECADRON) tablet 6 mg     ferrous sulfate (FEROSUL) tablet 325 mg     lidocaine (LMX4) cream     lidocaine 1 % 0.1-1 mL     melatonin tablet 1 mg     ondansetron (ZOFRAN ODT) ODT tab 4 mg    Or     ondansetron (ZOFRAN) injection 4 mg     polyethylene glycol (MIRALAX) Packet 17 g     QUEtiapine (SEROquel) half-tab 12.5 mg     simvastatin (ZOCOR) tablet 10 mg     sodium chloride (PF) 0.9% PF flush 3 mL     sodium chloride (PF) 0.9% PF flush 3 mL     trimethoprim-polymyxin b (POLYTRIM) ophthalmic solution 2 drop       Pertinent Radiology   Radiology Results: Personally reviewed   Results for orders placed or performed during the hospital encounter of 11/09/22   CT Head w/o Contrast    Impression    IMPRESSION:  HEAD CT:  1.  Bilateral subacute subdural hematoma is overlying lateral aspect of cerebral hemispheres measuring 9 mm in greatest thickness  on the right and 4 mm in greatest thickness on the left.  2.  Size of the hematomas is similar to prior, allowing for differences in technique; previously there was acute component bilaterally, which is not definitely identified on the current exam.  3.  Moderate presumed chronic small vessel ischemia.  4.  Moderate atrophy.  5.  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    CERVICAL SPINE CT:  1.  Mild chronic compression superior aspect of T3, similar to prior. No definite other compression fractures.  2.   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   3.  No definite other fractures.   CT Cervical Spine w/o Contrast    Impression    IMPRESSION:  HEAD CT:  1.  Bilateral subacute subdural hematoma is overlying lateral aspect of cerebral hemispheres measuring 9 mm in greatest thickness on the right and 4 mm in greatest thickness on the left.  2.  Size of the hematomas is similar to prior, allowing for differences in technique; previously there was acute component bilaterally, which is not definitely identified on the current exam.  3.  Moderate presumed chronic small vessel ischemia.  4.  Moderate atrophy.  5.  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    CERVICAL SPINE CT:  1.  Mild chronic compression superior aspect of T3, similar to prior. No definite other compression fractures.  2.   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   3.  No definite other fractures.   XR Chest 1 View    Impression    IMPRESSION: Heart size is normal. Lung volumes are low with mild elevation of the left hemidiaphragm. Multiple calcified granulomas, mediastinal/hilar nodes, and pleural plaques.. No CHF, lobar consolidation or pneumothorax. Moderately advanced   degenerative changes in the left shoulder redemonstrated.           [facility] minutes time spent with greater than 50% of time involved in counseling and coordinating  patient's care and includes shared decision making with all questions answered to patient satisfaction with time being given for questions answers.  I discussed all the above elements of patient's hospitalization in great detail with the patient and patient in agreement with hospital plan.        Belgica Avila MD  Internal Medicine Hospitalist  11/13/2022

## 2022-11-13 NOTE — PLAN OF CARE
Problem: Confusion Chronic  Goal: Optimal Cognitive Function  Intervention: Minimize Injury Risk and Provide Safety  Recent Flowsheet Documentation  Taken 11/13/2022 0400 by Elo Hunt, RN  Enhanced Safety Measures:   chair alarm set    at bedside       Patient is impulsive, 1:1 sitter for safety.    Problem: Confusion Chronic  Goal: Optimal Cognitive Function  Outcome: Not Progressing  Intervention: Minimize Injury Risk and Provide Safety  Recent Flowsheet Documentation  Taken 11/13/2022 0400 by Elo Hunt, RN  Enhanced Safety Measures:    at bedside     Patient woke up at 0300, he was very confused and disoriented, he was hallucinating and thought that the army was going to bomb his room, he was combative, hitting staff and unable to follow commands,  I time dose of Zyprexa given IM, patient was more calmer after that.

## 2022-11-14 NOTE — PROGRESS NOTES
Patient was fairly calm all shift until suppertime. He became very angry and agitated and daughter was unable to calm him down over the phone. Gave bedtime dose of Seroquel 12.5 early, notified physician. Patient seems to be less angry but is still restless. Will monitor.

## 2022-11-14 NOTE — PLAN OF CARE
PRIMARY DIAGNOSIS: GENERALIZED WEAKNESS    OUTPATIENT/OBSERVATION GOALS TO BE MET BEFORE DISCHARGE  1. Orthostatic performed: N/A    2. Tolerating PO medications: Yes    3. Return to near baseline physical activity: No    4. Cleared for discharge by consultants (if involved): No    Discharge Planner Nurse   Safe discharge environment identified: No  Barriers to discharge: Yes       Entered by: Adam Walsh RN 11/14/2022 11:52 AM     Please review provider order for any additional goals.   Nurse to notify provider when observation goals have been met and patient is ready for discharge.Goal Outcome Evaluation:

## 2022-11-14 NOTE — PLAN OF CARE
PRIMARY DIAGNOSIS: GENERALIZED WEAKNESS    OUTPATIENT/OBSERVATION GOALS TO BE MET BEFORE DISCHARGE  1. Orthostatic performed: N/A    2. Tolerating PO medications: Yes    3. Return to near baseline physical activity: No    4. Cleared for discharge by consultants (if involved): No    Discharge Planner Nurse   Safe discharge environment identified: No  Barriers to discharge: Yes       Entered by: Adam Walsh RN 11/14/2022 8:38 AM     Please review provider order for any additional goals.   Nurse to notify provider when observation goals have been met and patient is ready for discharge.Goal Outcome Evaluation:

## 2022-11-14 NOTE — PROGRESS NOTES
Care Management Follow Up    Length of Stay (days): 0    Expected Discharge Date: 11/14/2022     Concerns to be Addressed: discharge planning     Patient plan of care discussed at interdisciplinary rounds: Yes    Anticipated Discharge Disposition: Long Term Care     Anticipated Discharge Services: None  Anticipated Discharge DME: None    Patient/family educated on Medicare website which has current facility and service quality ratings: no  Education Provided on the Discharge Plan:    Patient/Family in Agreement with the Plan: yes    Referrals Placed by CM/SW:    Private pay costs discussed: Not applicable    Additional Information:  RNCM notified by MD patient able to return to facility today.    RNCM placed call to [facility] to follow up on ability to have patient return today. Left message on [employer] for [facility]. Awaiting return call.    10:31 AM - Updated by MD again, patient having aggressive outbursts. Plan to consult Psych for med adjustment/ management.    Ansiha Fontenot RN

## 2022-11-14 NOTE — PROGRESS NOTES
Hospitalist Progress Note    ASSESSMENT & PLAN    Principal Problem:    Infection due to 2019 novel coronavirus      Lang Christina is a 89 YO man with HTN, HLD, Falls, Hyponatremia and Iron Deficiency Anemia who presented with mechanical fall, found to be febrile and diagnosed with COVID-19 infection.     COVID-19 Infection   - Clinically stable. Tmax of 100.6F in ED but no more fevers since initial.  - CXR without acute process or infiltrates  -Oxygen titrated off and now doing well on room air.  Does not appear to be in distress.  Breathing comfortably.  -Completed remdesivir  - Continue decadron 6 mg daily x total of 10 days.   -Supportive cares as needed     Falls with Hx of SDH  - Evaluated by PT/OT: TCU at discharge  - Fall precautions     Hyponatremia  -Resolved unrelated to acute illness.     Iron Deficiency Anemia  - Started on PO iron supplement. Monitor outpatient.  -Hemoglobin stable.    Dementia and insomnia.  - Seroquel 12.5 mg at night.  -Seroquel 12.5 mg every 6 hours as needed for impulsivity and anxiety.  -B12, folate, vitamin D and TSH were all normal.       DVT Prophylaxis: SCDs. Lovenox for DVT ppx not started due to hx of SDH     Disposition: TCU per PT.       SUBJECTIVE  No chest pain, shortness of breath or palpitations. No nausea or vomiting. Pain controlled. No fever.    Patient did demonstrate some impulsivity last night which improved with Seroquel p.o.  Feels anxious this morning but otherwise no other acute issues.    OBJECTIVE    Vital signs in last 24 hours  Temp:  [97.5  F (36.4  C)-98.4  F (36.9  C)] 97.9  F (36.6  C)  Pulse:  [65-90] 90  Resp:  [17-20] 20  BP: (102-134)/(67-77) 102/77  SpO2:  [93 %-95 %] 94 % @LASTSAO2(12)@ O2 Device: None (Room air)    Weight:   Wt Readings from Last 3 Encounters:   11/09/22 59 kg (130 lb)   08/02/22 59 kg (130 lb)   06/20/22 58.8 kg (129 lb 11.2 oz)      Weight change:     Intake/Output last 3 shifts  I/O last 3 completed shifts:  In: 680  [P.O.:680]  Out: -   Body mass index is 20.98 kg/m .      PHYSICAL EXAM    Patient consented to exam.    General: Appears comfortable and in no distress.  Neuro: No new focal deficits observed.  No motor or sensory deficits observed.  Cranial nerves intact.  Psychiatric: No hallucinations or delusions.  ENT: MMM and no acute oral pathology detected.  CVS: S1 S2 with equal pulses.  Resp: CTABL with good inspiratory effort.  Abdomen: SNT with +BS. No guarding, rebound or distention.  Extremities: Good perfusion.  Radial pulses intact.      Pertinent Labs   Lab Results: personally reviewed.   Recent Labs   Lab 11/14/22  0537 11/10/22  0817 11/09/22  2021    134* 134*   CO2 27 28 27   BUN 18.2 18.3 20.8   ALBUMIN 3.3* 3.3*  --    ALKPHOS 112 123  --    ALT 24 20  --    AST 30 65*  --      Recent Labs   Lab 11/14/22  0537 11/10/22  0817 11/09/22  2021   WBC 6.2 6.5 8.2   HGB 10.9* 10.8* 11.6*   HCT 33.4* 33.0* 34.7*    155 169     No results for input(s): CKTOTAL, TROPONINI in the last 168 hours.    Invalid input(s): TROPONINT, CKMBINDEX  Invalid input(s): POCGLUFGR    Medications  Current Facility-Administered Medications   Medication     acetaminophen (TYLENOL) tablet 650 mg    Or     acetaminophen (TYLENOL) Suppository 650 mg     albuterol (PROVENTIL HFA/VENTOLIN HFA) inhaler     [START ON 11/15/2022] cyanocobalamin (VITAMIN B-12) tablet 1,000 mcg     dexamethasone (DECADRON) tablet 6 mg     ferrous sulfate (FEROSUL) tablet 325 mg     lidocaine (LMX4) cream     lidocaine 1 % 0.1-1 mL     melatonin tablet 1 mg     ondansetron (ZOFRAN ODT) ODT tab 4 mg    Or     ondansetron (ZOFRAN) injection 4 mg     polyethylene glycol (MIRALAX) Packet 17 g     QUEtiapine (SEROquel) half-tab 12.5 mg     QUEtiapine (SEROquel) half-tab 12.5 mg     simvastatin (ZOCOR) tablet 10 mg     sodium chloride (PF) 0.9% PF flush 3 mL     sodium chloride (PF) 0.9% PF flush 3 mL     trimethoprim-polymyxin b (POLYTRIM) ophthalmic  solution 2 drop       Pertinent Radiology   Radiology Results: Personally reviewed   Results for orders placed or performed during the hospital encounter of 11/09/22   CT Head w/o Contrast    Impression    IMPRESSION:  HEAD CT:  1.  Bilateral subacute subdural hematoma is overlying lateral aspect of cerebral hemispheres measuring 9 mm in greatest thickness on the right and 4 mm in greatest thickness on the left.  2.  Size of the hematomas is similar to prior, allowing for differences in technique; previously there was acute component bilaterally, which is not definitely identified on the current exam.  3.  Moderate presumed chronic small vessel ischemia.  4.  Moderate atrophy.  5.  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    CERVICAL SPINE CT:  1.  Mild chronic compression superior aspect of T3, similar to prior. No definite other compression fractures.  2.   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   3.  No definite other fractures.   CT Cervical Spine w/o Contrast    Impression    IMPRESSION:  HEAD CT:  1.  Bilateral subacute subdural hematoma is overlying lateral aspect of cerebral hemispheres measuring 9 mm in greatest thickness on the right and 4 mm in greatest thickness on the left.  2.  Size of the hematomas is similar to prior, allowing for differences in technique; previously there was acute component bilaterally, which is not definitely identified on the current exam.  3.  Moderate presumed chronic small vessel ischemia.  4.  Moderate atrophy.  5.  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    CERVICAL SPINE CT:  1.  Mild chronic compression superior aspect of T3, similar to prior. No definite other compression fractures.  2.   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   3.  No definite other fractures.   XR Chest 1 View    Impression    IMPRESSION: Heart size is normal. Lung volumes are low with mild elevation of  the left hemidiaphragm. Multiple calcified granulomas, mediastinal/hilar nodes, and pleural plaques.. No CHF, lobar consolidation or pneumothorax. Moderately advanced   degenerative changes in the left shoulder redemonstrated.           [facility] minutes time spent with greater than 50% of time involved in counseling and coordinating patient's care and includes shared decision making with all questions answered to patient satisfaction with time being given for questions answers.  I discussed all the above elements of patient's hospitalization in great detail with the patient and patient in agreement with hospital plan.        Belgica Avila MD  Internal Medicine Hospitalist  2022

## 2022-11-14 NOTE — PLAN OF CARE
"PRIMARY DIAGNOSIS: \"GENERIC\" NURSING  OUTPATIENT/OBSERVATION GOALS TO BE MET BEFORE DISCHARGE:  ADLs back to baseline: No    Activity and level of assistance: Up with standby assistance.    Pain status: Pain free.    Return to near baseline physical activity: No     Discharge Planner Nurse   Safe discharge environment identified: No  Barriers to discharge: Yes       Entered by: Marsha Landin RN 11/14/2022 4:14 AM     Please review provider order for any additional goals.   Nurse to notify provider when observation goals have been met and patient is ready for discharge.Goal Outcome Evaluation:                        "

## 2022-11-14 NOTE — PLAN OF CARE
Problem: Plan of Care - These are the overarching goals to be used throughout the patient stay.    Goal: Plan of Care Review  Description: The Plan of Care Review/Shift note should be completed every shift.  The Outcome Evaluation is a brief statement about your assessment that the patient is improving, declining, or no change.  This information will be displayed automatically on your shift note.  11/14/2022 0416 by Marsha Landin RN  Outcome: Progressing  11/14/2022 0413 by Marsha Landin RN  Outcome: Progressing     Problem: Confusion Chronic  Goal: Optimal Cognitive Function  Outcome: Progressing  Intervention: Minimize Injury Risk and Provide Safety  Recent Flowsheet Documentation  Taken 11/14/2022 0406 by Marsha Landin RN  Enhanced Safety Measures: bed alarm set  Taken 11/14/2022 0209 by Marsha Landin RN  Enhanced Safety Measures: bed alarm set  Taken 11/13/2022 2110 by Marsha Landin RN  Enhanced Safety Measures: bed alarm set   Goal Outcome Evaluation:               Pt confused and agitated at bed time. Pt was non redirectable .  One time Zyprexa given and was effective.pt calm and cooperative at this time, 1:1 continued for poor safety judgment.

## 2022-11-15 PROBLEM — M62.81 GENERALIZED MUSCLE WEAKNESS: Status: ACTIVE | Noted: 2022-01-01
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
## 2022-11-15 NOTE — PLAN OF CARE
PRIMARY DIAGNOSIS: GENERALIZED WEAKNESS    OUTPATIENT/OBSERVATION GOALS TO BE MET BEFORE DISCHARGE  1. Orthostatic performed: N/A    2. Tolerating PO medications: Yes    3. Return to near baseline physical activity: No    4. Cleared for discharge by consultants (if involved): No    Discharge Planner Nurse   Safe discharge environment identified: No  Barriers to discharge: Yes       Entered by: Adam Walsh RN 11/15/2022 12:35 PM     Please review provider order for any additional goals.   Nurse to notify provider when observation goals have been met and patient is ready for discharge.Goal Outcome Evaluation:

## 2022-11-15 NOTE — PLAN OF CARE
PRIMARY DIAGNOSIS: GENERALIZED WEAKNESS    OUTPATIENT/OBSERVATION GOALS TO BE MET BEFORE DISCHARGE  1. Orthostatic performed: No    2. Tolerating PO medications: Yes    3. Return to near baseline physical activity: No    4. Cleared for discharge by consultants (if involved): N/A    Discharge Planner Nurse   Safe discharge environment identified: Yes  Barriers to discharge: Yes       Entered by: Smita Christian RN 11/15/2022 4:35 PM     Please review provider order for any additional goals.   Nurse to notify provider when observation goals have been met and patient is ready for discharge.Goal Outcome Evaluation:  Patient half asleep and awake. Denies pain and calm at this time.

## 2022-11-15 NOTE — CONSULTS
"Psychiatry acknowledges consult for \"dementia with difficulty controlling impulsivity.\" Patient also is covid +. Recommendations are based on chart review. Please order repeat IP Psych consult if patient does not continue to get better and psychiatry will return to see pt via telehealth.     Recommendations:     --Trial increase of Seroquel to 25mg at bedtime due to increased anxiety/lability as well as recent code 21 in the early morning (ordered)     --Agree with pharmacists suggestion of discontinuing Dexamethasone as it is well known to increase emotional liability and aggression.     --Delirium precautions:    Up during the day with lights on    Lights off at night, avoid interruptions during the night as much as possible    Family visits    Encourage wearing glasses    Reorientation    Avoid opioids, benzodiazepines, anticholinergics as much as possible.     Continue to ensure proper nutrition, fluid and electrolyte balance. Monitor for infections, hypoxia, metabolic derangements, or other causes of delirium.       Christina Menjivar, PMHNP-BC  Consult/Liaison Psychiatry   [facility]     "

## 2022-11-15 NOTE — PLAN OF CARE
"PRIMARY DIAGNOSIS: \"GENERIC\" NURSING  OUTPATIENT/OBSERVATION GOALS TO BE MET BEFORE DISCHARGE:  ADLs back to baseline: No    Activity and level of assistance: Up with standby assistance.    Pain status: Pain free.    Return to near baseline physical activity: No     Discharge Planner Nurse   Safe discharge environment identified: No  Barriers to discharge: Yes       Entered by: Elgin Felder RN 11/15/2022 2:34 AM     Please review provider order for any additional goals.   Nurse to notify provider when observation goals have been met and patient is ready for discharge.Goal Outcome Evaluation:                        "

## 2022-11-15 NOTE — PLAN OF CARE
"PRIMARY DIAGNOSIS: \"GENERIC\" NURSING  OUTPATIENT/OBSERVATION GOALS TO BE MET BEFORE DISCHARGE:  ADLs back to baseline: No    Activity and level of assistance: Up with standby assistance.    Pain status: Pain free.    Return to near baseline physical activity: No     Discharge Planner Nurse   Safe discharge environment identified: No  Barriers to discharge: Yes       Entered by: Elgin Felder RN 11/15/2022 6:28 AM     Please review provider order for any additional goals.   Nurse to notify provider when observation goals have been met and patient is ready for discharge.Goal Outcome Evaluation:       Pt restless and agitated, screaming with staff and uncooperative. Code green activated. IM zyprexa given. Slept well after with agitation improved.                 "

## 2022-11-15 NOTE — PLAN OF CARE
PRIMARY DIAGNOSIS: GENERALIZED WEAKNESS    OUTPATIENT/OBSERVATION GOALS TO BE MET BEFORE DISCHARGE  1. Orthostatic performed: N/A    2. Tolerating PO medications: Yes    3. Return to near baseline physical activity: No    4. Cleared for discharge by consultants (if involved): No    Discharge Planner Nurse   Safe discharge environment identified: No  Barriers to discharge: Yes       Entered by: Adam Walsh RN 11/15/2022 8:59 AM     Please review provider order for any additional goals.   Nurse to notify provider when observation goals have been met and patient is ready for discharge.Goal Outcome Evaluation:

## 2022-11-15 NOTE — PROGRESS NOTES
Hospitalist Progress Note    ASSESSMENT & PLAN    Principal Problem:    Infection due to 2019 novel coronavirus      Lang Christina is a 89 YO man with HTN, HLD, Falls, Hyponatremia and Iron Deficiency Anemia who presented with mechanical fall, found to be febrile and diagnosed with COVID-19 infection.     COVID-19 Infection   - Clinically stable. Tmax of 100.6F in ED but no more fevers since initial.  - CXR without acute process or infiltrates  -Oxygen titrated off and now doing well on room air.  Does not appear to be in distress.  Breathing comfortably.  -Completed remdesivir  - Continue decadron 6 mg daily x total of 10 days.   -Supportive cares as needed     Falls with Hx of SDH  - Evaluated by PT/OT: TCU at discharge  - Fall precautions     Hyponatremia  -Resolved unrelated to acute illness.     Iron Deficiency Anemia  - Started on PO iron supplement. Monitor outpatient.  -Hemoglobin stable.    Dementia and insomnia.  - Seroquel 12.5 mg at night.  -Seroquel 12.5 mg every 6 hours as needed for impulsivity and anxiety.  -B12, folate, vitamin D and TSH were all normal.  -Psychiatry consult placed.  Look forward to recommendations.  - Patient appears to be a lot calmer.       DVT Prophylaxis: SCDs. Lovenox for DVT ppx not started due to hx of SDH     Disposition: TCU per PT.       SUBJECTIVE  No chest pain, shortness of breath or palpitations. No nausea or vomiting. Pain controlled. No fever.    Patient appears to be a lot calmer.  Does not appear to be impulsive.      OBJECTIVE    Vital signs in last 24 hours  Temp:  [98  F (36.7  C)-98.4  F (36.9  C)] 98.4  F (36.9  C)  Pulse:  [63-89] 80  Resp:  [18-22] 18  BP: (126-133)/(60-80) 133/77  SpO2:  [92 %-95 %] 92 % @LASTSAO2(12)@ O2 Device: None (Room air)    Weight:   Wt Readings from Last 3 Encounters:   11/09/22 59 kg (130 lb)   08/02/22 59 kg (130 lb)   06/20/22 58.8 kg (129 lb 11.2 oz)      Weight change:     Intake/Output last 3 shifts  I/O last 3  completed shifts:  In: 340 [P.O.:340]  Out: -   Body mass index is 20.98 kg/m .      PHYSICAL EXAM    Patient appears to be sleeping    General: Appears comfortable and in no distress.  Neuro: No new focal deficits observed.  No motor or sensory deficits observed.  Cranial nerves intact.  Psychiatric: No hallucinations or delusions.  ENT: MMM and no acute oral pathology detected.  CVS: S1 S2 with equal pulses.  Resp: CTABL with good inspiratory effort.  Abdomen: SNT with +BS. No guarding, rebound or distention.  Extremities: Good perfusion.  Radial pulses intact.      Pertinent Labs   Lab Results: personally reviewed.   Recent Labs   Lab 11/15/22  0537 11/14/22  0537 11/10/22  0817    137 134*   CO2 27 27 28   BUN 15.5 18.2 18.3   ALBUMIN 3.3* 3.3* 3.3*   ALKPHOS 117 112 123   ALT 22 24 20   AST 21 30 65*     Recent Labs   Lab 11/15/22  0537 11/14/22  0537 11/10/22  0817   WBC 8.0 6.2 6.5   HGB 11.3* 10.9* 10.8*   HCT 34.8* 33.4* 33.0*    225 155     No results for input(s): CKTOTAL, TROPONINI in the last 168 hours.    Invalid input(s): TROPONINT, CKMBINDEX  Invalid input(s): POCGLUFGR    Medications  Current Facility-Administered Medications   Medication     acetaminophen (TYLENOL) tablet 650 mg    Or     acetaminophen (TYLENOL) Suppository 650 mg     albuterol (PROVENTIL HFA/VENTOLIN HFA) inhaler     cyanocobalamin (VITAMIN B-12) tablet 1,000 mcg     dexamethasone (DECADRON) tablet 6 mg     ferrous sulfate (FEROSUL) tablet 325 mg     lidocaine (LMX4) cream     lidocaine 1 % 0.1-1 mL     melatonin tablet 1 mg     ondansetron (ZOFRAN ODT) ODT tab 4 mg    Or     ondansetron (ZOFRAN) injection 4 mg     polyethylene glycol (MIRALAX) Packet 17 g     QUEtiapine (SEROquel) half-tab 12.5 mg     QUEtiapine (SEROquel) half-tab 12.5 mg     simvastatin (ZOCOR) tablet 10 mg     sodium chloride (PF) 0.9% PF flush 3 mL     sodium chloride (PF) 0.9% PF flush 3 mL     trimethoprim-polymyxin b (POLYTRIM) ophthalmic  solution 2 drop       Pertinent Radiology   Radiology Results: Personally reviewed   Results for orders placed or performed during the hospital encounter of 11/09/22   CT Head w/o Contrast    Impression    IMPRESSION:  HEAD CT:  1.  Bilateral subacute subdural hematoma is overlying lateral aspect of cerebral hemispheres measuring 9 mm in greatest thickness on the right and 4 mm in greatest thickness on the left.  2.  Size of the hematomas is similar to prior, allowing for differences in technique; previously there was acute component bilaterally, which is not definitely identified on the current exam.  3.  Moderate presumed chronic small vessel ischemia.  4.  Moderate atrophy.  5.  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    CERVICAL SPINE CT:  1.  Mild chronic compression superior aspect of T3, similar to prior. No definite other compression fractures.  2.   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   3.  No definite other fractures.   CT Cervical Spine w/o Contrast    Impression    IMPRESSION:  HEAD CT:  1.  Bilateral subacute subdural hematoma is overlying lateral aspect of cerebral hemispheres measuring 9 mm in greatest thickness on the right and 4 mm in greatest thickness on the left.  2.  Size of the hematomas is similar to prior, allowing for differences in technique; previously there was acute component bilaterally, which is not definitely identified on the current exam.  3.  Moderate presumed chronic small vessel ischemia.  4.  Moderate atrophy.  5.  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    CERVICAL SPINE CT:  1.  Mild chronic compression superior aspect of T3, similar to prior. No definite other compression fractures.  2.   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   3.  No definite other fractures.   XR Chest 1 View    Impression    IMPRESSION: Heart size is normal. Lung volumes are low with mild elevation of  the left hemidiaphragm. Multiple calcified granulomas, mediastinal/hilar nodes, and pleural plaques.. No CHF, lobar consolidation or pneumothorax. Moderately advanced   degenerative changes in the left shoulder redemonstrated.       Belgica Avila MD  Internal Medicine Hospitalist  11/15/2022

## 2022-11-15 NOTE — SIGNIFICANT EVENT
Significant Event Note    Time of event: 3:52 AM November 15, 2022    Description of event:  Responded to a code green.    Briefly, Lnag is a 89-year-old with history sent for hypertension, hyperlipidemia, falls, hyponatremia presented with a mechanical fall found to have COVID-19 infection.    Known history of dementia with insomnia.  Becoming more agitated tonight banging on the door and threatening staff.  Myself and staff having a difficult time redirecting.  Continued to make threats toward staff    Plan:  Was given a dose of IM Zyprexa.  Agitation improved.  We will continue to attempt to redirect as able.    Hieu Martinez MD PGY-2  [facility]   House Officer

## 2022-11-15 NOTE — PLAN OF CARE
"PRIMARY DIAGNOSIS: \"GENERIC\" NURSING  OUTPATIENT/OBSERVATION GOALS TO BE MET BEFORE DISCHARGE:  1. ADLs back to baseline: No    2. Activity and level of assistance: Up with standby assistance.    3. Pain status: Pain free.    4. Return to near baseline physical activity: No     Discharge Planner Nurse   Safe discharge environment identified: No  Barriers to discharge: Yes       Entered by: Jim Henriquez RN 11/14/2022 10:39 PM     Please review provider order for any additional goals.   Nurse to notify provider when observation goals have been met and patient is ready for discharge.Goal Outcome Evaluation:       Pt disoriented to place, time, and situation. Reaching out for things that are not there. Restless most of the time. Continues to be on 1:1. Congested non productive cough. Lung sounds crackles. Oxygen on RA. Afebrile. Fair appetite.                 "

## 2022-11-15 NOTE — UTILIZATION REVIEW
Inpatient appropriate  Admission Status; Secondary Review Determination     Under the authority of the Utilization Management Committee, the utilization review process indicated a secondary review on the above patient. The review outcome is based on review of the medical records, discussions with staff, and applying clinical experience noted on the date of the review.     (x) Inpatient Status Appropriate - This patient's medical care is consistent with medical management for inpatient care and reasonable inpatient medical practice.     RATIONALE FOR DETERMINATION   89-year-old male evaluated in the ED on 11/9/2022 for a fall and fever.  Past medical history of paroxysmal atrial fibrillation, CVA, dementia, frequent falls, subdural hematoma 1/22 and 6/22, BPH, emphysema, hyperlipidemia, closed odontoid fracture type II on [place] J collar.  Laboratory work-up was remarkable for sodium 134, hemoglobin 11.6, positive SARS-CoV-2 PCR.  CT head showed bilateral subacute subdural hematoma similar to prior in size, mild chronic compression superior aspect of T3 similar to prior, chronic ununited fracture at the base of the dens similar to prior.  Chest x-ray showed lung volumes are low with mild elevation of the left hemidiaphragm.  Patient was vitally stable and without hypoxia but started on remdesivir and Decadron due to high risk for disease progression.  Overnight, patient had delirium with increased agitation banging on the floor and threatening staff, difficult to redirect requiring treatment with IM Zyprexa.  At the time of admission with the information available to the attending physician more than 2 nights Hospital complex care was anticipated, based on patient risk of adverse outcome if treated as outpatient and complex care required. Inpatient admission is appropriate based on the Medicare guidelines.     This document was produced using voice recognition software     The information on this document is developed  by the utilization review team in order for the business office to ensure compliance. This only denotes the appropriateness of proper admission status and does not reflect the quality of care rendered.   The definitions of Inpatient Status and Observation Status used in making the determination above are those provided in the CMS Coverage Manual, Chapter 1 and Chapter 6, section 70.4.     Sincerely,     Yung Atkinson MD  [facility]  Utilization Review Physician Advisor  Pager: [phone]

## 2022-11-15 NOTE — PLAN OF CARE
"PRIMARY DIAGNOSIS: \"GENERIC\" NURSING  OUTPATIENT/OBSERVATION GOALS TO BE MET BEFORE DISCHARGE:  ADLs back to baseline: No    Activity and level of assistance: Up with standby assistance.    Pain status: Pain free.    Return to near baseline physical activity: No     Discharge Planner Nurse   Safe discharge environment identified: No  Barriers to discharge: Yes       Entered by: Jim Henriquez RN 11/14/2022 9:42 PM     Please review provider order for any additional goals.   Nurse to notify provider when observation goals have been met and patient is ready for discharge.Goal Outcome Evaluation:                        "

## 2022-11-16 PROBLEM — R62.7 ADULT FAILURE TO THRIVE: Status: ACTIVE | Noted: 2022-01-01

## 2022-11-16 PROBLEM — F03.90 DEMENTIA (H): Status: ACTIVE | Noted: 2022-01-01

## 2022-11-16 PROBLEM — J44.9 COPD (CHRONIC OBSTRUCTIVE PULMONARY DISEASE) (H): Status: ACTIVE | Noted: 2022-01-01

## 2022-11-16 PROBLEM — D64.9 NORMOCYTIC ANEMIA: Status: ACTIVE | Noted: 2022-01-01

## 2022-11-16 NOTE — PLAN OF CARE
Problem: Plan of Care - These are the overarching goals to be used throughout the patient stay.    Goal: Absence of Hospital-Acquired Illness or Injury  Intervention: Identify and Manage Fall Risk  Recent Flowsheet Documentation  Taken 11/15/2022 1610 by Smita Christian RN  Safety Promotion/Fall Prevention:   activity supervised   bed alarm on   Goal Outcome Evaluation:  Patient alert and calm. Able to make needs known. Denies pain. No peripheral iv access. Patient 1 to 1 and Covid positive.

## 2022-11-16 NOTE — PROGRESS NOTES
Care Management Follow Up    Length of Stay (days): 1    Expected Discharge Date: 11/17/2022     Concerns to be Addressed: discharge planning     Patient plan of care discussed at interdisciplinary rounds: Yes    Anticipated Discharge Disposition: Long Term Care     Anticipated Discharge Services: None  Anticipated Discharge DME: None    Patient/family educated on Medicare website which has current facility and service quality ratings: no  Education Provided on the Discharge Plan:    Patient/Family in Agreement with the Plan: yes    Referrals Placed by CM/SW:    Private pay costs discussed: transportation costs and insurance costs out of pocket expenses    Additional Information:  RNCM received call from patient's facility. Provided information on patient baseline and to discuss what patient would need to be at to return. Facility SW stated that he typically was more agitated and became upset with different situations. Patient appreciates eye contact when speaking with him and he is [place] although he may deny that. Patient was not combative with staff really previously, however they stated that he typically does not do well when coming into the hospital and settles once he returns home.     RNCM discussed with MD, will follow up and see if patient would be able to return today or if they would want to wait 1 more day. Awaiting return call.     9:32 AM - Updated Sharon on plan for patient.     12:18 PM - Discussed with facility, MD updated, plan to discharge tomorrow to return to patients home/ facility via stretcher. Arranged for 11AM.     Anisha Fontenot RN

## 2022-11-16 NOTE — PLAN OF CARE
Problem: Plan of Care - These are the overarching goals to be used throughout the patient stay.    Goal: Plan of Care Review  Description: The Plan of Care Review/Shift note should be completed every shift.  The Outcome Evaluation is a brief statement about your assessment that the patient is improving, declining, or no change.  This information will be displayed automatically on your shift note.  Outcome: Progressing  Flowsheets (Taken 11/16/2022 0646)  Overall Patient Progress: no change   Goal Outcome Evaluation:           Overall Patient Progress: no changeOverall Patient Progress: no change     1:1 sitter for safety. [place] J collar on due to odontoid fracture. Slept entire night. Occasional waking but able to go back to sleep without difficulty. Has frequent loose cough; productive at times and able to cough up and spit out.

## 2022-11-16 NOTE — TELEPHONE ENCOUNTER
"Returned call to Sharon who reported concern that her father had an \"unwitnessed fall\" while inpatient and no further head CT was done. After explanation of outpatient vs inpatient care management, encouraged Sharon to reach out to [facility]  (Sandy Fontenot) to discuss all inpatient care related concerns and discharge planning needs.     Assured her that if the hospitalist team feels that a neurosurgery consultation is needed based on the pt's symptoms or scans taken on 11/09, our team will be paged.     Arlette Ramos RN    "

## 2022-11-16 NOTE — TELEPHONE ENCOUNTER
Patient daughter Sharon called on patient behalf. Sharon would like to know since patient is inpatient @ [facility] can patient have imaging  done there while he is inpatient.       Please advise daughter.       BT

## 2022-11-16 NOTE — PROGRESS NOTES
Assessment & Plan   89-year-old male with dementia, previous subdural hematomas, COPD presents with COVID-19 infection.    Principal Problem:    Infection due to 2019 novel coronavirus  Active Problems:    Paroxysmal atrial fibrillation (H)    Generalized muscle weakness    Fall, initial encounter    Dementia (H)    Adult failure to thrive    COPD (chronic obstructive pulmonary disease) (H)    Normocytic anemia    Present on Admission:    Infection due to 2019 novel coronavirus    Generalized muscle weakness    Fall, initial encounter    Paroxysmal atrial fibrillation (H)      COVID-19 infection  -Patient with positive COVID-19 test on 11/9/2022.  He was febrile x1, chest x-ray 11/9 with low lung volume, multiple calcified granulomas with mediastinal and hilar nodes and pleural plaques but no findings of consolidation and within normal heart size.  Patient did receive remdesivir IV x3 and had been maintained on dexamethasone since 11/9 despite absence of hypoxia.  -Continue COVID-19 precautions through 11/20  -Stop dexamethasone, no current indication as patient never with hypoxia  -Stop checking daily labs  -Continue incentive spirometry    Dementia, behavioral disturbances  -Patient with dementia and some aggressive tendencies at baseline but agitation has increased in the setting of hospitalization, COVID-19 infection, likely exacerbated by steroid use.  -Stop dexamethasone  -Quetiapine 25 mg p.o. q. Bedtime  -Delirium protocol  -Psychiatry following, appreciate recommendations  -Plan to return to LTC on 11/17    Adult failure to thrive  -Patient with frequent falls resides in long-term care.  -PT/OT  -Fall precautions    ?  Right conjunctivitis  -Order for Polytrim drops right eye started on 11/11/2022 no clear indication as to why.  Will stop after 7 days total, 11/17.    COPD  -Not currently in exacerbation  -Resume home Anoro Ellipta daily  -Albuterol as needed    Paroxysmal atrial fibrillation  -Currently  rate controlled, patient not on chronic anticoagulation as prior history of subdural hematoma.    Type II odontoid fracture  -Patient without optic fracture on 6/12/2022.  Has been following with neurosurgery in the outpatient setting  -Cervical collar recommended lifelong    Normocytic anemia  -Hemoglobin 9.1 with an MCV of 99.     Clinically Significant Risk Factors              # Hypoalbuminemia: Lowest albumin = 3.3 g/dL (Ref range: 3.5-5.2) at 11/16/2022  5:18 AM, will monitor as appropriate                   Electrolytes: currently within normal limits  Fluids: po  Diet: regular  VTE prophylaxis: SCD boots    COVID19 symptom check 11/16/2022  Fevers/Chills/myalgias: NEGATIVE TO ALL  Sick contacts/COVID19 exposure: NEGATIVE TO ALL  Cough/trouble breathing/SOB/sore throat: NEGATIVE TO ALL  Diarrhea: NEGATIVE       Expected Discharge Date: 11/17/2022, 11:00 AM  Discharge Delays: *Early Discharge Anticipated  Destination: nursing home  Discharge Comments: Stretcher set for 11am            Subjective  Cc: Weakness, cough    Pt is new to be today.  Personally conducted extensive chart review prior to visit including labs, imaging, past provider notes and interventions.  Patient endorses feeling pretty well today but a bit confused that he is not quite sure why he is in the hospital.  We had a good talk about coming in for COVID-19 and he said that he felt much better as he was able to hear me and understood what was going on.  Denies any chest pain, cough, fevers, chills.  Patient is wearing a cervical collar and denies any pain in his neck or upper back.    Review of Systems: History obtained from the patient  General ROS: negative  for  - chills, fatigue, fever  ENT ROS: negative for - headaches, hearing change, nasal congestion, nasal discharge, sore throat  Hematological and Lymphatic ROS: negative for - bleeding problems, blood clots, bruising  Respiratory ROS: negative for - cough, pleuritic pain, shortness of  breath, sputum changes  Cardiovascular ROS: negative for - chest pain, dyspnea on exertion, edema  Gastrointestinal ROS: negative for - abdominal pain, constipation, diarrhea, and nausea/vomiting  Genito-Urinary ROS: negative for - dysuria, hematuria  Musculoskeletal ROS: negative for - gait disturbance,  muscle pain  Neurological ROS: Positive for baseline dementia  Dermatological ROS: negative for pruritus, rash    Objective    Physical Examination:   General appearance - alert, well appearing although thin, and in no distress and oriented to person, place, and time  Mental status - alert, oriented to person only at this time, mood is very pleasant, behavior normal, speech clear and comprehensible, dress, motor activity, and thought processes intact  HEENT - sclera anicteric, left eye normal, right eye normal, nares normal and patent, no erythema  Respiratory - clear to auscultation, no wheezes, rales or rhonchi, symmetric air entry, no tachypnea, retractions or cyanosis  Abdomen - soft, nontender, nondistended, no masses or organomegaly no rebound tenderness noted bowel sounds normal, no bladder distension noted  Neurological - alert, oriented to person, normal speech, no focal findings or movement disorder noted  Musculoskeletal -cervical collar in place  Extremities - peripheral pulses normal, no pedal edema, no clubbing or cyanosis  Skin - normal coloration and turgor, no rashes, no suspicious skin lesions noted    Temp:  [97.6  F (36.4  C)-97.9  F (36.6  C)] 97.9  F (36.6  C)  Pulse:  [53-69] 53  Resp:  [18] 18  BP: (121-136)/(71-78) 121/71  SpO2:  [89 %-96 %] 89 %      Intake/Output Summary (Last 24 hours) at 11/16/2022 0902  Last data filed at 11/15/2022 2200  Gross per 24 hour   Intake 720 ml   Output --   Net 720 ml       Results    Recent Results (from the past 24 hour(s))   Comprehensive metabolic panel    Collection Time: 11/16/22  5:18 AM   Result Value Ref Range    Sodium 140 136 - 145 mmol/L     Potassium 3.8 3.4 - 5.3 mmol/L    Chloride 103 98 - 107 mmol/L    Carbon Dioxide (CO2) 29 22 - 29 mmol/L    Anion Gap 8 7 - 15 mmol/L    Urea Nitrogen 17.4 8.0 - 23.0 mg/dL    Creatinine 0.63 (L) 0.67 - 1.17 mg/dL    Calcium 8.6 8.2 - 9.6 mg/dL    Glucose 97 70 - 99 mg/dL    Alkaline Phosphatase 111 40 - 129 U/L    AST 17 10 - 50 U/L    ALT 19 10 - 50 U/L    Protein Total 5.6 (L) 6.4 - 8.3 g/dL    Albumin 3.3 (L) 3.5 - 5.2 g/dL    Bilirubin Total 0.8 <=1.2 mg/dL    GFR Estimate 88 >60 mL/min/1.73m2   CBC with platelets and differential    Collection Time: 11/16/22  5:18 AM   Result Value Ref Range    WBC Count 7.3 4.0 - 11.0 10e3/uL    RBC Count 3.44 (L) 4.40 - 5.90 10e6/uL    Hemoglobin 11.1 (L) 13.3 - 17.7 g/dL    Hematocrit 34.0 (L) 40.0 - 53.0 %    MCV 99 78 - 100 fL    MCH 32.3 26.5 - 33.0 pg    MCHC 32.6 31.5 - 36.5 g/dL    RDW 13.7 10.0 - 15.0 %    Platelet Count 237 150 - 450 10e3/uL    % Neutrophils 73 %    % Lymphocytes 16 %    % Monocytes 9 %    % Eosinophils 1 %    % Basophils 0 %    % Immature Granulocytes 1 %    NRBCs per 100 WBC 0 <1 /100    Absolute Neutrophils 5.4 1.6 - 8.3 10e3/uL    Absolute Lymphocytes 1.2 0.8 - 5.3 10e3/uL    Absolute Monocytes 0.6 0.0 - 1.3 10e3/uL    Absolute Eosinophils 0.0 0.0 - 0.7 10e3/uL    Absolute Basophils 0.0 0.0 - 0.2 10e3/uL    Absolute Immature Granulocytes 0.1 <=0.4 10e3/uL    Absolute NRBCs 0.0 10e3/uL          Lab Results personally reviewed 11/16  Imaging Results personally reviewed 11/16  Discussed with patient  Reviewed old records     Advanced Care Planning  Discharge Planning discussed with patient  Discussed care with patient for 25 minutes with greater than 50% of time spent in counseling and coordination of care.    Arlette Francois,   Hospitalist Service  [facility]  Text page via [facility] Paging/Directory     This note was created using dragon dictation, any spelling and grammatical errors are unintentional.

## 2022-11-16 NOTE — PLAN OF CARE
Problem: Plan of Care - These are the overarching goals to be used throughout the patient stay.    Goal: Plan of Care Review  Description: The Plan of Care Review/Shift note should be completed every shift.  The Outcome Evaluation is a brief statement about your assessment that the patient is improving, declining, or no change.  This information will be displayed automatically on your shift note.  Outcome: Progressing   Goal Outcome Evaluation:  Reviewed plan of care with pt and later daughter Sharon. Patient is alert to person-knows he is in the hospital-disoriented to time and situation. He has been up to the bathroom-up in chair for meals-tolerated regular diet. Had bm today. He is pleasant and cooperative with cares-aide is just outside door with video monitor-he does not use calllight-he just starts to get up  when he needs to use the restroom-He still has a loose sounding cough at intervals-remain on RA.Mepilex placed on red coccyx/IT

## 2022-11-17 NOTE — DISCHARGE SUMMARY
[facility] MEDICINE  DISCHARGE SUMMARY     Primary Care Physician: Shakila Clancy  Admission Date: 11/9/2022   Discharge Provider: Edgardo Santillan DO, DO Discharge Date: 11/17/2022   Diet:   Active Diet and Nourishment Order   Procedures     Combination Diet Regular Diet Adult     Diet       Code Status: No CPR- Do NOT Intubate   Activity: DCACTIVITY: Activity as tolerated        Condition at Discharge: Stable     REASON FOR PRESENTATION(See Admission Note for Details)   Refer to h&p    PRINCIPAL & ACTIVE DISCHARGE DIAGNOSES     Principal Problem:    Infection due to 2019 novel coronavirus  Active Problems:    Paroxysmal atrial fibrillation (H)    Generalized muscle weakness    Fall, initial encounter    Dementia (H)    Adult failure to thrive    COPD (chronic obstructive pulmonary disease) (H)    Normocytic anemia      PENDING LABS     Unresulted Labs Ordered in the Past 30 Days of this Admission     No orders found from 10/10/2022 to 11/10/2022.            PROCEDURES ( this hospitalization only)          RECOMMENDATIONS TO OUTPATIENT PROVIDER FOR F/U VISIT     Follow-up Appointments     Follow Up and recommended labs and tests      Follow up with Nursing home physician.  No follow up labs or test are   needed.                 DISPOSITION     Skilled Nursing Facility    SUMMARY OF HOSPITAL COURSE:    89-year-old male with dementia, previous subdural hematomas, COPD presents with COVID-19 infection.     Principal Problem:    Infection due to 2019 novel coronavirus  Active Problems:    Paroxysmal atrial fibrillation (H)    Generalized muscle weakness    Fall, initial encounter    Dementia (H)    Adult failure to thrive    COPD (chronic obstructive pulmonary disease) (H)    Normocytic anemia     Present on Admission:    Infection due to 2019 novel coronavirus    Generalized muscle weakness    Fall, initial encounter    Paroxysmal atrial fibrillation (H)        COVID-19  infection  -Patient with positive COVID-19 test on 11/9/2022.  He was febrile x1, chest x-ray 11/9 with low lung volume, multiple calcified granulomas with mediastinal and hilar nodes and pleural plaques but no findings of consolidation and within normal heart size.  Patient did receive remdesivir IV x3 and had been maintained on dexamethasone since 11/9 despite absence of hypoxia.  -Continue COVID-19 precautions through 11/20     Dementia, behavioral disturbances, improved  -Patient with dementia and some aggressive tendencies at baseline but agitation has increased in the setting of hospitalization, COVID-19 infection, likely exacerbated by steroid use. Improved.  -Quetiapine 25 mg p.o. q. Bedtime  -Psychiatry followed  -discharge to LTC on 11/17     Adult failure to thrive  -Patient with frequent falls resides in long-term care.  -Fall precautions     Possible Right conjunctivitis  -improved. Polytrim gtts from 11/11-11/16.     COPD  -Not currently in exacerbation  -Anoro Ellipta daily  -Albuterol as needed     Paroxysmal atrial fibrillation  -rate controlled, patient not on chronic anticoagulation as prior history of subdural hematoma.     Chronic Type II odontoid fracture  -follows with neurosurgery in the outpatient setting  -Cervical collar recommended lifelong     Normocytic anemia  -Hemoglobin stable  -started on PO Iron        Discharge Medications with Med changes:     Current Discharge Medication List      START taking these medications    Details   ferrous sulfate (FEROSUL) 325 (65 Fe) MG tablet Take 1 tablet (325 mg) by mouth daily    Associated Diagnoses: Iron deficiency anemia, unspecified iron deficiency anemia type      QUEtiapine (SEROQUEL) 25 MG tablet Take 1 tablet (25 mg) by mouth At Bedtime May also use 1/2 tablet as needed every 6 hours for agitation    Associated Diagnoses: Moderate dementia with other behavioral disturbance, unspecified dementia type         CONTINUE these medications which  have NOT CHANGED    Details   !! acetaminophen (TYLENOL) 500 MG tablet Take 1,000 mg by mouth At Bedtime      !! acetaminophen (TYLENOL) 500 MG tablet Take 1,000 mg by mouth 2 times daily as needed for pain Maximum of 4000 mg in 24 hours      albuterol (PROAIR HFA/PROVENTIL HFA/VENTOLIN HFA) 108 (90 Base) MCG/ACT inhaler Inhale 2 puffs into the lungs every 4 hours as needed for shortness of breath / dyspnea or wheezing      ANORO ELLIPTA 62.5-25 MCG/INH oral inhaler INHALE ONE PUFF BY MOUTH ONCE DAILY  Qty: 60 each, Refills: 8    Associated Diagnoses: Centrilobular emphysema (H)      bisacodyl (DULCOLAX) 10 MG suppository Place 10 mg rectally daily as needed for constipation      Cyanocobalamin 1000 MCG TABS Take 1,000 mcg by mouth daily      diclofenac (VOLTAREN) 1 % topical gel Apply 4 g topically 3 times daily      polyethylene glycol (MIRALAX) 17 g packet Take 1 packet by mouth daily      senna-docusate (SENOKOT-S/PERICOLACE) 8.6-50 MG tablet Take 1 tablet by mouth 2 times daily      simvastatin (ZOCOR) 10 MG tablet TAKE ONE TABLET BY MOUTH AT BEDTIME.  Qty: 90 tablet, Refills: 2    Associated Diagnoses: Hyperlipidemia LDL goal <130      order for DME Oxygen for home use. Liters per minute: 1 per nasal cannula. Frequency of use: With activity;. Length of need: 99.       !! - Potential duplicate medications found. Please discuss with provider.                Rationale for medication changes:            Consults       PHYSICAL THERAPY ADULT IP CONSULT  CARE MANAGEMENT / SOCIAL WORK IP CONSULT  PSYCHIATRY IP CONSULT  PSYCHIATRY IP CONSULT  PHYSICAL THERAPY ADULT IP CONSULT  OCCUPATIONAL THERAPY ADULT IP CONSULT    Immunizations given this encounter     Most Recent Immunizations   Administered Date(s) Administered     COVID-19,PF,Pfizer (12+ Yrs) 11/09/2021     Influenza (High Dose) 3 valent vaccine 10/09/2019     Influenza (IIV3) PF 09/23/2011     Influenza, Quad, High Dose, Pf, 65yr+ (Fluzone HD) 09/30/2021      Pneumo Conj 13-V (2010&after) 11/03/2015     Pneumococcal 23 valent 09/22/2010     Pneumococcal, Unspecified 02/01/2001     TD (ADULT, 7+) 09/22/2010     Td (Adult), Adsorbed 09/09/2000     Tdap (Adult) Unspecified Formulation 07/31/2006     Zoster vaccine, live 08/24/2007           Anticoagulation Information            SIGNIFICANT IMAGING FINDINGS     Results for orders placed or performed during the hospital encounter of 11/09/22   CT Head w/o Contrast    Impression    IMPRESSION:  HEAD CT:  1.  Bilateral subacute subdural hematoma is overlying lateral aspect of cerebral hemispheres measuring 9 mm in greatest thickness on the right and 4 mm in greatest thickness on the left.  2.  Size of the hematomas is similar to prior, allowing for differences in technique; previously there was acute component bilaterally, which is not definitely identified on the current exam.  3.  Moderate presumed chronic small vessel ischemia.  4.  Moderate atrophy.  5.  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    CERVICAL SPINE CT:  1.  Mild chronic compression superior aspect of T3, similar to prior. No definite other compression fractures.  2.   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   3.  No definite other fractures.   CT Cervical Spine w/o Contrast    Impression    IMPRESSION:  HEAD CT:  1.  Bilateral subacute subdural hematoma is overlying lateral aspect of cerebral hemispheres measuring 9 mm in greatest thickness on the right and 4 mm in greatest thickness on the left.  2.  Size of the hematomas is similar to prior, allowing for differences in technique; previously there was acute component bilaterally, which is not definitely identified on the current exam.  3.  Moderate presumed chronic small vessel ischemia.  4.  Moderate atrophy.  5.  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    CERVICAL SPINE CT:  1.  Mild chronic compression superior aspect of T3, similar to  prior. No definite other compression fractures.  2.   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   3.  No definite other fractures.   XR Chest 1 View    Impression    IMPRESSION: Heart size is normal. Lung volumes are low with mild elevation of the left hemidiaphragm. Multiple calcified granulomas, mediastinal/hilar nodes, and pleural plaques.. No CHF, lobar consolidation or pneumothorax. Moderately advanced   degenerative changes in the left shoulder redemonstrated.       SIGNIFICANT LABORATORY FINDINGS       Discharge Orders        General info for SNF    Length of Stay Estimate: Long Term Care  Condition at Discharge: Stable  Level of care:board and care  Rehabilitation Potential: Fair  Admission H&P remains valid and up-to-date: Yes  Recent Chemotherapy: N/A  Use Nursing Home Standing Orders: Yes     Follow Up and recommended labs and tests    Follow up with Nursing home physician.  No follow up labs or test are needed.     Reason for your hospital stay    Patient presented with weakness and confusion found to have COVID-19 infection     Activity - Up with assistive device    Cervical collar should be worn at all times     No CPR- Do NOT Intubate     Physical Therapy Adult Consult    Evaluate and treat as clinically indicated.    Reason:  weakness     Occupational Therapy Adult Consult    Evaluate and treat as clinically indicated.    Reason:  weakness     Airborne Isolation    Covid-19 precautions should be continued through 11/20     Fall precautions     Diet    Follow this diet upon discharge: Orders Placed This Encounter      Combination Diet Regular Diet Adult       Examination   Physical Exam   Temp:  [97.8  F (36.6  C)-98.3  F (36.8  C)] 98.1  F (36.7  C)  Pulse:  [60-78] 78  Resp:  [18-22] 18  BP: (104-117)/(55-70) 114/70  SpO2:  [91 %-99 %] 92 %  Wt Readings from Last 1 Encounters:   11/09/22 59 kg (130 lb)   gen nad  cv rrrr  Lungs nonlabored, no wheezing  abd soft, bs+,  nd  Neuro alert, no agitation    Total unit/floor time 31minutes.  Time consisted of examination of patient, reviewing the record, lab results, imaging results, completing documentation.  Coordination of care 20minutes discussing  with nursing, care management teams, and Physicians involved directly in the care of this patient.  Counseling time 11minutes consisted of discharge planning.      Edgardo Santillan DO, DO  [facility]    CC:Shakila Clancy

## 2022-11-17 NOTE — PLAN OF CARE
Problem: Plan of Care - These are the overarching goals to be used throughout the patient stay.    Goal: Readiness for Transition of Care  Outcome: Progressing     Problem: COPD (Chronic Obstructive Pulmonary Disease) Comorbidity  Goal: Maintenance of COPD Symptom Control  Outcome: Progressing  Intervention: Maintain COPD-Symptom Control  Recent Flowsheet Documentation  Taken 11/16/2022 2329 by Milana Kwon, RN  Medication Review/Management: medications reviewed     Problem: COPD (Chronic Obstructive Pulmonary Disease) Comorbidity  Goal: Maintenance of COPD Symptom Control  Outcome: Progressing  Intervention: Maintain COPD-Symptom Control  Recent Flowsheet Documentation  Taken 11/16/2022 2329 by Milana Kwon, RN  Medication Review/Management: medications reviewed   Goal Outcome Evaluation:  Pt sleeping well. Denies pain. VS stable, A1 with ambulation to bathroom. Coughing productively. Repositioned without c/o pain in bed.

## 2022-11-17 NOTE — PROGRESS NOTES
Care Management Discharge Note    Discharge Date: 11/17/2022       Discharge Disposition: Long Term Care    Discharge Services: None    Discharge DME: None    Discharge Transportation: health plan transportation    Private pay costs discussed: transportation costs    PAS Confirmation Code:    Patient/family educated on Medicare website which has current facility and service quality ratings: no    Education Provided on the Discharge Plan:    Persons Notified of Discharge Plans: Patient/ Facility/ Family  Patient/Family in Agreement with the Plan: yes    Handoff Referral Completed: Yes    Additional Information:  Patient to return to his home, LTC - Memory Care at [facility]. Stretcher transport arranged due to patient dementia and Neck brace/ pain with wheelchair transport. discharge set for 11AM. Facility updated this morning.     Orders faxed to facility via [employer].     Anisha Fontenot RN

## 2022-11-17 NOTE — PLAN OF CARE
Goal Outcome Evaluation:           Problem: Plan of Care - These are the overarching goals to be used throughout the patient stay.    Goal: Plan of Care Review  Description: The Plan of Care Review/Shift note should be completed every shift.  The Outcome Evaluation is a brief statement about your assessment that the patient is improving, declining, or no change.  This information will be displayed automatically on your shift note.  Outcome: Progressing       The patient was alert and oriented to self and place. The patient ate 100% of dinner. They remained calm throughout the shift. Comfort was promoted through the use of rest between cares. The patient was able to repositioning themselves independently in bed and with assistance at times. The patient denied shortness of breath and vitals remained within parameters.

## 2022-11-17 NOTE — PLAN OF CARE
Physical Therapy Discharge Summary    Reason for therapy discharge:    Discharged to long term care facility.    Progress towards therapy goal(s). See goals on Care Plan in [facility] electronic health record for goal details.  Goals not met.  Barriers to achieving goals:   limited tolerance for therapy.    Therapy recommendation(s):    Continued therapy is recommended.  Rationale/Recommendations:  to improve functional strength and mobility.

## 2022-11-17 NOTE — PLAN OF CARE
Problem: COPD (Chronic Obstructive Pulmonary Disease) Comorbidity  Goal: Maintenance of COPD Symptom Control  Intervention: Maintain COPD-Symptom Control  Recent Flowsheet Documentation  Taken 11/16/2022 2329 by Milana Kwon RN  Medication Review/Management: medications reviewed     Problem: Fall Injury Risk  Goal: Absence of Fall and Fall-Related Injury  Intervention: Identify and Manage Contributors  Recent Flowsheet Documentation  Taken 11/16/2022 2329 by Milana Kwon RN  Medication Review/Management: medications reviewed  Intervention: Promote Injury-Free Environment  Recent Flowsheet Documentation  Taken 11/16/2022 2329 by Milana Kwon RN  Safety Promotion/Fall Prevention:    activity supervised    bed alarm on    chair alarm on    assistive device/personal items within reach    nonskid shoes/slippers when out of bed    safety round/check completed   Goal Outcome Evaluation:  Pt is on one on one monitoring. Alert and clm. Stayed in bed but often sit on bed to sip water. Reoriented to place and time, and to call or use call button if in need of help. VSS. Lung sounds diminished. Coughs at times, productive coughs.  Able to spit out phlegm. Talks while sleeping. Sleep apnea noted. Neck brace worn all the time. Denies pain.

## 2022-11-18 NOTE — PROGRESS NOTES
[facility] Care Resource [place]    Background: Transitional Care Management program identified per system criteria and reviewed by [facility] Resource Center team for possible outreach.    Assessment: Upon chart review, CCRC Team member will not proceed with patient outreach related to this episode of Transitional Care Management program due to reason below:    Non-[facility] TCU: Patient is not established within [facility] Primary Care and CCRC team member noted patient discharged to TCU/ARU/LTACH.    Plan: Transitional Care Management episode addressed appropriately per reason noted above.      Linda Jerez MA  Connected Care Resource [place], [facility]    *Connected Care Resource Team does NOT follow patient ongoing. Referrals are identified based on internal discharge reports and the outreach is to ensure patient has an understanding of their discharge instructions.

## 2023-01-01 ENCOUNTER — APPOINTMENT (OUTPATIENT)
Dept: GENERAL RADIOLOGY | Facility: CLINIC | Age: 88
DRG: 125 | End: 2023-01-01
Attending: EMERGENCY MEDICINE
Payer: COMMERCIAL

## 2023-01-01 ENCOUNTER — LAB REQUISITION (OUTPATIENT)
Dept: LAB | Facility: CLINIC | Age: 88
End: 2023-01-01
Payer: COMMERCIAL

## 2023-01-01 ENCOUNTER — APPOINTMENT (OUTPATIENT)
Dept: OCCUPATIONAL THERAPY | Facility: CLINIC | Age: 88
DRG: 125 | End: 2023-01-01
Attending: PHYSICIAN ASSISTANT
Payer: COMMERCIAL

## 2023-01-01 ENCOUNTER — APPOINTMENT (OUTPATIENT)
Dept: CT IMAGING | Facility: CLINIC | Age: 88
DRG: 125 | End: 2023-01-01
Attending: EMERGENCY MEDICINE
Payer: COMMERCIAL

## 2023-01-01 ENCOUNTER — TELEPHONE (OUTPATIENT)
Dept: FAMILY MEDICINE | Facility: CLINIC | Age: 88
End: 2023-01-01
Payer: COMMERCIAL

## 2023-01-01 ENCOUNTER — PATIENT OUTREACH (OUTPATIENT)
Dept: CARE COORDINATION | Facility: CLINIC | Age: 88
End: 2023-01-01
Payer: COMMERCIAL

## 2023-01-01 ENCOUNTER — TELEPHONE (OUTPATIENT)
Dept: OPHTHALMOLOGY | Facility: CLINIC | Age: 88
End: 2023-01-01
Payer: COMMERCIAL

## 2023-01-01 ENCOUNTER — HOSPITAL ENCOUNTER (INPATIENT)
Facility: CLINIC | Age: 88
LOS: 5 days | Discharge: NURSING, REHAB OR LONG-TERM CARE | DRG: 125 | End: 2023-07-17
Attending: EMERGENCY MEDICINE | Admitting: INTERNAL MEDICINE
Payer: COMMERCIAL

## 2023-01-01 ENCOUNTER — HOSPITAL ENCOUNTER (EMERGENCY)
Facility: HOSPITAL | Age: 88
Discharge: ANOTHER HOSPITAL | End: 2023-07-12
Attending: STUDENT IN AN ORGANIZED HEALTH CARE EDUCATION/TRAINING PROGRAM | Admitting: STUDENT IN AN ORGANIZED HEALTH CARE EDUCATION/TRAINING PROGRAM
Payer: COMMERCIAL

## 2023-01-01 VITALS
HEART RATE: 88 BPM | OXYGEN SATURATION: 90 % | RESPIRATION RATE: 16 BRPM | SYSTOLIC BLOOD PRESSURE: 155 MMHG | DIASTOLIC BLOOD PRESSURE: 70 MMHG | TEMPERATURE: 99.5 F

## 2023-01-01 VITALS
HEART RATE: 105 BPM | BODY MASS INDEX: 22.43 KG/M2 | DIASTOLIC BLOOD PRESSURE: 85 MMHG | TEMPERATURE: 98.4 F | RESPIRATION RATE: 16 BRPM | OXYGEN SATURATION: 93 % | SYSTOLIC BLOOD PRESSURE: 141 MMHG | HEIGHT: 69 IN | WEIGHT: 151.46 LBS

## 2023-01-01 DIAGNOSIS — B02.30 HERPES ZOSTER WITH OPHTHALMIC COMPLICATION, UNSPECIFIED HERPES ZOSTER EYE DISEASE: ICD-10-CM

## 2023-01-01 DIAGNOSIS — B02.30 HERPES ZOSTER OPHTHALMICUS OF RIGHT EYE: Primary | ICD-10-CM

## 2023-01-01 DIAGNOSIS — L03.211 FACIAL CELLULITIS: ICD-10-CM

## 2023-01-01 DIAGNOSIS — D64.9 ANEMIA, UNSPECIFIED: ICD-10-CM

## 2023-01-01 DIAGNOSIS — R60.9 EDEMA, UNSPECIFIED: ICD-10-CM

## 2023-01-01 DIAGNOSIS — R21 FACIAL RASH: ICD-10-CM

## 2023-01-01 DIAGNOSIS — R50.9 FEVER IN ADULT: ICD-10-CM

## 2023-01-01 LAB
ALBUMIN SERPL BCG-MCNC: 3.5 G/DL (ref 3.5–5.2)
ALBUMIN SERPL BCG-MCNC: 3.7 G/DL (ref 3.5–5.2)
ALBUMIN SERPL BCG-MCNC: 3.7 G/DL (ref 3.5–5.2)
ALP SERPL-CCNC: 101 U/L (ref 40–129)
ALP SERPL-CCNC: 111 U/L (ref 40–129)
ALP SERPL-CCNC: 114 U/L (ref 40–129)
ALT SERPL W P-5'-P-CCNC: 8 U/L (ref 0–70)
ALT SERPL W P-5'-P-CCNC: 9 U/L (ref 0–70)
ALT SERPL W P-5'-P-CCNC: <5 U/L (ref 0–70)
ANION GAP SERPL CALCULATED.3IONS-SCNC: 10 MMOL/L (ref 7–15)
ANION GAP SERPL CALCULATED.3IONS-SCNC: 11 MMOL/L (ref 7–15)
ANION GAP SERPL CALCULATED.3IONS-SCNC: 12 MMOL/L (ref 7–15)
ANION GAP SERPL CALCULATED.3IONS-SCNC: 12 MMOL/L (ref 7–15)
ANION GAP SERPL CALCULATED.3IONS-SCNC: 13 MMOL/L (ref 7–15)
ANION GAP SERPL CALCULATED.3IONS-SCNC: 13 MMOL/L (ref 7–15)
AST SERPL W P-5'-P-CCNC: 17 U/L (ref 0–45)
AST SERPL W P-5'-P-CCNC: 19 U/L (ref 0–45)
AST SERPL W P-5'-P-CCNC: 22 U/L (ref 0–45)
ATRIAL RATE - MUSE: 87 BPM
BACTERIA BLD CULT: NO GROWTH
BACTERIA BLD CULT: NO GROWTH
BASOPHILS # BLD AUTO: 0 10E3/UL (ref 0–0.2)
BASOPHILS # BLD AUTO: 0 10E3/UL (ref 0–0.2)
BASOPHILS NFR BLD AUTO: 0 %
BASOPHILS NFR BLD AUTO: 1 %
BILIRUB SERPL-MCNC: 0.6 MG/DL
BILIRUB SERPL-MCNC: 0.7 MG/DL
BILIRUB SERPL-MCNC: 0.8 MG/DL
BUN SERPL-MCNC: 10.6 MG/DL (ref 8–23)
BUN SERPL-MCNC: 11.3 MG/DL (ref 8–23)
BUN SERPL-MCNC: 12.4 MG/DL (ref 8–23)
BUN SERPL-MCNC: 14.8 MG/DL (ref 8–23)
BUN SERPL-MCNC: 16.6 MG/DL (ref 8–23)
BUN SERPL-MCNC: 9.5 MG/DL (ref 8–23)
C PNEUM DNA SPEC QL NAA+PROBE: NOT DETECTED
CALCIUM SERPL-MCNC: 8.7 MG/DL (ref 8.2–9.6)
CALCIUM SERPL-MCNC: 8.8 MG/DL (ref 8.2–9.6)
CALCIUM SERPL-MCNC: 9 MG/DL (ref 8.2–9.6)
CALCIUM SERPL-MCNC: 9.1 MG/DL (ref 8.2–9.6)
CALCIUM SERPL-MCNC: 9.5 MG/DL (ref 8.2–9.6)
CALCIUM SERPL-MCNC: 9.7 MG/DL (ref 8.2–9.6)
CHLORIDE SERPL-SCNC: 100 MMOL/L (ref 98–107)
CHLORIDE SERPL-SCNC: 103 MMOL/L (ref 98–107)
CHLORIDE SERPL-SCNC: 103 MMOL/L (ref 98–107)
CHLORIDE SERPL-SCNC: 96 MMOL/L (ref 98–107)
CHLORIDE SERPL-SCNC: 98 MMOL/L (ref 98–107)
CHLORIDE SERPL-SCNC: 99 MMOL/L (ref 98–107)
CREAT SERPL-MCNC: 0.48 MG/DL (ref 0.67–1.17)
CREAT SERPL-MCNC: 0.52 MG/DL (ref 0.67–1.17)
CREAT SERPL-MCNC: 0.52 MG/DL (ref 0.67–1.17)
CREAT SERPL-MCNC: 0.54 MG/DL (ref 0.67–1.17)
CREAT SERPL-MCNC: 0.65 MG/DL (ref 0.67–1.17)
CREAT SERPL-MCNC: 0.66 MG/DL (ref 0.67–1.17)
CREAT SERPL-MCNC: 0.68 MG/DL (ref 0.67–1.17)
CREAT SERPL-MCNC: 0.76 MG/DL (ref 0.67–1.17)
CREAT SERPL-MCNC: 0.81 MG/DL (ref 0.67–1.17)
CRP SERPL-MCNC: 46.06 MG/L
DEPRECATED HCO3 PLAS-SCNC: 22 MMOL/L (ref 22–29)
DEPRECATED HCO3 PLAS-SCNC: 22 MMOL/L (ref 22–29)
DEPRECATED HCO3 PLAS-SCNC: 23 MMOL/L (ref 22–29)
DEPRECATED HCO3 PLAS-SCNC: 23 MMOL/L (ref 22–29)
DEPRECATED HCO3 PLAS-SCNC: 25 MMOL/L (ref 22–29)
DEPRECATED HCO3 PLAS-SCNC: 25 MMOL/L (ref 22–29)
DIASTOLIC BLOOD PRESSURE - MUSE: 55 MMHG
EOSINOPHIL # BLD AUTO: 0 10E3/UL (ref 0–0.7)
EOSINOPHIL # BLD AUTO: 0.1 10E3/UL (ref 0–0.7)
EOSINOPHIL NFR BLD AUTO: 1 %
EOSINOPHIL NFR BLD AUTO: 1 %
ERYTHROCYTE [DISTWIDTH] IN BLOOD BY AUTOMATED COUNT: 14.4 % (ref 10–15)
ERYTHROCYTE [DISTWIDTH] IN BLOOD BY AUTOMATED COUNT: 14.4 % (ref 10–15)
ERYTHROCYTE [DISTWIDTH] IN BLOOD BY AUTOMATED COUNT: 14.5 % (ref 10–15)
FLUAV H1 2009 PAND RNA SPEC QL NAA+PROBE: NOT DETECTED
FLUAV H1 RNA SPEC QL NAA+PROBE: NOT DETECTED
FLUAV H3 RNA SPEC QL NAA+PROBE: NOT DETECTED
FLUAV RNA SPEC QL NAA+PROBE: NOT DETECTED
FLUBV RNA SPEC QL NAA+PROBE: NOT DETECTED
GFR SERPL CREATININE-BSD FRML MDRD: 82 ML/MIN/1.73M2
GFR SERPL CREATININE-BSD FRML MDRD: 83 ML/MIN/1.73M2
GFR SERPL CREATININE-BSD FRML MDRD: 86 ML/MIN/1.73M2
GFR SERPL CREATININE-BSD FRML MDRD: 86 ML/MIN/1.73M2
GFR SERPL CREATININE-BSD FRML MDRD: 87 ML/MIN/1.73M2
GFR SERPL CREATININE-BSD FRML MDRD: >90 ML/MIN/1.73M2
GLUCOSE SERPL-MCNC: 123 MG/DL (ref 70–99)
GLUCOSE SERPL-MCNC: 129 MG/DL (ref 70–99)
GLUCOSE SERPL-MCNC: 163 MG/DL (ref 70–99)
GLUCOSE SERPL-MCNC: 75 MG/DL (ref 70–99)
GLUCOSE SERPL-MCNC: 80 MG/DL (ref 70–99)
GLUCOSE SERPL-MCNC: 94 MG/DL (ref 70–99)
HADV DNA SPEC QL NAA+PROBE: NOT DETECTED
HCOV PNL SPEC NAA+PROBE: NOT DETECTED
HCT VFR BLD AUTO: 34 % (ref 40–53)
HCT VFR BLD AUTO: 35.6 % (ref 40–53)
HCT VFR BLD AUTO: 37.5 % (ref 40–53)
HGB BLD-MCNC: 11.2 G/DL (ref 13.3–17.7)
HGB BLD-MCNC: 11.4 G/DL (ref 13.3–17.7)
HGB BLD-MCNC: 11.7 G/DL (ref 13.3–17.7)
HMPV RNA SPEC QL NAA+PROBE: NOT DETECTED
HOLD SPECIMEN: NORMAL
HPIV1 RNA SPEC QL NAA+PROBE: NOT DETECTED
HPIV2 RNA SPEC QL NAA+PROBE: NOT DETECTED
HPIV3 RNA SPEC QL NAA+PROBE: NOT DETECTED
HPIV4 RNA SPEC QL NAA+PROBE: NOT DETECTED
IMM GRANULOCYTES # BLD: 0 10E3/UL
IMM GRANULOCYTES # BLD: 0 10E3/UL
IMM GRANULOCYTES NFR BLD: 0 %
IMM GRANULOCYTES NFR BLD: 1 %
INTERPRETATION ECG - MUSE: NORMAL
LACTATE SERPL-SCNC: 1.5 MMOL/L (ref 0.7–2)
LACTATE SERPL-SCNC: 2.4 MMOL/L (ref 0.7–2)
LYMPHOCYTES # BLD AUTO: 0.4 10E3/UL (ref 0.8–5.3)
LYMPHOCYTES # BLD AUTO: 0.6 10E3/UL (ref 0.8–5.3)
LYMPHOCYTES NFR BLD AUTO: 9 %
LYMPHOCYTES NFR BLD AUTO: 9 %
M PNEUMO DNA SPEC QL NAA+PROBE: NOT DETECTED
MCH RBC QN AUTO: 32.3 PG (ref 26.5–33)
MCH RBC QN AUTO: 32.3 PG (ref 26.5–33)
MCH RBC QN AUTO: 33.1 PG (ref 26.5–33)
MCHC RBC AUTO-ENTMCNC: 31.2 G/DL (ref 31.5–36.5)
MCHC RBC AUTO-ENTMCNC: 32 G/DL (ref 31.5–36.5)
MCHC RBC AUTO-ENTMCNC: 32.9 G/DL (ref 31.5–36.5)
MCV RBC AUTO: 101 FL (ref 78–100)
MCV RBC AUTO: 101 FL (ref 78–100)
MCV RBC AUTO: 104 FL (ref 78–100)
MONOCYTES # BLD AUTO: 0.6 10E3/UL (ref 0–1.3)
MONOCYTES # BLD AUTO: 1.1 10E3/UL (ref 0–1.3)
MONOCYTES NFR BLD AUTO: 12 %
MONOCYTES NFR BLD AUTO: 16 %
MRSA DNA SPEC QL NAA+PROBE: NEGATIVE
NEUTROPHILS # BLD AUTO: 3.7 10E3/UL (ref 1.6–8.3)
NEUTROPHILS # BLD AUTO: 4.8 10E3/UL (ref 1.6–8.3)
NEUTROPHILS NFR BLD AUTO: 72 %
NEUTROPHILS NFR BLD AUTO: 78 %
NRBC # BLD AUTO: 0 10E3/UL
NRBC # BLD AUTO: 0 10E3/UL
NRBC BLD AUTO-RTO: 0 /100
NRBC BLD AUTO-RTO: 0 /100
NT-PROBNP SERPL-MCNC: 2032 PG/ML (ref 0–1800)
P AXIS - MUSE: NORMAL DEGREES
PLATELET # BLD AUTO: 140 10E3/UL (ref 150–450)
PLATELET # BLD AUTO: 156 10E3/UL (ref 150–450)
PLATELET # BLD AUTO: 158 10E3/UL (ref 150–450)
POTASSIUM SERPL-SCNC: 3.7 MMOL/L (ref 3.4–5.3)
POTASSIUM SERPL-SCNC: 4.1 MMOL/L (ref 3.4–5.3)
POTASSIUM SERPL-SCNC: 4.2 MMOL/L (ref 3.4–5.3)
POTASSIUM SERPL-SCNC: 4.2 MMOL/L (ref 3.4–5.3)
POTASSIUM SERPL-SCNC: 4.3 MMOL/L (ref 3.4–5.3)
POTASSIUM SERPL-SCNC: 4.5 MMOL/L (ref 3.4–5.3)
PR INTERVAL - MUSE: NORMAL MS
PROCALCITONIN SERPL IA-MCNC: 0.25 NG/ML
PROCALCITONIN SERPL IA-MCNC: 0.29 NG/ML
PROT SERPL-MCNC: 6.3 G/DL (ref 6.4–8.3)
PROT SERPL-MCNC: 6.7 G/DL (ref 6.4–8.3)
PROT SERPL-MCNC: 6.9 G/DL (ref 6.4–8.3)
QRS DURATION - MUSE: 112 MS
QT - MUSE: 400 MS
QTC - MUSE: 434 MS
R AXIS - MUSE: -52 DEGREES
RBC # BLD AUTO: 3.38 10E6/UL (ref 4.4–5.9)
RBC # BLD AUTO: 3.53 10E6/UL (ref 4.4–5.9)
RBC # BLD AUTO: 3.62 10E6/UL (ref 4.4–5.9)
RSV RNA SPEC QL NAA+PROBE: NOT DETECTED
RSV RNA SPEC QL NAA+PROBE: NOT DETECTED
RV+EV RNA SPEC QL NAA+PROBE: NOT DETECTED
SA TARGET DNA: NEGATIVE
SODIUM SERPL-SCNC: 129 MMOL/L (ref 136–145)
SODIUM SERPL-SCNC: 132 MMOL/L (ref 136–145)
SODIUM SERPL-SCNC: 134 MMOL/L (ref 136–145)
SODIUM SERPL-SCNC: 134 MMOL/L (ref 136–145)
SODIUM SERPL-SCNC: 140 MMOL/L (ref 136–145)
SODIUM SERPL-SCNC: 141 MMOL/L (ref 136–145)
SYSTOLIC BLOOD PRESSURE - MUSE: 107 MMHG
T AXIS - MUSE: 70 DEGREES
VENTRICULAR RATE- MUSE: 71 BPM
WBC # BLD AUTO: 4.8 10E3/UL (ref 4–11)
WBC # BLD AUTO: 6 10E3/UL (ref 4–11)
WBC # BLD AUTO: 6.6 10E3/UL (ref 4–11)

## 2023-01-01 PROCEDURE — P9603 ONE-WAY ALLOW PRORATED MILES: HCPCS | Mod: ORL | Performed by: NURSE PRACTITIONER

## 2023-01-01 PROCEDURE — 258N000003 HC RX IP 258 OP 636: Performed by: PHYSICIAN ASSISTANT

## 2023-01-01 PROCEDURE — 258N000003 HC RX IP 258 OP 636: Performed by: EMERGENCY MEDICINE

## 2023-01-01 PROCEDURE — 250N000011 HC RX IP 250 OP 636: Mod: JZ | Performed by: PHYSICIAN ASSISTANT

## 2023-01-01 PROCEDURE — 97165 OT EVAL LOW COMPLEX 30 MIN: CPT | Mod: GO

## 2023-01-01 PROCEDURE — 250N000011 HC RX IP 250 OP 636: Mod: JZ | Performed by: EMERGENCY MEDICINE

## 2023-01-01 PROCEDURE — 80048 BASIC METABOLIC PNL TOTAL CA: CPT | Mod: ORL | Performed by: NURSE PRACTITIONER

## 2023-01-01 PROCEDURE — 96365 THER/PROPH/DIAG IV INF INIT: CPT

## 2023-01-01 PROCEDURE — 84145 PROCALCITONIN (PCT): CPT | Performed by: PHYSICIAN ASSISTANT

## 2023-01-01 PROCEDURE — 87633 RESP VIRUS 12-25 TARGETS: CPT | Performed by: EMERGENCY MEDICINE

## 2023-01-01 PROCEDURE — 250N000009 HC RX 250: Performed by: PHYSICIAN ASSISTANT

## 2023-01-01 PROCEDURE — 82565 ASSAY OF CREATININE: CPT | Performed by: INTERNAL MEDICINE

## 2023-01-01 PROCEDURE — 85025 COMPLETE CBC W/AUTO DIFF WBC: CPT | Performed by: STUDENT IN AN ORGANIZED HEALTH CARE EDUCATION/TRAINING PROGRAM

## 2023-01-01 PROCEDURE — 99232 SBSQ HOSP IP/OBS MODERATE 35: CPT | Performed by: INTERNAL MEDICINE

## 2023-01-01 PROCEDURE — 99207 PR NO CHARGE LOS: CPT | Mod: GC | Performed by: STUDENT IN AN ORGANIZED HEALTH CARE EDUCATION/TRAINING PROGRAM

## 2023-01-01 PROCEDURE — 82310 ASSAY OF CALCIUM: CPT | Performed by: INTERNAL MEDICINE

## 2023-01-01 PROCEDURE — 83605 ASSAY OF LACTIC ACID: CPT | Performed by: EMERGENCY MEDICINE

## 2023-01-01 PROCEDURE — 258N000002 HC RX IP 258 OP 250: Performed by: INTERNAL MEDICINE

## 2023-01-01 PROCEDURE — 87040 BLOOD CULTURE FOR BACTERIA: CPT | Performed by: PHYSICIAN ASSISTANT

## 2023-01-01 PROCEDURE — 250N000011 HC RX IP 250 OP 636: Mod: JZ | Performed by: STUDENT IN AN ORGANIZED HEALTH CARE EDUCATION/TRAINING PROGRAM

## 2023-01-01 PROCEDURE — 120N000002 HC R&B MED SURG/OB UMMC

## 2023-01-01 PROCEDURE — 36415 COLL VENOUS BLD VENIPUNCTURE: CPT | Mod: ORL | Performed by: NURSE PRACTITIONER

## 2023-01-01 PROCEDURE — 36415 COLL VENOUS BLD VENIPUNCTURE: CPT | Performed by: INTERNAL MEDICINE

## 2023-01-01 PROCEDURE — 84450 TRANSFERASE (AST) (SGOT): CPT | Performed by: EMERGENCY MEDICINE

## 2023-01-01 PROCEDURE — 250N000011 HC RX IP 250 OP 636: Performed by: STUDENT IN AN ORGANIZED HEALTH CARE EDUCATION/TRAINING PROGRAM

## 2023-01-01 PROCEDURE — 70450 CT HEAD/BRAIN W/O DYE: CPT | Mod: 26 | Performed by: STUDENT IN AN ORGANIZED HEALTH CARE EDUCATION/TRAINING PROGRAM

## 2023-01-01 PROCEDURE — 80053 COMPREHEN METABOLIC PANEL: CPT | Performed by: PHYSICIAN ASSISTANT

## 2023-01-01 PROCEDURE — 84145 PROCALCITONIN (PCT): CPT | Performed by: EMERGENCY MEDICINE

## 2023-01-01 PROCEDURE — 87641 MR-STAPH DNA AMP PROBE: CPT | Performed by: PHYSICIAN ASSISTANT

## 2023-01-01 PROCEDURE — 85004 AUTOMATED DIFF WBC COUNT: CPT | Performed by: EMERGENCY MEDICINE

## 2023-01-01 PROCEDURE — 250N000011 HC RX IP 250 OP 636: Performed by: EMERGENCY MEDICINE

## 2023-01-01 PROCEDURE — 70450 CT HEAD/BRAIN W/O DYE: CPT

## 2023-01-01 PROCEDURE — 99233 SBSQ HOSP IP/OBS HIGH 50: CPT | Performed by: INTERNAL MEDICINE

## 2023-01-01 PROCEDURE — 99285 EMERGENCY DEPT VISIT HI MDM: CPT | Performed by: EMERGENCY MEDICINE

## 2023-01-01 PROCEDURE — 70481 CT ORBIT/EAR/FOSSA W/DYE: CPT | Mod: 26 | Performed by: STUDENT IN AN ORGANIZED HEALTH CARE EDUCATION/TRAINING PROGRAM

## 2023-01-01 PROCEDURE — 250N000013 HC RX MED GY IP 250 OP 250 PS 637: Performed by: PHYSICIAN ASSISTANT

## 2023-01-01 PROCEDURE — 36415 COLL VENOUS BLD VENIPUNCTURE: CPT | Performed by: PHYSICIAN ASSISTANT

## 2023-01-01 PROCEDURE — 99285 EMERGENCY DEPT VISIT HI MDM: CPT | Mod: 25

## 2023-01-01 PROCEDURE — 99239 HOSP IP/OBS DSCHRG MGMT >30: CPT | Performed by: INTERNAL MEDICINE

## 2023-01-01 PROCEDURE — P9604 ONE-WAY ALLOW PRORATED TRIP: HCPCS | Mod: ORL | Performed by: NURSE PRACTITIONER

## 2023-01-01 PROCEDURE — 250N000013 HC RX MED GY IP 250 OP 250 PS 637: Performed by: EMERGENCY MEDICINE

## 2023-01-01 PROCEDURE — 99284 EMERGENCY DEPT VISIT MOD MDM: CPT | Performed by: EMERGENCY MEDICINE

## 2023-01-01 PROCEDURE — 83880 ASSAY OF NATRIURETIC PEPTIDE: CPT | Mod: ORL | Performed by: NURSE PRACTITIONER

## 2023-01-01 PROCEDURE — 85027 COMPLETE CBC AUTOMATED: CPT | Performed by: PHYSICIAN ASSISTANT

## 2023-01-01 PROCEDURE — 80053 COMPREHEN METABOLIC PANEL: CPT | Performed by: STUDENT IN AN ORGANIZED HEALTH CARE EDUCATION/TRAINING PROGRAM

## 2023-01-01 PROCEDURE — 99418 PROLNG IP/OBS E/M EA 15 MIN: CPT | Performed by: PHYSICIAN ASSISTANT

## 2023-01-01 PROCEDURE — 99222 1ST HOSP IP/OBS MODERATE 55: CPT | Performed by: INTERNAL MEDICINE

## 2023-01-01 PROCEDURE — 70481 CT ORBIT/EAR/FOSSA W/DYE: CPT

## 2023-01-01 PROCEDURE — 36415 COLL VENOUS BLD VENIPUNCTURE: CPT | Performed by: EMERGENCY MEDICINE

## 2023-01-01 PROCEDURE — 71046 X-RAY EXAM CHEST 2 VIEWS: CPT | Mod: 26 | Performed by: STUDENT IN AN ORGANIZED HEALTH CARE EDUCATION/TRAINING PROGRAM

## 2023-01-01 PROCEDURE — 258N000003 HC RX IP 258 OP 636: Performed by: STUDENT IN AN ORGANIZED HEALTH CARE EDUCATION/TRAINING PROGRAM

## 2023-01-01 PROCEDURE — 99223 1ST HOSP IP/OBS HIGH 75: CPT | Mod: AI | Performed by: PHYSICIAN ASSISTANT

## 2023-01-01 PROCEDURE — 36415 COLL VENOUS BLD VENIPUNCTURE: CPT | Performed by: STUDENT IN AN ORGANIZED HEALTH CARE EDUCATION/TRAINING PROGRAM

## 2023-01-01 PROCEDURE — 99207 PR APP CREDIT; MD BILLING SHARED VISIT: CPT | Performed by: INTERNAL MEDICINE

## 2023-01-01 PROCEDURE — 71046 X-RAY EXAM CHEST 2 VIEWS: CPT

## 2023-01-01 PROCEDURE — 250N000009 HC RX 250: Performed by: STUDENT IN AN ORGANIZED HEALTH CARE EDUCATION/TRAINING PROGRAM

## 2023-01-01 PROCEDURE — 97535 SELF CARE MNGMENT TRAINING: CPT | Mod: GO

## 2023-01-01 PROCEDURE — 86140 C-REACTIVE PROTEIN: CPT | Performed by: PHYSICIAN ASSISTANT

## 2023-01-01 RX ORDER — FERROUS SULFATE 325(65) MG
325 TABLET ORAL DAILY
Status: DISCONTINUED | OUTPATIENT
Start: 2023-01-01 | End: 2023-01-01 | Stop reason: HOSPADM

## 2023-01-01 RX ORDER — ACETAMINOPHEN 325 MG/1
650 TABLET ORAL EVERY 6 HOURS PRN
Status: DISCONTINUED | OUTPATIENT
Start: 2023-01-01 | End: 2023-01-01 | Stop reason: HOSPADM

## 2023-01-01 RX ORDER — SULFAMETHOXAZOLE/TRIMETHOPRIM 800-160 MG
2 TABLET ORAL 2 TIMES DAILY
Status: ON HOLD | COMMUNITY
End: 2023-01-01

## 2023-01-01 RX ORDER — SODIUM CHLORIDE 450 MG/100ML
INJECTION, SOLUTION INTRAVENOUS CONTINUOUS
Status: DISCONTINUED | OUTPATIENT
Start: 2023-01-01 | End: 2023-01-01

## 2023-01-01 RX ORDER — GABAPENTIN 100 MG/1
100 CAPSULE ORAL 3 TIMES DAILY
Status: DISCONTINUED | OUTPATIENT
Start: 2023-01-01 | End: 2023-01-01 | Stop reason: HOSPADM

## 2023-01-01 RX ORDER — CARBOXYMETHYLCELLULOSE SODIUM 5 MG/ML
1 SOLUTION/ DROPS OPHTHALMIC 2 TIMES DAILY
Status: ON HOLD | COMMUNITY
End: 2023-01-01

## 2023-01-01 RX ORDER — AMOXICILLIN 250 MG
1 CAPSULE ORAL 2 TIMES DAILY PRN
Status: DISCONTINUED | OUTPATIENT
Start: 2023-01-01 | End: 2023-01-01 | Stop reason: HOSPADM

## 2023-01-01 RX ORDER — VALACYCLOVIR HYDROCHLORIDE 1 G/1
1000 TABLET, FILM COATED ORAL 3 TIMES DAILY
Status: ON HOLD | COMMUNITY
End: 2023-01-01

## 2023-01-01 RX ORDER — AMOXICILLIN 250 MG
2 CAPSULE ORAL 2 TIMES DAILY PRN
Status: DISCONTINUED | OUTPATIENT
Start: 2023-01-01 | End: 2023-01-01 | Stop reason: HOSPADM

## 2023-01-01 RX ORDER — CLINDAMYCIN HCL 300 MG
300 CAPSULE ORAL 3 TIMES DAILY
Qty: 3 CAPSULE | Refills: 0 | Status: SHIPPED | OUTPATIENT
Start: 2023-01-01 | End: 2023-01-01

## 2023-01-01 RX ORDER — IOPAMIDOL 755 MG/ML
75 INJECTION, SOLUTION INTRAVASCULAR ONCE
Status: COMPLETED | OUTPATIENT
Start: 2023-01-01 | End: 2023-01-01

## 2023-01-01 RX ORDER — ERYTHROMYCIN 5 MG/G
OINTMENT OPHTHALMIC 3 TIMES DAILY
Status: ON HOLD | COMMUNITY
End: 2023-01-01

## 2023-01-01 RX ORDER — CARBOXYMETHYLCELLULOSE SODIUM 5 MG/ML
1 SOLUTION/ DROPS OPHTHALMIC 4 TIMES DAILY
Status: DISCONTINUED | OUTPATIENT
Start: 2023-01-01 | End: 2023-01-01 | Stop reason: HOSPADM

## 2023-01-01 RX ORDER — TRAMADOL HYDROCHLORIDE 50 MG/1
50 TABLET ORAL ONCE
Status: ON HOLD | COMMUNITY
End: 2023-01-01

## 2023-01-01 RX ORDER — TRAMADOL HYDROCHLORIDE 50 MG/1
50 TABLET ORAL
Qty: 1 TABLET | Refills: 0 | COMMUNITY
Start: 2023-01-01

## 2023-01-01 RX ORDER — ERYTHROMYCIN 5 MG/G
OINTMENT OPHTHALMIC 3 TIMES DAILY
Qty: 3.5 G | Refills: 0 | Status: SHIPPED | OUTPATIENT
Start: 2023-01-01

## 2023-01-01 RX ORDER — SIMVASTATIN 10 MG
10 TABLET ORAL AT BEDTIME
Status: DISCONTINUED | OUTPATIENT
Start: 2023-01-01 | End: 2023-01-01 | Stop reason: HOSPADM

## 2023-01-01 RX ORDER — CLINDAMYCIN PHOSPHATE 600 MG/50ML
600 INJECTION, SOLUTION INTRAVENOUS EVERY 8 HOURS
Status: DISCONTINUED | OUTPATIENT
Start: 2023-01-01 | End: 2023-01-01

## 2023-01-01 RX ORDER — ALBUTEROL SULFATE 90 UG/1
2 AEROSOL, METERED RESPIRATORY (INHALATION) EVERY 4 HOURS PRN
Status: DISCONTINUED | OUTPATIENT
Start: 2023-01-01 | End: 2023-01-01 | Stop reason: HOSPADM

## 2023-01-01 RX ORDER — POLYETHYLENE GLYCOL 3350 17 G/17G
17 POWDER, FOR SOLUTION ORAL DAILY PRN
Status: DISCONTINUED | OUTPATIENT
Start: 2023-01-01 | End: 2023-01-01 | Stop reason: HOSPADM

## 2023-01-01 RX ORDER — LIDOCAINE 40 MG/G
CREAM TOPICAL
Status: DISCONTINUED | OUTPATIENT
Start: 2023-01-01 | End: 2023-01-01 | Stop reason: HOSPADM

## 2023-01-01 RX ORDER — CARBOXYMETHYLCELLULOSE SODIUM 5 MG/ML
1 SOLUTION/ DROPS OPHTHALMIC 4 TIMES DAILY
Qty: 70 EACH | Refills: 0 | Status: SHIPPED | OUTPATIENT
Start: 2023-01-01

## 2023-01-01 RX ORDER — ACETAMINOPHEN 325 MG/1
975 TABLET ORAL ONCE
Status: COMPLETED | OUTPATIENT
Start: 2023-01-01 | End: 2023-01-01

## 2023-01-01 RX ORDER — VALACYCLOVIR HYDROCHLORIDE 1 G/1
1000 TABLET, FILM COATED ORAL 3 TIMES DAILY
Qty: 27 TABLET | Refills: 0 | Status: SHIPPED | OUTPATIENT
Start: 2023-01-01 | End: 2023-07-24

## 2023-01-01 RX ORDER — NEOMYCIN-BACITRACN ZN-POLYMYX 3.5 MG-400 UNIT-5,000 UNIT/GRAM TOP OINT
1 OINTMENT TOPICAL 3 TIMES DAILY
COMMUNITY

## 2023-01-01 RX ORDER — ACETAMINOPHEN/DIPHENHYDRAMINE 500MG-25MG
4 TABLET ORAL EVERY EVENING
COMMUNITY

## 2023-01-01 RX ORDER — TETRACAINE HYDROCHLORIDE 5 MG/ML
1-2 SOLUTION OPHTHALMIC ONCE
Status: COMPLETED | OUTPATIENT
Start: 2023-01-01 | End: 2023-01-01

## 2023-01-01 RX ORDER — ERYTHROMYCIN 5 MG/G
OINTMENT OPHTHALMIC EVERY 6 HOURS SCHEDULED
Status: DISCONTINUED | OUTPATIENT
Start: 2023-01-01 | End: 2023-01-01 | Stop reason: HOSPADM

## 2023-01-01 RX ORDER — GABAPENTIN 100 MG/1
1 CAPSULE ORAL 3 TIMES DAILY
COMMUNITY

## 2023-01-01 RX ORDER — MOXIFLOXACIN 5 MG/ML
1 SOLUTION/ DROPS OPHTHALMIC 4 TIMES DAILY
Status: DISCONTINUED | OUTPATIENT
Start: 2023-01-01 | End: 2023-01-01

## 2023-01-01 RX ADMIN — SODIUM CHLORIDE: 4.5 INJECTION, SOLUTION INTRAVENOUS at 16:04

## 2023-01-01 RX ADMIN — ACYCLOVIR SODIUM 600 MG: 50 INJECTION, SOLUTION INTRAVENOUS at 14:15

## 2023-01-01 RX ADMIN — MOXIFLOXACIN OPHTHALMIC SOLUTION 1 DROP: 5 SOLUTION/ DROPS OPHTHALMIC at 20:43

## 2023-01-01 RX ADMIN — SODIUM CHLORIDE: 4.5 INJECTION, SOLUTION INTRAVENOUS at 12:39

## 2023-01-01 RX ADMIN — GABAPENTIN 100 MG: 100 CAPSULE ORAL at 14:22

## 2023-01-01 RX ADMIN — UMECLIDINIUM BROMIDE AND VILANTEROL TRIFENATATE 1 PUFF: 62.5; 25 POWDER RESPIRATORY (INHALATION) at 10:02

## 2023-01-01 RX ADMIN — ERYTHROMYCIN 1 G: 5 OINTMENT OPHTHALMIC at 13:24

## 2023-01-01 RX ADMIN — ACYCLOVIR SODIUM 700 MG: 50 INJECTION, SOLUTION INTRAVENOUS at 22:16

## 2023-01-01 RX ADMIN — CLINDAMYCIN PHOSPHATE 600 MG: 150 INJECTION, SOLUTION INTRAVENOUS at 04:31

## 2023-01-01 RX ADMIN — ACYCLOVIR SODIUM 700 MG: 50 INJECTION, SOLUTION INTRAVENOUS at 13:30

## 2023-01-01 RX ADMIN — SIMVASTATIN 10 MG: 10 TABLET, FILM COATED ORAL at 22:16

## 2023-01-01 RX ADMIN — MOXIFLOXACIN OPHTHALMIC SOLUTION 1 DROP: 5 SOLUTION/ DROPS OPHTHALMIC at 16:28

## 2023-01-01 RX ADMIN — ERYTHROMYCIN 1 G: 5 OINTMENT OPHTHALMIC at 17:32

## 2023-01-01 RX ADMIN — ACETAMINOPHEN 650 MG: 325 TABLET, FILM COATED ORAL at 11:18

## 2023-01-01 RX ADMIN — TETRACAINE HYDROCHLORIDE 1 DROP: 5 SOLUTION OPHTHALMIC at 12:28

## 2023-01-01 RX ADMIN — MOXIFLOXACIN OPHTHALMIC SOLUTION 1 DROP: 5 SOLUTION/ DROPS OPHTHALMIC at 19:53

## 2023-01-01 RX ADMIN — ERYTHROMYCIN 1 G: 5 OINTMENT OPHTHALMIC at 01:17

## 2023-01-01 RX ADMIN — ERYTHROMYCIN 1 G: 5 OINTMENT OPHTHALMIC at 23:26

## 2023-01-01 RX ADMIN — FERROUS SULFATE TAB 325 MG (65 MG ELEMENTAL FE) 325 MG: 325 (65 FE) TAB at 08:56

## 2023-01-01 RX ADMIN — ACYCLOVIR SODIUM 700 MG: 50 INJECTION, SOLUTION INTRAVENOUS at 14:22

## 2023-01-01 RX ADMIN — ERYTHROMYCIN 1 G: 5 OINTMENT OPHTHALMIC at 17:34

## 2023-01-01 RX ADMIN — CLINDAMYCIN PHOSPHATE 600 MG: 150 INJECTION, SOLUTION INTRAVENOUS at 05:35

## 2023-01-01 RX ADMIN — UMECLIDINIUM BROMIDE AND VILANTEROL TRIFENATATE 1 PUFF: 62.5; 25 POWDER RESPIRATORY (INHALATION) at 07:55

## 2023-01-01 RX ADMIN — MOXIFLOXACIN OPHTHALMIC SOLUTION 1 DROP: 5 SOLUTION/ DROPS OPHTHALMIC at 12:29

## 2023-01-01 RX ADMIN — CLINDAMYCIN PHOSPHATE 600 MG: 150 INJECTION, SOLUTION INTRAVENOUS at 12:29

## 2023-01-01 RX ADMIN — CARBOXYMETHYLCELLULOSE SODIUM 1 DROP: 5 SOLUTION/ DROPS OPHTHALMIC at 15:59

## 2023-01-01 RX ADMIN — ACYCLOVIR SODIUM 700 MG: 50 INJECTION, SOLUTION INTRAVENOUS at 22:30

## 2023-01-01 RX ADMIN — CARBOXYMETHYLCELLULOSE SODIUM 1 DROP: 5 SOLUTION/ DROPS OPHTHALMIC at 20:35

## 2023-01-01 RX ADMIN — MOXIFLOXACIN OPHTHALMIC SOLUTION 1 DROP: 5 SOLUTION/ DROPS OPHTHALMIC at 08:56

## 2023-01-01 RX ADMIN — GABAPENTIN 100 MG: 100 CAPSULE ORAL at 08:56

## 2023-01-01 RX ADMIN — FERROUS SULFATE TAB 325 MG (65 MG ELEMENTAL FE) 325 MG: 325 (65 FE) TAB at 07:54

## 2023-01-01 RX ADMIN — CLINDAMYCIN PHOSPHATE 600 MG: 150 INJECTION, SOLUTION INTRAVENOUS at 12:23

## 2023-01-01 RX ADMIN — FERROUS SULFATE TAB 325 MG (65 MG ELEMENTAL FE) 325 MG: 325 (65 FE) TAB at 09:19

## 2023-01-01 RX ADMIN — MOXIFLOXACIN OPHTHALMIC SOLUTION 1 DROP: 5 SOLUTION/ DROPS OPHTHALMIC at 23:39

## 2023-01-01 RX ADMIN — GABAPENTIN 100 MG: 100 CAPSULE ORAL at 20:43

## 2023-01-01 RX ADMIN — GABAPENTIN 100 MG: 100 CAPSULE ORAL at 09:20

## 2023-01-01 RX ADMIN — IOPAMIDOL 75 ML: 755 INJECTION, SOLUTION INTRAVENOUS at 18:27

## 2023-01-01 RX ADMIN — SODIUM CHLORIDE, POTASSIUM CHLORIDE, SODIUM LACTATE AND CALCIUM CHLORIDE 1000 ML: 600; 310; 30; 20 INJECTION, SOLUTION INTRAVENOUS at 18:02

## 2023-01-01 RX ADMIN — GABAPENTIN 100 MG: 100 CAPSULE ORAL at 13:39

## 2023-01-01 RX ADMIN — CARBOXYMETHYLCELLULOSE SODIUM 1 DROP: 5 SOLUTION/ DROPS OPHTHALMIC at 19:52

## 2023-01-01 RX ADMIN — GABAPENTIN 100 MG: 100 CAPSULE ORAL at 20:35

## 2023-01-01 RX ADMIN — MOXIFLOXACIN OPHTHALMIC SOLUTION 1 DROP: 5 SOLUTION/ DROPS OPHTHALMIC at 12:08

## 2023-01-01 RX ADMIN — CLINDAMYCIN PHOSPHATE 600 MG: 150 INJECTION, SOLUTION INTRAVENOUS at 04:41

## 2023-01-01 RX ADMIN — ACETAMINOPHEN 650 MG: 325 TABLET, FILM COATED ORAL at 07:28

## 2023-01-01 RX ADMIN — ACYCLOVIR SODIUM 700 MG: 50 INJECTION, SOLUTION INTRAVENOUS at 13:40

## 2023-01-01 RX ADMIN — ACYCLOVIR SODIUM 700 MG: 50 INJECTION, SOLUTION INTRAVENOUS at 05:49

## 2023-01-01 RX ADMIN — ACYCLOVIR SODIUM 700 MG: 50 INJECTION, SOLUTION INTRAVENOUS at 05:31

## 2023-01-01 RX ADMIN — CARBOXYMETHYLCELLULOSE SODIUM 1 DROP: 5 SOLUTION/ DROPS OPHTHALMIC at 08:56

## 2023-01-01 RX ADMIN — ACYCLOVIR SODIUM 700 MG: 50 INJECTION, SOLUTION INTRAVENOUS at 16:10

## 2023-01-01 RX ADMIN — ERYTHROMYCIN 1 G: 5 OINTMENT OPHTHALMIC at 13:43

## 2023-01-01 RX ADMIN — GABAPENTIN 100 MG: 100 CAPSULE ORAL at 13:24

## 2023-01-01 RX ADMIN — CARBOXYMETHYLCELLULOSE SODIUM 1 DROP: 5 SOLUTION/ DROPS OPHTHALMIC at 13:42

## 2023-01-01 RX ADMIN — ACETAMINOPHEN 975 MG: 325 TABLET, FILM COATED ORAL at 18:00

## 2023-01-01 RX ADMIN — ERYTHROMYCIN 1 G: 5 OINTMENT OPHTHALMIC at 00:18

## 2023-01-01 RX ADMIN — CLINDAMYCIN PHOSPHATE 600 MG: 150 INJECTION, SOLUTION INTRAVENOUS at 19:43

## 2023-01-01 RX ADMIN — CARBOXYMETHYLCELLULOSE SODIUM 1 DROP: 5 SOLUTION/ DROPS OPHTHALMIC at 17:05

## 2023-01-01 RX ADMIN — GABAPENTIN 100 MG: 100 CAPSULE ORAL at 19:52

## 2023-01-01 RX ADMIN — ACYCLOVIR SODIUM 700 MG: 50 INJECTION, SOLUTION INTRAVENOUS at 06:31

## 2023-01-01 RX ADMIN — ACYCLOVIR SODIUM 700 MG: 50 INJECTION, SOLUTION INTRAVENOUS at 22:21

## 2023-01-01 RX ADMIN — MOXIFLOXACIN OPHTHALMIC SOLUTION 1 DROP: 5 SOLUTION/ DROPS OPHTHALMIC at 16:19

## 2023-01-01 RX ADMIN — GABAPENTIN 100 MG: 100 CAPSULE ORAL at 15:59

## 2023-01-01 RX ADMIN — ERYTHROMYCIN 1 G: 5 OINTMENT OPHTHALMIC at 05:51

## 2023-01-01 RX ADMIN — ERYTHROMYCIN 1 G: 5 OINTMENT OPHTHALMIC at 12:13

## 2023-01-01 RX ADMIN — CARBOXYMETHYLCELLULOSE SODIUM 1 DROP: 5 SOLUTION/ DROPS OPHTHALMIC at 07:28

## 2023-01-01 RX ADMIN — GABAPENTIN 100 MG: 100 CAPSULE ORAL at 20:05

## 2023-01-01 RX ADMIN — ACYCLOVIR SODIUM 700 MG: 50 INJECTION, SOLUTION INTRAVENOUS at 06:01

## 2023-01-01 RX ADMIN — GABAPENTIN 100 MG: 100 CAPSULE ORAL at 08:07

## 2023-01-01 RX ADMIN — MOXIFLOXACIN OPHTHALMIC SOLUTION 1 DROP: 5 SOLUTION/ DROPS OPHTHALMIC at 13:42

## 2023-01-01 RX ADMIN — CARBOXYMETHYLCELLULOSE SODIUM 1 DROP: 5 SOLUTION/ DROPS OPHTHALMIC at 12:29

## 2023-01-01 RX ADMIN — CLINDAMYCIN PHOSPHATE 600 MG: 150 INJECTION, SOLUTION INTRAVENOUS at 03:55

## 2023-01-01 RX ADMIN — CARBOXYMETHYLCELLULOSE SODIUM 1 DROP: 5 SOLUTION/ DROPS OPHTHALMIC at 08:07

## 2023-01-01 RX ADMIN — CARBOXYMETHYLCELLULOSE SODIUM 1 DROP: 5 SOLUTION/ DROPS OPHTHALMIC at 11:24

## 2023-01-01 RX ADMIN — SODIUM CHLORIDE: 4.5 INJECTION, SOLUTION INTRAVENOUS at 09:18

## 2023-01-01 RX ADMIN — ERYTHROMYCIN 1 G: 5 OINTMENT OPHTHALMIC at 17:03

## 2023-01-01 RX ADMIN — CLINDAMYCIN PHOSPHATE 600 MG: 150 INJECTION, SOLUTION INTRAVENOUS at 11:30

## 2023-01-01 RX ADMIN — FERROUS SULFATE TAB 325 MG (65 MG ELEMENTAL FE) 325 MG: 325 (65 FE) TAB at 10:02

## 2023-01-01 RX ADMIN — CARBOXYMETHYLCELLULOSE SODIUM 1 DROP: 5 SOLUTION/ DROPS OPHTHALMIC at 20:04

## 2023-01-01 RX ADMIN — UMECLIDINIUM BROMIDE AND VILANTEROL TRIFENATATE 1 PUFF: 62.5; 25 POWDER RESPIRATORY (INHALATION) at 08:56

## 2023-01-01 RX ADMIN — FLUORESCEIN SODIUM 1 STRIP: 1 STRIP OPHTHALMIC at 12:28

## 2023-01-01 RX ADMIN — MOXIFLOXACIN OPHTHALMIC SOLUTION 1 DROP: 5 SOLUTION/ DROPS OPHTHALMIC at 17:07

## 2023-01-01 RX ADMIN — ERYTHROMYCIN 1 G: 5 OINTMENT OPHTHALMIC at 18:55

## 2023-01-01 RX ADMIN — FERROUS SULFATE TAB 325 MG (65 MG ELEMENTAL FE) 325 MG: 325 (65 FE) TAB at 08:07

## 2023-01-01 RX ADMIN — UMECLIDINIUM BROMIDE AND VILANTEROL TRIFENATATE 1 PUFF: 62.5; 25 POWDER RESPIRATORY (INHALATION) at 08:06

## 2023-01-01 RX ADMIN — CLINDAMYCIN PHOSPHATE 600 MG: 150 INJECTION, SOLUTION INTRAVENOUS at 14:35

## 2023-01-01 RX ADMIN — CLINDAMYCIN PHOSPHATE 600 MG: 150 INJECTION, SOLUTION INTRAVENOUS at 21:08

## 2023-01-01 RX ADMIN — MOXIFLOXACIN OPHTHALMIC SOLUTION 1 DROP: 5 SOLUTION/ DROPS OPHTHALMIC at 20:04

## 2023-01-01 RX ADMIN — CLINDAMYCIN PHOSPHATE 600 MG: 150 INJECTION, SOLUTION INTRAVENOUS at 21:48

## 2023-01-01 RX ADMIN — CARBOXYMETHYLCELLULOSE SODIUM 1 DROP: 5 SOLUTION/ DROPS OPHTHALMIC at 23:39

## 2023-01-01 RX ADMIN — ERYTHROMYCIN 1 G: 5 OINTMENT OPHTHALMIC at 23:39

## 2023-01-01 RX ADMIN — MOXIFLOXACIN OPHTHALMIC SOLUTION 1 DROP: 5 SOLUTION/ DROPS OPHTHALMIC at 10:12

## 2023-01-01 RX ADMIN — GABAPENTIN 100 MG: 100 CAPSULE ORAL at 07:54

## 2023-01-01 RX ADMIN — CARBOXYMETHYLCELLULOSE SODIUM 1 DROP: 5 SOLUTION/ DROPS OPHTHALMIC at 20:42

## 2023-01-01 RX ADMIN — CLINDAMYCIN PHOSPHATE 600 MG: 150 INJECTION, SOLUTION INTRAVENOUS at 19:52

## 2023-01-01 RX ADMIN — ERYTHROMYCIN 1 G: 5 OINTMENT OPHTHALMIC at 06:31

## 2023-01-01 RX ADMIN — CARBOXYMETHYLCELLULOSE SODIUM 1 DROP: 5 SOLUTION/ DROPS OPHTHALMIC at 16:28

## 2023-01-01 RX ADMIN — MOXIFLOXACIN OPHTHALMIC SOLUTION 1 DROP: 5 SOLUTION/ DROPS OPHTHALMIC at 08:06

## 2023-01-01 RX ADMIN — SODIUM CHLORIDE: 4.5 INJECTION, SOLUTION INTRAVENOUS at 09:58

## 2023-01-01 RX ADMIN — ERYTHROMYCIN 1 G: 5 OINTMENT OPHTHALMIC at 06:04

## 2023-01-01 RX ADMIN — ERYTHROMYCIN 1 G: 5 OINTMENT OPHTHALMIC at 12:30

## 2023-01-01 RX ADMIN — GABAPENTIN 100 MG: 100 CAPSULE ORAL at 10:02

## 2023-01-01 RX ADMIN — VANCOMYCIN HYDROCHLORIDE 1500 MG: 10 INJECTION, POWDER, LYOPHILIZED, FOR SOLUTION INTRAVENOUS at 18:52

## 2023-01-01 RX ADMIN — VANCOMYCIN HYDROCHLORIDE 1500 MG: 10 INJECTION, POWDER, LYOPHILIZED, FOR SOLUTION INTRAVENOUS at 20:41

## 2023-01-01 RX ADMIN — SIMVASTATIN 10 MG: 10 TABLET, FILM COATED ORAL at 22:27

## 2023-01-01 RX ADMIN — CLINDAMYCIN PHOSPHATE 600 MG: 150 INJECTION, SOLUTION INTRAVENOUS at 03:36

## 2023-01-01 RX ADMIN — CARBOXYMETHYLCELLULOSE SODIUM 1 DROP: 5 SOLUTION/ DROPS OPHTHALMIC at 09:20

## 2023-01-01 RX ADMIN — ACYCLOVIR SODIUM 700 MG: 50 INJECTION, SOLUTION INTRAVENOUS at 22:01

## 2023-01-01 RX ADMIN — SIMVASTATIN 10 MG: 10 TABLET, FILM COATED ORAL at 21:08

## 2023-01-01 RX ADMIN — UMECLIDINIUM BROMIDE AND VILANTEROL TRIFENATATE 1 PUFF: 62.5; 25 POWDER RESPIRATORY (INHALATION) at 09:18

## 2023-01-01 RX ADMIN — CARBOXYMETHYLCELLULOSE SODIUM 1 DROP: 5 SOLUTION/ DROPS OPHTHALMIC at 16:18

## 2023-01-01 RX ADMIN — ERYTHROMYCIN 1 G: 5 OINTMENT OPHTHALMIC at 05:31

## 2023-01-01 RX ADMIN — ERYTHROMYCIN 1 G: 5 OINTMENT OPHTHALMIC at 00:22

## 2023-01-01 RX ADMIN — CARBOXYMETHYLCELLULOSE SODIUM 1 DROP: 5 SOLUTION/ DROPS OPHTHALMIC at 11:27

## 2023-01-01 RX ADMIN — CARBOXYMETHYLCELLULOSE SODIUM 1 DROP: 5 SOLUTION/ DROPS OPHTHALMIC at 10:02

## 2023-01-01 RX ADMIN — SIMVASTATIN 10 MG: 10 TABLET, FILM COATED ORAL at 22:21

## 2023-01-01 RX ADMIN — CLINDAMYCIN PHOSPHATE 600 MG: 150 INJECTION, SOLUTION INTRAVENOUS at 20:05

## 2023-01-01 RX ADMIN — ACYCLOVIR SODIUM 700 MG: 50 INJECTION, SOLUTION INTRAVENOUS at 22:25

## 2023-01-01 ASSESSMENT — ACTIVITIES OF DAILY LIVING (ADL)
IADL_COMMENTS: ASSIST
ADLS_ACUITY_SCORE: 41
DOING_ERRANDS_INDEPENDENTLY_DIFFICULTY: NO
DIFFICULTY_EATING/SWALLOWING: NO
ADLS_ACUITY_SCORE: 45
ADLS_ACUITY_SCORE: 24
ADLS_ACUITY_SCORE: 24
DEPENDENT_IADLS:: CLEANING;COOKING
ADLS_ACUITY_SCORE: 45
ADLS_ACUITY_SCORE: 45
ADLS_ACUITY_SCORE: 35
ADLS_ACUITY_SCORE: 41
ADLS_ACUITY_SCORE: 41
ADLS_ACUITY_SCORE: 35
ADLS_ACUITY_SCORE: 41
ADLS_ACUITY_SCORE: 41
ADLS_ACUITY_SCORE: 24
ADLS_ACUITY_SCORE: 45
ADLS_ACUITY_SCORE: 26
WALKING_OR_CLIMBING_STAIRS_DIFFICULTY: NO
ADLS_ACUITY_SCORE: 41
ADLS_ACUITY_SCORE: 26
ADLS_ACUITY_SCORE: 35
ADLS_ACUITY_SCORE: 26
ADLS_ACUITY_SCORE: 24
EQUIPMENT_CURRENTLY_USED_AT_HOME: WHEELCHAIR, MANUAL
ADLS_ACUITY_SCORE: 26
ADLS_ACUITY_SCORE: 26
ADLS_ACUITY_SCORE: 24
ADLS_ACUITY_SCORE: 35
ADLS_ACUITY_SCORE: 41
ADLS_ACUITY_SCORE: 45
ADLS_ACUITY_SCORE: 24
ADLS_ACUITY_SCORE: 24
ADLS_ACUITY_SCORE: 41
ADLS_ACUITY_SCORE: 45
FALL_HISTORY_WITHIN_LAST_SIX_MONTHS: NO
TOILETING_ISSUES: NO
ADLS_ACUITY_SCORE: 24
ADLS_ACUITY_SCORE: 26
ADLS_ACUITY_SCORE: 26
ADLS_ACUITY_SCORE: 45
ADLS_ACUITY_SCORE: 41
WEAR_GLASSES_OR_BLIND: YES
VISION_MANAGEMENT: GLASSES
ADLS_ACUITY_SCORE: 26
ADLS_ACUITY_SCORE: 24
ADLS_ACUITY_SCORE: 41
ADLS_ACUITY_SCORE: 24
ADLS_ACUITY_SCORE: 35
ADLS_ACUITY_SCORE: 26
ADLS_ACUITY_SCORE: 35
ADLS_ACUITY_SCORE: 26
ADLS_ACUITY_SCORE: 41
ADLS_ACUITY_SCORE: 24
ADLS_ACUITY_SCORE: 41
CHANGE_IN_FUNCTIONAL_STATUS_SINCE_ONSET_OF_CURRENT_ILLNESS/INJURY: NO
ADLS_ACUITY_SCORE: 45
ADLS_ACUITY_SCORE: 24
ADLS_ACUITY_SCORE: 24
ADLS_ACUITY_SCORE: 26
ADLS_ACUITY_SCORE: 41
ADLS_ACUITY_SCORE: 24
ADLS_ACUITY_SCORE: 22
ADLS_ACUITY_SCORE: 45
CONCENTRATING,_REMEMBERING_OR_MAKING_DECISIONS_DIFFICULTY: NO
ADLS_ACUITY_SCORE: 41
ADLS_ACUITY_SCORE: 41
DRESSING/BATHING_DIFFICULTY: NO
ADLS_ACUITY_SCORE: 41
ADLS_ACUITY_SCORE: 35

## 2023-05-23 NOTE — TELEPHONE ENCOUNTER
Patient Quality Outreach    Patient is due for the following:   Patient seeing new provider     Next Steps:   none    Type of outreach:    none      Questions for provider review:    None           Malia Joe [facility]  Chart routed to none.

## 2023-07-12 PROBLEM — R21 FACIAL RASH: Status: ACTIVE | Noted: 2023-01-01

## 2023-07-12 PROBLEM — R50.9 FEVER IN ADULT: Status: ACTIVE | Noted: 2023-01-01

## 2023-07-12 NOTE — LETTER
Recipient:  [employer]        Sender:  Gabby Hilliard, RN, BA  Care Coordinator  6 Med Surg  [phone], pager [phone]      For weekend social work needs, contact information below and available on [facility]:      Weekend [place] Pager: [phone]  SW Weekend 6MS, 8A, 10ICU- Pager: [phone]     For weekend RN care coordinator needs (home discharge with needs including home care, assisted living facility returns, durable medical equip, IV antibiotics) - page [phone]         Date: July 17, 2023  Patient Name:  Lang Christina  Patient YOB: 1928  Routing Message:    Updated orders      The documents accompanying this notice contain confidential information belonging to the sender.  This information is intended only for the use of the individual or entity named above.  The authorized recipient of this information is prohibited from disclosing this information to any other party and is required to destroy the information after its stated need has been fulfilled, unless otherwise required by state law.    If you are not the intended recipient, you are hereby notified that any disclosure, copy, distribution or action taken in reliance on the contents of these documents is strictly prohibited.  If you have received this document in error, please return it by fax to [phone] with a note on the cover sheet explaining why you are returning it (e.g. not your patient, not your provider, etc.).  If you need further assistance, please call .  Documents may also be returned by mail to Health Information Management, , [facility] [address] [place], [place] [address].

## 2023-07-12 NOTE — ED TRIAGE NOTES
Triage Assessment       Row Name 07/12/23 0959       Triage Assessment (Adult)    Airway WDL WDL       Respiratory WDL    Respiratory WDL WDL       Skin Circulation/Temperature WDL    Skin Circulation/Temperature WDL X  shingles rt side forehead involving rt eye.                  Pt arrives via ems from nursing home for evaluation of shingles affecting rt forehead and eye. Is being treated with valacyclovir, tylenol, and tramadol. Was started on gabapentin yesterday. Significant lesions on rt forehead, rt eye very puffy just below eye with some drainage from eye, pt states he can see out of his rt eye. C-collar in place due to hx of cervical and thoracic fractures.Family wanted him evaluated, they feel shingles are not improving. Nursing home stated pt needed pain control, however, he denies any pain at this time. Hx of dementia, glaucoma, and cataracts.

## 2023-07-12 NOTE — PHARMACY-ADMISSION MEDICATION HISTORY
Pharmacist Admission Medication History    Admission medication history is complete. The information provided in this note is only as accurate as the sources available at the time of the update.    Medication reconciliation/reorder completed by provider prior to medication history? No    Information Source(s): Facility ([facility]/) medication list/MAR and CareEverywhere/SureScripts via N/A    Pertinent Information: Patient started Bactrim 800-160 mg tab today, 7/12/2023, written for 2 tabs BID x 5 days. Patient started valacyclovir on 7/10/2023, patient also using erythromycin eye ointment.     Changes made to PTA medication list:    Added: valacyclovir 1 gram tab TID, first dose in PM on 7/10/2023 (written for 10 days), tramadol 50 mg tab x 1, gabapentin 100 mg cap TID, Bactrim 800-160 mg tab: 2 tabs BID x 5 days, first dose this morning, 7/12/2023 for infection under skin per MAR, erythromycin 5 mg/g ointment: 1 strip in right eye three times daily, neosporin topically TID for rash, nutritional shake, eye drops for dry eyes    Deleted: quetiapine     Changed: miralax/senna-s daily --> daily as needed for constipation, tylenol 1000 mg BID prn --> 1000 mg TID, and 1000 mg daily prn pain    Medication Affordability:  Not including over the counter (OTC) medications, was there a time in the past 3 months when you did not take your medications as prescribed because of cost?: Unable to Assess    Allergies reviewed with patient and updates made in EHR: yes    Medication History Completed By: DEVYN BYRNES RPH 7/12/2023 1:36 PM    Prior to Admission medications    Medication Sig Last Dose Taking? Auth Provider Long Term End Date   acetaminophen (TYLENOL) 500 MG tablet Take 2 tablets by mouth daily as needed for pain 7/11/2023 at 0412 Yes Unknown, Entered By History     acetaminophen (TYLENOL) 500 MG tablet Take 2 tablets by mouth 3 times daily Maximum of 4000 mg in 24 hours 7/12/2023 at 0800 Yes Reported, Patient      albuterol (PROAIR HFA/PROVENTIL HFA/VENTOLIN HFA) 108 (90 Base) MCG/ACT inhaler Inhale 2 puffs into the lungs every 4 hours as needed for shortness of breath / dyspnea or wheezing PRN Yes Unknown, Entered By History Yes    ANORO ELLIPTA 62.5-25 MCG/INH oral inhaler INHALE ONE PUFF BY MOUTH ONCE DAILY 7/12/2023 at AM Yes Micheal Blake MD     bisacodyl (DULCOLAX) 10 MG suppository Place 10 mg rectally daily as needed for constipation PRN Yes Unknown, Entered By History     carboxymethylcellulose PF (REFRESH PLUS) 0.5 % ophthalmic solution Place 1 drop into both eyes 2 times daily 7/12/2023 at AM Yes Unknown, Entered By History     Cyanocobalamin 1000 MCG TABS Take 1,000 mcg by mouth daily 7/12/2023 at AM Yes Reported, Patient     diclofenac (VOLTAREN) 1 % topical gel Apply 2 g topically 3 times daily as needed for moderate pain PRN Yes Unknown, Entered By History     erythromycin (ROMYCIN) 5 MG/GM ophthalmic ointment Place into the right eye 3 times daily 7/12/2023 at 0800 Yes Unknown, Entered By History     ferrous sulfate (FEROSUL) 325 (65 Fe) MG tablet Take 1 tablet (325 mg) by mouth daily 7/12/2023 at AM Yes Arlette Francois DO     gabapentin (NEURONTIN) 100 MG capsule Take 1 capsule by mouth 3 times daily 7/12/2023 at 0800 Yes Unknown, Entered By History Yes    Neomycin-Bacitracin-Polymyxin (NEOSPORIN ORIGINAL) OINT Apply 1 Application topically 3 times daily 7/12/2023 at 0800 Yes Unknown, Entered By History     Nutritional Supplements (NUTRITIONAL SHAKE PLUS) LIQD Take 4 fluid ounces by mouth every evening 7/11/2023 at PM Yes Unknown, Entered By History     polyethylene glycol (MIRALAX) 17 g packet Take 1 packet by mouth daily as needed for constipation PRN Yes Unknown, Entered By History     senna-docusate (SENOKOT-S/PERICOLACE) 8.6-50 MG tablet Take 1 tablet by mouth 2 times daily as needed for constipation PRN Yes Unknown, Entered By History     simvastatin (ZOCOR) 10 MG tablet TAKE ONE TABLET BY MOUTH AT  BEDTIME. 7/11/2023 at HS Yes Micheal Blake MD Yes    sulfamethoxazole-trimethoprim (BACTRIM DS) 800-160 MG tablet Take 2 tablets by mouth 2 times daily 7/12/2023 at AM Yes Unknown, Entered By History     traMADol (ULTRAM) 50 MG tablet Take 50 mg by mouth once 7/11/2023 at 1303 Yes Unknown, Entered By History     valACYclovir (VALTREX) 1000 mg tablet Take 1,000 mg by mouth 3 times daily 7/12/2023 at 0800 Yes Unknown, Entered By History Yes    order for DME Oxygen for home use. Liters per minute: 1 per nasal cannula. Frequency of use: With activity;. Length of need: 99.   Reported, Patient

## 2023-07-12 NOTE — ED NOTES
Bed: [facility]  Expected date:   Expected time:   Means of arrival:   Comments:  [place] Transfer

## 2023-07-12 NOTE — ED NOTES
[facility]  Expected date:   Expected time:   Means of arrival:   Comments:  Non-monitored WR pt

## 2023-07-12 NOTE — ED NOTES
Right side of face/eye with drainage (serous) and blisters from shingels. Pt just left via EMS for [facility]

## 2023-07-12 NOTE — ED PROVIDER NOTES
"    [place] EMERGENCY DEPARTMENT ([facility])    7/12/23       ED PROVIDER NOTE   ED 8  History     Chief Complaint   Patient presents with     Rash     The history is provided by the patient, medical records and a relative (daughter).     Lang Christina is a 89 year old male PMH of HLD, basal cell carcinoma, senile purpura, dementia, centrilobular emphysema, acute respiratory failure, atrial fibrillation, subdural hematoma assisted care resident who began having a right-sided forehead lesion 4 days ago and was treated for zoster as an outpatient. Patient has a history of a subdural hematoma and has had a cervical spine fracture approximately 1 year ago which was treated nonoperatively and patient wears a hard collar 24 hours a day. 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 Daughter is at the bedside who I have taken a history from who reports that at baseline patient has mild dementia but takes care of most of his ADLs. He reports that in the last hour he has been \"a little bit more confused than normal but we have been up since the morning and in hospitals.\" No new falls, reported headaches, or vomiting. Patient expresses no eye pain, face pain, or other distress.     Per chart review, patient was diagnosed with shingles to his right face/scalp 4 days ago.  He has been on valaciclovir.  Patient was seen by [facility] eye physician who said that symptoms sounded like they are worsening.  Was transferred to [facility] to be seen by ophthalmology.     Past Medical History  Past Medical History:   Diagnosis Date     Actinic keratosis      BCC (basal cell carcinoma) 12/2012     BPH (benign prostatic hypertrophy) with urinary obstruction      COPD (chronic obstructive pulmonary disease) (H)     9/2012 Allergy " consult     DDD (degenerative disc disease), cervical      Diverticulosis      ED (erectile dysfunction)      Hearing loss      Hyperlipidemia LDL goal < 130      Kidney stones      Localized osteoarthritis of both knees      Osteoarthritis      Senile purpura (H) 01/12/2021     Strabismus      Vasomotor rhinitis      Vitamin B12 deficiency      Past Surgical History:   Procedure Laterality Date     ARTHROSCOPY KNEE RT/LT      Right     CATARACT IOL, RT/LT       CYSTOSCOPY       HERNIA REPAIR, INGUINAL RT/LT      Right     LIGATN/STRIP LONG OR SHORT SAPHEN       PHACOEMULSIFICATION WITH STANDARD INTRAOCULAR LENS IMPLANT  12-09 / 01-10    RT / LT     ISELAP      .     acetaminophen (TYLENOL) 500 MG tablet  acetaminophen (TYLENOL) 500 MG tablet  albuterol (PROAIR HFA/PROVENTIL HFA/VENTOLIN HFA) 108 (90 Base) MCG/ACT inhaler  ANORO ELLIPTA 62.5-25 MCG/INH oral inhaler  bisacodyl (DULCOLAX) 10 MG suppository  carboxymethylcellulose PF (REFRESH PLUS) 0.5 % ophthalmic solution  Cyanocobalamin 1000 MCG TABS  diclofenac (VOLTAREN) 1 % topical gel  erythromycin (ROMYCIN) 5 MG/GM ophthalmic ointment  ferrous sulfate (FEROSUL) 325 (65 Fe) MG tablet  gabapentin (NEURONTIN) 100 MG capsule  Neomycin-Bacitracin-Polymyxin (NEOSPORIN ORIGINAL) OINT  Nutritional Supplements (NUTRITIONAL SHAKE PLUS) LIQD  order for DME  polyethylene glycol (MIRALAX) 17 g packet  senna-docusate (SENOKOT-S/PERICOLACE) 8.6-50 MG tablet  simvastatin (ZOCOR) 10 MG tablet  sulfamethoxazole-trimethoprim (BACTRIM DS) 800-160 MG tablet  traMADol (ULTRAM) 50 MG tablet  valACYclovir (VALTREX) 1000 mg tablet      Allergies   Allergen Reactions     Pcn [Penicillins]      Family History  Family History   Problem Relation Age of Onset     Cerebrovascular Disease Brother      Social History   Social History     Tobacco Use     Smoking status: Former     Packs/day: 1.00     Years: 47.00     Pack years: 47.00     Types: Cigarettes, Pipe     Quit date: 1/1/1994      Years since quittin.5     Smokeless tobacco: Never   Substance Use Topics     Alcohol use: No     Drug use: No         A medically appropriate review of systems was performed with pertinent positives and negatives noted in the HPI, and all other systems negative.    Physical Exam      Physical Exam  Vitals and nursing note reviewed.   Constitutional:       General: He is not in acute distress.     Appearance: Normal appearance. He is not toxic-appearing.   HENT:      Head: Atraumatic.        Comments: right upper facial and forehead soft tissues are red with a warm color. There are several lesions on the skin away from patient's lids and lashes  Eyes:      General: No scleral icterus.     Conjunctiva/sclera: Conjunctivae normal.      Right eye: Right conjunctiva is not injected.      Left eye: Left conjunctiva is not injected.      Pupils: Pupils are equal, round, and reactive to light.      Comments: Fluorescein from prior ophthalmic exam draining from the eye   Cardiovascular:      Rate and Rhythm: Normal rate and regular rhythm.      Heart sounds: Normal heart sounds.   Pulmonary:      Effort: Pulmonary effort is normal. No respiratory distress.      Breath sounds: Normal breath sounds.   Abdominal:      Palpations: Abdomen is soft.      Tenderness: There is no abdominal tenderness.   Musculoskeletal:         General: No deformity.      Cervical back: Neck supple.   Skin:     General: Skin is warm.   Neurological:      Mental Status: He is alert.           ED Course, Procedures, & Data      Procedures               Results for orders placed or performed during the hospital encounter of 23   Comprehensive metabolic panel     Status: Abnormal   Result Value Ref Range    Sodium 134 (L) 136 - 145 mmol/L    Potassium 4.1 3.4 - 5.3 mmol/L    Chloride 98 98 - 107 mmol/L    Carbon Dioxide (CO2) 23 22 - 29 mmol/L    Anion Gap 13 7 - 15 mmol/L    Urea Nitrogen 12.4 8.0 - 23.0 mg/dL    Creatinine 0.66 (L) 0.67 -  1.17 mg/dL    Calcium 9.0 8.2 - 9.6 mg/dL    Glucose 123 (H) 70 - 99 mg/dL    Alkaline Phosphatase 114 40 - 129 U/L    AST 22 0 - 45 U/L    ALT 8 0 - 70 U/L    Protein Total 6.7 6.4 - 8.3 g/dL    Albumin 3.7 3.5 - 5.2 g/dL    Bilirubin Total 0.6 <=1.2 mg/dL    GFR Estimate 86 >60 mL/min/1.73m2   CBC with platelets and differential     Status: Abnormal   Result Value Ref Range    WBC Count 4.8 4.0 - 11.0 10e3/uL    RBC Count 3.53 (L) 4.40 - 5.90 10e6/uL    Hemoglobin 11.4 (L) 13.3 - 17.7 g/dL    Hematocrit 35.6 (L) 40.0 - 53.0 %     (H) 78 - 100 fL    MCH 32.3 26.5 - 33.0 pg    MCHC 32.0 31.5 - 36.5 g/dL    RDW 14.4 10.0 - 15.0 %    Platelet Count 158 150 - 450 10e3/uL    % Neutrophils 78 %    % Lymphocytes 9 %    % Monocytes 12 %    % Eosinophils 1 %    % Basophils 0 %    % Immature Granulocytes 0 %    NRBCs per 100 WBC 0 <1 /100    Absolute Neutrophils 3.7 1.6 - 8.3 10e3/uL    Absolute Lymphocytes 0.4 (L) 0.8 - 5.3 10e3/uL    Absolute Monocytes 0.6 0.0 - 1.3 10e3/uL    Absolute Eosinophils 0.0 0.0 - 0.7 10e3/uL    Absolute Basophils 0.0 0.0 - 0.2 10e3/uL    Absolute Immature Granulocytes 0.0 <=0.4 10e3/uL    Absolute NRBCs 0.0 10e3/uL   [place] Draw     Status: None    Narrative    The following orders were created for panel order [place] Draw.  Procedure                               Abnormality         Status                     ---------                               -----------         ------                     Extra Blue Top Tube[[id number]]                              Final result               Extra Red Top Tube[[id number]]                               Final result                 Please view results for these tests on the individual orders.   Extra Blue Top Tube     Status: None   Result Value Ref Range    Hold Specimen JIC    Extra Red Top Tube     Status: None   Result Value Ref Range    Hold Specimen JIC    CBC with platelets + differential     Status: Abnormal    Narrative    The following  orders were created for panel order CBC with platelets + differential.  Procedure                               Abnormality         Status                     ---------                               -----------         ------                     CBC with platelets and d...[[id number]]  Abnormal            Final result                 Please view results for these tests on the individual orders.     Medications - No data to display  Labs Ordered and Resulted from Time of ED Arrival to Time of ED Departure - No data to display  No orders to display          Critical care was not performed.     Medical Decision Making  The patient's presentation was of high complexity (a chronic illness severe exacerbation, progression, or side effect of treatment).    The patient's evaluation involved:  ordering and/or review of 3+ test(s) in this encounter (see separate area of note for details)    The patient's management necessitated high risk (a decision regarding hospitalization).      Assessment & Plan    Concerning for delirium, either hospital related or related to new spiking fever. Will treat for underlying facial cellulitis, will give gentle IV fluids, vancomycin, and has already received 1 dose of IV valacyclovir. Optho consult for ophthalmic exam. We will also rule out retro-orbital or deep space tissue infection with a CT orbits and rule out recurrence of subdural hemorrhage with a CT head, chest x-ray, urine to search for source of fever, COVID-19 test. These are less likely given clear facial source of infection .     Will admit for iv abx and iv antivirals    This part of the document was transcribed by Vannesa Perales, Medical Scribe.      I have reviewed the nursing notes. I have reviewed the findings, diagnosis, plan and need for follow up with the patient.    New Prescriptions    No medications on file       Final diagnoses:   None       Spencer Roman MD   [facility] EMERGENCY DEPARTMENT  7/12/2023      Spencer Roman MD  07/12/23 1283

## 2023-07-12 NOTE — ED PROVIDER NOTES
NAME: Lang Christina  AGE: 89 year old male  YOB: 1928  MRN: [id number]  EVALUATION DATE & TIME: 2023 11:15 AM    PCP: Shakila Clancy  ED PROVIDER: Emily Echevarria MD.    Chief Complaint   Patient presents with     Shingles       FINAL IMPRESSION:  1. Herpes zoster with ophthalmic complication, unspecified herpes zoster eye disease        MEDICAL DECISION MAKIN:30 AM I met with the patient, obtained history, performed an initial exam, and discussed options and plan for diagnostics and treatment here in the ED.   12:15 PM Spoke with nursing staff from [facility]. Dr. Velasquez will call back   12:26 PM Discussed the case with Dr. Velasquez, [facility], who agrees with plan for IV acyclovir and plan for transfer to [facility] for further ophthalmologic care during admission.   1:19 PM Spoke to Dr. Srinivasan, [facility] Ophthalmology.  1:40 PM Updated the patient on consult information and probable transfer.   2:00 PM Discussed the case with Dr. Grijalva, [facility] ED. She agrees to accept the patient for transfer at this time.  2:13 PM Updated the patient and his family on the current plan for transfer.    89-year-old male with multiple chronic comorbidities who presents with rash.  Patient diagnosed with shingles to his right face/scalp 4 days ago.  He has been on valacyclovir.  He saw [facility] and Monday and was told there was no eye involvement but has been on topical erythromycin. Despite this is having new and worsening lesions to his face and well as increased redness to the face. No fevers or systemic symptoms. He declines anything for pain here. He reports no new changes in vision. Fluorescein exam here without obvious dendritic lesions. I talked to [facility] physician who just saw him and sounds like this is quite worse than Monday. Overall we considered admission here for IV antivirals through if not improving or worsening symptoms would need transfer to the [facility]  where opthalmology is available.  I think it is in his best interest to go to the [facility] sooner rather than waiting and the ophthalmology resident there is aware and able to see him in the ED when he arrives and the [facility] ED provider accepted him for transfer. Patient's daughter is frustrated by needing to transfer but is in agreement with this. I considered overlying bacterial infection or orbital cellulitis, no fevers, no warmth to the area, normal WBC, no pain with EOM--will defer any additional antibiotics to opthalmology and [facility] team. Patient discharged to the [facility] in stable condition.       Medical Decision Making    History:    Supplemental history from: Documented in chart, if applicable    External Record(s) reviewed: Documented in chart, if applicable.    Work Up:    Chart documentation includes differential considered and any EKGs or imaging interpreted by provider.    In additional to work up documented, I considered the following work up: Documented in chart, if applicable.    External consultation:    Discussion of management with another provider: Documented in chart, if applicable    Complicating factors:    Care impacted by chronic illness: Chronic Kidney Disease, Dementia and Heart Disease    Care affected by social determinants of health: N/A    Disposition considerations: Admit. (transfer)    MEDICATIONS GIVEN IN THE EMERGENCY:  Medications   acyclovir (ZOVIRAX) 600 mg in sodium chloride 0.9 % 112 mL intermittent infusion (0 mg Intravenous Stopped 7/12/23 1517)   tetracaine (PONTOCAINE) 0.5 % ophthalmic solution 1-2 drop (1 drop Both Eyes $Given by Other 7/12/23 1228)   fluorescein (FUL-GRADY) ophthalmic strip 1 strip (1 strip Both Eyes $Given by Other 7/12/23 1228)       NEW PRESCRIPTIONS STARTED AT TODAY'S ER VISIT:  New Prescriptions    No medications on file        =================================================================  HPI    Patient information was obtained from: the  patient and his family member  Use of : N/A       Lang Christina is a 89 year old male with a past medical history of HLD, paroxysmal atrial fibrillation, and dementia, among others who presents for evaluation of shingles.     For the past four days, the patient has had a shingles flare to the right side of his face down to his cheek. Two days ago, the lesions began to dry. The patient saw his ophthalmologist at [facility] on Monday and was told that his eye was fine. He has been on oral valacyclovir and erythromycin eye ointment.  Yesterday, the entire right side of his face was red and purple (worse than it appeared in days prior). Today, the patient's lesions worsened and he is having new vesicles. His long term care facility suspected that he may have a separate infection occurring simultaneously and he was started on an antibiotic. The patient denies fevers, vision changes, pain and any other symptoms at this time.    REVIEW OF SYSTEMS   All systems reviewed, please see HPI for pertinent findings    PAST MEDICAL HISTORY:  Past Medical History:   Diagnosis Date     Actinic keratosis      BCC (basal cell carcinoma) 12/2012     BPH (benign prostatic hypertrophy) with urinary obstruction      COPD (chronic obstructive pulmonary disease) (H)     9/2012 Allergy consult     DDD (degenerative disc disease), cervical      Diverticulosis      ED (erectile dysfunction)      Hearing loss      Hyperlipidemia LDL goal < 130      Kidney stones      Localized osteoarthritis of both knees      Osteoarthritis      Senile purpura (H) 01/12/2021     Strabismus      Vasomotor rhinitis      Vitamin B12 deficiency        PAST SURGICAL HISTORY:  Past Surgical History:   Procedure Laterality Date     ARTHROSCOPY KNEE RT/LT      Right     CATARACT IOL, RT/LT       CYSTOSCOPY       HERNIA REPAIR, INGUINAL RT/LT      Right     LIGATN/STRIP LONG OR SHORT SAPHEN       PHACOEMULSIFICATION WITH STANDARD INTRAOCULAR LENS  IMPLANT  12-09 / 01-10    RT / LT     TURP      .       CURRENT MEDICATIONS:      Current Facility-Administered Medications:      acyclovir (ZOVIRAX) 600 mg in sodium chloride 0.9 % 112 mL intermittent infusion, 10 mg/kg, Intravenous, Q8H, Emily Echevarria MD, Stopped at 07/12/23 1517    Current Outpatient Medications:      acetaminophen (TYLENOL) 500 MG tablet, Take 2 tablets by mouth daily as needed for pain, Disp: , Rfl:      acetaminophen (TYLENOL) 500 MG tablet, Take 2 tablets by mouth 3 times daily Maximum of 4000 mg in 24 hours, Disp: , Rfl:      albuterol (PROAIR HFA/PROVENTIL HFA/VENTOLIN HFA) 108 (90 Base) MCG/ACT inhaler, Inhale 2 puffs into the lungs every 4 hours as needed for shortness of breath / dyspnea or wheezing, Disp: , Rfl:      ANORO ELLIPTA 62.5-25 MCG/INH oral inhaler, INHALE ONE PUFF BY MOUTH ONCE DAILY, Disp: 60 each, Rfl: 8     bisacodyl (DULCOLAX) 10 MG suppository, Place 10 mg rectally daily as needed for constipation, Disp: , Rfl:      carboxymethylcellulose PF (REFRESH PLUS) 0.5 % ophthalmic solution, Place 1 drop into both eyes 2 times daily, Disp: , Rfl:      Cyanocobalamin 1000 MCG TABS, Take 1,000 mcg by mouth daily, Disp: , Rfl:      diclofenac (VOLTAREN) 1 % topical gel, Apply 2 g topically 3 times daily as needed for moderate pain, Disp: , Rfl:      erythromycin (ROMYCIN) 5 MG/GM ophthalmic ointment, Place into the right eye 3 times daily, Disp: , Rfl:      ferrous sulfate (FEROSUL) 325 (65 Fe) MG tablet, Take 1 tablet (325 mg) by mouth daily, Disp: , Rfl:      gabapentin (NEURONTIN) 100 MG capsule, Take 1 capsule by mouth 3 times daily, Disp: , Rfl:      Neomycin-Bacitracin-Polymyxin (NEOSPORIN ORIGINAL) OINT, Apply 1 Application topically 3 times daily, Disp: , Rfl:      Nutritional Supplements (NUTRITIONAL SHAKE PLUS) LIQD, Take 4 fluid ounces by mouth every evening, Disp: , Rfl:      polyethylene glycol (MIRALAX) 17 g packet, Take 1 packet by mouth daily as needed for  constipation, Disp: , Rfl:      senna-docusate (SENOKOT-S/PERICOLACE) 8.6-50 MG tablet, Take 1 tablet by mouth 2 times daily as needed for constipation, Disp: , Rfl:      simvastatin (ZOCOR) 10 MG tablet, TAKE ONE TABLET BY MOUTH AT BEDTIME., Disp: 90 tablet, Rfl: 2     sulfamethoxazole-trimethoprim (BACTRIM DS) 800-160 MG tablet, Take 2 tablets by mouth 2 times daily, Disp: , Rfl:      traMADol (ULTRAM) 50 MG tablet, Take 50 mg by mouth once, Disp: , Rfl:      valACYclovir (VALTREX) 1000 mg tablet, Take 1,000 mg by mouth 3 times daily, Disp: , Rfl:      order for DME, Oxygen for home use. Liters per minute: 1 per nasal cannula. Frequency of use: With activity;. Length of need: 99., Disp: , Rfl:     ALLERGIES:  Allergies   Allergen Reactions     Pcn [Penicillins]        FAMILY HISTORY:  Family History   Problem Relation Age of Onset     Cerebrovascular Disease Brother        SOCIAL HISTORY:   Social History     Socioeconomic History     Marital status:    Occupational History     Employer: RETIRED   Tobacco Use     Smoking status: Former     Packs/day: 1.00     Years: 47.00     Pack years: 47.00     Types: Cigarettes, Pipe     Quit date: 1994     Years since quittin.5     Smokeless tobacco: Never   Substance and Sexual Activity     Alcohol use: No     Drug use: No     Sexual activity: Not Currently     Partners: Female       PHYSICAL EXAM:    Vitals: BP (!) 176/84   Pulse 81   Temp 99.5  F (37.5  C) (Oral)   Resp 16   SpO2 95%    Constitutional: Well developed, well nourished. No acute distress. Is in [place] [employer] collar from prior injury  HENT: Normocephalic, atraumatic, mucous membranes moist, nose normal  Eyes: Pupils mid range, PERRL. Right with scleral injection and some periorbital edema, see picture, no pain with ROM, no drainage from the eye. Right face/scalp with redness, lesions with many crusted over though some vesicles just lateral to his right eye. Able to read my name tag to his right  eye  Respiratory: Normal work of breathing, normal rate, speaks in full sentences  Cardiovascular: Normal heart rate  Musculoskeletal: Moving all 4 extremities intentionally  Neurologic: Alert & oriented, cranial nerves grossly intact. Speech clear. Moving all extremitie           LAB:  All pertinent labs reviewed and interpreted.  Labs Ordered and Resulted from Time of ED Arrival to Time of ED Departure   COMPREHENSIVE METABOLIC PANEL - Abnormal       Result Value    Sodium 134 (*)     Potassium 4.1      Chloride 98      Carbon Dioxide (CO2) 23      Anion Gap 13      Urea Nitrogen 12.4      Creatinine 0.66 (*)     Calcium 9.0      Glucose 123 (*)     Alkaline Phosphatase 114      AST 22      ALT 8      Protein Total 6.7      Albumin 3.7      Bilirubin Total 0.6      GFR Estimate 86     CBC WITH PLATELETS AND DIFFERENTIAL - Abnormal    WBC Count 4.8      RBC Count 3.53 (*)     Hemoglobin 11.4 (*)     Hematocrit 35.6 (*)      (*)     MCH 32.3      MCHC 32.0      RDW 14.4      Platelet Count 158      % Neutrophils 78      % Lymphocytes 9      % Monocytes 12      % Eosinophils 1      % Basophils 0      % Immature Granulocytes 0      NRBCs per 100 WBC 0      Absolute Neutrophils 3.7      Absolute Lymphocytes 0.4 (*)     Absolute Monocytes 0.6      Absolute Eosinophils 0.0      Absolute Basophils 0.0      Absolute Immature Granulocytes 0.0      Absolute NRBCs 0.0         RADIOLOGY:  No orders to display       EKG:   N/A    PROCEDURES:   Procedures     PROCEDURE: Woods lamp Exam   INDICATIONS: Shingles   PROCEDURE PROVIDER: Dr Emily Echevarria   SITE: right eye   CONSENT:  The risks, benefits and alternatives for this procedure were explained to the patient and verbally accepted.     MEDICATION: fluorescein stain and tetracaine   EXAM FINDINGS: Right Eye: one very small punctate area of uptake but no other abnormal corneal uptake, no dendritic lesions seen   COMPLICATIONS: Patient tolerated procedure well, without  complication     I, Greta Mckeon, am serving as a scribe to document services personally performed by Dr. Emily Echevarria based on my observation and the provider's statements to me. I, Emily Echevarria MD attest that Greta Mckeon is acting in a scribe capacity, has observed my performance of the services and has documented them in accordance with my direction.    Emily Echevarria M.D.  Emergency Medicine  [facility] EMERGENCY DEPARTMENT  [address] [place] [id number] [phone]  Dept: [phone]       Emily Echevarria MD  07/12/23 5047

## 2023-07-12 NOTE — PROGRESS NOTES
Patient comes from long term care at [facility].   He has a bed hold which has been confirmed with admissions at [facility] [phone]).   CM will follow for needs.    Christina Roberto, EMANI  7/12/2023

## 2023-07-12 NOTE — TELEPHONE ENCOUNTER
Telephone Note    I was contacted by Dr. Echevarria from [facility] ED regarding this patient on 7/12/23. The following information was obtained via phone call with this provider.      Patient is a 89-year-old male who presents with concern for shingles/zoster ophthalmicus.       Outside provider's exam revealed:   - Visual acuity of able to read provider's ID.    - No fluorescein uptake on exam.   - Vesicles and increasing erythema/edema over one side of face      I conveyed to the consulting physician that I could provide some general thoughts regarding this patient but that a formal consult and evaluation would be necessary for me to provide an active role in the patient's care. Given the reported examination findings, I emphasized the importance of ophthalmology evaluation and I offered to see the patient in the ED today. I conveyed to the provider that if there was any concern about the patient's care or my suggestions, the patient should be sent to the ED for evaluation right away.      Following communication with the provider they elected to discuss with the patient transfer today vs a one day trial of IV antivirals at [facility].     If patient is transferred to [facility] please page the ophthalmology resident on call upon arrival for assessment.       Hieu Mcbride MD  Resident Physician, PGY-2  Department of Ophthalmology

## 2023-07-12 NOTE — LETTER
Recipient:    [place]      Sender:    Gabby Hilliard RN, BA  Care Coordinator  6 Med Surg  [phone], pager [phone]      For weekend social work needs, contact information below and available on [facility]:      Weekend [facility] Pager: [phone]  SW Weekend 6MS, 8A, 10ICU- Pager: [phone]     For weekend RN care coordinator needs (home discharge with needs including home care, assisted living facility returns, durable medical equip, IV antibiotics) - page [phone]       Date: July 17, 2023  Patient Name:  Lang Christina  Patient YOB: 1928  Routing Message:    Discharge orders      The documents accompanying this notice contain confidential information belonging to the sender.  This information is intended only for the use of the individual or entity named above.  The authorized recipient of this information is prohibited from disclosing this information to any other party and is required to destroy the information after its stated need has been fulfilled, unless otherwise required by state law.    If you are not the intended recipient, you are hereby notified that any disclosure, copy, distribution or action taken in reliance on the contents of these documents is strictly prohibited.  If you have received this document in error, please return it by fax to [phone] with a note on the cover sheet explaining why you are returning it (e.g. not your patient, not your provider, etc.).  If you need further assistance, please call .  Documents may also be returned by mail to [employer], , [address], [place], [place] [address].

## 2023-07-12 NOTE — ED TRIAGE NOTES
"Shingles symptoms started Friday. Lesions were \"ok\" on Monday, but lesions got worse yesterday approx. 1000, then this AM, pt had redness, swelling, and new lesion under R eye. Other lesions are worse as well. Transferred from [facility] for ophthalmology consult.     Triage Assessment     Row Name 07/12/23 8104       Triage Assessment (Adult)    Airway WDL WDL       Respiratory WDL    Respiratory WDL WDL       Skin Circulation/Temperature WDL    Skin Circulation/Temperature WDL WDL       Cardiac WDL    Cardiac WDL WDL       Peripheral/Neurovascular WDL    Peripheral Neurovascular WDL WDL       Cognitive/Neuro/Behavioral WDL    Cognitive/Neuro/Behavioral WDL orientation    Orientation disoriented to;situation;time              "

## 2023-07-13 NOTE — PLAN OF CARE
"Patient is alert and oriented x4, denies any pain this shift. New IV placed on right forearm. Neck brace on at all times for comfort. Not OOB this shift. Daughter in room with patient. Facial shingles and right eye swelling. Medications applied per orders. Patient is able to make needs known and uses the call light appropriately. Continue with the plan of care.    BP (!) 140/75 (BP Location: Left arm)   Pulse 64   Temp 99.6  F (37.6  C) (Oral)   Resp 20   Ht 1.753 m (5' 9\")   Wt 68.7 kg (151 lb 7.3 oz)   SpO2 97%   BMI 22.37 kg/m      "

## 2023-07-13 NOTE — PHARMACY-ADMISSION MEDICATION HISTORY
Admission Medication History completed by pharmacist, Nikky Horton RPH on 7/12/23. Please see Pharmacy - Admission Medication History note under previous encounter at [facility] for information regarding prior to admission medications.    Yoli Barry, PharmD

## 2023-07-13 NOTE — PLAN OF CARE
Occupational Therapy Discharge Summary    Reason for therapy discharge:    All goals and outcomes met, no further needs identified.  No further expectations of functional progress.    Progress towards therapy goal(s). See goals on Care Plan in [facility] electronic health record for goal details.  Goals met    Therapy recommendation(s):    No further therapy is recommended.

## 2023-07-13 NOTE — ED NOTES
Pt tansported with [facility] non-emergency to [place] 6MS, belongings went home with wife, sent IV ABX with patient.        Greta Ramírez MSN, RN

## 2023-07-13 NOTE — PROGRESS NOTES
6MS ADMISSION    D: Patient admitted/transferred from [facility] ED via  for fevers with right facial leisons .   I: Upon arrival to the unit patient was oriented to room, unit, and call light. Patient s height, weight, and vital signs were obtained. Allergies reviewed and allergy band applied. Provider notified of patient s arrival on the unit. Adult AVS completed. Head to toe assessment completed. Education assessment completed. Care plan initiated.    A: Vital signs stable upon admission. Patient rates pain at 0/10. Two RN skin assessment completed Yes. Second RN was Augusta ELIZABETH Significant Skin Findings include right upper facial leisons. [facility] Nurse Consult Ordered No. Bed Algorithm can be found in PCS flow sheets (Support Surface Algorithm) and on IP [facility] NURSE RESOURCE TAB, was this used during this assessment? No. Was a pulsate mattress ordered No.    P: Continue with plan of care. Notify provider with any concerns or changes in patient status.

## 2023-07-13 NOTE — CONSULTS
GENERAL ID SERVICE: NEW CONSULTATION  Patient:  Lang Christina, Date of birth 6/3/1928, Medical record number [id number]  Date of Admission: 7/12/2023  Date of Visit:  7/13/2023  Requesting Provider: Raúl Huber         Assessment and Recommendations:   Problem List:  1. Zoster ophthalmicus, right eye. Corneal lesions present. No retinal involvement.   2. Concern for bacterial superinfection/preseptal cellulitis. Currently on clindamycin.  3. History of fall 6/22 with subdural hematoma and cervical spine fracture in C collar. Hematoma improving on admission CT.   4. Listed penicillin allergy - patient is unable to remember details about this   5. Dementia    Recommendations:  1. Agree with current IV acyclovir but anticipate we can discharge him with oral valacyclovir given somewhat marginal superiority of IV therapy and potential risks of a PICC line given age and dementia  2. Agree with current clindamycin for now.   3. I'll contact family to see if they can shed light on his history of PCN allergy.   4. I'd hold off on LP for now but consider if mental status is significantly different from baseline per family    Thank you for this consult. ID will continue to follow this patient. Please feel free to call with any questions.     Corina Momin MD  Infectious Diseases  Pager [phone]        History of Present Illness:   Lang Christina is a 89 yo man with history dementia, A fib, emphysema, recent fall with subdural hematoma and c spine fracture (now in C collar), who is admitted for ophtho evalaution of clinically diagnosed Zoster ophthalmicus and concern for superimposed preseptal cellulitis. Family is not currently at bedside so history is mostly obtained from chart. He was noted to have right sided forehead lesions about 4 days prior to admission. He was seen in a local ophtho clinic and at that time it was thought there was no eye involvement. He was started on PO valacyclovir (not sure what dose)  which he took 7/10 and . However, on  it was noted that the right side of his face was becoming swollen so he was transferred to [facility] and admitted to [facility] for ophtho evaluation. They did not corneal involvement but no retinal involvement. He was started on IV acyclovir as well as on clindamycin for possible secondary bacterial infection in setting of listed pcn allergy. I can't find confirmation that he has previously received beta lactam antibiotics.          Review of Systems:   Complete 12 point review of systems negative except as noted in HPI.        Past Medical History:     Past Medical History:   Diagnosis Date     Actinic keratosis      BCC (basal cell carcinoma) 2012     BPH (benign prostatic hypertrophy) with urinary obstruction      COPD (chronic obstructive pulmonary disease) (H)     2012 Allergy consult     DDD (degenerative disc disease), cervical      Diverticulosis      ED (erectile dysfunction)      Hearing loss      Hyperlipidemia LDL goal < 130      Kidney stones      Localized osteoarthritis of both knees      Osteoarthritis      Senile purpura (H) 2021     Strabismus      Vasomotor rhinitis      Vitamin B12 deficiency          Allergies:      Allergies   Allergen Reactions     Pcn [Penicillins]            Recent Antimicrobials::   Clindamycin  Acyclovir       Family History:   Reviewed and noncontributory.   Family History   Problem Relation Age of Onset     Cerebrovascular Disease Brother         Social History:     Social History     Socioeconomic History     Marital status:      Spouse name: Not on file     Number of children: Not on file     Years of education: Not on file     Highest education level: Not on file   Occupational History     Employer: RETIRED   Tobacco Use     Smoking status: Former     Packs/day: 1.00     Years: 47.00     Pack years: 47.00     Types: Cigarettes, Pipe     Quit date: 1994     Years since quittin.5     Smokeless tobacco:  "Never   Substance and Sexual Activity     Alcohol use: No     Drug use: No     Sexual activity: Not Currently     Partners: Female   Other Topics Concern     Parent/sibling w/ CABG, MI or angioplasty before 65F 55M? Not Asked   Social History Narrative     Not on file     Social Determinants of Health     Financial Resource Strain: Not on file   Food Insecurity: Not on file   Transportation Needs: Not on file   Physical Activity: Not on file   Stress: Not on file   Social Connections: Not on file   Intimate Partner Violence: Not on file   Housing Stability: Not on file          Physical Exam:   BP (!) 145/93 (BP Location: Left arm)   Pulse 79   Temp 99.2  F (37.3  C) (Oral)   Resp 19   Ht 1.753 m (5' 9\")   Wt 68.7 kg (151 lb 7.3 oz)   SpO2 97%   BMI 22.37 kg/m     Exam:  GENERAL:  Awake, calm, supine in bed in no distress. Answers questions pleasantly but without much content.   ENT:  Crusted lesions over right eye with erythema, swelling of surrounding area.   EYES:  Eyes have anicteric sclerae.    NECK:  Supple.  LUNGS:  Clear to auscultation.  CARDIOVASCULAR:  Regular rate and rhythm with no murmurs, gallops or rubs.  ABDOMEN:  Normal bowel sounds, soft, nontender.  EXT: Extremities warm and without edema.  SKIN:  No acute rashes other than face as above.  Line is in place without any surrounding erythema.  NEUROLOGIC:  Grossly nonfocal.         Laboratory Data:     Creatinine   Date Value Ref Range Status   07/13/2023 0.65 (L) 0.67 - 1.17 mg/dL Final   07/12/2023 0.68 0.67 - 1.17 mg/dL Final   07/12/2023 0.66 (L) 0.67 - 1.17 mg/dL Final   04/26/2023 0.81 0.67 - 1.17 mg/dL Final   04/10/2023 0.76 0.67 - 1.17 mg/dL Final   08/04/2020 0.74 0.66 - 1.25 mg/dL Final   03/04/2019 0.75 0.66 - 1.25 mg/dL Final   11/04/2016 0.90 0.66 - 1.25 mg/dL Final   11/03/2015 0.84 0.66 - 1.25 mg/dL Final   10/07/2014 0.88 0.66 - 1.25 mg/dL Final     WBC   Date Value Ref Range Status   08/04/2020 5.8 4.0 - 11.0 10e9/L Final "   03/04/2019 5.2 4.0 - 11.0 10e9/L Final   09/30/2013 6.2 4.0 - 11.0 10e9/L Final   09/22/2010 6.3 4.0 - 11.0 10e9/L Final   07/21/2009 6.4 4.0 - 11.0 10e9/L Final     WBC Count   Date Value Ref Range Status   07/13/2023 6.0 4.0 - 11.0 10e3/uL Final   07/12/2023 6.6 4.0 - 11.0 10e3/uL Final   07/12/2023 4.8 4.0 - 11.0 10e3/uL Final   11/16/2022 7.3 4.0 - 11.0 10e3/uL Final   11/15/2022 8.0 4.0 - 11.0 10e3/uL Final     Hemoglobin   Date Value Ref Range Status   07/13/2023 11.2 (L) 13.3 - 17.7 g/dL Final   08/04/2020 13.2 (L) 13.3 - 17.7 g/dL Final     Platelet Count   Date Value Ref Range Status   07/13/2023 140 (L) 150 - 450 10e3/uL Final   08/04/2020 203 150 - 450 10e9/L Final     Lab Results   Component Value Date     (L) 07/13/2023    BUN 10.6 07/13/2023    CO2 23 07/13/2023     No results found for: CRP        Pertinent Recent Microbiology Data:   No results for input(s): CULT, SDES in the last 168 hours.         Imaging:     Recent Results (from the past 48 hour(s))   XR Chest 2 Views    Narrative    EXAM: XR CHEST 2 VIEWS  7/12/2023 6:28 PM     HISTORY:  fever       COMPARISON:  Chest radiograph 5/29/2018, CT chest abdomen and pelvis  6/13/22    FINDINGS:   Opacity in the right mediastinum, likely tortuous aorta in addition to  projection. Trachea is midline. Cardiac silhouette is within normal  limits. Left basilar/retrocardiac opacity present obscuring the left  hemidiaphragm and left costophrenic angle. Scattered left midlung  opacities likely to correlate with calcified pleural plaques seen on  prior chest CT. Right basilar patchy opacity obscuring the right  costophrenic angle. No appreciable pneumothorax. Severe degenerative  changes of the shoulders.      Impression    IMPRESSION:   1. Bibasilar hazy opacities, may represent atelectasis, edema, or  infection.  2. Left midlung pleural plaques.    I have personally reviewed the examination and initial interpretation  and I agree with the  findings.    DEBORA BARTLETT DO         SYSTEM ID:  [id number]   CT Orbits w Contrast    Narrative    CT ORBITS W CONTRAST 7/12/2023 6:56 PM    History: R facial cellulitis vs facial zoster.  signs of deep space  infection?    Comparison: Same day had, 11/9/2022 CT head      Technique: Using thin collimation multidetector helical acquisition  technique, axial, coronal, sagittal CT images were obtained through  the orbits and upper facial bones, following intravenous  administration of nonionic iodinated contrast medium.    Contrast: 75 cc Isovue-[id number]    Findings: There is mild right periorbital preseptal soft tissue  swelling and inflammation without discrete fluid collection or  intraorbital inflammatory change. No osseous erosion or fracture..  Alignment of the upper facial bones is normal.     There is no hematoma or gas within the orbits. The visualized portions  of the paranasal sinuses and mastoid air cells are clear. The  cribriform plate appears intact.      Impression    Impression: Periorbital soft tissue thickening and enhancement, may  represent preseptal periorbital cellulitis. No abscess identified.  No  post septal extension. Normal facial bones.    I have personally reviewed the examination and initial interpretation  and I agree with the findings.    LEA BRICEÑO MD         SYSTEM ID:  [id number]   CT Head w/o Contrast    Narrative    CT HEAD W/O CONTRAST 7/12/2023 6:57 PM    Provided History: h/o subdural, delirium now    Comparison: Outside CT head 11/9/2022.    Technique: Using multidetector thin collimation helical acquisition  technique, axial, coronal and sagittal CT images from the skull base  to the vertex were obtained without intravenous contrast.     Findings:    No intracranial hemorrhage. No mass effect. No midline shift. No new  extra-axial fluid collection. Decreased conspicuity of chronic right  subdural hemorrhage, today measures 5 mm in maximum thickness,  previously 10 mm November 2022.  There is no left subdural hemorrhage  identified. The gray to white matter differentiation of the cerebral  hemispheres is preserved. Ventricles are proportionate to the sulci.  No sulcal effacement. The basal cisterns are patent. Moderate diffuse  cerebral atrophy. Periventricular white matter hypodensities, left  greater than right, as well as old lacunar infarct involving the left  basal ganglia as seen before. Cavum septum pellucidum and cavum vergae  present.    The visualized paranasal sinuses are clear. The mastoid air cells are  clear. Mild thickening soft tissues of the right orbit, please see  same day CT Orbits for detailed findings. No acute fracture.      Impression    Impression:   1. No acute intracranial pathology.  2. Atrophy and chronic small vessel ischemic disease. 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  3. Decreased thickening of the chronic right subdural hemorrhage since  November 2022. No new intracranial hemorrhage.    Please see same day CT Orbits for detailed findings of this region.    I have personally reviewed the examination and initial interpretation  and I agree with the findings.    LEA BRICEÑO MD         SYSTEM ID:  [id number]

## 2023-07-13 NOTE — PROGRESS NOTES
"Occupational Therapy Evaluation       07/13/23 1500   Appointment Info   Signing Clinician's Name / Credentials (OT) Maria L Zamora OTR/L   Living Environment   People in Home facility resident   Current Living Arrangements   (long term care)   Home Accessibility no concerns   Transportation Anticipated health plan transportation   Living Environment Comments Pt family reported he lives in long term care   Self-Care   Usual Activity Tolerance fair   Current Activity Tolerance fair   Equipment Currently Used at Home wheelchair, manual   Fall history within last six months no   Activity/Exercise/Self-Care Comment Pt family reported that LTC facility provides assistance with showers at baseline, pt otherwise wheelchair based for all mobility and ADLs.   Instrumental Activities of Daily Living (IADL)   IADL Comments Assist   General Information   Onset of Illness/Injury or Date of Surgery 07/12/23   Referring Physician Cody Scott, APRIL   Patient/Family Therapy Goal Statement (OT) to return to LTC   Additional Occupational Profile Info/Pertinent History of Current Problem Per chart, \"Lang Christina is a 89 year old male with past medical history significant for HLD, basal cell carcinoma, senile purpura, dementia, centrilobular emphysema, acute respiratory failure, COPD, paroxysmal atrial fibrillation, DDD (cervical) and chronic subdural hematoma admitted on 7/12/2023 to [facility] from [facility] ED. He presented over there for evaluation of zoster ophthalmicus after presenting to another outside hospital earlier where he was given 1 dose of IV valacyclovir. Pt was transferred to [facility] to be evaluated by ophthalmology.\"   Existing Precautions/Restrictions fall   Left Upper Extremity (Weight-bearing Status) full weight-bearing (FWB)   Right Upper Extremity (Weight-bearing Status) full weight-bearing (FWB)   Left Lower Extremity (Weight-bearing Status) full weight-bearing (FWB)   Right Lower Extremity " (Weight-bearing Status) full weight-bearing (FWB)   General Observations and Info Activity: up with assist   Cognitive Status Examination   Orientation Status person   Affect/Mental Status (Cognitive) confused   Follows Commands follows one-step commands   Safety Deficit judgment;problem-solving   Memory Deficit short-term memory   Cognitive Status Comments Pt acutely confused with illness, functionally pt following 1 step commands. Anticipate with return to familiar environment and set up, pt will require less assist for ADL   Visual Perception   Impact of Vision Impairment on Function (Vision) Impaired   Pain Assessment   Patient Currently in Pain Yes, see Vital Sign flowsheet   Posture   Posture kyphosis;protracted shoulders;forward head position   Range of Motion Comprehensive   General Range of Motion bilateral upper extremity ROM WFL   Strength Comprehensive (MMT)   Comment, General Manual Muscle Testing (MMT) Assessment Generalized weakness   Coordination   Coordination Comments FM coordination impacted by decreased strength in hand   Bed Mobility   Bed Mobility supine-sit;sit-supine   Supine-Sit [place] (Bed Mobility) minimum assist (75% patient effort)   Sit-Supine [place] (Bed Mobility) minimum assist (75% patient effort)   Transfers   Transfers sit-stand transfer   Sit-Stand Transfer   Sit-Stand [place] (Transfers) minimum assist (75% patient effort)   Balance   Balance Comments Sitting balance intact   Activities of Daily Living   BADL Assessment/Intervention bathing;lower body dressing;upper body dressing;grooming;toileting   Bathing Assessment/Intervention   [place] Level (Bathing) minimum assist (75% patient effort)   Comment, (Bathing) Per clinical judgement   Upper Body Dressing Assessment/Training   [place] Level (Upper Body Dressing) set up   Lower Body Dressing Assessment/Training   Comment, (Lower Body Dressing) Per clinical judgement   [place] Level (Lower Body  Dressing) moderate assist (50% patient effort)   Grooming Assessment/Training   [place] Level (Grooming) set up   Toileting   [place] Level (Toileting) minimum assist (75% patient effort)   Clinical Impression   Criteria for Skilled Therapeutic Interventions Met (OT) Yes, treatment indicated   OT Diagnosis decreased ADL and IADL independence   OT Problem List-Impairments impacting ADL problems related to;activity tolerance impaired;cognition;strength   Assessment of Occupational Performance 1-3 Performance Deficits   Identified Performance Deficits ADL including bathing and toileting; IADLs   Planned Therapy Interventions (OT) ADL retraining;cognition   Clinical Decision Making Complexity (OT) low complexity   Risk & Benefits of therapy have been explained evaluation/treatment results reviewed;care plan/treatment goals reviewed;risks/benefits reviewed;current/potential barriers reviewed;participants voiced agreement with care plan;participants included;patient   Clinical Impression Comments Pt presents slightly below functional baseline due to acute decrease in cognition with illness. Pt would benefit from 1 OT session for education and recommendations for assistance with ADL for safety following discharge from hospital back to LTC facility.   OT Total Evaluation Time   OT Eval, Low Complexity Minutes (13897) 10   OT Goals   Therapy Frequency (OT) One time eval and treatment   OT Predicted Duration/Target Date for Goal Attainment 07/14/23   OT Goals Cognition;Bed Mobility;Toilet Transfer/Toileting;Hygiene/Grooming   OT: Hygiene/Grooming supervision/stand-by assist;Goal Met   OT: Bed Mobility Minimal assist;Goal Met   OT: Toilet Transfer/Toileting Minimal assist;Goal Met   OT: Cognitive Patient/caregiver will verbalize understanding of cognitive assessment results/recommendations as needed for safe discharge planning   Self-Care/Home Management   Self-Care/Home Mgmt/ADL, Compensatory, Meal Prep Minutes  (91822) 24   Symptoms Noted During/After Treatment (Meal Preparation/Planning Training) fatigue;increased pain   Treatment Detail/Skilled Intervention OT: Following evaluation, 1 time OT treatment indicated. Facilitated ADL for increased participation and activity tolerance. Pt sat EoB min A, required 1 step commands and increased processing time. Pt completed SPT to commode and toileting with min A and increased time. Pt returned to EoB and supine with min A. Educated pt and family on recommendations for assistance at LTC facility for ADL, in agreement. Discussed with pt and family OT signing off due to no anticipated functional benefit from continued OT, pt and family in agreement.   OT Discharge Planning   OT Plan dc   OT Discharge Recommendation (DC Rec) Long term care facility   OT Rationale for DC Rec Pt presents slightly below functional baseline due to acute decrease in cognition with illness. Recommend discharge back to LTC facility with assist for ADL.   OT Brief overview of current status min A pivot to commode   Total Session Time   Timed Code Treatment Minutes 24   Total Session Time (sum of timed and untimed services) 34

## 2023-07-13 NOTE — PLAN OF CARE
Physical Therapy: Orders received. Chart reviewed and discussed with care team.? Physical Therapy not indicated due to patient presenting very near baseline level of function and currently from LTC.? Defer discharge recommendations to Occupational Therapy.? Will complete orders.

## 2023-07-13 NOTE — CONSULTS
OPHTHALMOLOGY CONSULT NOTE  07/12/23    Patient: Lang Christina      ASSESSMENT/PLAN:     Lang Christina is a 89 year old male who presented with right sided facial erythema, swelling, and vesicular rash in V1 distribution.     Herpes Zoster Ophthalmicus, right eye  -Vesicular rash with crusting in V1 distribution. Lesions on nose. No lesions of ear. Mild-moderate right sided face erythema and swelling.   -Does have corneal involvement with few small dendritiform lesions.   -Patient is delirious, and in a c-collar and unable to sit at the slit lamp. On portable slit lamp examination, there is no anterior chamber cell. No scleritis.   -Fundus exam unremarkable. No retinitis, disc edema. He does have bilateral histo spots that have been previously documented, and are bilateral. No vitreous haze. No cranial nerve palsies. Equal facial sensation.     PLAN:  -While inpatient status, agree with 700mg acyclovir, TID (I.e. 10mg/kg). Defer to medicine team for need of IV anti-virals vs orals as an outpatient. No focal neurological deficits. He is aware of person, place, but has some difficulties with time. He knows the year, but not the month.   -Recommend medicine team consider infectious disease consult if mental status worsens and there is concern for CNS involvement. They can also assist with recommended duration of treatment.   -For ophthalmic involvement:   -Vigamox, 1 drop, QID, right eye   -Artificial tears, 1 drop, QID, right eye   -Erythromycin ointment to the vesicles around the eye and on the forehead to prevent bacterial superinfection.   -Erythema, and swelling, appears appropriate for degree of rash on right side of face. However, story of having ruptured vesicles and then subsequent worsening rash and erythema is concerning for overlying pre-septal cellulitis. Consistent with CT scan read of soft tissue thickening and enhancement.  -Defer to medicine on choice of antibiotic, but recommend  broad-spectrum antibiotic coverage for preseptal cellulitis.   -Ophthalmology will continue to follow the patient. Re-examine for improvement in 2 days. If discharge before then, please page the ophthalmology resident on call and they will assist in scheduling follow-up in the [facility].     Discussed with cornea service.    It is our pleasure to participate in this patient's care and treatment. Please contact us with any further questions or concerns.      Thank you for entrusting us with your care  West Perez MD, PGY3  Ophthalmology Resident  [facility]  Pager: [phone]            HISTORY OF PRESENTING ILLNESS:     Lang Christina is a 89 year old male who has a hx of HLD, basal cell carcinoma, senile purpura, dementia, centrilobular emphysema, acute respiratory failure, atrial fibrillation, subdural hematoma, assisted care resident who began having a right-sided forehead lesion 4 days ago and was treated for zoster as an outpatient.     He presented to outside ED today and was treated with 1 dose of IV valacyclovir and then transferred to the [place] ED for evaluation by ophthalmology.    Patient's daughter is with him in the ED and history was obtained by daughter. She states the patient had an eruption of shingles on the right side of his forehead a few days ago. On Monday he had vesicles that had erupted and crusted over. They went to see an ophthalmologist on Monday and that doctor stated there was no ophthalmic involvement and recommended following up in two days. The next day (7/11/23), the right side of the face became red and swollen. They took 1-2 pills of valtrex on Monday, 2 pills on 7/11/23. They went to the ED today and received a dose of valacyclovir by IV and then was transferred to the [place] ed for evaluation by ophthalmology. To the daughters knowledge he had not been complaining of vision changes, and no photophobia.     10+ review of systems were  otherwise negative except for that which has been stated above.      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       Posterior vitreous detachment of both eyes           Eye Drops:   Artificial tears    Pertinent Systemic Medications:   Valtrex 1g, TID    Past Medical History:  Past Medical History:   Diagnosis Date     Actinic keratosis      BCC (basal cell carcinoma) 12/2012     BPH (benign prostatic hypertrophy) with urinary obstruction      COPD (chronic obstructive pulmonary disease) (H)     9/2012 Allergy consult     DDD (degenerative disc disease), cervical      Diverticulosis      ED (erectile dysfunction)      Hearing loss      Hyperlipidemia LDL goal < 130      Kidney stones      Localized osteoarthritis of both knees      Osteoarthritis      Senile purpura (H) 01/12/2021     Strabismus      Vasomotor rhinitis      Vitamin B12 deficiency        Past Surgical History:   Past Surgical History:   Procedure Laterality Date     ARTHROSCOPY KNEE RT/LT      Right     CATARACT IOL, RT/LT       CYSTOSCOPY       HERNIA REPAIR, INGUINAL RT/LT      Right     LIGATN/STRIP LONG OR SHORT SAPHEN       PHACOEMULSIFICATION WITH STANDARD INTRAOCULAR LENS IMPLANT  12-09 / 01-10    RT / LT     TURP      .       Family History:  No pertinent family history    Social History:  Lives in assisted living facility    EXAMINATION:     Base Eye Exam     Visual Acuity (Snellen - Linear)       Right Left    Dist cc 20/60 20/50   Poor participation and delirious in the ED           Tonometry (Tonopen, 7:48 PM)       Right Left    Pressure 16 13          Pupils       Dark Light Shape React APD    Right 2 1 Round Brisk None    Left 2 1 Round Brisk None          Visual Fields       Left Right     Full Full          Extraocular Movement        Right Left     Full Full          Neuro/Psych     Oriented x3: Yes    Mood/Affect: Normal          Dilation     Both eyes: 1.0% Mydriacyl, 2.5% Pepe Synephrine @ 7:25 PM            Slit Lamp and Fundus Exam     External Exam       Right Left    External many crusted vesicles in v1 distribution down forehead, erythema and swelling of forehead. Equal facial sensation. Has severe hearing loss at baseline. Normal          Slit Lamp Exam       Right Left    Lids/Lashes upper lid erythema, few vesicles on brow, and lateral canthus, none on lid margin Normal    Conjunctiva/Sclera small cyst inferonasal White and quiet    Cornea multiple small dendritiform lesions superiorly at 11 oclock, and few tiny dendritiform lesions centrally Clear    Anterior Chamber Deep and quiet, no cell Deep and quiet    Iris Round and reactive Round and reactive    Lens Posterior chamber intraocular lens Posterior chamber intraocular lens    Anterior Vitreous Posterior vitreous detachment Posterior vitreous detachment          Fundus Exam       Right Left    Disc PPA Normal    C/D Ratio 0.7 0.7    Macula few fine drusen 1+ Retinal pigment epithelial changes with mild pigment clumping    Vessels Normal Normal    Periphery few scattered punched out lesions few scattered punched out lesions                Labs/Studies/Imaging Performed  CXR  IMPRESSION:   1. Bibasilar hazy opacities, may represent atelectasis, edema, or  infection.  2. Left midlung pleural plaques.  3. Tortuous aorta.  4. Severe degenerative changes of the shoulders.    CT orbit and head (July 12, 2023)  Impression: Periorbital soft tissue thickening and enhancement, may  represent preseptal periorbital cellulitis. No abscess identified.  No  post septal extension. Normal facial bones.       West Perez MD  Resident Physician, PGY3  Department of Ophthalmology  07/12/23 7:04 PM

## 2023-07-13 NOTE — PROGRESS NOTES
"  VS: /79 (BP Location: Left arm, Patient Position: Semi-Marshall's)   Pulse 77   Temp 98  F (36.7  C) (Oral)   Resp 18   Ht 1.753 m (5' 9\")   Wt 69.4 kg (153 lb)   SpO2 99%   BMI 22.59 kg/m     O2: Alert and oriented x 3 with intermittent confusion, denies chest pain, SOB with activity. Denies N/V   Output: Continent of bowel and bladder   Last BM: 7/12/23 per pt   Activity: Pt not oob   Skin: Right facial leison   Pain: Pt denies pain   CMS: Denies  Numbness and tinging   Dressing: none   Diet: Regular diet   LDA: Right forearm PIV tko   Plan: ABX    Additional Info:       "

## 2023-07-13 NOTE — H&P
[facility]    History and Physical - Hospitalist Service, GOLD TEAM        Date of Admission:  7/12/2023    Assessment & Plan      Lang Christina is a 89 year old male with past medical history significant for HLD, basal cell carcinoma, senile purpura, dementia, centrilobular emphysema, acute respiratory failure, COPD, paroxysmal atrial fibrillation, DDD (cervical) and chronic subdural hematoma [place] [facility] [place] [facility] [place]. He presented over there for evaluation of zoster ophthalmicus after presenting to another outside hospital earlier where he was given 1 dose of IV valacyclovir. Pt was transferred to [facility] to be evaluated by ophthalmology.    Zoster ophthalmicus  Preseptal cellulitis  Patient was seen at [facility] on Monday with shingles to his right face/scalp. He has been on valacyclovir and topical erythromycin. When patient presented there again this morning, he was told to transfer to [facility] for ophthalmology evaluation due to the area looking worse. Labs remarkable for lactate 2.4-->1.5, WBC 6.6, procalc 0.25. CT Head and CT orbits w contrast revealed periorbital soft tissue thickening and enhancement, no abscess, no post-septal extension. Patient reports minimal pain and no vision changes. No meningeal signs at present but low threshold for LP if there is concern for CNS involvement. On Q4H neuro checks.   -Ophthalmology consulted, seen at [facility] ED   Plan per Ophthalmology:   -Continue IV acyclovir   -Continue vigamox drops, artificial tears, and erythromycin ointment.   - Blood cultures ordered and MRSA swab   - Continue vancomycin and Clindamycin started in ED for preseptal cellulitis (penicillin allergy).   - ID consult   - Contact precautions    Hyperlipidemia   - Continue PTA simvastatin    COPD  Centrilobular emphysema   - Continue PTA albuterol inhaler, and anoro ellipta inhaler     DDD (cervical)  Hx of  cervical spine fracture  Hx of subdural hematoma  Patient had a fall back in 6/2022 that resulted in a type II odontoid cervical fracture and subdural hematoma. Fracture was treated non-operatively and needs to wear his cervical collar at all times. His admission CT revealed interval decrease in size of subdural hematoma and no new acute intracranial abnormality.   - Wear cervical collar at all times    Hyponatremia  Sodium noted to be 134 at admission in setting of poor PO.   - Encourage PO, recheck in am    Anemia  Hemoglobin noted to be 11.7 at admission which is consistent with previous.   - Recheck CBC in am       Diet: Combination Diet Clear Liquid  DVT Prophylaxis: Pneumatic Compression Devices  Hung Catheter: Not present  Lines: None     Cardiac Monitoring: None  Code Status:  Need to discuss further with patient and family, has been DNR in the past    Clinically Significant Risk Factors Present on Admission                    # Dementia: noted on problem list             Disposition Plan      Expected Discharge Date: 07/14/2023                The patient's care was discussed with the Attending Physician, Dr. Brito.    Cody Scott PA-C  Hospitalist Service, [facility]  Securely message with sailsquare (more info)  Text page via [facility] Paging/Directory   See signed in provider for up to date coverage information    ______________________________________________________________________    Chief Complaint   Zoster ophthalmicus    History is obtained from the patient, family member and EMR    History of Present Illness   Lang Christina is a 89 year old male who presents today for zoster ophthalmicus.       Past Medical History    Past Medical History:   Diagnosis Date     Actinic keratosis      BCC (basal cell carcinoma) 12/2012     BPH (benign prostatic hypertrophy) with urinary obstruction      COPD (chronic obstructive pulmonary disease) (H)      9/2012 Allergy consult     DDD (degenerative disc disease), cervical      Diverticulosis      ED (erectile dysfunction)      Hearing loss      Hyperlipidemia LDL goal < 130      Kidney stones      Localized osteoarthritis of both knees      Osteoarthritis      Senile purpura (H) 01/12/2021     Strabismus      Vasomotor rhinitis      Vitamin B12 deficiency        Past Surgical History   Past Surgical History:   Procedure Laterality Date     ARTHROSCOPY KNEE RT/LT      Right     CATARACT IOL, RT/LT       CYSTOSCOPY       HERNIA REPAIR, INGUINAL RT/LT      Right     LIGATN/STRIP LONG OR SHORT SAPHEN       PHACOEMULSIFICATION WITH STANDARD INTRAOCULAR LENS IMPLANT  12-09 / 01-10    RT / LT     TURP      .       Prior to Admission Medications   Prior to Admission Medications   Prescriptions Last Dose Informant Patient Reported? Taking?   ANORO ELLIPTA 62.5-25 MCG/INH oral inhaler 7/12/2023 at AM Nursing Home No Yes   Sig: INHALE ONE PUFF BY MOUTH ONCE DAILY   Cyanocobalamin 1000 MCG TABS 7/12/2023 at AM Nursing Home Yes Yes   Sig: Take 1,000 mcg by mouth daily   Neomycin-Bacitracin-Polymyxin (NEOSPORIN ORIGINAL) OINT 7/12/2023 at AM Nursing Home Yes Yes   Sig: Apply 1 Application topically 3 times daily   Nutritional Supplements (NUTRITIONAL SHAKE PLUS) LIQD 7/11/2023 at PM Nursing Home Yes Yes   Sig: Take 4 fluid ounces by mouth every evening   acetaminophen (TYLENOL) 500 MG tablet 7/12/2023 at 0800 Nursing Home Yes Yes   Sig: Take 2 tablets by mouth 3 times daily Maximum of 4000 mg in 24 hours   acetaminophen (TYLENOL) 500 MG tablet 7/11/2023 at 0412 Nursing Home Yes Yes   Sig: Take 2 tablets by mouth daily as needed for pain   albuterol (PROAIR HFA/PROVENTIL HFA/VENTOLIN HFA) 108 (90 Base) MCG/ACT inhaler  at [facility] Nursing Home Yes Yes   Sig: Inhale 2 puffs into the lungs every 4 hours as needed for shortness of breath / dyspnea or wheezing   bisacodyl (DULCOLAX) 10 MG suppository  at [facility] Nursing Home Yes Yes    Sig: Place 10 mg rectally daily as needed for constipation   carboxymethylcellulose PF (REFRESH PLUS) 0.5 % ophthalmic solution 7/12/2023 at AM Nursing Home Yes Yes   Sig: Place 1 drop into both eyes 2 times daily   diclofenac (VOLTAREN) 1 % topical gel  at [facility] Nursing Home Yes Yes   Sig: Apply 2 g topically 3 times daily as needed for moderate pain   erythromycin (ROMYCIN) 5 MG/GM ophthalmic ointment 7/12/2023 at AM Nursing Home Yes Yes   Sig: Place into the right eye 3 times daily   ferrous sulfate (FEROSUL) 325 (65 Fe) MG tablet 7/12/2023 at AM Nursing Home No Yes   Sig: Take 1 tablet (325 mg) by mouth daily   gabapentin (NEURONTIN) 100 MG capsule 7/12/2023 Nursing Home Yes Yes   Sig: Take 1 capsule by mouth 3 times daily   order for DME  Nursing Home Yes No   Sig: Oxygen for home use. Liters per minute: 1 per nasal cannula. Frequency of use: With activity;. Length of need: 99.   polyethylene glycol (MIRALAX) 17 g packet  at [facility] Nursing Home Yes Yes   Sig: Take 1 packet by mouth daily as needed for constipation   senna-docusate (SENOKOT-S/PERICOLACE) 8.6-50 MG tablet  at [facility] Nursing Home Yes Yes   Sig: Take 1 tablet by mouth 2 times daily as needed for constipation   simvastatin (ZOCOR) 10 MG tablet 7/11/2023 at PM Nursing Home No Yes   Sig: TAKE ONE TABLET BY MOUTH AT BEDTIME.   sulfamethoxazole-trimethoprim (BACTRIM DS) 800-160 MG tablet 7/12/2023 at AM Nursing Home Yes Yes   Sig: Take 2 tablets by mouth 2 times daily   traMADol (ULTRAM) 50 MG tablet 7/11/2023 at 1303 Nursing Home Yes Yes   Sig: Take 50 mg by mouth once   valACYclovir (VALTREX) 1000 mg tablet 7/12/2023 Nursing Home Yes Yes   Sig: Take 1,000 mg by mouth 3 times daily      Facility-Administered Medications: None        Review of Systems    The 10 point Review of Systems is negative other than noted in the HPI or here.    Social History   I have reviewed this patient's social history and updated it with pertinent information if needed.  Social  History     Tobacco Use     Smoking status: Former     Packs/day: 1.00     Years: 47.00     Pack years: 47.00     Types: Cigarettes, Pipe     Quit date: 1994     Years since quittin.5     Smokeless tobacco: Never   Substance Use Topics     Alcohol use: No     Drug use: No       Family History   I have reviewed this patient's family history and updated it with pertinent information if needed.  Family History   Problem Relation Age of Onset     Cerebrovascular Disease Brother        Allergies   Allergies   Allergen Reactions     Pcn [Penicillins]         Physical Exam   Vital Signs: Temp: 100.4  F (38  C) Temp src: Oral BP: 127/74 Pulse: 71   Resp: 19 SpO2: (!) 89 % O2 Device: None (Room air)    Weight: 153 lbs 0 oz    GENERAL: Alert and oriented x 3. Well nourished, well developed.  No acute distress.    HEENT: Normocephalic, atraumatic. Anicteric sclera. Mucous membranes moist. Diffuse erythema R face with crusted vesicles.  CV: RRR. S1, S2. No murmurs appreciated.   RESPIRATORY: Effort normal on room air. Lungs CTAB with no wheezing, rales, or rhonchi.   GI: Abdomen soft and non distended, bowel sounds present x all 4 quadrants. No tenderness, rebound, or guarding.   NEUROLOGICAL: No focal deficits. Follows commands.  Strength 5/5 in upper and lower extremities. Cranial nerves II-XII intact. No slurred speech or facial droop. No neck pain.  MUSCULOSKELETAL: No joint swelling or tenderness. Moves all extremities.   EXTREMITIES: No gross deformities. No peripheral edema.   SKIN: Grossly warm, dry, and intact. No jaundice. No rashes.     Medical Decision Making       90 MINUTES SPENT BY ME on the date of service doing chart review, history, exam, documentation & further activities per the note.      Data   Imaging results reviewed over the past 24 hrs:   Recent Results (from the past 24 hour(s))   XR Chest 2 Views    Narrative    EXAM: XR CHEST 2 VIEWS  2023 6:28 PM     HISTORY:  fever       COMPARISON:   Chest radiograph 5/29/2018, CT chest abdomen and pelvis  6/13/22    FINDINGS:   Opacity in the right mediastinum, likely tortuous aorta in addition to  projection. Trachea is midline. Cardiac silhouette is within normal  limits. Left basilar/retrocardiac opacity present obscuring the left  hemidiaphragm and left costophrenic angle. Scattered left midlung  opacities likely to correlate with calcified pleural plaques seen on  prior chest CT. Right basilar patchy opacity obscuring the right  costophrenic angle. No appreciable pneumothorax. Severe degenerative  changes of the shoulders.      Impression    IMPRESSION:   1. Bibasilar hazy opacities, may represent atelectasis, edema, or  infection.  2. Left midlung pleural plaques.    I have personally reviewed the examination and initial interpretation  and I agree with the findings.    DEBORA BARTLETT DO         SYSTEM ID:  [id number]   CT Orbits w Contrast    Narrative    CT ORBITS W CONTRAST 7/12/2023 6:56 PM    History: R facial cellulitis vs facial zoster.  signs of deep space  infection?    Comparison: Same day had, 11/9/2022 CT head      Technique: Using thin collimation multidetector helical acquisition  technique, axial, coronal, sagittal CT images were obtained through  the orbits and upper facial bones, following intravenous  administration of nonionic iodinated contrast medium.    Contrast: 75 cc [id number]    Findings: There is mild right periorbital preseptal soft tissue  swelling and inflammation without discrete fluid collection or  intraorbital inflammatory change. No osseous erosion or fracture..  Alignment of the upper facial bones is normal.     There is no hematoma or gas within the orbits. The visualized portions  of the paranasal sinuses and mastoid air cells are clear. The  cribriform plate appears intact.      Impression    Impression: Periorbital soft tissue thickening and enhancement, may  represent preseptal periorbital cellulitis. No abscess  identified.  No  post septal extension. Normal facial bones.    I have personally reviewed the examination and initial interpretation  and I agree with the findings.    LEA BRICEÑO MD         SYSTEM ID:  [id number]   CT Head w/o Contrast    Narrative    CT HEAD W/O CONTRAST 7/12/2023 6:57 PM    Provided History: h/o subdural, delirium now    Comparison: Outside CT head 11/9/2022.    Technique: Using multidetector thin collimation helical acquisition  technique, axial, coronal and sagittal CT images from the skull base  to the vertex were obtained without intravenous contrast.     Findings:    No intracranial hemorrhage. No mass effect. No midline shift. No new  extra-axial fluid collection. Decreased conspicuity of chronic right  subdural hemorrhage, today measures 5 mm in maximum thickness,  previously 10 mm November 2022. There is no left subdural hemorrhage  identified. The gray to white matter differentiation of the cerebral  hemispheres is preserved. Ventricles are proportionate to the sulci.  No sulcal effacement. The basal cisterns are patent. Moderate diffuse  cerebral atrophy. Periventricular white matter hypodensities, left  greater than right, as well as old lacunar infarct involving the left  basal ganglia as seen before. Cavum septum pellucidum and cavum vergae  present.    The visualized paranasal sinuses are clear. The mastoid air cells are  clear. Mild thickening soft tissues of the right orbit, please see  same day CT Orbits for detailed findings. No acute fracture.      Impression    Impression:   1. No acute intracranial pathology.  2. Atrophy and chronic small vessel ischemic disease. 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  3. Decreased thickening of the chronic right subdural hemorrhage since  November 2022. No new intracranial hemorrhage.    Please see same day CT Orbits for detailed findings of this region.    I have personally reviewed the examination and initial  interpretation  and I agree with the findings.    LEA BRICEÑO MD         SYSTEM ID:  [id number]     Recent Labs   Lab 07/12/23  1807 07/12/23  1219   WBC 6.6 4.8   HGB 11.7* 11.4*   * 101*    158   * 134*   POTASSIUM 4.5 4.1   CHLORIDE 100 98   CO2 22 23   BUN 11.3 12.4   CR 0.68 0.66*   ANIONGAP 12 13   MADELIN 9.1 9.0   * 123*   ALBUMIN 3.7 3.7   PROTTOTAL 6.9 6.7   BILITOTAL 0.7 0.6   ALKPHOS 111 114   ALT <5 8   AST 19 22

## 2023-07-13 NOTE — PLAN OF CARE
"Goal Outcome Evaluation: [id number]    Blood pressure (!) 145/93, pulse 79, temperature 99.2  F (37.3  C), temperature source Oral, resp. rate 19, height 1.753 m (5' 9\"), weight 68.7 kg (151 lb 7.3 oz), SpO2 97 %.    Patient is alert and oriented x4, able to make needs known, uses call light appropriately. On a clear liquid diet, needing help with feeding per family. Denied any pain this shift,Facial shingles and right eye swelling-Medications applied per orders,pt on abx, no other concerns, continue with plan of care.       Plan of Care Reviewed With: patient    Overall Patient Progress: improvingOverall Patient Progress: improving           "

## 2023-07-14 NOTE — PROGRESS NOTES
OPHTHALMOLOGY CONSULT NOTE  07/12/23    Patient: Lang Christina      ASSESSMENT/PLAN:     Lang Christina is a 89 year old male who presented with right sided facial erythema, swelling, and vesicular rash in V1 distribution.     Herpes Zoster Ophthalmicus, right eye  -Vesicular rash with crusting in V1 distribution. Lesions on nose. No lesions of ear. Complicated by preseptal cellulitis.  -Does have corneal involvement with few small dendritiform lesions.   -Patient's visual acuity testing is suspect given his delirium and not having his prescription glasses with him to test best corrected vision. On portable slit lamp examination, there is no anterior chamber cell. No scleritis.   - On initial exam 7/12: Fundus exam unremarkable. No retinitis, disc edema. He does have bilateral histo spots that have been previously documented, and are bilateral. No vitreous haze. No cranial nerve palsies. Equal facial sensation.     PLAN:  -While inpatient status, agree with 700mg acyclovir, TID (I.e. 10mg/kg). Defer to medicine team for need of IV anti-virals vs orals as an outpatient. No focal neurological deficits.  -For ophthalmic involvement:   -Vigamox, 1 drop, QID, right eye   -Artificial tears, 1 drop, QID, right eye   -Erythromycin ointment to the vesicles around the eye and on the forehead to prevent further bacterial superinfection.   -Erythema, and swelling, appears appropriate for degree of rash on right side of face. However, story of having ruptured vesicles and then subsequent worsening rash and erythema is concerning for overlying pre-septal cellulitis. Consistent with CT scan read of soft tissue thickening and enhancement.  -Defer to medicine on choice of antibiotic, but recommend broad-spectrum antibiotic coverage for preseptal cellulitis.   -Ophthalmology will continue to follow the patient. Re-examine, preferably with the patient's prescription glasses, if still admitted in 1-2 days. If discharge before  then, please page the ophthalmology resident on call and they will assist in scheduling follow-up in the [facility].     Discussed with cornea service.    It is our pleasure to participate in this patient's care and treatment. Please contact us with any further questions or concerns.    Thank you for entrusting us with your care  Hieu Mcbride MD  Resident Physician, PGY-2  Department of Ophthalmology      HISTORY OF PRESENTING ILLNESS:     Lang Christina is a 89 year old male who has a hx of HLD, basal cell carcinoma, senile purpura, dementia, centrilobular emphysema, acute respiratory failure, atrial fibrillation, subdural hematoma, assisted care resident who began having a right-sided forehead lesion 4 days ago and was treated for zoster as an outpatient.     He presented to outside ED today and was treated with 1 dose of IV valacyclovir and then transferred to the [facility] ED for evaluation by ophthalmology.    Patient's daughter is with him in the ED and history was obtained by daughter. She states the patient had an eruption of shingles on the right side of his forehead a few days ago. On Monday he had vesicles that had erupted and crusted over. They went to see an ophthalmologist on Monday and that doctor stated there was no ophthalmic involvement and recommended following up in two days. The next day (7/11/23), the right side of the face became red and swollen. They took 1-2 pills of valtrex on Monday, 2 pills on 7/11/23. They went to the ED today and received a dose of valacyclovir by IV and then was transferred to the [facility] ed for evaluation by ophthalmology. To the daughters knowledge he had not been complaining of vision changes, and no photophobia.     7/14/23: He has not noticed any change in vision in the right eye. No pain or photophobia.     10+ review of systems were otherwise negative except for that which has been stated above.      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       Posterior vitreous detachment of both eyes           Eye Drops:   Artificial tears  erythromycin ointment   moxifloxacin    Pertinent Systemic Medications:   Valtrex 1g, TID    Past Medical History:  Past Medical History:   Diagnosis Date     Actinic keratosis      BCC (basal cell carcinoma) 12/2012     BPH (benign prostatic hypertrophy) with urinary obstruction      COPD (chronic obstructive pulmonary disease) (H)     9/2012 Allergy consult     DDD (degenerative disc disease), cervical      Diverticulosis      ED (erectile dysfunction)      Hearing loss      Hyperlipidemia LDL goal < 130      Kidney stones      Localized osteoarthritis of both knees      Osteoarthritis      Senile purpura (H) 01/12/2021     Strabismus      Vasomotor rhinitis      Vitamin B12 deficiency        Past Surgical History:   Past Surgical History:   Procedure Laterality Date     ARTHROSCOPY KNEE RT/LT      Right     CATARACT IOL, RT/LT       CYSTOSCOPY       HERNIA REPAIR, INGUINAL RT/LT      Right     LIGATN/STRIP LONG OR SHORT SAPHEN       PHACOEMULSIFICATION WITH STANDARD INTRAOCULAR LENS IMPLANT  12-09 / 01-10    RT / LT     TURP      .       Family History:  No pertinent family history    Social History:  Lives in assisted living facility    EXAMINATION:     Base Eye Exam     Visual Acuity    Patient did not have his glasses with him to accurately test visual acuity           Tonometry (Tonopen, 6:59 AM)       Right Left    Pressure 22           Tonometry #2 (Tonopen, 7:01 AM)       Right Left    Pressure 23 17          Pupils       Dark Light Shape React APD    Right 4 2 Round Brisk None    Left 4 2 Round Brisk None          Extraocular Movement       Right Left     Full, Ortho Full, Ortho          Neuro/Psych      Oriented x3: Yes    Mood/Affect: Normal            Slit Lamp and Fundus Exam     External Exam       Right Left    External many crusted vesicles in v1 distribution down forehead, erythema and swelling of forehead. Equal facial sensation. Has severe hearing loss at baseline. Normal          Slit Lamp Exam       Right Left    Lids/Lashes few vesicles on brow, and lateral canthus, none on lid margin., trace mucous/purulent drainage from lateral canthus Normal    Conjunctiva/Sclera small cyst inferonasal White and quiet    Cornea multiple small dendritiform lesions superiorly at 11 oclock, and few tiny dendritiform lesions centrally Clear    Anterior Chamber Deep and quiet, no cell Deep and quiet    Iris Round and reactive Round and reactive    Lens Posterior chamber intraocular lens Posterior chamber intraocular lens                Labs/Studies/Imaging Performed  CXR  IMPRESSION:   1. Bibasilar hazy opacities, may represent atelectasis, edema, or  infection.  2. Left midlung pleural plaques.  3. Tortuous aorta.  4. Severe degenerative changes of the shoulders.    CT orbit and head (July 12, 2023)  Impression: Periorbital soft tissue thickening and enhancement, may  represent preseptal periorbital cellulitis. No abscess identified.  No  post septal extension. Normal facial bones.       Hieu Mcbride MD  Resident Physician, PGY-2  Department of Ophthalmology

## 2023-07-14 NOTE — PROGRESS NOTES
[facility]    Medicine Progress Note - Hospitalist Service, GOLD TEAM 18    Date of Admission:  7/12/2023    Assessment & Plan      Lang Christina is a 89 year old male with past medical history significant for HLD, basal cell carcinoma, senile purpura, dementia, centrilobular emphysema, acute respiratory failure, COPD, paroxysmal atrial fibrillation, DDD (cervical) and chronic subdural hematoma admitted on 7/12/2023 to [facility] from [facility] ED. He presented over there for evaluation of zoster ophthalmicus after presenting to another outside hospital earlier where he was given 1 dose of IV valacyclovir. Pt was transferred to [facility] to be evaluated by ophthalmology.    Zoster ophthalmicus  Preseptal cellulitis  Patient reports minimal pain and no vision changes. No meningeal signs at present but low threshold for LP if there is concern for CNS involvement. On Q4H neuro checks.   -Ophthalmology consulted, seen at [facility] ED   Plan per Ophthalmology:   -Continue IV acyclovir   -Continue vigamox drops, artificial tears, and erythromycin ointment.   - Blood cultures ordered and MRSA swab   - Continue Clindamycin started in ED for preseptal cellulitis (penicillin allergy).   - ID consult   - Contact precautions    Hyperlipidemia   - Continue PTA simvastatin    COPD  Centrilobular emphysema   - Continue PTA albuterol inhaler, and anoro ellipta inhaler     DDD (cervical)  Hx of cervical spine fracture  Hx of subdural hematoma  Patient had a fall back in 6/2022 that resulted in a type II odontoid cervical fracture and subdural hematoma. Fracture was treated non-operatively and needs to wear his cervical collar at all times. His admission CT revealed interval decrease in size of subdural hematoma and no new acute intracranial abnormality.   - Wear cervical collar at all times    Hyponatremia  Sodium noted to be 134 at admission in setting of poor PO.   -  Encourage PO, recheck in am    Anemia  Hemoglobin noted to be 11.7 at admission which is consistent with previous.   - Recheck CBC in am         Diet: Combination Diet Clear Liquid    DVT Prophylaxis: Low Risk/Ambulatory with no VTE prophylaxis indicated  Hung Catheter: Not present  Lines: None     Cardiac Monitoring: None  Code Status:   Full code    Clinically Significant Risk Factors                      # Dementia: noted on problem list             Disposition Plan      Expected Discharge Date: 07/14/2023      Destination: nursing home            Paula Alexander MD  Hospitalist Service, GOLD TEAM 18  [facility]  Securely message with [employer]en on [employer] (more info)  Text page via [facility] Paging/Directory   See signed in provider for up to date coverage information  ______________________________________________________________________    Interval History       Physical Exam   Vital Signs: Temp: 99.4  F (37.4  C) Temp src: Oral BP: (!) 152/74 Pulse: 80   Resp: 18 SpO2: 99 % O2 Device: None (Room air) Oxygen Delivery: 1.5 LPM  Weight: 151 lbs 7.3 oz    General: No acute distress, breathing comfortably on room air  Neuro: EOMI, PERRLA. Facial muscles symmetric, strength/sensation grossly intact. Crusted lesions over right eye with erythema, swelling of surrounding area  HEENT: Anicteric sclera. Oropharynx is clear. No lymphadenopathy.  Chest/Lungs: No accessory respiratory muscle use. Adequate air movement throughout. CTAB.  CV: Normal rate, regular rhythm. nl S1/S2. No m/r/g. Cap refill &lt;2s.  Abd: BS+. Soft, NTND.  Ext: Warm, well-perfused. Strong distal pulses    Medical Decision Making       40 MINUTES SPENT BY ME on the date of service doing chart review, history, exam, documentation & further activities per the note.      Data   NOTE: Data reviewed over the past 24 hrs contributes toward MDM complexity

## 2023-07-14 NOTE — PROGRESS NOTES
Care Management Follow Up    Length of Stay (days): 2    Expected Discharge Date: 07/14/2023     Concerns to be Addressed: discharge planning     Patient plan of care discussed at interdisciplinary rounds: Yes    Anticipated Discharge Disposition: Skilled Nursing Facility     Anticipated Discharge Services: None  Anticipated Discharge DME: None    Patient/family educated on Medicare website which has current facility and service quality ratings:  Yes  Education Provided on the Discharge Plan:  Yes  Patient/Family in Agreement with the Plan: Yes     Referrals Placed by CM/SW:  See below  Private pay costs discussed: Not applicable    Additional Information:    Writer called [facility]. They gave writer the email/ number for direct admissions. Writer tried calling with no option to leave VM. Writer emailed [facility] to ask if patient can return there today and what else they need.    Contacts  [facility]  Tom RN [phone]  [place] Office [phone]  Fax [phone] - [facility] with admissions  [email or web address]    AMIE Marquez, LGSW  6 Med Surg   [facility]  Phone: [phone]  Pager: [phone]

## 2023-07-14 NOTE — PROGRESS NOTES
0700 - 1900    General Data:  Awake, alert, disoriented to time, place and people, Able to verbalized needs.  Diet: Clear Diet   Activity: Mostly on the chair the whole day. A2 with walker to bedside commode.     Vital signs: Temp: 99.1  F (37.3  C) Temp src: Oral BP: 131/86 Pulse: 64   Resp: 22 SpO2: 95 % O2 Device: Oxymask Oxygen Delivery: 5 LPM    Skin: Visible skin intact, PIV on the Right forearm  HEENT: right upper quadrant of the face reddish in color with scattered vesicle. Uses C collar  Chest and Lungs: denies / SOB  Heart: denies chest pain  Abdomen: flat, had BM today  Genitourinary: No problem urination, urine output yellowish in color.     Pain: denies pain    Continue care plan

## 2023-07-14 NOTE — PROGRESS NOTES
"VS: BP (!) 152/74 (BP Location: Left arm)   Pulse 80   Temp 99.4  F (37.4  C) (Oral)   Resp 18   Ht 1.753 m (5' 9\")   Wt 68.7 kg (151 lb 7.3 oz)   SpO2 99%   BMI 22.37 kg/m     O2: Alert and oriented x 4 with intermittent confusion, denies chest pain, SOB with activity. Denies N/V   Output: Continent of bowel and bladder. Pt used urinal at bedside   Last BM: 7/12/23 per pt   Activity: Pt not oob   Skin: Right facial leison   Pain: Pt denies pain   CMS: Denies  Numbness and tinging   Dressing: none   Diet: Regular diet   LDA: Right lowerforearm PIV tko   Plan: ABX    Additional Info:         "

## 2023-07-14 NOTE — PROGRESS NOTES
Care Management Follow Up    Length of Stay (days): 2    Expected Discharge Date: 07/14/2023     Concerns to be Addressed: discharge planning     Patient plan of care discussed at interdisciplinary rounds: Yes    Anticipated Discharge Disposition: Skilled Nursing Facility     Anticipated Discharge Services: None  Anticipated Discharge DME: None    Patient/family educated on Medicare website which has current facility and service quality ratings:    Education Provided on the Discharge Plan:    Patient/Family in Agreement with the Plan:      Referrals Placed by CM/SW:    Private pay costs discussed: verbalized that if stretcher ride is not covered by insurance they will pay the bill.     Additional Information:    Call to [facility] and spoke with admissions.  They can take her today.  He was waiting to hear back about antibiotics the patient is discharging on.    Usually need to be there by 4 pm but he will check to see if they can accept later.    Call to [facility] at admissions and updated of Monday plan.   Need orders 2 hours before.  Ride probably for about 10:30 am.  Cut off for Monday is for 3 pm.   Will need stretcher ride per daughter. Verbalized understanding that we will bill insurance but there is no guarantee that it will be covered.     [facility]  [email or web address]  [address] [place] [id number]   Tom RN [phone]  [place] Office [phone]  Fax [phone] -  with admissions  [email or web address]    Gabby Hilliard RN, BA  Care Coordinator  6 Med Surg  [phone], pager [phone]      For weekend social work needs, contact information below and available on [facility]:      Weekend [place] Pager: [phone]   Weekend 6MS, 8A, 10ICU- Pager: [phone]     For weekend RN care coordinator needs (home discharge with needs including home care, assisted living facility returns, durable medical equip, IV antibiotics) - page [phone]

## 2023-07-14 NOTE — PROGRESS NOTES
"  GENERAL ID SERVICE: Sign Off Note  Patient:  Lang Christina, Date of birth 6/3/1928, Medical record number [id number]  Date of Admission: 2023  Date of Visit:  2023   Requesting Provider: Raúl Huber         Assessment and Recommendations:   Problem List:  1. Zoster ophthalmicus, right eye. Clinical diagnosis. Corneal lesions present. No retinal involvement.   2. Concern for bacterial superinfection/preseptal cellulitis. Currently on clindamycin and improving.  3. History of fall  with subdural hematoma and cervical spine fracture in C collar. Hematoma improving on admission CT.   4. Listed penicillin allergy - sounds as if it was a remote reaction (40-50 years ago) but likely anaphylaxis with hives, difficulty breathing, need for medical care. He \"almost \" per daughter.    Per daughter he has never been diagnosed with dementia and she has been working to get it removed from his chart. He has mild occasional short term memory loss but usually has normal mental status.   6. History of severe delirium with steroids (unsure which product)    Discussion:   Would continue treatment for Zoster but plan to discharge with oral Valtrex. IV acyclovir is of unclear superiority in this setting and I think the risks of a PICC would outweigh potential benefit of IV therapy. Discussed penicillin allergy with ID pharmacist. We agree that given his history and likely discharge today that we would be hesitant to give him trial of beta lactam since we can't find record that he has had any challenge with them since original reaction  and since he is improving on clindamycin and tolerating it well so far.     Recommendations:  1. Agree with transition to oral Valtrex 1000 mg PO Q8H upon discharge to complete 14 days total (which would also cover possible neurologic complications).   2. Will not consider LP at this time. Unclear utility and, per daughter, they would like to limit invasive procedures including " LP.    3. Continue clindamycin to complete 7 days. Can transition to clindamycin 300 mg PO Q8H upon discharge. High vigilance for C diff if diarrhea occurs.   4. Agree that he may recover to his baseline mental status faster out of hospital. No sigificant ID contraindication for discharge today if primary team and daughter agree this would be best.     Discussed with patient's daughter and with primary team.     Thank you for this consult. ID will continue to follow this patient. Please feel free to call with any questions.     Corina Momin MD  Infectious Diseases  Pager [phone]       Interval History:    Sitting up in chair. Alert and interactive but not able to engage in complex conversation. Facial rash still adhering to midline. Individual scabbing lesions are more pronounced but swelling is markedly improved.         History of Present Illness:   Lang Christina is a 89 yo man with history dementia, A fib, emphysema, recent fall with subdural hematoma and c spine fracture (now in C collar), who is admitted for ophtho evalaution of clinically diagnosed Zoster ophthalmicus and concern for superimposed preseptal cellulitis. Family is not currently at bedside so history is mostly obtained from chart. He was noted to have right sided forehead lesions about 4 days prior to admission. He was seen in a local ophtho clinic and at that time it was thought there was no eye involvement. He was started on PO valacyclovir (not sure what dose) which he took 7/10 and 7/11. However, on 7/11 it was noted that the right side of his face was becoming swollen so he was transferred to [facility] and admitted to [facility] for ophtho evaluation. They did not corneal involvement but no retinal involvement. He was started on IV acyclovir as well as on clindamycin for possible secondary bacterial infection in setting of listed pcn allergy. I can't find confirmation that he has previously received beta lactam antibiotics.       Allergies:     "  Allergies   Allergen Reactions     Pcn [Penicillins] Anaphylaxis     Brief assessment done on 2023. Per patient's daughter, reaction occurred over 50 years ago, cannot recall which specific penicillin, but stated he developed itchiness and hives and his throat started closing up shortly after the first dose.          Recent Antimicrobials::   Clindamycin  Acyclovir       Family History:   Reviewed and noncontributory.   Family History   Problem Relation Age of Onset     Cerebrovascular Disease Brother         Social History:     Social History     Socioeconomic History     Marital status:      Spouse name: Not on file     Number of children: Not on file     Years of education: Not on file     Highest education level: Not on file   Occupational History     Employer: RETIRED   Tobacco Use     Smoking status: Former     Packs/day: 1.00     Years: 47.00     Pack years: 47.00     Types: Cigarettes, Pipe     Quit date: 1994     Years since quittin.5     Smokeless tobacco: Never   Substance and Sexual Activity     Alcohol use: No     Drug use: No     Sexual activity: Not Currently     Partners: Female   Other Topics Concern     Parent/sibling w/ CABG, MI or angioplasty before 65F 55M? Not Asked   Social History Narrative     Not on file     Social Determinants of Health     Financial Resource Strain: Not on file   Food Insecurity: Not on file   Transportation Needs: Not on file   Physical Activity: Not on file   Stress: Not on file   Social Connections: Not on file   Intimate Partner Violence: Not on file   Housing Stability: Not on file          Physical Exam:   BP (!) 150/94 (BP Location: Left arm)   Pulse 66   Temp 99.1  F (37.3  C) (Oral)   Resp 18   Ht 1.753 m (5' 9\")   Wt 68.7 kg (151 lb 7.3 oz)   SpO2 99%   BMI 22.37 kg/m     Exam:  GENERAL:  Awake, calm, supine in bed in no distress. Answers questions pleasantly but without much content.   ENT:  Crusted lesions over right eye with " erythema, swelling of surrounding area.   EYES:  Eyes have anicteric sclerae.    NECK:  Supple.  LUNGS:  Clear to auscultation.  CARDIOVASCULAR:  Regular rate and rhythm with no murmurs, gallops or rubs.  ABDOMEN:  Normal bowel sounds, soft, nontender.  EXT: Extremities warm and without edema.  SKIN:  No acute rashes other than face as above.  Line is in place without any surrounding erythema.  NEUROLOGIC:  Grossly nonfocal.         Laboratory Data:     Creatinine   Date Value Ref Range Status   07/13/2023 0.65 (L) 0.67 - 1.17 mg/dL Final   07/12/2023 0.68 0.67 - 1.17 mg/dL Final   07/12/2023 0.66 (L) 0.67 - 1.17 mg/dL Final   04/26/2023 0.81 0.67 - 1.17 mg/dL Final   04/10/2023 0.76 0.67 - 1.17 mg/dL Final   08/04/2020 0.74 0.66 - 1.25 mg/dL Final   03/04/2019 0.75 0.66 - 1.25 mg/dL Final   11/04/2016 0.90 0.66 - 1.25 mg/dL Final   11/03/2015 0.84 0.66 - 1.25 mg/dL Final   10/07/2014 0.88 0.66 - 1.25 mg/dL Final     WBC   Date Value Ref Range Status   08/04/2020 5.8 4.0 - 11.0 10e9/L Final   03/04/2019 5.2 4.0 - 11.0 10e9/L Final   09/30/2013 6.2 4.0 - 11.0 10e9/L Final   09/22/2010 6.3 4.0 - 11.0 10e9/L Final   07/21/2009 6.4 4.0 - 11.0 10e9/L Final     WBC Count   Date Value Ref Range Status   07/13/2023 6.0 4.0 - 11.0 10e3/uL Final   07/12/2023 6.6 4.0 - 11.0 10e3/uL Final   07/12/2023 4.8 4.0 - 11.0 10e3/uL Final   11/16/2022 7.3 4.0 - 11.0 10e3/uL Final   11/15/2022 8.0 4.0 - 11.0 10e3/uL Final     Hemoglobin   Date Value Ref Range Status   07/13/2023 11.2 (L) 13.3 - 17.7 g/dL Final   08/04/2020 13.2 (L) 13.3 - 17.7 g/dL Final     Platelet Count   Date Value Ref Range Status   07/13/2023 140 (L) 150 - 450 10e3/uL Final   08/04/2020 203 150 - 450 10e9/L Final     Lab Results   Component Value Date     (L) 07/13/2023    BUN 10.6 07/13/2023    CO2 23 07/13/2023     No results found for: CRP        Pertinent Recent Microbiology Data:   No results for input(s): CULT, SDES in the last 168 hours.          Imaging:     Recent Results (from the past 48 hour(s))   XR Chest 2 Views    Narrative    EXAM: XR CHEST 2 VIEWS  7/12/2023 6:28 PM     HISTORY:  fever       COMPARISON:  Chest radiograph 5/29/2018, CT chest abdomen and pelvis  6/13/22    FINDINGS:   Opacity in the right mediastinum, likely tortuous aorta in addition to  projection. Trachea is midline. Cardiac silhouette is within normal  limits. Left basilar/retrocardiac opacity present obscuring the left  hemidiaphragm and left costophrenic angle. Scattered left midlung  opacities likely to correlate with calcified pleural plaques seen on  prior chest CT. Right basilar patchy opacity obscuring the right  costophrenic angle. No appreciable pneumothorax. Severe degenerative  changes of the shoulders.      Impression    IMPRESSION:   1. Bibasilar hazy opacities, may represent atelectasis, edema, or  infection.  2. Left midlung pleural plaques.    I have personally reviewed the examination and initial interpretation  and I agree with the findings.    DEBORA BARTLETT DO         SYSTEM ID:  [id number]   CT Orbits w Contrast    Narrative    CT ORBITS W CONTRAST 7/12/2023 6:56 PM    History: R facial cellulitis vs facial zoster.  signs of deep space  infection?    Comparison: Same day had, 11/9/2022 CT head      Technique: Using thin collimation multidetector helical acquisition  technique, axial, coronal, sagittal CT images were obtained through  the orbits and upper facial bones, following intravenous  administration of nonionic iodinated contrast medium.    Contrast: 75 cc [id number]    Findings: There is mild right periorbital preseptal soft tissue  swelling and inflammation without discrete fluid collection or  intraorbital inflammatory change. No osseous erosion or fracture..  Alignment of the upper facial bones is normal.     There is no hematoma or gas within the orbits. The visualized portions  of the paranasal sinuses and mastoid air cells are clear. The  cribriform  plate appears intact.      Impression    Impression: Periorbital soft tissue thickening and enhancement, may  represent preseptal periorbital cellulitis. No abscess identified.  No  post septal extension. Normal facial bones.    I have personally reviewed the examination and initial interpretation  and I agree with the findings.    LEA BRICEÑO MD         SYSTEM ID:  [id number]   CT Head w/o Contrast    Narrative    CT HEAD W/O CONTRAST 7/12/2023 6:57 PM    Provided History: h/o subdural, delirium now    Comparison: Outside CT head 11/9/2022.    Technique: Using multidetector thin collimation helical acquisition  technique, axial, coronal and sagittal CT images from the skull base  to the vertex were obtained without intravenous contrast.     Findings:    No intracranial hemorrhage. No mass effect. No midline shift. No new  extra-axial fluid collection. Decreased conspicuity of chronic right  subdural hemorrhage, today measures 5 mm in maximum thickness,  previously 10 mm November 2022. There is no left subdural hemorrhage  identified. The gray to white matter differentiation of the cerebral  hemispheres is preserved. Ventricles are proportionate to the sulci.  No sulcal effacement. The basal cisterns are patent. Moderate diffuse  cerebral atrophy. Periventricular white matter hypodensities, left  greater than right, as well as old lacunar infarct involving the left  basal ganglia as seen before. Cavum septum pellucidum and cavum vergae  present.    The visualized paranasal sinuses are clear. The mastoid air cells are  clear. Mild thickening soft tissues of the right orbit, please see  same day CT Orbits for detailed findings. No acute fracture.      Impression    Impression:   1. No acute intracranial pathology.  2. Atrophy and chronic small vessel ischemic disease. 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  3. Decreased thickening of the chronic right subdural hemorrhage since  November 2022. No new  intracranial hemorrhage.    Please see same day CT Orbits for detailed findings of this region.    I have personally reviewed the examination and initial interpretation  and I agree with the findings.    LEA BRICEÑO MD         SYSTEM ID:  [id number]

## 2023-07-15 NOTE — PROGRESS NOTES
0700 - 1900    General Data:  Awake, alert, able to verbalize concerns  Diet: Changed from clear to full liquid  Activity: With assistance    Vital signs Temp: 97.4  F (36.3  C) Temp src: Axillary BP: (!) 151/80 Pulse: 96   Resp: 18 SpO2: 93 % O2 Device: Oxymask Oxygen Delivery: 3 LPM    Skin: clean and intact visible skin  HEENT:  Right upper quadrant of the face with vesicular rash. Right checked by opthalmologist.   Chest and Lungs: denies / SOB  Heart: denies chest discomfort   Abdomen: flat  Genitourinary: Output - yellow , denies dysuria   Extremities:  Decrease strength on all extremities    Pain: complained of back pain with pain scale of 7/10. PRN meds given. Able to sleep.     Other:   > [facility] transport arranged by .   > O2 decreased to 2 LPM per nasal cannula; O2 sat stayed at low 90's while asleep.    Continue current care plan

## 2023-07-15 NOTE — PROGRESS NOTES
OPHTHALMOLOGY CONSULT NOTE  07/12/23    Patient: Lang Christina      ASSESSMENT/PLAN:     Lang Christina is a 89 year old male who presented with right sided facial erythema, swelling, and vesicular rash in V1 distribution.     Herpes Zoster Ophthalmicus, right eye  -Vesicular rash with crusting in V1 distribution. Lesions on nose. No lesions of ear. Complicated by preseptal cellulitis. On initial exam he had corneal involvement with few small dendritiform lesions. Fundus exam unremarkable. No retinitis, disc edema. He does have bilateral histo spots that have been previously documented, and are bilateral. No vitreous haze. No cranial nerve palsies. Equal facial sensation. Erythema, and swelling, appears appropriate for degree of rash on right side of face. However, story of having ruptured vesicles and then subsequent worsening rash and erythema is concerning for overlying pre-septal cellulitis. Consistent with CT scan read of soft tissue thickening and enhancement.    PLAN:  -While inpatient status, agree with 700mg acyclovir, TID (I.e. 10mg/kg). Defer to medicine team for need of IV anti-virals vs orals as an outpatient. No focal neurological deficits.  -For ophthalmic involvement:   -Stop vigamox (ordered)   -Artificial tears, 1 drop, QID, right eye   -Erythromycin ointment to the vesicles around the eye and on the forehead to prevent further bacterial superinfection.   -Defer to medicine on choice of antibiotic, but recommend broad-spectrum antibiotic coverage for preseptal cellulitis.   -Ophthalmology will sign off, patient has 2 day follow up with outpatient ophthalmologist.    Discussed with cornea service.    It is our pleasure to participate in this patient's care and treatment. Please contact us with any further questions or concerns.    Thank you for entrusting us with your care  Patti Parr MD, PGY3  Ophthalmology Resident  [facility]    HISTORY OF PRESENTING ILLNESS:     Lang MAHARAJ  Sanford is a 89 year old male who has a hx of HLD, basal cell carcinoma, senile purpura, dementia, centrilobular emphysema, acute respiratory failure, atrial fibrillation, subdural hematoma, assisted care resident who began having a right-sided forehead lesion 4 days ago and was treated for zoster as an outpatient.     He presented to outside ED today and was treated with 1 dose of IV valacyclovir and then transferred to the [place] ED for evaluation by ophthalmology.    Patient's daughter is with him in the ED and history was obtained by daughter. She states the patient had an eruption of shingles on the right side of his forehead a few days ago. On Monday he had vesicles that had erupted and crusted over. They went to see an ophthalmologist on Monday and that doctor stated there was no ophthalmic involvement and recommended following up in two days. The next day (7/11/23), the right side of the face became red and swollen. They took 1-2 pills of valtrex on Monday, 2 pills on 7/11/23. They went to the ED today and received a dose of valacyclovir by IV and then was transferred to the [place] ed for evaluation by ophthalmology. To the daughters knowledge he had not been complaining of vision changes, and no photophobia.     7/14/23: He has not noticed any change in vision in the right eye. No pain or photophobia.     10+ review of systems were otherwise negative except for that which has been stated above.      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       Posterior vitreous detachment of both eyes         Eye Drops:   Artificial tears  erythromycin ointment   moxifloxacin    Pertinent Systemic Medications:   Valtrex 1g, TID    Past Medical History:  Past Medical History:   Diagnosis Date     Actinic keratosis       BCC (basal cell carcinoma) 12/2012     BPH (benign prostatic hypertrophy) with urinary obstruction      COPD (chronic obstructive pulmonary disease) (H)     9/2012 Allergy consult     DDD (degenerative disc disease), cervical      Diverticulosis      ED (erectile dysfunction)      Hearing loss      Hyperlipidemia LDL goal < 130      Kidney stones      Localized osteoarthritis of both knees      Osteoarthritis      Senile purpura (H) 01/12/2021     Strabismus      Vasomotor rhinitis      Vitamin B12 deficiency        Past Surgical History:   Past Surgical History:   Procedure Laterality Date     ARTHROSCOPY KNEE RT/LT      Right     CATARACT IOL, RT/LT       CYSTOSCOPY       HERNIA REPAIR, INGUINAL RT/LT      Right     LIGATN/STRIP LONG OR SHORT SAPHEN       PHACOEMULSIFICATION WITH STANDARD INTRAOCULAR LENS IMPLANT  12-09 / 01-10    RT / LT     TURP      .       Family History:  No pertinent family history    Social History:  Lives in assisted living facility    EXAMINATION:     Base Eye Exam       Visual Acuity (Snellen - Linear)         Right Left    Near sc Fix and follow Fix and follow   Patient has difficulty talking - unable to assess acuity in any meaningful way             Tonometry (Tonopen, 4:34 PM)         Right Left    Pressure 14 16                    Labs/Studies/Imaging Performed  CXR  IMPRESSION:   1. Bibasilar hazy opacities, may represent atelectasis, edema, or  infection.  2. Left midlung pleural plaques.  3. Tortuous aorta.  4. Severe degenerative changes of the shoulders.    CT orbit and head (July 12, 2023)  Impression: Periorbital soft tissue thickening and enhancement, may  represent preseptal periorbital cellulitis. No abscess identified.  No  post septal extension. Normal facial bones.     Thank you for entrusting us with your care  Patti Parr MD, PGY3  Ophthalmology Resident  [facility]

## 2023-07-15 NOTE — PROGRESS NOTES
Care Management Follow Up    Length of Stay (days): 3    Expected Discharge Date: 07/17/2023     Concerns to be Addressed: discharge planning     Patient plan of care discussed at interdisciplinary rounds: Yes    Anticipated Discharge Disposition:    [facility]  Tom RN [phone]  [place] Office [phone]  Fax [phone] - aj with admissions  [email or web address]     Anticipated Discharge Services: None  Anticipated Discharge DME: None    Patient/family educated on Medicare website which has current facility and service quality ratings:  Yes  Education Provided on the Discharge Plan:  Yes  Patient/Family in Agreement with the Plan:  Yes    Referrals Placed by CM/SW:      [facility]  Tom RN [phone]  [place] Office [phone]  Fax [phone] - aj with admissions  [email or web address]    Private pay costs discussed: transportation costs    Additional Information:  Pt is expected to discharge to [facility] on Monday 7/17 and needs a stretcher ride set up. GABE called [facility] ([phone]) and secured a stretcher ride between 10:11-10:56 on Monday 7/17. GABE confirmed plan with pt's daughter Sharon.     GABE/RNCC will continue to follow for support and discharge planning.    Heather Medina, MSW, LGSW       SEARCHABLE in [employer] - search SOCIAL WORK       [facility] (0800  1630) Saturday and Sunday     Units: 4A, 4C, & 4E Pager: [phone]     Units: 5A & 5B Pager: [phone]     Units: 6A & 6B   Pager: [phone]     Units: 6C & 6D Pager: [phone]     Units: 7A &7B  Pager: [phone]     Units: 7C, 7D, & 5C Pager: [phone]     Unit: [facility] Pager: [phone]      [facility] ([id number]) Saturday and Sunday      Units: 5 Ortho, 5 Med/Surg & WB ED  Pager:[phone]     Units: 6 Med/Surg, 8A, & 10A ICU  Pager: [phone]        After hours (1630 - 0000) Saturday & Sunday; ([id number]) Mon-Fri; ([phone])  [facility] Recognized Holidays     Units: ALL  Pager: [phone]

## 2023-07-15 NOTE — PROGRESS NOTES
"VS: BP (!) 153/88   Pulse 96   Temp 97.3  F (36.3  C) (Oral)   Resp 19   Ht 1.753 m (5' 9\")   Wt 68.7 kg (151 lb 7.3 oz)   SpO2 96%   BMI 22.37 kg/m     O2: Alert and oriented to self  with intermittent confusion, denies chest pain, SOB with activity. Denies N/V   Output: Continent of bowel and bladder. Pt used urinal at bedside   Last BM: 7/14/23 per pt   Activity: Pt not oob   Skin: Right facial leison   Pain: Pt denies pain   CMS: Denies  Numbness and tinging   Dressing: none   Diet: Regular diet   LDA: Right lowerforearm PIV tko   Plan: 7/17/23 discharge to skilled nursing facility   Additional Info:             "

## 2023-07-15 NOTE — PROGRESS NOTES
[facility]    Medicine Progress Note - Hospitalist Service, GOLD TEAM 18    Date of Admission:  7/12/2023    Assessment & Plan      Lang Christina is a 89 year old male with past medical history significant for HLD, basal cell carcinoma, senile purpura, dementia, centrilobular emphysema, acute respiratory failure, COPD, paroxysmal atrial fibrillation, DDD (cervical) and chronic subdural hematoma admitted on 7/12/2023 to [facility] from [facility] ED. He presented over there for evaluation of zoster ophthalmicus after presenting to another outside hospital earlier where he was given 1 dose of IV valacyclovir. Pt was transferred to [facility] to be evaluated by ophthalmology.    #Zoster ophthalmicus  #Preseptal cellulitis  Patient reports minimal pain and no vision changes. No meningeal signs at present but low threshold for LP if there is concern for CNS involvement. On Q4H neuro checks.   -Ophthalmology consulted, seen at [facility] ED   Plan per Ophthalmology:   -Continue IV acyclovir   -Continue vigamox drops, artificial tears, and erythromycin ointment.   - Blood cultures ordered and MRSA swab   - Continue Clindamycin started in ED for preseptal cellulitis (penicillin allergy).   - ID consult   - Contact precautions    #Hyperlipidemia   - Continue PTA simvastatin    #COPD  #Centrilobular emphysema   - Continue PTA albuterol inhaler, and anoro ellipta inhaler     #DDD (cervical)  #Hx of cervical spine fracture  #Hx of subdural hematoma  Patient had a fall back in 6/2022 that resulted in a type II odontoid cervical fracture and subdural hematoma. Fracture was treated non-operatively and needs to wear his cervical collar at all times. His admission CT revealed interval decrease in size of subdural hematoma and no new acute intracranial abnormality.   - Wear cervical collar at all times    #Hyponatremia  Sodium noted to be 134 at admission in setting of poor  PO.   - Encourage PO, recheck in am    #Anemia  Hemoglobin noted to be 11.7 at admission which is consistent with previous.   - Recheck CBC in am         Diet: Combination Diet Clear Liquid    DVT Prophylaxis: Low Risk/Ambulatory with no VTE prophylaxis indicated  Hung Catheter: Not present  Lines: None     Cardiac Monitoring: None  Code Status: No CPR- Do NOT Intubate Full code    Clinically Significant Risk Factors                      # Dementia: noted on problem list             Disposition Plan  Pending discharge back to Long term care    Expected Discharge Date: 07/17/2023,  9:00 AM    Destination: nursing home            REHANA JOY MD  Hospitalist Service, GOLD TEAM 18  [facility]  Securely message with [employer] (more info)  Text page via [facility] Paging/Directory   See signed in provider for up to date coverage information  ______________________________________________________________________    Interval History   -No acute events overnight  - Afebrile and hemodynamically stable    Physical Exam   Vital Signs: Temp: 97.4  F (36.3  C) Temp src: Axillary BP: (!) 151/80 Pulse: 96   Resp: 18 SpO2: 93 % O2 Device: Oxymask Oxygen Delivery: 3 LPM  Weight: 151 lbs 7.3 oz    General: No acute distress, breathing comfortably on room air  Neuro: EOMI, PERRLA. Facial muscles symmetric, strength/sensation grossly intact. Crusted lesions over right eye with erythema, swelling of surrounding area  HEENT: Anicteric sclera. Oropharynx is clear. No lymphadenopathy.  Chest/Lungs: No accessory respiratory muscle use. Adequate air movement throughout. CTAB.  CV: Normal rate, regular rhythm. nl S1/S2. No m/r/g. Cap refill &lt;2s.  Abd: BS+. Soft, NTND.  Ext: Warm, well-perfused. Strong distal pulses    Medical Decision Making       40 MINUTES SPENT BY ME on the date of service doing chart review, history, exam, documentation & further activities per the note.      Data   NOTE: Data  reviewed over the past 24 hrs contributes toward MDM complexity

## 2023-07-16 NOTE — PROGRESS NOTES
Patient has been assessed for Home Oxygen needs. Oxygen readings:    *Pulse oximetry (SpO2) = 92% on room air at rest while awake.    *SpO2 improved to 93% on 2 liters/minute at rest.    *SpO2 = 91% on room air during activity/with exercise.    *SpO2 improved to 92% on 2 liters/minute during activity/with exercise.    Sasha Juarez RN on 7/16/2023 at 10:26 AM

## 2023-07-16 NOTE — PLAN OF CARE
Pt is alert to self disoriented to time, place and situation. Calm and cooperative. Can make the needs known. Denies nausea,vomiting, SOB, and chest pain. Pt is on 2 L O2 via NC.Up w/1 A GB and walker. Void adequately . Slept in recliner throughout the night. Denies pain. R PIV infusing 0.45% NS @75ml/hr. R facial lesions. Full liquid diet. Neck brace on at all times for comfort. Continue with POC and discharge planned to nursing home at 7/17.

## 2023-07-16 NOTE — PROGRESS NOTES
General Data:  Awake, alert, able to verbalize needs  Diet:  Full liquid   Activity: Activity with assistance    Vital signs: Temp: 98.7  F (37.1  C) Temp src: Oral BP: 128/67 Pulse: 87   Resp: 20 SpO2: 92 % O2 Device: Nasal cannula Oxygen Delivery: 2 LPM    Skin: Visible skin intact  Head:  Right upper quadrant with vesicles  Chest and Lungs: denies / SOB  Heart: denies chest pain and discomfort   Abdomen: flat , flabby,  Genitourinary: Output - yellow , denies dysuria   Extremities:  Able to move extremities. Decrease in strength    Pain: Request tylenol for back pain. Effective. Able to sleep    Other:  > 0.45 normal saline discontinued  > clothes for discharge tomorrow on the

## 2023-07-16 NOTE — PROGRESS NOTES
[facility]    Medicine Progress Note - Hospitalist Service, GOLD TEAM 18    Date of Admission:  7/12/2023    Assessment & Plan      Lang Christina is a 89 year old male with past medical history significant for HLD, basal cell carcinoma, senile purpura, dementia, centrilobular emphysema, acute respiratory failure, COPD, paroxysmal atrial fibrillation, DDD (cervical) and chronic subdural hematoma admitted on 7/12/2023 to [facility] from [facility] ED. He presented over there for evaluation of zoster ophthalmicus after presenting to another outside hospital earlier where he was given 1 dose of IV valacyclovir. Pt was transferred to [facility] to be evaluated by ophthalmology.    #Zoster ophthalmicus  #Preseptal cellulitis  Patient reports minimal pain and no vision changes. No meningeal signs at present but low threshold for LP if there is concern for CNS involvement. On Q4H neuro checks.   -Ophthalmology consulted, recommending:    Artificial tears 1 drop 4 times daily right eye    Erythromycin ointment to the vesicles around the eye and on the forehead to prevent further bacterial superinfection    Patient already completed x2 days of moxifloxacin ophthalmic solution   Plan per Ophthalmology:   -Continue IV acyclovir   -Continue vigamox drops, artificial tears, and erythromycin ointment.   - Blood cultures are negative to date.  MRSA is negative.   -Patient also seen by infectious disease recommending:    Transition from IV acyclovir to Valtrex 1000 mg p.o. every 8 hours to complete 14-day course (7/12/2023- 7/25/2023)    Patient to complete x7 days of clindamycin (7/12/2023- 7/18/2023)   -Continue contact precautions until all lesion have crusted over.    #Presumed hypoxia  -Does not appear to be in respiratory failure at this time  -Home O2 assessment shows patient is 91% on room air with exercise.  -Chest x-ray obtained on admission showed bibasilar hazy  opacity and left midlung pleural plaques.  -Patient does have mild crackles on examination bilaterally.    Continue to encourage use of incentive spirometer    Encourage adequate ambulation.    Supplemental O2 for SPO2 <90%.    #Hyperlipidemia   - Continue PTA simvastatin    #COPD  #Centrilobular emphysema   - Continue PTA albuterol inhaler, and anoro ellipta inhaler     #DDD (cervical)  #Hx of cervical spine fracture  #Hx of subdural hematoma  Patient had a fall back in 6/2022 that resulted in a type II odontoid cervical fracture and subdural hematoma. Fracture was treated non-operatively and needs to wear his cervical collar at all times. His admission CT revealed interval decrease in size of subdural hematoma and no new acute intracranial abnormality.    Wear cervical collar at all times    #Hyponatremia  Sodium noted to be 134 at admission in setting of poor PO. Gradually worsened to 129 today. Etiology likely iatrogenic due to infusion with half-normal saline.    Discontinue IV normal saline    Continue to monitor at this time.   - Encourage PO, recheck in am    #Anemia  Hemoglobin noted to be 11.7 at admission which is consistent with previous.   - Recheck CBC in am         Diet: Full Liquid Diet    DVT Prophylaxis: Low Risk/Ambulatory with no VTE prophylaxis indicated  Hung Catheter: Not present  Lines: None     Cardiac Monitoring: None  Code Status: No CPR- Do NOT Intubate Full code    Clinically Significant Risk Factors                      # Dementia: noted on problem list             Disposition Plan  Pending discharge back to Long term care    Expected Discharge Date: 07/17/2023,  9:00 AM    Destination: nursing home            REHANA JOY MD  Hospitalist Service, [address]  Securely message with [employer] (more info)  Text page via [facility] Paging/Directory   See signed in provider for up to date coverage  information  ______________________________________________________________________    Interval History   -No acute events overnight  - Afebrile and hemodynamically stable    Physical Exam   Vital Signs: Temp: 98.7  F (37.1  C) Temp src: Oral BP: 128/67 Pulse: 87   Resp: 20 SpO2: 92 % O2 Device: Nasal cannula Oxygen Delivery: 2 LPM  Weight: 151 lbs 7.3 oz    General: No acute distress, breathing comfortably on room air  Neuro: EOMI, PERRLA. Facial muscles symmetric, strength/sensation grossly intact. Crusted lesions over right eye with erythema, swelling of surrounding area  HEENT: Anicteric sclera. Oropharynx is clear. No lymphadenopathy.  Chest/Lungs: No accessory respiratory muscle use. Adequate air movement throughout.  Mild bibasilar crackles heard on examination.  CV: Normal rate, regular rhythm. nl S1/S2. No m/r/g. Cap refill &lt;2s.  Abd: BS+. Soft, NTND.  Ext: Warm, well-perfused. Strong distal pulses    Medical Decision Making       40 MINUTES SPENT BY ME on the date of service doing chart review, history, exam, documentation & further activities per the note.      Data   NOTE: Data reviewed over the past 24 hrs contributes toward MDM complexity

## 2023-07-16 NOTE — PLAN OF CARE
"Goal Outcome Evaluation:    VS: BP (!) 145/98 (BP Location: Left arm)   Pulse 89   Temp 97.8  F (36.6  C) (Oral)   Resp 24   Ht 1.753 m (5' 9\")   Wt 68.7 kg (151 lb 7.3 oz)   SpO2 94%   BMI 22.37 kg/m     O2: >90% on 2L nasal cannula, denies chest pain and shortness of breath   Output: Voids spontaneously without difficulty   Last BM: 7/15   Activity: Assist x1 with walker and gait belt   Up for meals? Yes    Skin: Intact    Pain: Denies pain    CMS: Intact, denies numbness and tingling, A/Ox4    Dressing: N/a    Diet: Full liquid    LDA: L PIV- SL    Equipment: IV pump and pole, personal belongings, call light within reach    Plan: Discharge to long term care tomorrow.         Plan of Care Reviewed With: patient    Overall Patient Progress: improvingOverall Patient Progress: improving           "

## 2023-07-17 NOTE — DISCHARGE SUMMARY
"[facility]    Discharge Summary  Hospitalist    Date of Admission:  7/12/2023  Date of Discharge:  7/17/2023    Discharge Diagnoses      Facial rash  Fever in adult  Facial cellulitis  Herpes zoster ophthalmicus of right eye    History of Present Illness   \"Lang Christina is a 89 year old male with past medical history significant for HLD, basal cell carcinoma, senile purpura, dementia, centrilobular emphysema, acute respiratory failure, COPD, paroxysmal atrial fibrillation, DDD (cervical) and chronic subdural hematoma admitted on 7/12/2023 to [facility] [place], [place] from [facility] ED. He presented over there for evaluation of zoster ophthalmicus after presenting to another outside hospital earlier where he was given 1 dose of IV valacyclovir. Pt was transferred to [facility] to be evaluated by ophthalmology.\"    Hospital Course   #Zoster ophthalmicus  #Preseptal cellulitis  Patient reports minimal pain and no vision changes. No meningeal signs at present but low threshold for LP if there is concern for CNS involvement. On Q4H neuro checks.   -Ophthalmology consulted, recommending:  - Artificial tears 1 drop 4 times daily right eye  - Erythromycin ointment to the vesicles around the eye and on the forehead to prevent further bacterial superinfection  - Patient already completed x2 days of moxifloxacin ophthalmic solution                 Plan per Ophthalmology:                 -Continue IV acyclovir                 -Continue vigamox drops, artificial tears, and erythromycin ointment.   - Blood cultures are negative to date.  MRSA is negative.   - Patient also seen by infectious disease recommending:  - Transition from IV acyclovir to Valtrex 1000 mg p.o. every 8 hours to complete 14-day course (7/12/2023- 7/25/2023)  - Patient to complete x7 days of clindamycin (7/12/2023- 7/18/2023)   -Continue contact precautions until all lesion have crusted over.     #Presumed hypoxia " -- passed home oxygen assessment, no o2 needed  -Does not appear to be in respiratory failure at this time  -Home O2 assessment shows patient is 91% on room air with exercise.  -Chest x-ray obtained on admission showed bibasilar hazy opacity and left midlung pleural plaques.  -Patient does have mild crackles on examination bilaterally.    Continue to encourage use of incentive spirometer    Encourage adequate ambulation.    Supplemental O2 for SPO2 <90%.     #Hyperlipidemia   - Continue PTA simvastatin     #COPD  #Centrilobular emphysema   - Continue PTA albuterol inhaler, and anoro ellipta inhaler      #DDD (cervical)  #Hx of cervical spine fracture  #Hx of subdural hematoma  Patient had a fall back in 6/2022 that resulted in a type II odontoid cervical fracture and subdural hematoma. Fracture was treated non-operatively and needs to wear his cervical collar at all times. His admission CT revealed interval decrease in size of subdural hematoma and no new acute intracranial abnormality.    Wear cervical collar at all times     #Hyponatremia  Sodium noted to be 134 at admission in setting of poor PO. Gradually worsened to 129 today. Etiology likely iatrogenic due to infusion with half-normal saline.    Discontinue IV normal saline    Continue to monitor at this time.   - Encourage PO, recheck in am     #Anemia  Hemoglobin noted to be 11.7 at admission which is consistent with previous.  Paula Alexander MD, MD    Significant Results and Procedures   na    Pending Results   These results will be followed up by PCP   Unresulted Labs Ordered in the Past 30 Days of this Admission     Date and Time Order Name Status Description    7/12/2023 10:26 PM Blood Culture Arm, Left Preliminary     7/12/2023 10:26 PM Blood Culture Arm, Left Preliminary         Code Status   DNR / DNI       Primary Care Physician   Shakila Clancy    Physical Exam   Temp: 98.4  F (36.9  C) Temp src: Oral BP: (!) 141/85 Pulse: 105   Resp: 16 SpO2:  93 % O2 Device: Nasal cannula Oxygen Delivery: 2 LPM  Vitals:    07/12/23 1858 07/13/23 0654   Weight: 69.4 kg (153 lb) 68.7 kg (151 lb 7.3 oz)     Vital Signs with Ranges  Temp:  [97.8  F (36.6  C)-98.8  F (37.1  C)] 98.4  F (36.9  C)  Pulse:  [] 105  Resp:  [16-24] 16  BP: (137-162)/(85-98) 141/85  SpO2:  [93 %-95 %] 93 %  I/O last 3 completed shifts:  In: 480 [P.O.:480]  Out: -     Constitutional: Awake, alert, cooperative, no apparent distress  Respiratory: Clear to auscultation bilaterally, no crackles or wheezing  Cardiovascular: Regular rate and rhythm, normal S1 and S2, and no murmur noted  GI: Normal bowel sounds, soft, non-distended, non-tender  Skin/Integumen: No rashes, no cyanosis, no edema  Other:     Discharge Disposition   Discharged to long-term care facility  Condition at discharge: Stable    Consultations This Hospital Stay   OPHTHALMOLOGY IP CONSULT  PHARMACY TO DOSE [facility]  INFECTIOUS DISEASE [facility] ADULT IP CONSULT  CARE MANAGEMENT / SOCIAL WORK IP CONSULT  PHYSICAL THERAPY ADULT IP CONSULT  OCCUPATIONAL THERAPY ADULT IP CONSULT  PHARMACY IP CONSULT FOR PENICILLIN ALLERGY SKIN TEST   PHYSICAL THERAPY ADULT IP CONSULT  OCCUPATIONAL THERAPY ADULT IP CONSULT    Time Spent on this Encounter   I, Paula Alexander MD, personally saw the patient today and spent greater than 30 minutes discharging this patient.    Discharge Orders      Reason for your hospital stay    shingles     Activity    Your activity upon discharge: activity as tolerated     Adult [facility] Follow-up and recommended labs and tests    Follow up with primary care provider, Shakila Clancy, within 7 days for hospital follow- up.  No follow up labs or test are needed.      Appointments on [place] and/or [facility] (with [facility] or [facility] provider or service). Call [phone] if you haven't heard regarding these appointments within 7 days of discharge.     General info for SNF    Length of Stay Estimate: Long Term  Care  Condition at Discharge: Stable  Level of care:board and care  Rehabilitation Potential: Good  Admission H&P remains valid and up-to-date: Yes  Recent Chemotherapy: N/A  Use Nursing Home Standing Orders: Yes     Follow Up and recommended labs and tests    Follow up with primary care provider in 14 days.  No follow up labs or test are needed.     Reason for your hospital stay    Shingles flare     Activity - Up ad lubna     Physical Therapy Adult Consult    Evaluate and treat as clinically indicated.    Reason:  decond     Occupational Therapy Adult Consult    Evaluate and treat as clinically indicated.    Reason: decond     Fall precautions     Diet    Follow this diet upon discharge: regular diet     Discharge Medications   Discharge Medication List as of 7/17/2023  9:50 AM      START taking these medications    Details   clindamycin (CLEOCIN) 300 MG capsule Take 1 capsule (300 mg) by mouth 3 times daily for 1 day, Disp-3 capsule, R-0, E-Prescribe         CONTINUE these medications which have CHANGED    Details   carboxymethylcellulose PF (REFRESH PLUS) 0.5 % ophthalmic solution Place 1 drop into the right eye 4 times daily, Disp-70 each, R-0, E-Prescribe      erythromycin (ROMYCIN) 5 MG/GM ophthalmic ointment Place into the right eye 3 times daily To the vesicles around the right eye and on the forehead to prevent further bacterial superinfectionDisp-3.5 g, [id number]      valACYclovir (VALTREX) 1000 mg tablet Take 1 tablet (1,000 mg) by mouth 3 times daily for 9 days, Disp-27 tablet, R-0, E-Prescribe         CONTINUE these medications which have NOT CHANGED    Details   acetaminophen (TYLENOL) 500 MG tablet Take 2 tablets by mouth daily as needed for pain, Historical      albuterol (PROAIR HFA/PROVENTIL HFA/VENTOLIN HFA) 108 (90 Base) MCG/ACT inhaler Inhale 2 puffs into the lungs every 4 hours as needed for shortness of breath / dyspnea or wheezing, Historical      ANORO ELLIPTA 62.5-25 MCG/INH oral  inhaler INHALE ONE PUFF BY MOUTH ONCE DAILY, Disp-60 each, R-8, E-Prescribe      bisacodyl (DULCOLAX) 10 MG suppository Place 10 mg rectally daily as needed for constipation, Historical      Cyanocobalamin 1000 MCG TABS Take 1,000 mcg by mouth daily, Historical      diclofenac (VOLTAREN) 1 % topical gel Apply 2 g topically 3 times daily as needed for moderate pain, Historical      ferrous sulfate (FEROSUL) 325 (65 Fe) MG tablet Take 1 tablet (325 mg) by mouth daily, Transitional      gabapentin (NEURONTIN) 100 MG capsule Take 1 capsule by mouth 3 times daily, Historical      Neomycin-Bacitracin-Polymyxin (NEOSPORIN ORIGINAL) OINT Apply 1 Application topically 3 times daily, Historical      Nutritional Supplements (NUTRITIONAL SHAKE PLUS) LIQD Take 4 fluid ounces by mouth every evening, Historical      order for DME Oxygen for home use. Liters per minute: 1 per nasal cannula. Frequency of use: With activity;. Length of need: 99.Historical      polyethylene glycol (MIRALAX) 17 g packet Take 1 packet by mouth daily as needed for constipation, Historical      senna-docusate (SENOKOT-S/PERICOLACE) 8.6-50 MG tablet Take 1 tablet by mouth 2 times daily as needed for constipation, Historical      simvastatin (ZOCOR) 10 MG tablet TAKE ONE TABLET BY MOUTH AT BEDTIME., Disp-90 tablet, R-2, E-Prescribe      traMADol (ULTRAM) 50 MG tablet Take 50 mg by mouth once, Historical         STOP taking these medications       sulfamethoxazole-trimethoprim (BACTRIM DS) 800-160 MG tablet Comments:   Reason for Stopping:             Allergies   Allergies   Allergen Reactions     Pcn [Penicillins] Anaphylaxis     Brief assessment done on 7/14/2023. Per patient's daughter, reaction occurred over 50 years ago, cannot recall which specific penicillin, but stated he developed itchiness and hives and his throat started closing up shortly after the first dose.      Data   Most Recent 3 CBC's:  Recent Labs   Lab Test 07/13/23  0716 07/12/23  7054  07/12/23  1219   WBC 6.0 6.6 4.8   HGB 11.2* 11.7* 11.4*   * 104* 101*   * 156 158      Most Recent 3 BMP's:  Recent Labs   Lab Test 07/17/23  0545 07/16/23  0833 07/15/23  0917 07/13/23  0716 07/12/23  1807   NA  --  129*  --  132* 134*   POTASSIUM  --  3.7  --  4.2 4.5   CHLORIDE  --  96*  --  99 100   CO2  --  22  --  23 22   BUN  --  9.5  --  10.6 11.3   CR 0.52* 0.52*  0.54* 0.48* 0.65* 0.68   ANIONGAP  --  11  --  10 12   MADELIN  --  8.8  --  8.7 9.1   GLC  --  163*  --  94 129*     Most Recent 2 LFT's:  Recent Labs   Lab Test 07/13/23  0716 07/12/23  1807   AST 17 19   ALT 9 <5   ALKPHOS 101 111   BILITOTAL 0.8 0.7     Most Recent INR's and Anticoagulation Dosing History:  Anticoagulation Dose History        Latest Ref Rng & Units 6/13/2022   Recent Dosing and Labs   INR 0.85 - 1.15 1.41      Most Recent 3 Troponin's:No lab results found.    Invalid input(s): TROP, TROPONINIES  Most Recent Cholesterol Panel:  Recent Labs   Lab Test 11/05/21  1205   CHOL 141   LDL 48   HDL 78   TRIG 73     Most Recent 6 Bacteria Isolates From Any Culture (See EPIC Reports for Culture Details):No lab results found.  Most Recent TSH, T4 and A1c Labs:  Recent Labs   Lab Test 11/13/22  1517 08/12/22  0544   TSH 1.08 2.21   A1C  --  5.7*       Results for orders placed or performed during the hospital encounter of 07/12/23   CT Orbits w Contrast    Narrative    CT ORBITS W CONTRAST 7/12/2023 6:56 PM    History: R facial cellulitis vs facial zoster.  signs of deep space  infection?    Comparison: Same day had, 11/9/2022 CT head      Technique: Using thin collimation multidetector helical acquisition  technique, axial, coronal, sagittal CT images were obtained through  the orbits and upper facial bones, following intravenous  administration of nonionic iodinated contrast medium.    Contrast: 75 cc [id number]    Findings: There is mild right periorbital preseptal soft tissue  swelling and inflammation without discrete  fluid collection or  intraorbital inflammatory change. No osseous erosion or fracture..  Alignment of the upper facial bones is normal.     There is no hematoma or gas within the orbits. The visualized portions  of the paranasal sinuses and mastoid air cells are clear. The  cribriform plate appears intact.      Impression    Impression: Periorbital soft tissue thickening and enhancement, may  represent preseptal periorbital cellulitis. No abscess identified.  No  post septal extension. Normal facial bones.    I have personally reviewed the examination and initial interpretation  and I agree with the findings.    LEA BRICEÑO MD         SYSTEM ID:  [id number]   CT Head w/o Contrast    Narrative    CT HEAD W/O CONTRAST 7/12/2023 6:57 PM    Provided History: h/o subdural, delirium now    Comparison: Outside CT head 11/9/2022.    Technique: Using multidetector thin collimation helical acquisition  technique, axial, coronal and sagittal CT images from the skull base  to the vertex were obtained without intravenous contrast.     Findings:    No intracranial hemorrhage. No mass effect. No midline shift. No new  extra-axial fluid collection. Decreased conspicuity of chronic right  subdural hemorrhage, today measures 5 mm in maximum thickness,  previously 10 mm November 2022. There is no left subdural hemorrhage  identified. The gray to white matter differentiation of the cerebral  hemispheres is preserved. Ventricles are proportionate to the sulci.  No sulcal effacement. The basal cisterns are patent. Moderate diffuse  cerebral atrophy. Periventricular white matter hypodensities, left  greater than right, as well as old lacunar infarct involving the left  basal ganglia as seen before. Cavum septum pellucidum and cavum vergae  present.    The visualized paranasal sinuses are clear. The mastoid air cells are  clear. Mild thickening soft tissues of the right orbit, please see  same day CT Orbits for detailed findings. No acute  fracture.      Impression    Impression:   1. No acute intracranial pathology.  2. Atrophy and chronic small vessel ischemic disease. 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  3. Decreased thickening of the chronic right subdural hemorrhage since  November 2022. No new intracranial hemorrhage.    Please see same day CT Orbits for detailed findings of this region.    I have personally reviewed the examination and initial interpretation  and I agree with the findings.    LEA BRICEÑO MD         SYSTEM ID:  [id number]   XR Chest 2 Views    Narrative    EXAM: XR CHEST 2 VIEWS  7/12/2023 6:28 PM     HISTORY:  fever       COMPARISON:  Chest radiograph 5/29/2018, CT chest abdomen and pelvis  6/13/22    FINDINGS:   Opacity in the right mediastinum, likely tortuous aorta in addition to  projection. Trachea is midline. Cardiac silhouette is within normal  limits. Left basilar/retrocardiac opacity present obscuring the left  hemidiaphragm and left costophrenic angle. Scattered left midlung  opacities likely to correlate with calcified pleural plaques seen on  prior chest CT. Right basilar patchy opacity obscuring the right  costophrenic angle. No appreciable pneumothorax. Severe degenerative  changes of the shoulders.      Impression    IMPRESSION:   1. Bibasilar hazy opacities, may represent atelectasis, edema, or  infection.  2. Left midlung pleural plaques.    I have personally reviewed the examination and initial interpretation  and I agree with the findings.    DEBORA BARTLETT DO         SYSTEM ID:  [id number]

## 2023-07-17 NOTE — PLAN OF CARE
"Goal Outcome Evaluation:    VS: Blood pressure 137/89, pulse 87, temperature 98.6  F (37  C), temperature source Oral, resp. rate 18, height 1.753 m (5' 9\"), weight 68.7 kg (151 lb 7.3 oz), SpO2 94 %.     O2: >90% on 2L nasal cannula, denies chest pain and shortness of breath   Output: Voids spontaneously without difficulty   Last BM: 7/15, Incontinent   Activity: Assist x1 with walker and gait belt   Up for meals? No   Skin: Intact, scattered bruises   Pain: Denies pain    CMS: Intact, denies numbness and tingling, A/Ox4    Dressing: N/A   Diet: Full liquid    LDA: L.PIV- SL    Equipment: IV pump and pole, personal belongings, call light within reach    Plan: Possible discharge 7/17. Continue with POC.       Plan of Care Reviewed With: patient    Overall Patient Progress: improvingOverall Patient Progress: improving           "

## 2023-07-17 NOTE — PROGRESS NOTES
Care Management Discharge Note    Discharge Date: 07/17/2023       Discharge Disposition: Skilled Nursing Facility    Discharge Services: None    Discharge DME: None    Discharge Transportation: family or friend will provide (daughter Cailin can transport in her car)    Private pay costs discussed: Not applicable    Does the patient's insurance plan have a 3 day qualifying hospital stay waiver?  No    PAS Confirmation Code:  NA  Patient/family educated on Medicare website which has current facility and service quality ratings:  NA    Education Provided on the Discharge Plan: yes    Persons Notified of Discharge Plans: patient, spouse, [facility]  Patient/Family in Agreement with the Plan:  yes    Handoff Referral Completed: Yes    Additional Information:   Lang will leave via stretcher back to [facility] this morning.  PCS ambulance form filled out and placed in hard chart.    Paged Dr. Alexander and she will put in orders asap.  They will need to be faxed over to [facility] asa.  8:33 AM  Orders faxed to [facility].    Per [facility] in admissions the discharge orders are missing ancillary SNF orders.  He is also wondering about the full liquid diet.  He was not on this prior to coming to the hospital.    8:40 AM  Paged Dr. Alexander.    [facility]  [email or web address]  [address] [place] [address]   Tom RN [phone]  [place] Office [phone]  Fax [phone] -  with admissions  [email or web address]    9:11 AM  Updated orders faxed to [facility] at [facility].\    10:56 AM  EMS here.  They found him with O2 on.  They are requesting an order for O2 be added to discharge orders.  Paged Dr. Alexander Lang does not need O2.  Received email from facility wanting to clarify time  order for Tramadol.  Per Dr. Alexander once daily at bedtime.    Gabby Hilliard, RN, BA  Care Coordinator  6 Med Surg  [phone], pager [phone]      For weekend social work  needs, contact information below and available on [facility]:     SW Weekend [place] Pager: [phone]   Weekend 6MS, 8A, 10ICU- Pager: [phone]     For weekend RN care coordinator needs (home discharge with needs including home care, assisted living facility returns, durable medical equip, IV antibiotics) - page [phone]

## 2023-07-17 NOTE — PROGRESS NOTES
[facility]    Medicine Progress Note - Hospitalist Service, GOLD TEAM 18    Date of Admission:  7/12/2023    Assessment & Plan    Lang Christina is a 89 year old male with past medical history significant for HLD, basal cell carcinoma, senile purpura, dementia, centrilobular emphysema, acute respiratory failure, COPD, paroxysmal atrial fibrillation, DDD (cervical) and chronic subdural hematoma admitted on 7/12/2023 to [facility] from [facility] ED. He presented over there for evaluation of zoster ophthalmicus after presenting to another outside hospital earlier where he was given 1 dose of IV valacyclovir. Pt was transferred to [facility] to be evaluated by ophthalmology.     #Zoster ophthalmicus  #Preseptal cellulitis  Patient reports minimal pain and no vision changes. No meningeal signs at present but low threshold for LP if there is concern for CNS involvement. On Q4H neuro checks.   -Ophthalmology consulted, recommending:  - Artificial tears 1 drop 4 times daily right eye  - Erythromycin ointment to the vesicles around the eye and on the forehead to prevent further bacterial superinfection  - Patient already completed x2 days of moxifloxacin ophthalmic solution                 Plan per Ophthalmology:                 -Continue IV acyclovir                 -Continue vigamox drops, artificial tears, and erythromycin ointment.   - Blood cultures are negative to date.  MRSA is negative.   - Patient also seen by infectious disease recommending:  - Transition from IV acyclovir to Valtrex 1000 mg p.o. every 8 hours to complete 14-day course (7/12/2023- 7/25/2023)  - Patient to complete x7 days of clindamycin (7/12/2023- 7/18/2023)   -Continue contact precautions until all lesion have crusted over.     #Presumed hypoxia -- passed home oxygen assessment, no o2 needed  -Does not appear to be in respiratory failure at this time  -Home O2 assessment shows patient is  91% on room air with exercise.  -Chest x-ray obtained on admission showed bibasilar hazy opacity and left midlung pleural plaques.  -Patient does have mild crackles on examination bilaterally.    Continue to encourage use of incentive spirometer    Encourage adequate ambulation.    Supplemental O2 for SPO2 <90%.     #Hyperlipidemia   - Continue PTA simvastatin     #COPD  #Centrilobular emphysema   - Continue PTA albuterol inhaler, and anoro ellipta inhaler      #DDD (cervical)  #Hx of cervical spine fracture  #Hx of subdural hematoma  Patient had a fall back in 6/2022 that resulted in a type II odontoid cervical fracture and subdural hematoma. Fracture was treated non-operatively and needs to wear his cervical collar at all times. His admission CT revealed interval decrease in size of subdural hematoma and no new acute intracranial abnormality.    Wear cervical collar at all times     #Hyponatremia  Sodium noted to be 134 at admission in setting of poor PO. Gradually worsened to 129 today. Etiology likely iatrogenic due to infusion with half-normal saline.    Discontinue IV normal saline    Continue to monitor at this time.   - Encourage PO, recheck in am     #Anemia  Hemoglobin noted to be 11.7 at admission which is consistent with previous.         Diet: Full Liquid Diet    DVT Prophylaxis: Low Risk/Ambulatory with no VTE prophylaxis indicated  Hung Catheter: Not present  Lines: None     Cardiac Monitoring: None  Code Status: No CPR- Do NOT Intubate      Clinically Significant Risk Factors         # Hyponatremia: Lowest Na = 129 mmol/L in last 2 days, will monitor as appropriate              # Dementia: noted on problem list             Disposition Plan     Expected Discharge Date: 07/17/2023,  9:00 AM    Destination: nursing home            Paula Alexander MD  Hospitalist Service, GOLD TEAM [address] [place] [address]  Securely message with [employer] (more info)  Text page via  [employer] Paging/Directory   See signed in provider for up to date coverage information  ______________________________________________________________________    Interval History   Doing ok  No distress  No NVD  Pain improved    Physical Exam   Vital Signs: Temp: 98.6  F (37  C) Temp src: Oral BP: 137/89 Pulse: 87   Resp: 18 SpO2: 94 % O2 Device: Nasal cannula Oxygen Delivery: 2 LPM  Weight: 151 lbs 7.3 oz    General: No acute distress, breathing comfortably on room air  Neuro: EOMI, PERRLA. Facial muscles symmetric, strength/sensation grossly intact. Crusted lesions over right eye with erythema, swelling of surrounding area  HEENT: Anicteric sclera. Oropharynx is clear. No lymphadenopathy.  Chest/Lungs: No accessory respiratory muscle use. Adequate air movement throughout.  Mild bibasilar crackles heard on examination.  CV: Normal rate, regular rhythm. nl S1/S2. No m/r/g. Cap refill &lt;2s.  Abd: BS+. Soft, NTND.  Ext: Warm, well-perfused. Strong distal pulses       Medical Decision Making   40 minutes           Data   NOTE: Data reviewed over the past 24 hrs contributes toward MDM complexity

## 2023-07-17 NOTE — PROGRESS NOTES
6MS DISCHARGE    D: Patient discharged to skilled nursing facility at 1100. Patient accompanied by EMS.    I: Discharge prescriptions given to patient. All discharge medications and instructions reviewed with patient. Education completed. IATF given.      A: patient verbalized understanding of discharge medications and instructions. Prescribed home medications given to patient.  Belongings returned to patient.        spoke to Criss of the facility; gave the nurse to nurse report

## 2023-07-17 NOTE — PROGRESS NOTES
CARE MANAGEMENT      7/17-CHW completed the IMM with the Patient's daughter on the phone. Patient received a copy in his room.      Luis LEYVA-[facility] Worker

## 2024-05-16 NOTE — CONSULTS
Care Management Initial Consult    General Information  Assessment completed with: Patient, Children, Lang (pt), daughter (Cailin)  Type of CM/SW Visit: Initial Assessment    Primary Care Provider verified and updated as needed: Yes   Readmission within the last 30 days:        Reason for Consult: discharge planning  Advance Care Planning:            Communication Assessment  Patient's communication style: spoken language (English or Bilingual)             Cognitive  Cognitive/Neuro/Behavioral: WDL  Level of Consciousness: alert  Arousal Level: opens eyes spontaneously  Orientation: oriented x 4  Mood/Behavior: calm  Best Language: 0 - No aphasia  Speech: clear, spontaneous, logical    Living Environment:   People in home: facility resident  Lives at [facility]  Current living Arrangements: extended care facility  Name of Facility: [facility]   Able to return to prior arrangements: yes (cannot return on the weekend)       Family/Social Support:  Care provided by: other (see comments) (staff)  Provides care for: no one, unable/limited ability to care for self     Children, Facility resident(s)/Staff          Description of Support System: Supportive, Involved    Support Assessment: Adequate family and caregiver support, Adequate social supports    Current Resources:   Patient receiving home care services: Yes  Skilled Home Care Services: Skilled Nursing (PT/OT available at LTC facility but pt refuses)  Community Resources: Skilled Nursing Facility  Equipment currently used at home: wheelchair, manual  Supplies currently used at home:      Employment/Financial:  Employment Status: retired        Financial Concerns:             Does the patient's insurance plan have a 3 day qualifying hospital stay waiver?  Yes   Will the waiver be used for post-acute placement? No    Lifestyle & Psychosocial Needs:  Social Determinants of Health     Tobacco Use: Medium Risk (9/1/2022)    Patient History      Smoking  Tobacco Use: Former      Smokeless Tobacco Use: Never      Passive Exposure: Not on file   Alcohol Use: Not on file   Financial Resource Strain: Not on file   Food Insecurity: Not on file   Transportation Needs: Not on file   Physical Activity: Not on file   Stress: Not on file   Social Connections: Not on file   Intimate Partner Violence: Not on file   Depression: Not at risk (11/5/2021)    PHQ-2      PHQ-2 Score: 0   Housing Stability: Not on file       Functional Status:  Prior to admission patient needed assistance:   Dependent ADLs:: Wheelchair-with assist  Dependent IADLs:: Cleaning, Cooking  Assesssment of Functional Status: At functional baseline    Mental Health Status:  Mental Health Status: No Current Concerns       Chemical Dependency Status:  Chemical Dependency Status: No Current Concerns             Values/Beliefs:  Spiritual, Cultural Beliefs, Adventism Practices, Values that affect care:                 Additional Information:  Pt with past medical history significant for HLD, basal cell carcinoma, senile purpura, dementia, centrilobular emphysema, acute respiratory failure, COPD, paroxysmal atrial fibrillation, DDD (cervical) and chronic subdural hematoma admitted on 7/12/2023 to [facility] from [facility] ED. He presented over there for evaluation of zoster ophthalmicus after presenting to another outside hospital earlier where he was given 1 dose of IV valacyclovir. Pt was transferred to [facility] to be evaluated by ophthalmology.     Met with patient and daughter Cailin to introduce self/role and complete initial assessment.     Per pt's daughter Cailin, pt has been living at [facility] for a little over a year. Facility has RN/PT/OT but pt refuses PT/OT services d/t knee pain. Pt had been quite independent until stroke and fall approx 1 year ago. Pt now utilizes a wheelchair for ambulation and receives support from facility staff. Per Cailin, they are happy with this facility and will  be returning at discharge. Writer called RN at facility. They have a bed hold for pt. They cannot accommodate transfer back to facility on the weekends. Writer reached out to provider with this update and also inquired if pt will need IV meds at discharge. Provider said pt will transition to oral before discharge. Facility RN Tom requested discharge orders be faxed before pt returns with ample time for them to look over before pt discharges. Daughter Cailin will be able to provide transportation. Pt is able to ride in care as long as he has his neck brace on.     Contacts  [facility]  Tom RN [phone]  [place] Office [phone]  Fax [phone]      Silvia Stover RNCC  Covering for 6MS  Phone [phone]    SEARCHABLE in [facility] - search CARE COORDINATOR    [place] [facility] [place] (2311-2237) Saturday & Sunday; ([id number]) [facility] Recognized Holidays    Units: 4A, 4C, 4E, 5A & 5B   Pager: [phone]    Units: 6A & 6B    Pager: [phone]    Units: 6C & 6D   Pager: [phone]    Units: 7A, 7B, 7C, 7D & 5C    Pager: [phone]    Units: [facility] ED, 5 Ortho, 5 Med/Surg, 6 Med/Surg, 8A & 10 ICU         Detail Level: Detailed Detail Level: Generalized Sunscreen Recommendations: Recommend SPF 30 or higher Prescription Strength Graduated Compression Stockings Recommendations: The patient was counseled that prescription strength graduated compression stockings should be worn for all waking hours. They will follow up with a venous specialist to monitor graduated compression stocking usage and their symptoms.